# Patient Record
Sex: MALE | Race: WHITE | Employment: UNEMPLOYED | URBAN - NONMETROPOLITAN AREA
[De-identification: names, ages, dates, MRNs, and addresses within clinical notes are randomized per-mention and may not be internally consistent; named-entity substitution may affect disease eponyms.]

---

## 2023-07-29 ENCOUNTER — APPOINTMENT (OUTPATIENT)
Dept: GENERAL RADIOLOGY | Age: 53
End: 2023-07-29
Payer: COMMERCIAL

## 2023-07-29 ENCOUNTER — HOSPITAL ENCOUNTER (EMERGENCY)
Age: 53
Discharge: HOME | End: 2023-07-29
Attending: EMERGENCY MEDICINE
Payer: COMMERCIAL

## 2023-07-29 ENCOUNTER — APPOINTMENT (OUTPATIENT)
Dept: INTERVENTIONAL RADIOLOGY/VASCULAR | Age: 53
End: 2023-07-29
Payer: COMMERCIAL

## 2023-07-29 VITALS
TEMPERATURE: 99.6 F | WEIGHT: 184 LBS | DIASTOLIC BLOOD PRESSURE: 77 MMHG | SYSTOLIC BLOOD PRESSURE: 129 MMHG | HEART RATE: 91 BPM | BODY MASS INDEX: 27.89 KG/M2 | RESPIRATION RATE: 16 BRPM | OXYGEN SATURATION: 92 % | HEIGHT: 68 IN

## 2023-07-29 DIAGNOSIS — M11.20 CHONDROCALCINOSIS: ICD-10-CM

## 2023-07-29 DIAGNOSIS — M19.90 INFLAMMATORY ARTHRITIS: Primary | ICD-10-CM

## 2023-07-29 LAB
ALBUMIN SERPL BCG-MCNC: 3.3 G/DL (ref 3.5–5.1)
ALP SERPL-CCNC: 105 U/L (ref 38–126)
ALT SERPL W/O P-5'-P-CCNC: 11 U/L (ref 11–66)
ANION GAP SERPL CALC-SCNC: 12 MEQ/L (ref 8–16)
AST SERPL-CCNC: 13 U/L (ref 5–40)
BASOPHILS ABSOLUTE: 0.1 THOU/MM3 (ref 0–0.1)
BASOPHILS NFR BLD AUTO: 0.6 %
BILIRUB CONJ SERPL-MCNC: < 0.2 MG/DL (ref 0–0.3)
BILIRUB SERPL-MCNC: 0.4 MG/DL (ref 0.3–1.2)
BUN SERPL-MCNC: 9 MG/DL (ref 7–22)
CALCIUM SERPL-MCNC: 8.7 MG/DL (ref 8.5–10.5)
CHLORIDE SERPL-SCNC: 100 MEQ/L (ref 98–111)
CO2 SERPL-SCNC: 27 MEQ/L (ref 23–33)
CREAT SERPL-MCNC: 0.7 MG/DL (ref 0.4–1.2)
CRP SERPL-MCNC: 5.58 MG/DL (ref 0–1)
DEPRECATED RDW RBC AUTO: 55.8 FL (ref 35–45)
EOSINOPHIL NFR BLD AUTO: 1.8 %
EOSINOPHILS ABSOLUTE: 0.2 THOU/MM3 (ref 0–0.4)
ERYTHROCYTE [DISTWIDTH] IN BLOOD BY AUTOMATED COUNT: 16.3 % (ref 11.5–14.5)
GFR SERPL CREATININE-BSD FRML MDRD: > 60 ML/MIN/1.73M2
GLUCOSE SERPL-MCNC: 125 MG/DL (ref 70–108)
HCT VFR BLD AUTO: 51.2 % (ref 42–52)
HGB BLD-MCNC: 16 GM/DL (ref 14–18)
IMM GRANULOCYTES # BLD AUTO: 0.04 THOU/MM3 (ref 0–0.07)
IMM GRANULOCYTES NFR BLD AUTO: 0.3 %
LYMPHOCYTES ABSOLUTE: 1.1 THOU/MM3 (ref 1–4.8)
LYMPHOCYTES NFR BLD AUTO: 9.5 %
MCH RBC QN AUTO: 29.1 PG (ref 26–33)
MCHC RBC AUTO-ENTMCNC: 31.3 GM/DL (ref 32.2–35.5)
MCV RBC AUTO: 93.3 FL (ref 80–94)
MONOCYTES ABSOLUTE: 1.2 THOU/MM3 (ref 0.4–1.3)
MONOCYTES NFR BLD AUTO: 9.8 %
NEUTROPHILS NFR BLD AUTO: 78 %
NRBC BLD AUTO-RTO: 0 /100 WBC
NT-PROBNP SERPL IA-MCNC: 816.7 PG/ML (ref 0–124)
OSMOLALITY SERPL CALC.SUM OF ELEC: 277.7 MOSMOL/KG (ref 275–300)
PLATELET # BLD AUTO: 368 THOU/MM3 (ref 130–400)
PMV BLD AUTO: 10.6 FL (ref 9.4–12.4)
POTASSIUM SERPL-SCNC: 3.8 MEQ/L (ref 3.5–5.2)
PROT SERPL-MCNC: 7.1 G/DL (ref 6.1–8)
RBC # BLD AUTO: 5.49 MILL/MM3 (ref 4.7–6.1)
SEGMENTED NEUTROPHILS ABSOLUTE COUNT: 9.4 THOU/MM3 (ref 1.8–7.7)
SODIUM SERPL-SCNC: 139 MEQ/L (ref 135–145)
URATE SERPL-MCNC: 5.2 MG/DL (ref 3.7–7)
WBC # BLD AUTO: 12 THOU/MM3 (ref 4.8–10.8)

## 2023-07-29 PROCEDURE — 96372 THER/PROPH/DIAG INJ SC/IM: CPT

## 2023-07-29 PROCEDURE — 87205 SMEAR GRAM STAIN: CPT

## 2023-07-29 PROCEDURE — 89060 EXAM SYNOVIAL FLUID CRYSTALS: CPT

## 2023-07-29 PROCEDURE — 87070 CULTURE OTHR SPECIMN AEROBIC: CPT

## 2023-07-29 PROCEDURE — 80053 COMPREHEN METABOLIC PANEL: CPT

## 2023-07-29 PROCEDURE — 86140 C-REACTIVE PROTEIN: CPT

## 2023-07-29 PROCEDURE — 2500000003 HC RX 250 WO HCPCS: Performed by: EMERGENCY MEDICINE

## 2023-07-29 PROCEDURE — 6370000000 HC RX 637 (ALT 250 FOR IP): Performed by: EMERGENCY MEDICINE

## 2023-07-29 PROCEDURE — 73610 X-RAY EXAM OF ANKLE: CPT

## 2023-07-29 PROCEDURE — 83880 ASSAY OF NATRIURETIC PEPTIDE: CPT

## 2023-07-29 PROCEDURE — 87075 CULTR BACTERIA EXCEPT BLOOD: CPT

## 2023-07-29 PROCEDURE — 6360000002 HC RX W HCPCS: Performed by: EMERGENCY MEDICINE

## 2023-07-29 PROCEDURE — 84550 ASSAY OF BLOOD/URIC ACID: CPT

## 2023-07-29 PROCEDURE — 85025 COMPLETE CBC W/AUTO DIFF WBC: CPT

## 2023-07-29 PROCEDURE — 88305 TISSUE EXAM BY PATHOLOGIST: CPT

## 2023-07-29 PROCEDURE — 20610 DRAIN/INJ JOINT/BURSA W/O US: CPT

## 2023-07-29 PROCEDURE — 88112 CYTOPATH CELL ENHANCE TECH: CPT

## 2023-07-29 PROCEDURE — 89050 BODY FLUID CELL COUNT: CPT

## 2023-07-29 PROCEDURE — 99284 EMERGENCY DEPT VISIT MOD MDM: CPT

## 2023-07-29 PROCEDURE — 82248 BILIRUBIN DIRECT: CPT

## 2023-07-29 PROCEDURE — 73564 X-RAY EXAM KNEE 4 OR MORE: CPT

## 2023-07-29 PROCEDURE — 93970 EXTREMITY STUDY: CPT

## 2023-07-29 PROCEDURE — 36415 COLL VENOUS BLD VENIPUNCTURE: CPT

## 2023-07-29 RX ORDER — PREDNISONE 20 MG/1
40 TABLET ORAL DAILY
Qty: 10 TABLET | Refills: 0 | Status: SHIPPED | OUTPATIENT
Start: 2023-07-29 | End: 2023-08-03

## 2023-07-29 RX ORDER — ACETAMINOPHEN 325 MG/1
650 TABLET ORAL PRN
COMMUNITY

## 2023-07-29 RX ORDER — LIDOCAINE HYDROCHLORIDE 10 MG/ML
5 INJECTION, SOLUTION EPIDURAL; INFILTRATION; INTRACAUDAL; PERINEURAL ONCE
Status: COMPLETED | OUTPATIENT
Start: 2023-07-29 | End: 2023-07-29

## 2023-07-29 RX ORDER — OXYCODONE HYDROCHLORIDE AND ACETAMINOPHEN 5; 325 MG/1; MG/1
1 TABLET ORAL ONCE
Status: COMPLETED | OUTPATIENT
Start: 2023-07-29 | End: 2023-07-29

## 2023-07-29 RX ORDER — NALOXONE HYDROCHLORIDE 4 MG/.1ML
1 SPRAY NASAL PRN
Qty: 1 EACH | Refills: 0 | Status: SHIPPED | OUTPATIENT
Start: 2023-07-29

## 2023-07-29 RX ORDER — KETOROLAC TROMETHAMINE 30 MG/ML
30 INJECTION, SOLUTION INTRAMUSCULAR; INTRAVENOUS ONCE
Status: COMPLETED | OUTPATIENT
Start: 2023-07-29 | End: 2023-07-29

## 2023-07-29 RX ORDER — KETOROLAC TROMETHAMINE 30 MG/ML
30 INJECTION, SOLUTION INTRAMUSCULAR; INTRAVENOUS ONCE
Status: DISCONTINUED | OUTPATIENT
Start: 2023-07-29 | End: 2023-07-29

## 2023-07-29 RX ORDER — OXYCODONE HYDROCHLORIDE AND ACETAMINOPHEN 5; 325 MG/1; MG/1
1 TABLET ORAL EVERY 8 HOURS PRN
Qty: 8 TABLET | Refills: 0 | Status: SHIPPED | OUTPATIENT
Start: 2023-07-29 | End: 2023-08-01

## 2023-07-29 RX ADMIN — OXYCODONE AND ACETAMINOPHEN 1 TABLET: 5; 325 TABLET ORAL at 17:50

## 2023-07-29 RX ADMIN — KETOROLAC TROMETHAMINE 30 MG: 30 INJECTION, SOLUTION INTRAMUSCULAR; INTRAVENOUS at 19:47

## 2023-07-29 RX ADMIN — LIDOCAINE HYDROCHLORIDE 5 ML: 10 INJECTION, SOLUTION EPIDURAL; INFILTRATION; INTRACAUDAL; PERINEURAL at 20:45

## 2023-07-29 ASSESSMENT — PAIN DESCRIPTION - LOCATION: LOCATION: LEG

## 2023-07-29 ASSESSMENT — ENCOUNTER SYMPTOMS
NAUSEA: 0
VOMITING: 0
COUGH: 0
ABDOMINAL PAIN: 0
SHORTNESS OF BREATH: 0

## 2023-07-29 ASSESSMENT — PAIN SCALES - GENERAL: PAINLEVEL_OUTOF10: 4

## 2023-07-29 NOTE — CONSULTS
Orthopedic Consult      CHIEF COMPLAINT: Right lower leg    HISTORY OF PRESENT ILLNESS:      The patient is a 48 y.o. male with increasing right lower leg swelling over the last 3 weeks. Denies any injury or inciting event. States it is all really began at the ankle which slowly became swollen and painful over time. Eventually this worked up the lower leg and now at this time today is all the way to the knee. Does note history of knee injury with a knee surgery approximately 30 years ago. Denies any fevers or chills over the last 3 weeks, states that over the last few days he has felt \"groggy. \"  Patient denies much pain after Percocet was given in the emergency department. Today he notes most of his pain is at the knee and ankle is not very bothersome. Has not noticed any increasing redness, just the swelling and warmth. 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 Authorizing Provider   acetaminophen (TYLENOL) 325 MG tablet Take 2 tablets by mouth as needed for Pain   Yes Historical Provider, MD   ASPIRIN 81 PO Take by mouth   Yes Historical Provider, MD   Walking Boot MISC by Does not apply route Dx: Left Calcaneal Stress Fracture, Hemorrhagic Fracture Blisters    Sig: Please fit/dispense offloading pneumatic fracture boot, to help offload with the assistance of crutches. 2/6/17   Nelsy Zhao DPM       Allergies:    Patient has no known allergies.     Social History:   Social History     Socioeconomic History    Marital status:      Spouse name: None    Number of children: None    Years of education: None    Highest education level: None   Tobacco Use    Smoking status: Every Day     Packs/day: 0.50

## 2023-07-29 NOTE — ED NOTES
Received bedside shift report from [place], [place] at this time.       Daylin Hu RN  07/29/23 [id number]

## 2023-07-29 NOTE — ED TRIAGE NOTES
Patient to ER due to right leg swelling and pain that started three weeks ago. Patient states Tuesday he went to [facility] and they did an ultrasound to look for blood blots and did not have any at that time. Patient states they did not do any blood work. Patient was given an antibitoic for cellulitis. Which he has completed. This antibiotic did not help at all per patient. Patient states the swelling is getting worse. Patient states the swelling has shon from his foot and ankle up to his leg past his knee. Patient unable to walk on it, states can hardly straighten it. Patient states pain is tolerable 4/10 when he is sitting still. When he tries to move or gets a cramp pain goes up to \"12 out of 10\".

## 2023-07-30 LAB
BACTERIA SPEC BFLD CULT: NORMAL
CHARACTER SNV: ABNORMAL
COLOR SNV: YELLOW
CRYSTALS FLD MICRO: ABNORMAL
GRAM STN SPEC: NORMAL
GRANULOCYTES NFR FLD AUTO: 95.7 % (ref 0–24)
MONONUC CELLS NFR FLD AUTO: 4.3 % (ref 0–74)
NUC CELL # FLD AUTO: 3829 /CUMM (ref 0–150)
PATHOLOGIST REVIEW: ABNORMAL
TOTAL VOLUME RECEIVED SYNOVIAL: 50 ML

## 2023-07-30 NOTE — PROCEDURES
Arthrocentesis    Date/Time: 7/29/2023 9:00 PM  Performed by: Venessa Evans MD  Authorized by: Herminio Marshall DO     Consent:     Consent obtained:  Written and verbal    Consent given by:  Patient    Risks, benefits, and alternatives were discussed: yes      Risks discussed:  Bleeding, infection, pain and incomplete drainage    Alternatives discussed:  No treatment  Universal protocol:     Procedure explained and questions answered to patient or proxy's satisfaction: yes      Relevant documents present and verified: yes      Test results available: yes      Imaging studies available: yes      Required blood products, implants, devices, and special equipment available: yes      Site/side marked: yes      Immediately prior to procedure, a time out was called: yes      Patient identity confirmed:  Verbally with patient  Location:     Location:  Knee    Knee:  R knee  Anesthesia:     Anesthesia method:  Local infiltration    Local anesthetic:  Bupivacaine 0.25% w/o epi  Procedure details:     Preparation: Patient was prepped and draped in usual sterile fashion      Needle gauge:  20 G    Ultrasound guidance: no      Approach:  Superior    Aspirate characteristics:  Yellow and serous    Steroid injected: no      Specimen collected: yes    Post-procedure details:     Dressing:  Adhesive bandage    Procedure completion:  Tolerated

## 2023-07-30 NOTE — ED NOTES
Patient given fan for comfort at this time. Patient joint aspiration consent form signed at 2012 by patient, this RN, and provider. Supplies for aspiration at bedside.       Elver Swift RN  07/29/23 2021

## 2023-07-30 NOTE — DISCHARGE INSTRUCTIONS
Be sure to call orthopedics on Monday, July 31, 2023 to schedule an appointment for Monday or Tuesday this week.     Return to the ER if you have any emergent symptoms such as difficulty walking, fever, chills, extremity redness, pain, or you have any other concerns

## 2023-07-30 NOTE — ED PROVIDER NOTES
[facility] ENCOUNTER          Pt Name: Darrius Dickey  MRN: [id number]  [address] [facility] [place] 1970  Date of evaluation: 7/29/2023  Emergency Physician: Kamaljit Mendes       Chief Complaint   Patient presents with    Leg Pain    Leg Swelling     History obtained from the patient. HISTORY OF PRESENT ILLNESS    HPI  Darrius Dickey is a 48 y.o. male who presents to the emergency department for evaluation of lower extremity pain. Patient reports over the last 3 weeks he has had progressively difficulty ambulating. He reports his pain initially started at his right ankle. He noted swelling around his ankle as well. He was seen at an outside hospital and had a negative DVT ultrasound. He reports since that time he has been having progressively worsening pain to his right lower extremity. He states now his pain is about his right knee. He was previously started on Keflex for potential early cellulitis. No fever no chills no rashes. No tick bites. No changes in urination/dysuria. Currently he rates his pain 9 out of 10. The patient has no other acute complaints at this time. REVIEW OF SYSTEMS   Review of Systems   Constitutional:  Negative for chills and fever. Respiratory:  Negative for cough and shortness of breath. Cardiovascular:  Negative for chest pain. Gastrointestinal:  Negative for abdominal pain, nausea and vomiting. Genitourinary:  Negative for decreased urine volume, dysuria, flank pain and urgency. Musculoskeletal:  Positive for arthralgias and joint swelling. Negative for myalgias and neck stiffness. Skin:  Negative for rash and wound.        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 Cervical back: Normal range of motion and neck supple. Right hip: Normal. No tenderness or bony tenderness. Normal range of motion. Right upper leg: Normal. No tenderness or bony tenderness. Right knee: Swelling and effusion present. No erythema or bony tenderness. Decreased range of motion. Tenderness present. No ACL laxity or PCL laxity. Normal pulse. Right lower leg: No swelling. No edema. Right ankle: Swelling present. Tenderness present. No base of 5th metatarsal tenderness. Normal pulse. Right Achilles Tendon: Normal.   Skin:     Comments: Bilateral lower extremities without wounds, cellulitis, or rash   Neurological:      Mental Status: He is alert. INITIAL IMPRESSION AND DIFFERENTIALS   Initial Assessment: Given the patient's above chief complaint and findings on history and physical examination, I thought it was appropriate to consider the following emergency medical conditions: Inflammatory arthritis, occult injury, DVT, septic arthritis, cellulitis, dependent edema, peripheral arterial disease and others. Although, some of these diagnoses are unlikely they were considered in my medical decision making. Plan: CBC, BMP, CRP, sed rate, x-ray knee and ankle symptomatic treatment with analgesia, anti-inflammatory and reassess     Chronic Conditions considered: There is no problem list on file for this patient.         ED RESULTS   Laboratory results:  Labs Reviewed   BASIC METABOLIC PANEL - Abnormal; Notable for the following components:       Result Value    Glucose 125 (*)     All other components within normal limits   CBC WITH AUTO DIFFERENTIAL - Abnormal; Notable for the following components:    WBC 12.0 (*)     MCHC 31.3 (*)     RDW-CV 16.3 (*)     RDW-SD 55.8 (*)     Segs Absolute 9.4 (*)     All other components within normal limits   HEPATIC FUNCTION PANEL - Abnormal; Notable for the following components:    Albumin 3.3 (*)

## 2023-07-30 NOTE — ED NOTES
Dr. Gissell Yousif was able to retrieve 60 cc's of fluid from the right knee during joint aspiration. Lab collected.       Hollie Bey RN  07/29/23 [id number]

## 2023-08-03 LAB
BACTERIA SPEC ANAEROBE CULT: NORMAL
BACTERIA SPEC BFLD CULT: NORMAL
GRAM STN SPEC: NORMAL

## 2024-02-03 ENCOUNTER — HOSPITAL ENCOUNTER (INPATIENT)
Age: 54
LOS: 18 days | Discharge: NURSING, REHAB OR LONG-TERM CARE | DRG: 130 | End: 2024-02-22
Attending: EMERGENCY MEDICINE | Admitting: STUDENT IN AN ORGANIZED HEALTH CARE EDUCATION/TRAINING PROGRAM
Payer: MEDICAID

## 2024-02-03 DIAGNOSIS — R06.09 DYSPNEA ON EXERTION: ICD-10-CM

## 2024-02-03 DIAGNOSIS — R79.89 INCREASED AMMONIA LEVEL: ICD-10-CM

## 2024-02-03 DIAGNOSIS — J96.01 ACUTE RESPIRATORY FAILURE WITH HYPOXIA AND HYPERCAPNIA (HCC): ICD-10-CM

## 2024-02-03 DIAGNOSIS — J96.02 ACUTE RESPIRATORY FAILURE WITH HYPOXIA AND HYPERCAPNIA (HCC): ICD-10-CM

## 2024-02-03 DIAGNOSIS — L03.119 CELLULITIS OF LOWER EXTREMITY, UNSPECIFIED LATERALITY: ICD-10-CM

## 2024-02-03 DIAGNOSIS — L03.311 ABDOMINAL WALL CELLULITIS: Primary | ICD-10-CM

## 2024-02-03 DIAGNOSIS — J80 ARDS (ADULT RESPIRATORY DISTRESS SYNDROME) (HCC): ICD-10-CM

## 2024-02-03 PROCEDURE — 36415 COLL VENOUS BLD VENIPUNCTURE: CPT

## 2024-02-03 PROCEDURE — 99285 EMERGENCY DEPT VISIT HI MDM: CPT

## 2024-02-03 PROCEDURE — 83690 ASSAY OF LIPASE: CPT

## 2024-02-03 PROCEDURE — 84484 ASSAY OF TROPONIN QUANT: CPT

## 2024-02-03 PROCEDURE — 80053 COMPREHEN METABOLIC PANEL: CPT

## 2024-02-03 PROCEDURE — 93005 ELECTROCARDIOGRAM TRACING: CPT | Performed by: EMERGENCY MEDICINE

## 2024-02-03 PROCEDURE — 82248 BILIRUBIN DIRECT: CPT

## 2024-02-03 PROCEDURE — 85046 RETICYTE/HGB CONCENTRATE: CPT

## 2024-02-03 PROCEDURE — 85025 COMPLETE CBC W/AUTO DIFF WBC: CPT

## 2024-02-04 ENCOUNTER — APPOINTMENT (OUTPATIENT)
Dept: ULTRASOUND IMAGING | Age: 54
DRG: 130 | End: 2024-02-04
Payer: MEDICAID

## 2024-02-04 ENCOUNTER — APPOINTMENT (OUTPATIENT)
Dept: CT IMAGING | Age: 54
DRG: 130 | End: 2024-02-04
Payer: MEDICAID

## 2024-02-04 ENCOUNTER — APPOINTMENT (OUTPATIENT)
Dept: GENERAL RADIOLOGY | Age: 54
DRG: 130 | End: 2024-02-04
Payer: MEDICAID

## 2024-02-04 PROBLEM — J44.1 CHRONIC OBSTRUCTIVE PULMONARY DISEASE WITH ACUTE EXACERBATION (HCC): Status: ACTIVE | Noted: 2024-02-04

## 2024-02-04 PROBLEM — E72.20 HYPERAMMONEMIA (HCC): Status: ACTIVE | Noted: 2024-02-04

## 2024-02-04 PROBLEM — R60.1 ANASARCA: Status: ACTIVE | Noted: 2024-02-04

## 2024-02-04 PROBLEM — J96.02 ACUTE RESPIRATORY FAILURE WITH HYPOXIA AND HYPERCAPNIA (HCC): Status: ACTIVE | Noted: 2024-02-04

## 2024-02-04 PROBLEM — L03.311 ABDOMINAL WALL CELLULITIS: Status: ACTIVE | Noted: 2024-02-04

## 2024-02-04 PROBLEM — I25.10 CORONARY ARTERY DISEASE INVOLVING NATIVE CORONARY ARTERY OF NATIVE HEART WITHOUT ANGINA PECTORIS: Status: ACTIVE | Noted: 2024-02-04

## 2024-02-04 PROBLEM — J96.01 ACUTE RESPIRATORY FAILURE WITH HYPOXIA AND HYPERCAPNIA (HCC): Status: ACTIVE | Noted: 2024-02-04

## 2024-02-04 PROBLEM — Z95.5 H/O HEART ARTERY STENT: Status: ACTIVE | Noted: 2024-02-04

## 2024-02-04 PROBLEM — R79.89 INCREASED AMMONIA LEVEL: Status: ACTIVE | Noted: 2024-02-04

## 2024-02-04 PROBLEM — L03.90 CELLULITIS: Status: ACTIVE | Noted: 2024-02-04

## 2024-02-04 LAB
ACINETOBACTER CALCOACETICUS-BAUMANNII BY PCR: NOT DETECTED
ALBUMIN SERPL BCG-MCNC: 3 G/DL (ref 3.5–5.1)
ALBUMIN SERPL BCG-MCNC: 3.1 G/DL (ref 3.5–5.1)
ALP SERPL-CCNC: 130 U/L (ref 38–126)
ALP SERPL-CCNC: 137 U/L (ref 38–126)
ALT SERPL W/O P-5'-P-CCNC: 11 U/L (ref 11–66)
ALT SERPL W/O P-5'-P-CCNC: 13 U/L (ref 11–66)
AMMONIA PLAS-MCNC: 79 UMOL/L (ref 11–60)
AMPHETAMINES UR QL SCN: NEGATIVE
ANION GAP SERPL CALC-SCNC: 2 MEQ/L (ref 8–16)
ANION GAP SERPL CALC-SCNC: 3 MEQ/L (ref 8–16)
ANISOCYTOSIS BLD QL SMEAR: PRESENT
ARTERIAL PATENCY WRIST A: POSITIVE
ARTERIAL PATENCY WRIST A: POSITIVE
AST SERPL-CCNC: 8 U/L (ref 5–40)
AST SERPL-CCNC: 9 U/L (ref 5–40)
BACTERIA URNS QL MICRO: ABNORMAL /HPF
BACTERIA URNS QL MICRO: ABNORMAL /HPF
BARBITURATES UR QL SCN: NEGATIVE
BASE EXCESS BLDA CALC-SCNC: 15.7 MMOL/L (ref -2.5–2.5)
BASE EXCESS BLDA CALC-SCNC: 15.8 MMOL/L (ref -2.5–2.5)
BASE EXCESS BLDA CALC-SCNC: 16.6 MMOL/L (ref -2–3)
BASOPHILS ABSOLUTE: 0.1 THOU/MM3 (ref 0–0.1)
BASOPHILS NFR BLD AUTO: 0.6 %
BDY SITE: ABNORMAL
BDY SITE: ABNORMAL
BENZODIAZ UR QL SCN: NEGATIVE
BILIRUB CONJ SERPL-MCNC: < 0.2 MG/DL (ref 0–0.3)
BILIRUB SERPL-MCNC: 0.5 MG/DL (ref 0.3–1.2)
BILIRUB SERPL-MCNC: 0.5 MG/DL (ref 0.3–1.2)
BILIRUB UR QL STRIP.AUTO: NEGATIVE
BILIRUB UR QL STRIP.AUTO: NEGATIVE
BLACTX-M ISLT/SPM QL: NORMAL
BLAIMP ISLT/SPM QL: NORMAL
BLAKPC ISLT/SPM QL: NORMAL
BLAOXA-48-LIKE ISLT/SPM QL: NORMAL
BLAVIM ISLT/SPM QL: NORMAL
BUN SERPL-MCNC: 11 MG/DL (ref 7–22)
BUN SERPL-MCNC: 12 MG/DL (ref 7–22)
BZE UR QL SCN: NEGATIVE
C PNEUM DNA LOWER RESP QL NAA+NON-PROBE: NOT DETECTED
CA-I BLD ISE-SCNC: 1.11 MMOL/L (ref 1.12–1.32)
CALCIUM SERPL-MCNC: 8.2 MG/DL (ref 8.5–10.5)
CALCIUM SERPL-MCNC: 8.3 MG/DL (ref 8.5–10.5)
CANNABINOIDS UR QL SCN: NEGATIVE
CASTS #/AREA URNS LPF: ABNORMAL /LPF
CASTS #/AREA URNS LPF: ABNORMAL /LPF
CASTS 2: ABNORMAL /LPF
CASTS 2: ABNORMAL /LPF
CHARACTER UR: CLEAR
CHARACTER UR: CLEAR
CHLORIDE SERPL-SCNC: 96 MEQ/L (ref 98–111)
CHLORIDE SERPL-SCNC: 97 MEQ/L (ref 98–111)
CO2 SERPL-SCNC: 45 MEQ/L (ref 23–33)
CO2 SERPL-SCNC: 45 MEQ/L (ref 23–33)
COLLECTED BY:: ABNORMAL
COLOR: YELLOW
COLOR: YELLOW
CREAT SERPL-MCNC: 0.6 MG/DL (ref 0.4–1.2)
CREAT SERPL-MCNC: 0.7 MG/DL (ref 0.4–1.2)
CRYSTALS URNS MICRO: ABNORMAL
CRYSTALS URNS MICRO: ABNORMAL
DEPRECATED RDW RBC AUTO: 73.1 FL (ref 35–45)
DEPRECATED RDW RBC AUTO: 74.2 FL (ref 35–45)
DEPRECATED RDW RBC AUTO: 76 FL (ref 35–45)
DEVICE: ABNORMAL
EKG ATRIAL RATE: 101 BPM
EKG P AXIS: 50 DEGREES
EKG P-R INTERVAL: 160 MS
EKG Q-T INTERVAL: 362 MS
EKG QRS DURATION: 94 MS
EKG QTC CALCULATION (BAZETT): 469 MS
EKG R AXIS: 104 DEGREES
EKG T AXIS: 19 DEGREES
EKG VENTRICULAR RATE: 101 BPM
ENTEROBACTER CLOACAE COMPLEX BY PCR: NOT DETECTED
EOSINOPHIL NFR BLD AUTO: 1.2 %
EOSINOPHILS ABSOLUTE: 0.1 THOU/MM3 (ref 0–0.4)
EPITHELIAL CELLS, UA: ABNORMAL /HPF
EPITHELIAL CELLS, UA: ABNORMAL /HPF
ERYTHROCYTE [DISTWIDTH] IN BLOOD BY AUTOMATED COUNT: 20.1 % (ref 11.5–14.5)
ERYTHROCYTE [DISTWIDTH] IN BLOOD BY AUTOMATED COUNT: 20.3 % (ref 11.5–14.5)
ERYTHROCYTE [DISTWIDTH] IN BLOOD BY AUTOMATED COUNT: 20.4 % (ref 11.5–14.5)
ESCHERICHIA COLI BY PCR: NOT DETECTED
ETHANOL SERPL-MCNC: < 0.01 %
FENTANYL: NEGATIVE
FERRITIN SERPL IA-MCNC: 156 NG/ML (ref 22–322)
FIO2 ON VENT O2 ANALYZER: 100 %
FIO2 ON VENT O2 ANALYZER: 55 %
FIO2 ON VENT O2 ANALYZER: 6 %
FLUAV RNA LOWER RESP QL NAA+NON-PROBE: NOT DETECTED
FLUBV RNA LOWER RESP QL NAA+NON-PROBE: NOT DETECTED
FOLATE SERPL-MCNC: 8.9 NG/ML (ref 4.8–24.2)
GFR SERPL CREATININE-BSD FRML MDRD: > 60 ML/MIN/1.73M2
GFR SERPL CREATININE-BSD FRML MDRD: > 60 ML/MIN/1.73M2
GLUCOSE BLD STRIP.AUTO-MCNC: 104 MG/DL (ref 70–108)
GLUCOSE BLD STRIP.AUTO-MCNC: 107 MG/DL (ref 70–108)
GLUCOSE BLD STRIP.AUTO-MCNC: 129 MG/DL (ref 70–108)
GLUCOSE SERPL-MCNC: 143 MG/DL (ref 70–108)
GLUCOSE SERPL-MCNC: 99 MG/DL (ref 70–108)
GLUCOSE UR QL STRIP.AUTO: NEGATIVE MG/DL
GLUCOSE UR QL STRIP.AUTO: NEGATIVE MG/DL
HADV DNA LOWER RESP QL NAA+NON-PROBE: NOT DETECTED
HAEMOPHILUS INFLUENZAE BY PCR: NOT DETECTED
HCO3 BLDA-SCNC: 49 MMOL/L (ref 23–28)
HCO3 BLDA-SCNC: 50 MMOL/L (ref 23–28)
HCO3 BLDA-SCNC: 50 MMOL/L (ref 23–28)
HCOV RNA LOWER RESP QL NAA+NON-PROBE: NOT DETECTED
HCT VFR BLD AUTO: 44.8 % (ref 42–52)
HCT VFR BLD AUTO: 47.4 % (ref 42–52)
HCT VFR BLD AUTO: 49.1 % (ref 42–52)
HGB BLD-MCNC: 11.8 GM/DL (ref 14–18)
HGB BLD-MCNC: 12.4 GM/DL (ref 14–18)
HGB BLD-MCNC: 12.8 GM/DL (ref 14–18)
HGB RETIC QN AUTO: 32.8 PG (ref 28.2–35.7)
HGB UR QL STRIP.AUTO: ABNORMAL
HGB UR QL STRIP.AUTO: NEGATIVE
HMPV RNA LOWER RESP QL NAA+NON-PROBE: NOT DETECTED
HPIV RNA LOWER RESP QL NAA+NON-PROBE: NOT DETECTED
HYPOCHROMIA BLD QL SMEAR: PRESENT
IMM GRANULOCYTES # BLD AUTO: 0.01 THOU/MM3 (ref 0–0.07)
IMM GRANULOCYTES NFR BLD AUTO: 0.1 %
IMM RETICS NFR: 17.5 % (ref 2.3–13.4)
INR PPP: 1.08 (ref 0.85–1.13)
IRON SATN MFR SERPL: 14 % (ref 20–50)
IRON SERPL-MCNC: 40 UG/DL (ref 65–195)
KETONES UR QL STRIP.AUTO: NEGATIVE
KETONES UR QL STRIP.AUTO: NEGATIVE
KLEBSIELLA AEROGENES BY PCR: NOT DETECTED
KLEBSIELLA OXYTOCA BY PCR: NOT DETECTED
KLEBSIELLA PNEUMONIAE GROUP BY PCR: NOT DETECTED
L PNEUMO DNA LOWER RESP QL NAA+NON-PROBE: NOT DETECTED
LACTATE SERPL-SCNC: 0.7 MMOL/L (ref 0.5–2)
LIPASE SERPL-CCNC: 18.1 U/L (ref 5.6–51.3)
LYMPHOCYTES ABSOLUTE: 0.7 THOU/MM3 (ref 1–4.8)
LYMPHOCYTES NFR BLD AUTO: 7.4 %
M PNEUMO DNA LOWER RESP QL NAA+NON-PROBE: NOT DETECTED
MAGNESIUM SERPL-MCNC: 1.9 MG/DL (ref 1.6–2.4)
MCH RBC QN AUTO: 25.9 PG (ref 26–33)
MCH RBC QN AUTO: 26.1 PG (ref 26–33)
MCH RBC QN AUTO: 26.5 PG (ref 26–33)
MCHC RBC AUTO-ENTMCNC: 26.1 GM/DL (ref 32.2–35.5)
MCHC RBC AUTO-ENTMCNC: 26.2 GM/DL (ref 32.2–35.5)
MCHC RBC AUTO-ENTMCNC: 26.3 GM/DL (ref 32.2–35.5)
MCV RBC AUTO: 100.4 FL (ref 80–94)
MCV RBC AUTO: 99.4 FL (ref 80–94)
MCV RBC AUTO: 99.8 FL (ref 80–94)
MISCELLANEOUS 2: ABNORMAL
MISCELLANEOUS 2: ABNORMAL
MONOCYTES ABSOLUTE: 1.1 THOU/MM3 (ref 0.4–1.3)
MONOCYTES NFR BLD AUTO: 12.5 %
MORAXELLA CATARRHALIS BY PCR: NOT DETECTED
MRSA DNA SPEC QL NAA+PROBE: NEGATIVE
NEUTROPHILS NFR BLD AUTO: 78.2 %
NITRITE UR QL STRIP: NEGATIVE
NITRITE UR QL STRIP: NEGATIVE
NRBC BLD AUTO-RTO: 0 /100 WBC
NT-PROBNP SERPL IA-MCNC: 2648 PG/ML (ref 0–124)
OPIATES UR QL SCN: NEGATIVE
OSMOLALITY SERPL CALC.SUM OF ELEC: 287.2 MOSMOL/KG (ref 275–300)
OXYCODONE: NEGATIVE
PCO2 BLDA: 117 MMHG (ref 35–45)
PCO2 BLDA: 98 MMHG (ref 35–45)
PCO2 TEMP ADJ BLDMV: 104 MMHG (ref 41–51)
PCP UR QL SCN: NEGATIVE
PH BLDA: 7.24 [PH] (ref 7.35–7.45)
PH BLDA: 7.3 [PH] (ref 7.35–7.45)
PH BLDMV: 7.29 [PH] (ref 7.31–7.41)
PH UR STRIP.AUTO: 5.5 [PH] (ref 5–9)
PH UR STRIP.AUTO: 6 [PH] (ref 5–9)
PHOSPHATE SERPL-MCNC: 3.7 MG/DL (ref 2.4–4.7)
PLATELET # BLD AUTO: 275 THOU/MM3 (ref 130–400)
PLATELET # BLD AUTO: 285 THOU/MM3 (ref 130–400)
PLATELET # BLD AUTO: 293 THOU/MM3 (ref 130–400)
PLATELET BLD QL SMEAR: ADEQUATE
PMV BLD AUTO: 10.4 FL (ref 9.4–12.4)
PMV BLD AUTO: 10.5 FL (ref 9.4–12.4)
PMV BLD AUTO: 10.6 FL (ref 9.4–12.4)
PO2 BLDA: 63 MMHG (ref 71–104)
PO2 BLDA: 87 MMHG (ref 71–104)
PO2 BLDMV: 37 MMHG (ref 25–40)
POIKILOCYTES: ABNORMAL
POTASSIUM SERPL-SCNC: 3.4 MEQ/L (ref 3.5–5.2)
POTASSIUM SERPL-SCNC: 3.5 MEQ/L (ref 3.5–5.2)
PROT SERPL-MCNC: 5.8 G/DL (ref 6.1–8)
PROT SERPL-MCNC: 6.2 G/DL (ref 6.1–8)
PROT UR STRIP.AUTO-MCNC: 100 MG/DL
PROT UR STRIP.AUTO-MCNC: 100 MG/DL
PROTEUS SPECIES BY PCR: NOT DETECTED
PSEUDOMONAS AERUGINOSA BY PCR: NOT DETECTED
RBC # BLD AUTO: 4.46 MILL/MM3 (ref 4.7–6.1)
RBC # BLD AUTO: 4.75 MILL/MM3 (ref 4.7–6.1)
RBC # BLD AUTO: 4.94 MILL/MM3 (ref 4.7–6.1)
RBC URINE: ABNORMAL /HPF
RBC URINE: ABNORMAL /HPF
RENAL EPI CELLS #/AREA URNS HPF: ABNORMAL /[HPF]
RENAL EPI CELLS #/AREA URNS HPF: ABNORMAL /[HPF]
RESISTANT GENE MECA/C & MREJ BY PCR: NORMAL
RESISTANT GENE NDM BY PCR: NORMAL
RETICS # AUTO: 46 THOU/MM3 (ref 20–115)
RETICS/RBC NFR AUTO: 1 % (ref 0.5–2)
RSV RNA LOWER RESP QL NAA+NON-PROBE: NOT DETECTED
RV+EV RNA LOWER RESP QL NAA+NON-PROBE: NOT DETECTED
SAO2 % BLDA: 87 %
SAO2 % BLDA: 93 %
SAO2 % BLDMV: 58 %
SCAN OF BLOOD SMEAR: NORMAL
SCAN OF BLOOD SMEAR: NORMAL
SEGMENTED NEUTROPHILS ABSOLUTE COUNT: 7 THOU/MM3 (ref 1.8–7.7)
SERRATIA MARCESCENS BY PCR: NOT DETECTED
SET PEEP: 8 MMHG
SET RESPIRATORY RATE: 20 BPM
SODIUM SERPL-SCNC: 143 MEQ/L (ref 135–145)
SODIUM SERPL-SCNC: 145 MEQ/L (ref 135–145)
SOURCE: NORMAL
SP GR UR REFRACT.AUTO: > 1.03 (ref 1–1.03)
SP GR UR REFRACT.AUTO: > 1.03 (ref 1–1.03)
SPECIMEN ACCEPTABILITY: NORMAL
STAPH AUREUS BY PCR: NOT DETECTED
STOMATOCYTES: ABNORMAL
STREP AGALACTIAE BY PCR: NOT DETECTED
STREP PNEUMONIAE BY PCR: NOT DETECTED
STREP PYOGENES BY PCR: NOT DETECTED
TIBC SERPL-MCNC: 281 UG/DL (ref 171–450)
TROPONIN, HIGH SENSITIVITY: 16 NG/L (ref 0–12)
TROPONIN, HIGH SENSITIVITY: 18 NG/L (ref 0–12)
UROBILINOGEN, URINE: 1 EU/DL (ref 0–1)
UROBILINOGEN, URINE: 1 EU/DL (ref 0–1)
VENTILATION MODE VENT: AC
VIT B12 SERPL-MCNC: 978 PG/ML (ref 211–911)
WBC # BLD AUTO: 6.4 THOU/MM3 (ref 4.8–10.8)
WBC # BLD AUTO: 8.3 THOU/MM3 (ref 4.8–10.8)
WBC # BLD AUTO: 8.9 THOU/MM3 (ref 4.8–10.8)
WBC #/AREA URNS HPF: ABNORMAL /HPF
WBC #/AREA URNS HPF: ABNORMAL /HPF
WBC #/AREA URNS HPF: NEGATIVE /[HPF]
WBC #/AREA URNS HPF: NEGATIVE /[HPF]
YEAST LIKE FUNGI URNS QL MICRO: ABNORMAL
YEAST LIKE FUNGI URNS QL MICRO: ABNORMAL

## 2024-02-04 PROCEDURE — 87070 CULTURE OTHR SPECIMN AEROBIC: CPT

## 2024-02-04 PROCEDURE — 02HV33Z INSERTION OF INFUSION DEVICE INTO SUPERIOR VENA CAVA, PERCUTANEOUS APPROACH: ICD-10-PCS | Performed by: INTERNAL MEDICINE

## 2024-02-04 PROCEDURE — 94640 AIRWAY INHALATION TREATMENT: CPT

## 2024-02-04 PROCEDURE — 87486 CHLMYD PNEUM DNA AMP PROBE: CPT

## 2024-02-04 PROCEDURE — 6360000002 HC RX W HCPCS: Performed by: STUDENT IN AN ORGANIZED HEALTH CARE EDUCATION/TRAINING PROGRAM

## 2024-02-04 PROCEDURE — 74177 CT ABD & PELVIS W/CONTRAST: CPT

## 2024-02-04 PROCEDURE — 83735 ASSAY OF MAGNESIUM: CPT

## 2024-02-04 PROCEDURE — 6360000002 HC RX W HCPCS

## 2024-02-04 PROCEDURE — 82607 VITAMIN B-12: CPT

## 2024-02-04 PROCEDURE — 2580000003 HC RX 258

## 2024-02-04 PROCEDURE — 99223 1ST HOSP IP/OBS HIGH 75: CPT | Performed by: INTERNAL MEDICINE

## 2024-02-04 PROCEDURE — 36415 COLL VENOUS BLD VENIPUNCTURE: CPT

## 2024-02-04 PROCEDURE — 82140 ASSAY OF AMMONIA: CPT

## 2024-02-04 PROCEDURE — 6360000002 HC RX W HCPCS: Performed by: INTERNAL MEDICINE

## 2024-02-04 PROCEDURE — 81001 URINALYSIS AUTO W/SCOPE: CPT

## 2024-02-04 PROCEDURE — 82948 REAGENT STRIP/BLOOD GLUCOSE: CPT

## 2024-02-04 PROCEDURE — 93010 ELECTROCARDIOGRAM REPORT: CPT | Performed by: INTERNAL MEDICINE

## 2024-02-04 PROCEDURE — 36600 WITHDRAWAL OF ARTERIAL BLOOD: CPT

## 2024-02-04 PROCEDURE — 87205 SMEAR GRAM STAIN: CPT

## 2024-02-04 PROCEDURE — 87899 AGENT NOS ASSAY W/OPTIC: CPT

## 2024-02-04 PROCEDURE — 51702 INSERT TEMP BLADDER CATH: CPT

## 2024-02-04 PROCEDURE — 87631 RESP VIRUS 3-5 TARGETS: CPT

## 2024-02-04 PROCEDURE — 36556 INSERT NON-TUNNEL CV CATH: CPT | Performed by: INTERNAL MEDICINE

## 2024-02-04 PROCEDURE — 94761 N-INVAS EAR/PLS OXIMETRY MLT: CPT

## 2024-02-04 PROCEDURE — 82803 BLOOD GASES ANY COMBINATION: CPT

## 2024-02-04 PROCEDURE — 80307 DRUG TEST PRSMV CHEM ANLYZR: CPT

## 2024-02-04 PROCEDURE — 2100000000 HC CCU R&B

## 2024-02-04 PROCEDURE — 6360000004 HC RX CONTRAST MEDICATION: Performed by: INTERNAL MEDICINE

## 2024-02-04 PROCEDURE — 87798 DETECT AGENT NOS DNA AMP: CPT

## 2024-02-04 PROCEDURE — 2500000003 HC RX 250 WO HCPCS: Performed by: INTERNAL MEDICINE

## 2024-02-04 PROCEDURE — 85610 PROTHROMBIN TIME: CPT

## 2024-02-04 PROCEDURE — 84100 ASSAY OF PHOSPHORUS: CPT

## 2024-02-04 PROCEDURE — 80053 COMPREHEN METABOLIC PANEL: CPT

## 2024-02-04 PROCEDURE — 6360000004 HC RX CONTRAST MEDICATION

## 2024-02-04 PROCEDURE — 87040 BLOOD CULTURE FOR BACTERIA: CPT

## 2024-02-04 PROCEDURE — 87541 LEGION PNEUMO DNA AMP PROB: CPT

## 2024-02-04 PROCEDURE — 76870 US EXAM SCROTUM: CPT

## 2024-02-04 PROCEDURE — C9113 INJ PANTOPRAZOLE SODIUM, VIA: HCPCS | Performed by: INTERNAL MEDICINE

## 2024-02-04 PROCEDURE — 82746 ASSAY OF FOLIC ACID SERUM: CPT

## 2024-02-04 PROCEDURE — 87581 M.PNEUMON DNA AMP PROBE: CPT

## 2024-02-04 PROCEDURE — 5A09557 ASSISTANCE WITH RESPIRATORY VENTILATION, GREATER THAN 96 CONSECUTIVE HOURS, CONTINUOUS POSITIVE AIRWAY PRESSURE: ICD-10-PCS

## 2024-02-04 PROCEDURE — 6370000000 HC RX 637 (ALT 250 FOR IP)

## 2024-02-04 PROCEDURE — 82077 ASSAY SPEC XCP UR&BREATH IA: CPT

## 2024-02-04 PROCEDURE — 99291 CRITICAL CARE FIRST HOUR: CPT | Performed by: INTERNAL MEDICINE

## 2024-02-04 PROCEDURE — 87641 MR-STAPH DNA AMP PROBE: CPT

## 2024-02-04 PROCEDURE — 85027 COMPLETE CBC AUTOMATED: CPT

## 2024-02-04 PROCEDURE — 2500000003 HC RX 250 WO HCPCS: Performed by: STUDENT IN AN ORGANIZED HEALTH CARE EDUCATION/TRAINING PROGRAM

## 2024-02-04 PROCEDURE — 31500 INSERT EMERGENCY AIRWAY: CPT | Performed by: INTERNAL MEDICINE

## 2024-02-04 PROCEDURE — 83880 ASSAY OF NATRIURETIC PEPTIDE: CPT

## 2024-02-04 PROCEDURE — 84484 ASSAY OF TROPONIN QUANT: CPT

## 2024-02-04 PROCEDURE — 83605 ASSAY OF LACTIC ACID: CPT

## 2024-02-04 PROCEDURE — 2580000003 HC RX 258: Performed by: STUDENT IN AN ORGANIZED HEALTH CARE EDUCATION/TRAINING PROGRAM

## 2024-02-04 PROCEDURE — 94002 VENT MGMT INPAT INIT DAY: CPT

## 2024-02-04 PROCEDURE — 89220 SPUTUM SPECIMEN COLLECTION: CPT

## 2024-02-04 PROCEDURE — 94660 CPAP INITIATION&MGMT: CPT

## 2024-02-04 PROCEDURE — 71045 X-RAY EXAM CHEST 1 VIEW: CPT

## 2024-02-04 PROCEDURE — 0BH17EZ INSERTION OF ENDOTRACHEAL AIRWAY INTO TRACHEA, VIA NATURAL OR ARTIFICIAL OPENING: ICD-10-PCS

## 2024-02-04 PROCEDURE — 84238 ASSAY NONENDOCRINE RECEPTOR: CPT

## 2024-02-04 PROCEDURE — 83550 IRON BINDING TEST: CPT

## 2024-02-04 PROCEDURE — 2700000000 HC OXYGEN THERAPY PER DAY

## 2024-02-04 PROCEDURE — 71046 X-RAY EXAM CHEST 2 VIEWS: CPT

## 2024-02-04 PROCEDURE — 76705 ECHO EXAM OF ABDOMEN: CPT

## 2024-02-04 PROCEDURE — 82728 ASSAY OF FERRITIN: CPT

## 2024-02-04 PROCEDURE — 2580000003 HC RX 258: Performed by: INTERNAL MEDICINE

## 2024-02-04 PROCEDURE — 82330 ASSAY OF CALCIUM: CPT

## 2024-02-04 PROCEDURE — 6370000000 HC RX 637 (ALT 250 FOR IP): Performed by: INTERNAL MEDICINE

## 2024-02-04 PROCEDURE — 71275 CT ANGIOGRAPHY CHEST: CPT

## 2024-02-04 PROCEDURE — 36556 INSERT NON-TUNNEL CV CATH: CPT

## 2024-02-04 PROCEDURE — 83540 ASSAY OF IRON: CPT

## 2024-02-04 PROCEDURE — 87449 NOS EACH ORGANISM AG IA: CPT

## 2024-02-04 RX ORDER — ROCURONIUM BROMIDE 10 MG/ML
50 INJECTION, SOLUTION INTRAVENOUS ONCE
Status: COMPLETED | OUTPATIENT
Start: 2024-02-04 | End: 2024-02-04

## 2024-02-04 RX ORDER — METHYLPREDNISOLONE SODIUM SUCCINATE 125 MG/2ML
60 INJECTION, POWDER, LYOPHILIZED, FOR SOLUTION INTRAMUSCULAR; INTRAVENOUS EVERY 6 HOURS
Status: DISCONTINUED | OUTPATIENT
Start: 2024-02-04 | End: 2024-02-05

## 2024-02-04 RX ORDER — FENTANYL CITRATE 50 UG/ML
INJECTION, SOLUTION INTRAMUSCULAR; INTRAVENOUS
Status: COMPLETED
Start: 2024-02-04 | End: 2024-02-04

## 2024-02-04 RX ORDER — ACETYLCYSTEINE 200 MG/ML
600 SOLUTION ORAL; RESPIRATORY (INHALATION)
Status: DISCONTINUED | OUTPATIENT
Start: 2024-02-04 | End: 2024-02-09

## 2024-02-04 RX ORDER — LOSARTAN POTASSIUM 50 MG/1
50 TABLET ORAL DAILY
COMMUNITY
Start: 2023-12-31

## 2024-02-04 RX ORDER — ALBUTEROL SULFATE 2.5 MG/3ML
15 SOLUTION RESPIRATORY (INHALATION) ONCE
Status: COMPLETED | OUTPATIENT
Start: 2024-02-04 | End: 2024-02-04

## 2024-02-04 RX ORDER — MAGNESIUM SULFATE IN WATER 40 MG/ML
2000 INJECTION, SOLUTION INTRAVENOUS PRN
Status: DISCONTINUED | OUTPATIENT
Start: 2024-02-04 | End: 2024-02-22 | Stop reason: HOSPADM

## 2024-02-04 RX ORDER — FENTANYL CITRATE-0.9 % NACL/PF 10 MCG/ML
25-200 PLASTIC BAG, INJECTION (ML) INTRAVENOUS CONTINUOUS
Status: DISCONTINUED | OUTPATIENT
Start: 2024-02-04 | End: 2024-02-06

## 2024-02-04 RX ORDER — PHENYLEPHRINE HCL IN 0.9% NACL 1 MG/10 ML
100 VIAL (ML) INTRAVENOUS ONCE
Status: COMPLETED | OUTPATIENT
Start: 2024-02-04 | End: 2024-02-04

## 2024-02-04 RX ORDER — FENTANYL CITRATE 50 UG/ML
100 INJECTION, SOLUTION INTRAMUSCULAR; INTRAVENOUS ONCE
Status: COMPLETED | OUTPATIENT
Start: 2024-02-04 | End: 2024-02-04

## 2024-02-04 RX ORDER — NOREPINEPHRINE BITARTRATE 0.06 MG/ML
1-100 INJECTION, SOLUTION INTRAVENOUS CONTINUOUS
Status: DISCONTINUED | OUTPATIENT
Start: 2024-02-04 | End: 2024-02-14

## 2024-02-04 RX ORDER — SODIUM CHLORIDE 0.9 % (FLUSH) 0.9 %
10 SYRINGE (ML) INJECTION EVERY 12 HOURS SCHEDULED
Status: DISCONTINUED | OUTPATIENT
Start: 2024-02-04 | End: 2024-02-22 | Stop reason: HOSPADM

## 2024-02-04 RX ORDER — ACETAMINOPHEN 650 MG/1
650 SUPPOSITORY RECTAL EVERY 6 HOURS PRN
Status: DISCONTINUED | OUTPATIENT
Start: 2024-02-04 | End: 2024-02-22 | Stop reason: HOSPADM

## 2024-02-04 RX ORDER — POTASSIUM CHLORIDE 20 MEQ/1
40 TABLET, EXTENDED RELEASE ORAL PRN
Status: DISCONTINUED | OUTPATIENT
Start: 2024-02-04 | End: 2024-02-22 | Stop reason: HOSPADM

## 2024-02-04 RX ORDER — POTASSIUM CHLORIDE 7.45 MG/ML
10 INJECTION INTRAVENOUS PRN
Status: DISCONTINUED | OUTPATIENT
Start: 2024-02-04 | End: 2024-02-22 | Stop reason: HOSPADM

## 2024-02-04 RX ORDER — ENOXAPARIN SODIUM 100 MG/ML
40 INJECTION SUBCUTANEOUS DAILY
Status: DISCONTINUED | OUTPATIENT
Start: 2024-02-04 | End: 2024-02-22 | Stop reason: HOSPADM

## 2024-02-04 RX ORDER — DEXMEDETOMIDINE HYDROCHLORIDE 4 UG/ML
.1-1.5 INJECTION, SOLUTION INTRAVENOUS CONTINUOUS
Status: DISCONTINUED | OUTPATIENT
Start: 2024-02-04 | End: 2024-02-06 | Stop reason: ALTCHOICE

## 2024-02-04 RX ORDER — SODIUM CHLORIDE 9 MG/ML
INJECTION, SOLUTION INTRAVENOUS PRN
Status: DISCONTINUED | OUTPATIENT
Start: 2024-02-04 | End: 2024-02-22 | Stop reason: HOSPADM

## 2024-02-04 RX ORDER — SODIUM CHLORIDE 0.9 % (FLUSH) 0.9 %
10 SYRINGE (ML) INJECTION PRN
Status: DISCONTINUED | OUTPATIENT
Start: 2024-02-04 | End: 2024-02-22 | Stop reason: HOSPADM

## 2024-02-04 RX ORDER — LACTULOSE 10 G/15ML
20 SOLUTION ORAL ONCE
Status: COMPLETED | OUTPATIENT
Start: 2024-02-04 | End: 2024-02-04

## 2024-02-04 RX ORDER — POLYETHYLENE GLYCOL 3350 17 G/17G
17 POWDER, FOR SOLUTION ORAL DAILY PRN
Status: DISCONTINUED | OUTPATIENT
Start: 2024-02-04 | End: 2024-02-07

## 2024-02-04 RX ORDER — IPRATROPIUM BROMIDE AND ALBUTEROL SULFATE 2.5; .5 MG/3ML; MG/3ML
1 SOLUTION RESPIRATORY (INHALATION)
Status: DISCONTINUED | OUTPATIENT
Start: 2024-02-04 | End: 2024-02-05

## 2024-02-04 RX ORDER — ONDANSETRON 4 MG/1
4 TABLET, ORALLY DISINTEGRATING ORAL EVERY 8 HOURS PRN
Status: DISCONTINUED | OUTPATIENT
Start: 2024-02-04 | End: 2024-02-22 | Stop reason: HOSPADM

## 2024-02-04 RX ORDER — PROPOFOL 10 MG/ML
5-50 INJECTION, EMULSION INTRAVENOUS CONTINUOUS
Status: DISCONTINUED | OUTPATIENT
Start: 2024-02-04 | End: 2024-02-06

## 2024-02-04 RX ORDER — MIDAZOLAM HYDROCHLORIDE 1 MG/ML
4 INJECTION INTRAMUSCULAR; INTRAVENOUS ONCE
Status: COMPLETED | OUTPATIENT
Start: 2024-02-04 | End: 2024-02-04

## 2024-02-04 RX ORDER — MIDAZOLAM HYDROCHLORIDE 1 MG/ML
INJECTION INTRAMUSCULAR; INTRAVENOUS
Status: COMPLETED
Start: 2024-02-04 | End: 2024-02-04

## 2024-02-04 RX ORDER — ACETAMINOPHEN 325 MG/1
650 TABLET ORAL EVERY 6 HOURS PRN
Status: DISCONTINUED | OUTPATIENT
Start: 2024-02-04 | End: 2024-02-22 | Stop reason: HOSPADM

## 2024-02-04 RX ORDER — BUMETANIDE 0.25 MG/ML
1 INJECTION INTRAMUSCULAR; INTRAVENOUS ONCE
Status: COMPLETED | OUTPATIENT
Start: 2024-02-04 | End: 2024-02-04

## 2024-02-04 RX ORDER — ONDANSETRON 2 MG/ML
4 INJECTION INTRAMUSCULAR; INTRAVENOUS EVERY 6 HOURS PRN
Status: DISCONTINUED | OUTPATIENT
Start: 2024-02-04 | End: 2024-02-22 | Stop reason: HOSPADM

## 2024-02-04 RX ORDER — PANTOPRAZOLE SODIUM 40 MG/10ML
40 INJECTION, POWDER, LYOPHILIZED, FOR SOLUTION INTRAVENOUS DAILY
Status: DISCONTINUED | OUTPATIENT
Start: 2024-02-04 | End: 2024-02-19

## 2024-02-04 RX ORDER — ASPIRIN 81 MG/1
81 TABLET, CHEWABLE ORAL DAILY
Status: DISCONTINUED | OUTPATIENT
Start: 2024-02-04 | End: 2024-02-22 | Stop reason: HOSPADM

## 2024-02-04 RX ORDER — MIDAZOLAM HYDROCHLORIDE 1 MG/ML
2 INJECTION INTRAMUSCULAR; INTRAVENOUS ONCE
Status: COMPLETED | OUTPATIENT
Start: 2024-02-04 | End: 2024-02-04

## 2024-02-04 RX ORDER — PROPOFOL 10 MG/ML
INJECTION, EMULSION INTRAVENOUS
Status: COMPLETED
Start: 2024-02-04 | End: 2024-02-04

## 2024-02-04 RX ADMIN — ALBUTEROL SULFATE 15 MG: 2.5 SOLUTION RESPIRATORY (INHALATION) at 09:46

## 2024-02-04 RX ADMIN — Medication 100 MCG: at 10:17

## 2024-02-04 RX ADMIN — PROPOFOL 50 MCG/KG/MIN: 10 INJECTION, EMULSION INTRAVENOUS at 15:32

## 2024-02-04 RX ADMIN — ROCURONIUM BROMIDE 50 MG: 50 INJECTION, SOLUTION INTRAVENOUS at 10:10

## 2024-02-04 RX ADMIN — PROPOFOL 20 MCG/KG/MIN: 10 INJECTION, EMULSION INTRAVENOUS at 09:18

## 2024-02-04 RX ADMIN — PIPERACILLIN AND TAZOBACTAM 4500 MG: 4; .5 INJECTION, POWDER, FOR SOLUTION INTRAVENOUS at 02:36

## 2024-02-04 RX ADMIN — PROPOFOL 50 MG: 10 INJECTION, EMULSION INTRAVENOUS at 09:40

## 2024-02-04 RX ADMIN — LACTULOSE 20 G: 20 SOLUTION ORAL at 04:33

## 2024-02-04 RX ADMIN — VANCOMYCIN HYDROCHLORIDE 2000 MG: 1 INJECTION, POWDER, LYOPHILIZED, FOR SOLUTION INTRAVENOUS at 04:23

## 2024-02-04 RX ADMIN — PROPOFOL 50 MCG/KG/MIN: 10 INJECTION, EMULSION INTRAVENOUS at 12:06

## 2024-02-04 RX ADMIN — POTASSIUM BICARBONATE 40 MEQ: 782 TABLET, EFFERVESCENT ORAL at 14:32

## 2024-02-04 RX ADMIN — METHYLPREDNISOLONE SODIUM SUCCINATE 60 MG: 125 INJECTION, POWDER, FOR SOLUTION INTRAMUSCULAR; INTRAVENOUS at 18:09

## 2024-02-04 RX ADMIN — IPRATROPIUM BROMIDE AND ALBUTEROL SULFATE 1 DOSE: .5; 3 SOLUTION RESPIRATORY (INHALATION) at 15:28

## 2024-02-04 RX ADMIN — PIPERACILLIN AND TAZOBACTAM 3375 MG: 3; .375 INJECTION, POWDER, FOR SOLUTION INTRAVENOUS at 10:53

## 2024-02-04 RX ADMIN — Medication 50 MCG/HR: at 09:51

## 2024-02-04 RX ADMIN — ENOXAPARIN SODIUM 40 MG: 100 INJECTION SUBCUTANEOUS at 12:13

## 2024-02-04 RX ADMIN — FENTANYL CITRATE 100 MCG: 50 INJECTION, SOLUTION INTRAMUSCULAR; INTRAVENOUS at 09:17

## 2024-02-04 RX ADMIN — ACETYLCYSTEINE 600 MG: 200 SOLUTION ORAL; RESPIRATORY (INHALATION) at 20:18

## 2024-02-04 RX ADMIN — MIDAZOLAM 4 MG: 1 INJECTION INTRAMUSCULAR; INTRAVENOUS at 09:17

## 2024-02-04 RX ADMIN — PROPOFOL 50 MCG/KG/MIN: 10 INJECTION, EMULSION INTRAVENOUS at 22:21

## 2024-02-04 RX ADMIN — IPRATROPIUM BROMIDE AND ALBUTEROL SULFATE 1 DOSE: .5; 3 SOLUTION RESPIRATORY (INHALATION) at 12:57

## 2024-02-04 RX ADMIN — MIDAZOLAM HYDROCHLORIDE 2 MG: 1 INJECTION INTRAMUSCULAR; INTRAVENOUS at 09:53

## 2024-02-04 RX ADMIN — WATER 60 MG: 1 INJECTION INTRAMUSCULAR; INTRAVENOUS; SUBCUTANEOUS at 12:06

## 2024-02-04 RX ADMIN — ROCURONIUM BROMIDE 50 MG: 50 INJECTION, SOLUTION INTRAVENOUS at 09:21

## 2024-02-04 RX ADMIN — IOPAMIDOL 80 ML: 755 INJECTION, SOLUTION INTRAVENOUS at 01:08

## 2024-02-04 RX ADMIN — ACETAZOLAMIDE SODIUM 500 MG: 500 INJECTION, POWDER, LYOPHILIZED, FOR SOLUTION INTRAVENOUS at 13:12

## 2024-02-04 RX ADMIN — MIDAZOLAM 2 MG: 1 INJECTION INTRAMUSCULAR; INTRAVENOUS at 09:53

## 2024-02-04 RX ADMIN — ASPIRIN 81 MG 81 MG: 81 TABLET ORAL at 12:15

## 2024-02-04 RX ADMIN — Medication 0.2 MCG/KG/HR: at 11:00

## 2024-02-04 RX ADMIN — IPRATROPIUM BROMIDE AND ALBUTEROL SULFATE 1 DOSE: .5; 3 SOLUTION RESPIRATORY (INHALATION) at 20:18

## 2024-02-04 RX ADMIN — Medication 4 MCG/MIN: at 10:25

## 2024-02-04 RX ADMIN — Medication 75 MCG/HR: at 20:56

## 2024-02-04 RX ADMIN — PIPERACILLIN AND TAZOBACTAM 3375 MG: 3; .375 INJECTION, POWDER, FOR SOLUTION INTRAVENOUS at 16:58

## 2024-02-04 RX ADMIN — PROPOFOL 25 MCG/KG/MIN: 10 INJECTION, EMULSION INTRAVENOUS at 09:38

## 2024-02-04 RX ADMIN — FENTANYL CITRATE 100 MCG: 50 INJECTION INTRAMUSCULAR; INTRAVENOUS at 09:17

## 2024-02-04 RX ADMIN — AZITHROMYCIN DIHYDRATE 500 MG: 500 INJECTION, POWDER, LYOPHILIZED, FOR SOLUTION INTRAVENOUS at 12:57

## 2024-02-04 RX ADMIN — IOPAMIDOL 80 ML: 755 INJECTION, SOLUTION INTRAVENOUS at 12:31

## 2024-02-04 RX ADMIN — BUMETANIDE 1 MG: 0.25 INJECTION INTRAMUSCULAR; INTRAVENOUS at 18:09

## 2024-02-04 RX ADMIN — PROPOFOL 50 MCG/KG/MIN: 10 INJECTION, EMULSION INTRAVENOUS at 18:54

## 2024-02-04 RX ADMIN — MIDAZOLAM HYDROCHLORIDE 4 MG: 1 INJECTION INTRAMUSCULAR; INTRAVENOUS at 09:17

## 2024-02-04 RX ADMIN — VANCOMYCIN HYDROCHLORIDE 1000 MG: 1 INJECTION, POWDER, LYOPHILIZED, FOR SOLUTION INTRAVENOUS at 17:06

## 2024-02-04 RX ADMIN — PANTOPRAZOLE SODIUM 40 MG: 40 INJECTION, POWDER, FOR SOLUTION INTRAVENOUS at 12:13

## 2024-02-04 ASSESSMENT — PAIN SCALES - GENERAL
PAINLEVEL_OUTOF10: 0
PAINLEVEL_OUTOF10: 10
PAINLEVEL_OUTOF10: 0

## 2024-02-04 ASSESSMENT — LIFESTYLE VARIABLES: HOW OFTEN DO YOU HAVE A DRINK CONTAINING ALCOHOL: NEVER

## 2024-02-04 ASSESSMENT — PAIN DESCRIPTION - FREQUENCY: FREQUENCY: CONTINUOUS

## 2024-02-04 ASSESSMENT — PULMONARY FUNCTION TESTS
PIF_VALUE: 26
PIF_VALUE: 40
PIF_VALUE: 30
PIF_VALUE: 25
PIF_VALUE: 23
PIF_VALUE: 22

## 2024-02-04 ASSESSMENT — PAIN DESCRIPTION - ORIENTATION: ORIENTATION: RIGHT;LEFT

## 2024-02-04 ASSESSMENT — PAIN DESCRIPTION - ONSET: ONSET: ON-GOING

## 2024-02-04 ASSESSMENT — PAIN - FUNCTIONAL ASSESSMENT: PAIN_FUNCTIONAL_ASSESSMENT: ACTIVITIES ARE NOT PREVENTED

## 2024-02-04 ASSESSMENT — PAIN DESCRIPTION - PAIN TYPE: TYPE: ACUTE PAIN

## 2024-02-04 ASSESSMENT — PAIN DESCRIPTION - LOCATION: LOCATION: ARM

## 2024-02-04 ASSESSMENT — PAIN DESCRIPTION - DESCRIPTORS: DESCRIPTORS: THROBBING

## 2024-02-04 NOTE — ED PROVIDER NOTES
[facility] EMERGENCY DEPT  EMERGENCY DEPARTMENT ENCOUNTER          Pt Name: Fredo Rod  MRN: [id number]  Birthdate 1970  Date of evaluation: 2/3/2024  Physician: Jack Tavares MD  Supervising Attending Physician: Raleigh Vyas DO       CHIEF COMPLAINT       Chief Complaint   Patient presents with    Leg Swelling    Altered Mental Status       HISTORY OF PRESENT ILLNESS    HPI  Fredo Rod is a 53 y.o. male with PMH significant for COPD (on 6L O2 via NC at all times at home) and likely CHF who presents to the emergency department from home, brought in by EMS for evaluation of abdominal distention and worsening shortness of breath (SOB).  Patient states that he completes the majority of his care at [facility] however did not like the last time he was evaluated there, therefore decided to come to [facility] today.  Reports that he had worsening SOB over the past 2 hours and abdominal distention for the past 3 months.  States that he has not had a worsening cough from his baseline.  He was last seen in the hospital 3 weeks ago at [facility] in which he is unaware of his final diagnosis or disposition.  Lives with daughter at home.  Of note, patient states that he currently smokes approximately 1 pack of cigarettes every 3 to 4 days which decreased from previously smoking 1 carton every 1.5 days.  Denies current EtOH or drug use.    Denies fever, chills, diaphoresis, congestion, chest pain, abdominal pain, nausea, vomiting, constipation, dysuria, numbness/tingling/weakness in extremities.    The patient has no other acute complaints at this time.      PAST MEDICAL AND SURGICAL HISTORY     Past Medical History:   Diagnosis Date    Sarcoma of rib (HCC)      Past Surgical History:   Procedure Laterality Date    ARM SURGERY Left 1997    BONE RESECTION, RIB           MEDICATIONS     Current Facility-Administered Medications:     piperacillin-tazobactam (ZOSYN) 4,500 mg in sodium chloride 0.9

## 2024-02-04 NOTE — ED NOTES
ED to inpatient nurses report      Chief Complaint:  Chief Complaint   Patient presents with    Leg Swelling    Altered Mental Status     Present to ED from: home    MOA:     LOC: alert and orientated to name, place, date  Mobility: Requires assistance * 1  Oxygen Baseline: 5-6 L NC    Current needs required: 5 L NC     Code Status:   No Order    What abnormal results were found and what did you give/do to treat them?   Any procedures or intervention occur?     Mental Status:  Level of Consciousness: Alert (0)    Psych Assessment:        Vitals:  Patient Vitals for the past 24 hrs:   BP Temp Temp src Pulse Resp SpO2 Height Weight   02/04/24 0203 139/79 -- -- 93 -- 91 % -- --   02/04/24 0119 (!) 145/82 -- -- 98 (!) 31 91 % -- --   02/04/24 0052 (!) 143/82 -- -- 92 (!) 32 90 % -- --   02/03/24 2356 -- -- -- 99 30 92 % -- --   02/03/24 2310 (!) 165/85 98.7 °F (37.1 °C) Oral (!) 104 18 90 % 1.727 m (5' 8\") 83.9 kg (185 lb)        LDAs:   Peripheral IV 02/03/24 Right Antecubital (Active)   Site Assessment Clean, dry & intact 02/04/24 0119   Line Status Normal saline locked 02/04/24 0119   Phlebitis Assessment No symptoms 02/04/24 0119   Infiltration Assessment 0 02/04/24 0119       Peripheral IV 02/04/24 Right;Anterior Cephalic (Active)   Site Assessment Clean, dry & intact 02/04/24 0218   Line Status Blood return noted;Flushed;Normal saline locked;Specimen collected 02/04/24 0218   Phlebitis Assessment No symptoms 02/04/24 0218   Infiltration Assessment 0 02/04/24 0218   Dressing Status New dressing applied;Clean, dry & intact 02/04/24 0218   Dressing Type Transparent 02/04/24 0218   Dressing Intervention New 02/04/24 0218       Ambulatory Status:  No data recorded    Diagnosis:  DISPOSITION Admitted 02/04/2024 02:17:58 AM   Final diagnoses:   Abdominal wall cellulitis   Increased ammonia level   Cellulitis of lower extremity, unspecified laterality        Consults:  PHARMACY TO DOSE VANCOMYCIN     Pain Score:

## 2024-02-04 NOTE — H&P
Medicine Admission History & Physical      Patient:  Fredo Rod  YOB: 1970  Date of Service: 2/4/2024  MRN: [id number]   Acct: [id number]   Primary Care Physician: No primary care provider on file.    Admitting Faculty MD: Luther Henderson MD    Code Status: Full Code No additional code details    Date of Service: Pt seen/examined in consultation on 2/4/2024     Assessment / Plan     Briefly, pt Fredo Rod is a 53 y.o. male with a PMH of CAD, CHF, suspected COPD who presented with AMS.    #Anasarca: PoA. Presents with significant 4+ pitting edema in the b/l LE and abdominal swelling. Swelling worsening over the last 3 months, was reportedly at [facility] around 6 weeks ago, though patient unsure of treatment course and outcome. Does have abdominal wall cellulitis per below. Albumin 3.0 on admission, less likely liver dysfunction induced hypoalbuminemia. Renal function stable, will obtain UA to assess for proteinuria. No hx protein-losing enteropathy. Will hold on inflammatory markers given ?clinical significance in setting of cellulitis. Significant RHS on EKG. Concern for R-sided HF given suspected severe COPD and underlying pHTN, ?PE. ECHO ordered. CTA chest ordered to assess for PE given symptoms and RHS, will defer D-dimer in setting of active infection. Diuretic management per below.  #Abdominal wall cellulitis: PoA. Erythema of the abdomen/BLE noted on physical exam. CTAP with R ventral lateral abdominal wall inflammatory changes without abscess. Given Zosyn/vancomycin in the ED, will continue with vancomycin at this time.  #Hyperammonemia: Ammonia 79 on admission. No signs of cirrhosis on performed CTAP, denies significant alcohol abuse history. ?non-hepatic hyperammonemia with mild encephalopathy poa. No ascites/jaundice noted. Will check PT/INR to assess for liver function. Will treat with lactulose x1.  #Metabolic alkalosis: HCO3 45 on admission, elevated from previous values. Unclear

## 2024-02-04 NOTE — CONSULTS
CRITICAL CARE CONSULT NOTE      Patient:  Fredo Rod    Unit/Bed:4B-02/002-A  YOB: 1970  MRN: [id number]   PCP: No primary care provider on file.  Date of Admission: 2/3/2024  Chief Complaint:- AMS    Assessment and Plan:    Acute metabolic encephalopathy:  Multifactorial.  Predominantly secondary to hypoxic/hypercapnic respiratory failure, see below with concomitant hyperammonemia.  Questionable history of alcohol intake.  Check serum EtOH.  UDS ordered.  UA negative.  CT A/P with right ventral lateral abdominal wall findings consistent for cellulitis.  Blood cultures x 2 obtained.  On antibiotics.  LFTs and INR nonsignificant.  Check US liver. Given lactulose x 1. Consider CT head if no improvement.    Acute on chronic hypoxic hypercapnic respiratory failure: Suspect secondary to volume overload with concerns for progressive PHTN.  Chronic 6 L baseline home O2, suspicion for noncompliance.  History of COPD +/- PHTN, noncompliant with home inhalers and not following with outpatient pulmonology.  Possibly underlying CHF exacerbation, see below.  Presented with anasarca with worsening respiratory status.  Initial ABG 7.30/90/63/49 and worsening/refractory to BiPAP.  Intubated 2/4.  Continue wean as tolerated for SpO2 > 90%.  Repeat CXR check ABG if respiratory status worsens.  COPD: In exacerbation.  On 6 L baseline O2, presented in respiratory distress with history of stable hypercapnia and hypoxia necessitating intubation, see above.  No formal PFTs.  Previously seen by pulmonology, not currently following.  Noncompliant with home inhalers.  Check respiratory culture and pneumonia panel.  De-escalate appropriate per cultures and sensitivities. Started on azithromycin and Rocephin x 5 2/4.  Started Solu-Cortef 100 mg IV 3 times daily x 5.  Generalized anasarca: Unclear etiology.  High suspicion for PHTN, likely from uncontrolled COPD.  Presented with hyperammonemia, history of EtOH and morbid

## 2024-02-04 NOTE — PROCEDURES
Fredo Rod is a 53 y.o. male patient.  1. Abdominal wall cellulitis    2. Increased ammonia level    3. Cellulitis of lower extremity, unspecified laterality    4. Dyspnea on exertion      Past Medical History:   Diagnosis Date    Sarcoma of rib (HCC)      Blood pressure 134/75, pulse 77, temperature 98.4 °F (36.9 °C), resp. rate 25, height 1.753 m (5' 9\"), weight 99.4 kg (219 lb 2.2 oz), SpO2 94 %.    Intubation    Date/Time: 2/4/2024 9:32 AM    Performed by: Jose Juan Yancey DO  Authorized by: Jose Juan Yancey DO  Consent: The procedure was performed in an emergent situation. Verbal consent obtained. Written consent not obtained.  Patient identity confirmed: arm band  Time out: Immediately prior to procedure a \"time out\" was called to verify the correct patient, procedure, equipment, support staff and site/side marked as required.  Indications: respiratory distress, hypercapnia and hypoxemia  Intubation method: fiberoptic oral  Pretreatment medications: fentanyl  Sedatives: midazolam  Paralytic: rocuronium  Laryngoscope size: Mac 4  Tube size: 8.0 mm  Tube type: cuffed  Number of attempts: 1  Cricoid pressure: no  Cords visualized: yes  Post-procedure assessment: chest rise, CO2 detector and ETCO2 monitor  Breath sounds: equal  Cuff inflated: yes  ETT to lip: 24 cm  ETT to teeth: 23 cm  Tube secured with: ETT christopher      Discussed consent with patient's sister and mother.  Postprocedure CXR ordered.  To be interpreted by myself once completed.    Jose Juan Yancey DO  2/4/2024

## 2024-02-04 NOTE — ED NOTES
Pt and vs reassessed. Pt resting in bed alert. 63 ml urine noted on bladder scan. Admitting doctor at bedside assessing pt. Pt stable at this time. Second blood culture obtained.

## 2024-02-04 NOTE — ED PROVIDER NOTES
I performed a history and physical examination of the patient and discussed management with the resident. I reviewed the resident’s note and agree with the documented findings and plan of care. Any areas of disagreement are noted on the chart. I was personally present for the key portions of any procedures. I have documented in the chart those procedures where I was not present during the key portions. I have reviewed the emergency nurses triage note. I agree with the chief complaint, past medical history, past surgical history, allergies, medications, social and family history as documented unless otherwise noted below. Documentation of the HPI, Physical Exam and Medical Decision Making performed by medical students or scribes is based on my personal performance of the HPI, PE and MDM. For Phys Assistant/ Nurse Practitioner cases/documentation I have personally evaluated this patient and have completed at least one if not all key elements of the E/M (history, physical exam, and MDM). My findings are as noted below.    In other words, I personally saw and examined the patient I have reviewed and agreed with the resident findings including all diagnostic interpretations and treatment plans as written.  I was present for the key portion of any procedures performed and the inclusive time noted in any critical care statement.    For Ori today for reports of altered mental status.  He is able to hold conversation with us.  He states he has been having problems where he cannot remember things.  Patient does have a history of COPD states he is on 6 L nasal cannula at home.  He has been feeling increasing shortness of breath.  Patient is also here today complaining of stomach pain patient states that he feels like his stomach is going to explode.  Here today on physical examination he has extensive erythema on the abdomen.  There is multiple surgical scars.  Patient also has bilateral lower extremity swelling.  Patient

## 2024-02-04 NOTE — ED TRIAGE NOTES
Patient presents to ED via [facility] with report of altered metal status, cough, and leg swelling. Patient has a hx of COPD and is on 6L O2 NC at home. Patient states he has been SOB recently and feels like his \"stomach is going to burst\". Extensive abdominal distension noted at this time and patient states his last BM was yesterday. Patient is A/O x3 on arrival and is unable to state who the president is. Patient is ambulatory and pitting edema is also noted to bilateral lower legs at this time.

## 2024-02-04 NOTE — ED NOTES
Pt back from testing and vs reassessed. Pt resting in bed alert and updated on POC. Pt denies any needs and is stable at this time

## 2024-02-04 NOTE — ED NOTES
Assumed care at this time. Received report from Marguerite CURRIE. Pt out of room for testing at this time

## 2024-02-05 ENCOUNTER — APPOINTMENT (OUTPATIENT)
Dept: GENERAL RADIOLOGY | Age: 54
DRG: 130 | End: 2024-02-05
Payer: MEDICAID

## 2024-02-05 ENCOUNTER — APPOINTMENT (OUTPATIENT)
Age: 54
DRG: 130 | End: 2024-02-05
Payer: MEDICAID

## 2024-02-05 LAB
AMMONIA PLAS-MCNC: 50 UMOL/L (ref 11–60)
ANION GAP SERPL CALC-SCNC: 5 MEQ/L (ref 8–16)
BUN SERPL-MCNC: 13 MG/DL (ref 7–22)
CALCIUM SERPL-MCNC: 8.4 MG/DL (ref 8.5–10.5)
CHLORIDE SERPL-SCNC: 98 MEQ/L (ref 98–111)
CO2 SERPL-SCNC: 40 MEQ/L (ref 23–33)
CREAT SERPL-MCNC: 0.7 MG/DL (ref 0.4–1.2)
DEPRECATED RDW RBC AUTO: 73 FL (ref 35–45)
ECHO AO ASC DIAM: 3.5 CM
ECHO AO ASCENDING AORTA INDEX: 1.64 CM/M2
ECHO AV CUSP MM: 2.2 CM
ECHO AV PEAK GRADIENT: 16 MMHG
ECHO AV PEAK VELOCITY: 2 M/S
ECHO AV VELOCITY RATIO: 0.65
ECHO BSA: 2.18 M2
ECHO EST RA PRESSURE: 15 MMHG
ECHO IVC PROX: 2.9 CM
ECHO LA AREA 2C: 31.7 CM2
ECHO LA AREA 4C: 31.6 CM2
ECHO LA DIAMETER INDEX: 1.59 CM/M2
ECHO LA DIAMETER: 3.4 CM
ECHO LA MAJOR AXIS: 7.4 CM
ECHO LA MINOR AXIS: 6.9 CM
ECHO LA VOL BP: 120 ML (ref 18–58)
ECHO LA VOL MOD A2C: 120 ML (ref 18–58)
ECHO LA VOL MOD A4C: 112 ML (ref 18–58)
ECHO LA VOL/BSA BIPLANE: 56 ML/M2 (ref 16–34)
ECHO LA VOLUME INDEX MOD A2C: 56 ML/M2 (ref 16–34)
ECHO LA VOLUME INDEX MOD A4C: 52 ML/M2 (ref 16–34)
ECHO LV E' LATERAL VELOCITY: 10 CM/S
ECHO LV E' SEPTAL VELOCITY: 8 CM/S
ECHO LV FRACTIONAL SHORTENING: 35 % (ref 28–44)
ECHO LV INTERNAL DIMENSION DIASTOLE INDEX: 2.29 CM/M2
ECHO LV INTERNAL DIMENSION DIASTOLIC: 4.9 CM (ref 4.2–5.9)
ECHO LV INTERNAL DIMENSION SYSTOLIC INDEX: 1.5 CM/M2
ECHO LV INTERNAL DIMENSION SYSTOLIC: 3.2 CM
ECHO LV ISOVOLUMETRIC RELAXATION TIME (IVRT): 67 MS
ECHO LV IVSD: 1.5 CM (ref 0.6–1)
ECHO LV MASS 2D: 312.9 G (ref 88–224)
ECHO LV MASS INDEX 2D: 146.2 G/M2 (ref 49–115)
ECHO LV POSTERIOR WALL DIASTOLIC: 1.5 CM (ref 0.6–1)
ECHO LV RELATIVE WALL THICKNESS RATIO: 0.61
ECHO LVOT PEAK GRADIENT: 7 MMHG
ECHO LVOT PEAK VELOCITY: 1.3 M/S
ECHO MV A VELOCITY: 0.61 M/S
ECHO MV E DECELERATION TIME (DT): 317 MS
ECHO MV E VELOCITY: 0.94 M/S
ECHO MV E/A RATIO: 1.54
ECHO MV E/E' LATERAL: 9.4
ECHO MV E/E' RATIO (AVERAGED): 10.58
ECHO PV MAX VELOCITY: 0.8 M/S
ECHO PV PEAK GRADIENT: 3 MMHG
ECHO RIGHT VENTRICULAR SYSTOLIC PRESSURE (RVSP): 55 MMHG
ECHO RV GLOBAL SYSTOLIC STRAIN (GLS): -16.6 %
ECHO RV INTERNAL DIMENSION: 3.9 CM
ECHO RV TAPSE: 1.9 CM (ref 1.7–?)
ECHO TV E WAVE: 0.6 M/S
ECHO TV REGURGITANT MAX VELOCITY: 3.18 M/S
ECHO TV REGURGITANT PEAK GRADIENT: 40 MMHG
EKG ATRIAL RATE: 47 BPM
EKG P AXIS: 52 DEGREES
EKG P-R INTERVAL: 166 MS
EKG Q-T INTERVAL: 510 MS
EKG QRS DURATION: 104 MS
EKG QTC CALCULATION (BAZETT): 451 MS
EKG R AXIS: 55 DEGREES
EKG T AXIS: 43 DEGREES
EKG VENTRICULAR RATE: 47 BPM
ERYTHROCYTE [DISTWIDTH] IN BLOOD BY AUTOMATED COUNT: 20.3 % (ref 11.5–14.5)
GFR SERPL CREATININE-BSD FRML MDRD: > 60 ML/MIN/1.73M2
GLUCOSE BLD STRIP.AUTO-MCNC: 136 MG/DL (ref 70–108)
GLUCOSE BLD STRIP.AUTO-MCNC: 136 MG/DL (ref 70–108)
GLUCOSE BLD STRIP.AUTO-MCNC: 167 MG/DL (ref 70–108)
GLUCOSE SERPL-MCNC: 148 MG/DL (ref 70–108)
HAV IGM SER QL: NEGATIVE
HBV CORE IGM SERPL QL IA: NEGATIVE
HBV SURFACE AG SERPL QL IA: NEGATIVE
HCT VFR BLD AUTO: 45.2 % (ref 42–52)
HCV IGG SERPL QL IA: NEGATIVE
HGB BLD-MCNC: 12.2 GM/DL (ref 14–18)
MCH RBC QN AUTO: 26.6 PG (ref 26–33)
MCHC RBC AUTO-ENTMCNC: 27 GM/DL (ref 32.2–35.5)
MCV RBC AUTO: 98.7 FL (ref 80–94)
PLATELET # BLD AUTO: 285 THOU/MM3 (ref 130–400)
PMV BLD AUTO: 11.1 FL (ref 9.4–12.4)
POTASSIUM SERPL-SCNC: 4.1 MEQ/L (ref 3.5–5.2)
RBC # BLD AUTO: 4.58 MILL/MM3 (ref 4.7–6.1)
SODIUM SERPL-SCNC: 143 MEQ/L (ref 135–145)
T4 FREE SERPL-MCNC: 0.87 NG/DL (ref 0.93–1.76)
TROPONIN, HIGH SENSITIVITY: 13 NG/L (ref 0–12)
TSH SERPL DL<=0.005 MIU/L-ACNC: 0.2 UIU/ML (ref 0.4–4.2)
VANCOMYCIN SERPL-MCNC: 9 UG/ML (ref 0.1–39.9)
WBC # BLD AUTO: 5.7 THOU/MM3 (ref 4.8–10.8)

## 2024-02-05 PROCEDURE — 6360000002 HC RX W HCPCS

## 2024-02-05 PROCEDURE — 2580000003 HC RX 258

## 2024-02-05 PROCEDURE — 5A1955Z RESPIRATORY VENTILATION, GREATER THAN 96 CONSECUTIVE HOURS: ICD-10-PCS | Performed by: STUDENT IN AN ORGANIZED HEALTH CARE EDUCATION/TRAINING PROGRAM

## 2024-02-05 PROCEDURE — 94761 N-INVAS EAR/PLS OXIMETRY MLT: CPT

## 2024-02-05 PROCEDURE — 84439 ASSAY OF FREE THYROXINE: CPT

## 2024-02-05 PROCEDURE — 80202 ASSAY OF VANCOMYCIN: CPT

## 2024-02-05 PROCEDURE — 2580000003 HC RX 258: Performed by: STUDENT IN AN ORGANIZED HEALTH CARE EDUCATION/TRAINING PROGRAM

## 2024-02-05 PROCEDURE — 2500000003 HC RX 250 WO HCPCS: Performed by: INTERNAL MEDICINE

## 2024-02-05 PROCEDURE — 6370000000 HC RX 637 (ALT 250 FOR IP): Performed by: STUDENT IN AN ORGANIZED HEALTH CARE EDUCATION/TRAINING PROGRAM

## 2024-02-05 PROCEDURE — 93005 ELECTROCARDIOGRAM TRACING: CPT | Performed by: INTERNAL MEDICINE

## 2024-02-05 PROCEDURE — 71045 X-RAY EXAM CHEST 1 VIEW: CPT

## 2024-02-05 PROCEDURE — 36592 COLLECT BLOOD FROM PICC: CPT

## 2024-02-05 PROCEDURE — 6370000000 HC RX 637 (ALT 250 FOR IP): Performed by: INTERNAL MEDICINE

## 2024-02-05 PROCEDURE — 6360000002 HC RX W HCPCS: Performed by: STUDENT IN AN ORGANIZED HEALTH CARE EDUCATION/TRAINING PROGRAM

## 2024-02-05 PROCEDURE — C9113 INJ PANTOPRAZOLE SODIUM, VIA: HCPCS | Performed by: INTERNAL MEDICINE

## 2024-02-05 PROCEDURE — 2100000000 HC CCU R&B

## 2024-02-05 PROCEDURE — 99291 CRITICAL CARE FIRST HOUR: CPT | Performed by: INTERNAL MEDICINE

## 2024-02-05 PROCEDURE — 6370000000 HC RX 637 (ALT 250 FOR IP)

## 2024-02-05 PROCEDURE — 84484 ASSAY OF TROPONIN QUANT: CPT

## 2024-02-05 PROCEDURE — 93306 TTE W/DOPPLER COMPLETE: CPT

## 2024-02-05 PROCEDURE — 80074 ACUTE HEPATITIS PANEL: CPT

## 2024-02-05 PROCEDURE — 94640 AIRWAY INHALATION TREATMENT: CPT

## 2024-02-05 PROCEDURE — 82140 ASSAY OF AMMONIA: CPT

## 2024-02-05 PROCEDURE — 85027 COMPLETE CBC AUTOMATED: CPT

## 2024-02-05 PROCEDURE — 2700000000 HC OXYGEN THERAPY PER DAY

## 2024-02-05 PROCEDURE — 93306 TTE W/DOPPLER COMPLETE: CPT | Performed by: NUCLEAR MEDICINE

## 2024-02-05 PROCEDURE — 89220 SPUTUM SPECIMEN COLLECTION: CPT

## 2024-02-05 PROCEDURE — 6360000002 HC RX W HCPCS: Performed by: INTERNAL MEDICINE

## 2024-02-05 PROCEDURE — 36415 COLL VENOUS BLD VENIPUNCTURE: CPT

## 2024-02-05 PROCEDURE — 94003 VENT MGMT INPAT SUBQ DAY: CPT

## 2024-02-05 PROCEDURE — 2500000003 HC RX 250 WO HCPCS: Performed by: STUDENT IN AN ORGANIZED HEALTH CARE EDUCATION/TRAINING PROGRAM

## 2024-02-05 PROCEDURE — 93010 ELECTROCARDIOGRAM REPORT: CPT | Performed by: INTERNAL MEDICINE

## 2024-02-05 PROCEDURE — 80048 BASIC METABOLIC PNL TOTAL CA: CPT

## 2024-02-05 PROCEDURE — 84443 ASSAY THYROID STIM HORMONE: CPT

## 2024-02-05 PROCEDURE — 82948 REAGENT STRIP/BLOOD GLUCOSE: CPT

## 2024-02-05 RX ORDER — CHLORHEXIDINE GLUCONATE ORAL RINSE 1.2 MG/ML
15 SOLUTION DENTAL 2 TIMES DAILY
Status: DISCONTINUED | OUTPATIENT
Start: 2024-02-05 | End: 2024-02-16

## 2024-02-05 RX ORDER — ALBUTEROL SULFATE 2.5 MG/3ML
2.5 SOLUTION RESPIRATORY (INHALATION) 2 TIMES DAILY
Status: DISCONTINUED | OUTPATIENT
Start: 2024-02-05 | End: 2024-02-17

## 2024-02-05 RX ADMIN — IPRATROPIUM BROMIDE AND ALBUTEROL SULFATE 1 DOSE: .5; 3 SOLUTION RESPIRATORY (INHALATION) at 04:02

## 2024-02-05 RX ADMIN — ACETYLCYSTEINE 600 MG: 200 SOLUTION ORAL; RESPIRATORY (INHALATION) at 17:34

## 2024-02-05 RX ADMIN — TIOTROPIUM BROMIDE AND OLODATEROL 2 PUFF: 3.124; 2.736 SPRAY, METERED RESPIRATORY (INHALATION) at 07:26

## 2024-02-05 RX ADMIN — PROPOFOL 30 MCG/KG/MIN: 10 INJECTION, EMULSION INTRAVENOUS at 20:45

## 2024-02-05 RX ADMIN — METHYLPREDNISOLONE SODIUM SUCCINATE 60 MG: 125 INJECTION, POWDER, FOR SOLUTION INTRAMUSCULAR; INTRAVENOUS at 00:02

## 2024-02-05 RX ADMIN — VANCOMYCIN HYDROCHLORIDE 1000 MG: 1 INJECTION, POWDER, LYOPHILIZED, FOR SOLUTION INTRAVENOUS at 05:14

## 2024-02-05 RX ADMIN — ALBUTEROL SULFATE 2.5 MG: 2.5 SOLUTION RESPIRATORY (INHALATION) at 17:31

## 2024-02-05 RX ADMIN — ACETAZOLAMIDE 500 MG: 500 INJECTION, POWDER, LYOPHILIZED, FOR SOLUTION INTRAVENOUS at 09:15

## 2024-02-05 RX ADMIN — Medication 5 MCG/MIN: at 14:27

## 2024-02-05 RX ADMIN — PANTOPRAZOLE SODIUM 40 MG: 40 INJECTION, POWDER, FOR SOLUTION INTRAVENOUS at 08:10

## 2024-02-05 RX ADMIN — PIPERACILLIN AND TAZOBACTAM 3375 MG: 3; .375 INJECTION, POWDER, FOR SOLUTION INTRAVENOUS at 00:08

## 2024-02-05 RX ADMIN — IPRATROPIUM BROMIDE AND ALBUTEROL SULFATE 1 DOSE: .5; 3 SOLUTION RESPIRATORY (INHALATION) at 00:50

## 2024-02-05 RX ADMIN — Medication 1250 MG: at 16:32

## 2024-02-05 RX ADMIN — PIPERACILLIN AND TAZOBACTAM 3375 MG: 3; .375 INJECTION, POWDER, FOR SOLUTION INTRAVENOUS at 17:40

## 2024-02-05 RX ADMIN — Medication 125 MCG/HR: at 22:09

## 2024-02-05 RX ADMIN — AZITHROMYCIN DIHYDRATE 500 MG: 500 INJECTION, POWDER, LYOPHILIZED, FOR SOLUTION INTRAVENOUS at 12:37

## 2024-02-05 RX ADMIN — PIPERACILLIN AND TAZOBACTAM 3375 MG: 3; .375 INJECTION, POWDER, FOR SOLUTION INTRAVENOUS at 08:27

## 2024-02-05 RX ADMIN — METHYLPREDNISOLONE SODIUM SUCCINATE 60 MG: 125 INJECTION, POWDER, FOR SOLUTION INTRAMUSCULAR; INTRAVENOUS at 12:40

## 2024-02-05 RX ADMIN — METHYLPREDNISOLONE SODIUM SUCCINATE 60 MG: 125 INJECTION, POWDER, FOR SOLUTION INTRAMUSCULAR; INTRAVENOUS at 08:10

## 2024-02-05 RX ADMIN — PROPOFOL 50 MCG/KG/MIN: 10 INJECTION, EMULSION INTRAVENOUS at 00:49

## 2024-02-05 RX ADMIN — PROPOFOL 40 MCG/KG/MIN: 10 INJECTION, EMULSION INTRAVENOUS at 04:29

## 2024-02-05 RX ADMIN — PROPOFOL 30 MCG/KG/MIN: 10 INJECTION, EMULSION INTRAVENOUS at 15:38

## 2024-02-05 RX ADMIN — ENOXAPARIN SODIUM 40 MG: 100 INJECTION SUBCUTANEOUS at 08:10

## 2024-02-05 RX ADMIN — ALBUTEROL SULFATE 2.5 MG: 2.5 SOLUTION RESPIRATORY (INHALATION) at 13:07

## 2024-02-05 RX ADMIN — CHLORHEXIDINE GLUCONATE 0.12% ORAL RINSE 15 ML: 1.2 LIQUID ORAL at 20:29

## 2024-02-05 RX ADMIN — ASPIRIN 81 MG 81 MG: 81 TABLET ORAL at 08:10

## 2024-02-05 RX ADMIN — Medication 85 MCG/HR: at 11:41

## 2024-02-05 ASSESSMENT — PULMONARY FUNCTION TESTS
PIF_VALUE: 27
PIF_VALUE: 33
PIF_VALUE: 29
PIF_VALUE: 29
PIF_VALUE: 34
PIF_VALUE: 27
PIF_VALUE: 34

## 2024-02-05 NOTE — CARE COORDINATION
Case Management Assessment  Initial Evaluation    Date/Time of Evaluation: 2/5/2024 3:02 PM  Assessment Completed by: Iesha Villanueva RN    If patient is discharged prior to next notation, then this note serves as note for discharge by case management.    Patient Name: Fredo Rod                   YOB: 1970  Diagnosis: Cellulitis [L03.90]  Abdominal wall cellulitis [L03.311]  Increased ammonia level [R79.89]  Cellulitis of lower extremity, unspecified laterality [L03.119]                   Date / Time: 2/3/2024 11:07 PM  [facility]     Patient Admission Status: Inpatient   Readmission Risk Low 0-14, Mod 15-19), High > 20: Readmission Risk Score: 13.9    Current PCP: Mohinder Page MD  PCP verified by CM? Yes (PCP added to EPIC)    Chart Reviewed: Yes      History Provided by: Child/Family (Mother - Berna)  Patient Orientation: Sedated (on vent)    Patient Cognition: Other (see comment) (Sedated on vent)    Hospitalization in the last 30 days (Readmission):  No    If yes, Readmission Assessment in CM Navigator will be completed.    Advance Directives:      Code Status: Full Code   Patient's Primary Decision Maker is: Legal Next of Kin      Discharge Planning:    Patient lives with: Children (with 12 yo daughter) Type of Home: Trailer/Mobile Home  Primary Care Giver: Self  Patient Support Systems include: Parent, Children, Family Members   Current Financial resources: Medicaid  Current community resources: Other (Comment) (Food stamps)  Current services prior to admission: Oxygen Therapy, C-pap (She is unsure how many liters O2 - thinks 6L ATC; O2 & cpap thru [employer])            Current DME:              Type of Home Care services:  None    ADLS  Prior functional level: Independent in ADLs/IADLs  Current functional level: Other (see comment) (REBECCA; sedated on vent)    Family can provide assistance at DC: Yes  Would you like Case Management to discuss the discharge plan with any

## 2024-02-06 ENCOUNTER — APPOINTMENT (OUTPATIENT)
Dept: GENERAL RADIOLOGY | Age: 54
DRG: 130 | End: 2024-02-06
Payer: MEDICAID

## 2024-02-06 LAB
ANION GAP SERPL CALC-SCNC: 4 MEQ/L (ref 8–16)
BACTERIA SPEC AEROBE CULT: NORMAL
BACTERIA SPEC RESP CULT: NORMAL
BASE EXCESS BLDA CALC-SCNC: 10.6 MMOL/L (ref -2.5–2.5)
BREATHS SETTING VENT: 14 BPM
BUN SERPL-MCNC: 18 MG/DL (ref 7–22)
CALCIUM SERPL-MCNC: 8.3 MG/DL (ref 8.5–10.5)
CHLORIDE SERPL-SCNC: 99 MEQ/L (ref 98–111)
CO2 SERPL-SCNC: 39 MEQ/L (ref 23–33)
COLLECTED BY:: ABNORMAL
CREAT SERPL-MCNC: 0.7 MG/DL (ref 0.4–1.2)
DEPRECATED RDW RBC AUTO: 73.3 FL (ref 35–45)
DEVICE: ABNORMAL
ERYTHROCYTE [DISTWIDTH] IN BLOOD BY AUTOMATED COUNT: 20.9 % (ref 11.5–14.5)
FIO2 ON VENT O2 ANALYZER: 90 %
GFR SERPL CREATININE-BSD FRML MDRD: > 60 ML/MIN/1.73M2
GLUCOSE BLD STRIP.AUTO-MCNC: 126 MG/DL (ref 70–108)
GLUCOSE SERPL-MCNC: 108 MG/DL (ref 70–108)
GRAM STN SPEC: NORMAL
HCO3 BLDA-SCNC: 39 MMOL/L (ref 23–28)
HCT VFR BLD AUTO: 43.8 % (ref 42–52)
HGB BLD-MCNC: 11.9 GM/DL (ref 14–18)
L PNEUMO1 AG UR QL IA.RAPID: NEGATIVE
MCH RBC QN AUTO: 26.3 PG (ref 26–33)
MCHC RBC AUTO-ENTMCNC: 27.2 GM/DL (ref 32.2–35.5)
MCV RBC AUTO: 96.7 FL (ref 80–94)
PCO2 BLDA: 68 MMHG (ref 35–45)
PEEP SETTING VENT: 14 MMHG
PH BLDA: 7.37 [PH] (ref 7.35–7.45)
PIP: 32 CMH2O
PLATELET # BLD AUTO: 345 THOU/MM3 (ref 130–400)
PMV BLD AUTO: 11.2 FL (ref 9.4–12.4)
PO2 BLDA: 68 MMHG (ref 71–104)
POTASSIUM SERPL-SCNC: 4 MEQ/L (ref 3.5–5.2)
RBC # BLD AUTO: 4.53 MILL/MM3 (ref 4.7–6.1)
SAO2 % BLDA: 92 %
SODIUM SERPL-SCNC: 142 MEQ/L (ref 135–145)
STFR SERPL-MCNC: 4.2 MG/L (ref 2.2–5)
STREP PNEUMO AG, UR: NEGATIVE
VENTILATION MODE VENT: ABNORMAL
WBC # BLD AUTO: 7.7 THOU/MM3 (ref 4.8–10.8)

## 2024-02-06 PROCEDURE — 94761 N-INVAS EAR/PLS OXIMETRY MLT: CPT

## 2024-02-06 PROCEDURE — 80048 BASIC METABOLIC PNL TOTAL CA: CPT

## 2024-02-06 PROCEDURE — 6360000002 HC RX W HCPCS

## 2024-02-06 PROCEDURE — 6370000000 HC RX 637 (ALT 250 FOR IP)

## 2024-02-06 PROCEDURE — 6360000002 HC RX W HCPCS: Performed by: INTERNAL MEDICINE

## 2024-02-06 PROCEDURE — 2700000000 HC OXYGEN THERAPY PER DAY

## 2024-02-06 PROCEDURE — 2500000003 HC RX 250 WO HCPCS: Performed by: INTERNAL MEDICINE

## 2024-02-06 PROCEDURE — 2500000003 HC RX 250 WO HCPCS

## 2024-02-06 PROCEDURE — 85027 COMPLETE CBC AUTOMATED: CPT

## 2024-02-06 PROCEDURE — 89220 SPUTUM SPECIMEN COLLECTION: CPT

## 2024-02-06 PROCEDURE — C9113 INJ PANTOPRAZOLE SODIUM, VIA: HCPCS | Performed by: INTERNAL MEDICINE

## 2024-02-06 PROCEDURE — 6360000002 HC RX W HCPCS: Performed by: STUDENT IN AN ORGANIZED HEALTH CARE EDUCATION/TRAINING PROGRAM

## 2024-02-06 PROCEDURE — 2580000003 HC RX 258

## 2024-02-06 PROCEDURE — 6370000000 HC RX 637 (ALT 250 FOR IP): Performed by: INTERNAL MEDICINE

## 2024-02-06 PROCEDURE — 2580000003 HC RX 258: Performed by: STUDENT IN AN ORGANIZED HEALTH CARE EDUCATION/TRAINING PROGRAM

## 2024-02-06 PROCEDURE — 2100000000 HC CCU R&B

## 2024-02-06 PROCEDURE — 36415 COLL VENOUS BLD VENIPUNCTURE: CPT

## 2024-02-06 PROCEDURE — 2500000003 HC RX 250 WO HCPCS: Performed by: STUDENT IN AN ORGANIZED HEALTH CARE EDUCATION/TRAINING PROGRAM

## 2024-02-06 PROCEDURE — 99291 CRITICAL CARE FIRST HOUR: CPT | Performed by: INTERNAL MEDICINE

## 2024-02-06 PROCEDURE — 71045 X-RAY EXAM CHEST 1 VIEW: CPT

## 2024-02-06 PROCEDURE — 94003 VENT MGMT INPAT SUBQ DAY: CPT

## 2024-02-06 PROCEDURE — 82948 REAGENT STRIP/BLOOD GLUCOSE: CPT

## 2024-02-06 PROCEDURE — 82803 BLOOD GASES ANY COMBINATION: CPT

## 2024-02-06 PROCEDURE — 94640 AIRWAY INHALATION TREATMENT: CPT

## 2024-02-06 PROCEDURE — 84480 ASSAY TRIIODOTHYRONINE (T3): CPT

## 2024-02-06 RX ORDER — DEXAMETHASONE SODIUM PHOSPHATE 4 MG/ML
10 INJECTION, SOLUTION INTRA-ARTICULAR; INTRALESIONAL; INTRAMUSCULAR; INTRAVENOUS; SOFT TISSUE DAILY
Status: COMPLETED | OUTPATIENT
Start: 2024-02-11 | End: 2024-02-15

## 2024-02-06 RX ORDER — MORPHINE SULFATE 4 MG/ML
INJECTION, SOLUTION INTRAMUSCULAR; INTRAVENOUS
Status: COMPLETED
Start: 2024-02-06 | End: 2024-02-06

## 2024-02-06 RX ORDER — FENTANYL CITRATE-0.9 % NACL/PF 10 MCG/ML
25-200 PLASTIC BAG, INJECTION (ML) INTRAVENOUS CONTINUOUS
Status: DISCONTINUED | OUTPATIENT
Start: 2024-02-06 | End: 2024-02-13

## 2024-02-06 RX ORDER — ALBUTEROL SULFATE 2.5 MG/3ML
2.5 SOLUTION RESPIRATORY (INHALATION) EVERY 4 HOURS PRN
Status: DISCONTINUED | OUTPATIENT
Start: 2024-02-06 | End: 2024-02-22 | Stop reason: HOSPADM

## 2024-02-06 RX ORDER — LORAZEPAM 2 MG/ML
4 INJECTION INTRAMUSCULAR ONCE
Status: COMPLETED | OUTPATIENT
Start: 2024-02-06 | End: 2024-02-06

## 2024-02-06 RX ORDER — LORAZEPAM 2 MG/ML
INJECTION INTRAMUSCULAR
Status: COMPLETED
Start: 2024-02-06 | End: 2024-02-06

## 2024-02-06 RX ORDER — MORPHINE SULFATE 4 MG/ML
4 INJECTION, SOLUTION INTRAMUSCULAR; INTRAVENOUS ONCE
Status: COMPLETED | OUTPATIENT
Start: 2024-02-06 | End: 2024-02-06

## 2024-02-06 RX ORDER — PROPOFOL 10 MG/ML
5-50 INJECTION, EMULSION INTRAVENOUS CONTINUOUS
Status: DISCONTINUED | OUTPATIENT
Start: 2024-02-06 | End: 2024-02-09

## 2024-02-06 RX ORDER — LORAZEPAM 2 MG/ML
INJECTION INTRAMUSCULAR
Status: DISPENSED
Start: 2024-02-06 | End: 2024-02-06

## 2024-02-06 RX ORDER — MIDAZOLAM HYDROCHLORIDE 1 MG/ML
1-10 INJECTION, SOLUTION INTRAVENOUS CONTINUOUS
Status: DISCONTINUED | OUTPATIENT
Start: 2024-02-06 | End: 2024-02-06

## 2024-02-06 RX ORDER — MIDAZOLAM HYDROCHLORIDE 1 MG/ML
1-10 INJECTION, SOLUTION INTRAVENOUS CONTINUOUS
Status: DISCONTINUED | OUTPATIENT
Start: 2024-02-06 | End: 2024-02-13

## 2024-02-06 RX ADMIN — ALBUTEROL SULFATE 2.5 MG: 2.5 SOLUTION RESPIRATORY (INHALATION) at 07:39

## 2024-02-06 RX ADMIN — LORAZEPAM 4 MG: 2 INJECTION INTRAMUSCULAR; INTRAVENOUS at 09:45

## 2024-02-06 RX ADMIN — AZITHROMYCIN DIHYDRATE 500 MG: 500 INJECTION, POWDER, LYOPHILIZED, FOR SOLUTION INTRAVENOUS at 14:32

## 2024-02-06 RX ADMIN — Medication 200 MCG/HR: at 11:33

## 2024-02-06 RX ADMIN — PROPOFOL 50 MCG/KG/MIN: 10 INJECTION, EMULSION INTRAVENOUS at 11:31

## 2024-02-06 RX ADMIN — Medication 200 MCG/HR: at 10:16

## 2024-02-06 RX ADMIN — PROPOFOL 50 MCG/KG/MIN: 10 INJECTION, EMULSION INTRAVENOUS at 21:45

## 2024-02-06 RX ADMIN — CHLORHEXIDINE GLUCONATE 0.12% ORAL RINSE 15 ML: 1.2 LIQUID ORAL at 22:55

## 2024-02-06 RX ADMIN — WATER 60 MG: 1 INJECTION INTRAMUSCULAR; INTRAVENOUS; SUBCUTANEOUS at 10:12

## 2024-02-06 RX ADMIN — CHLORHEXIDINE GLUCONATE 0.12% ORAL RINSE 15 ML: 1.2 LIQUID ORAL at 10:12

## 2024-02-06 RX ADMIN — Medication 150 MCG/HR: at 07:09

## 2024-02-06 RX ADMIN — PROPOFOL 25 MCG/KG/MIN: 10 INJECTION, EMULSION INTRAVENOUS at 06:39

## 2024-02-06 RX ADMIN — TIOTROPIUM BROMIDE AND OLODATEROL 2 PUFF: 3.124; 2.736 SPRAY, METERED RESPIRATORY (INHALATION) at 07:39

## 2024-02-06 RX ADMIN — Medication 8 MG/HR: at 20:31

## 2024-02-06 RX ADMIN — Medication 5 MCG/MIN: at 07:19

## 2024-02-06 RX ADMIN — Medication 200 MCG/HR: at 21:26

## 2024-02-06 RX ADMIN — PROPOFOL 50 MCG/KG/MIN: 10 INJECTION, EMULSION INTRAVENOUS at 17:56

## 2024-02-06 RX ADMIN — Medication 1250 MG: at 17:03

## 2024-02-06 RX ADMIN — ACETYLCYSTEINE 600 MG: 200 SOLUTION ORAL; RESPIRATORY (INHALATION) at 07:39

## 2024-02-06 RX ADMIN — Medication 6 MG/HR: at 10:17

## 2024-02-06 RX ADMIN — PIPERACILLIN AND TAZOBACTAM 3375 MG: 3; .375 INJECTION, POWDER, FOR SOLUTION INTRAVENOUS at 17:08

## 2024-02-06 RX ADMIN — PROPOFOL 50 MCG/KG/MIN: 10 INJECTION, EMULSION INTRAVENOUS at 14:30

## 2024-02-06 RX ADMIN — PIPERACILLIN AND TAZOBACTAM 3375 MG: 3; .375 INJECTION, POWDER, FOR SOLUTION INTRAVENOUS at 01:18

## 2024-02-06 RX ADMIN — Medication 200 MCG/HR: at 16:44

## 2024-02-06 RX ADMIN — Medication 125 MCG/HR: at 06:23

## 2024-02-06 RX ADMIN — ALBUTEROL SULFATE 2.5 MG: 2.5 SOLUTION RESPIRATORY (INHALATION) at 17:39

## 2024-02-06 RX ADMIN — PROPOFOL 40 MCG/KG/MIN: 10 INJECTION, EMULSION INTRAVENOUS at 09:21

## 2024-02-06 RX ADMIN — MORPHINE SULFATE 4 MG: 4 INJECTION, SOLUTION INTRAMUSCULAR; INTRAVENOUS at 09:43

## 2024-02-06 RX ADMIN — ENOXAPARIN SODIUM 40 MG: 100 INJECTION SUBCUTANEOUS at 10:12

## 2024-02-06 RX ADMIN — Medication 1250 MG: at 05:32

## 2024-02-06 RX ADMIN — ASPIRIN 81 MG 81 MG: 81 TABLET ORAL at 10:12

## 2024-02-06 RX ADMIN — ALBUTEROL SULFATE 2.5 MG: 2.5 SOLUTION RESPIRATORY (INHALATION) at 01:06

## 2024-02-06 RX ADMIN — PANTOPRAZOLE SODIUM 40 MG: 40 INJECTION, POWDER, FOR SOLUTION INTRAVENOUS at 10:12

## 2024-02-06 RX ADMIN — Medication 2 MG/HR: at 09:07

## 2024-02-06 RX ADMIN — PIPERACILLIN AND TAZOBACTAM 3375 MG: 3; .375 INJECTION, POWDER, FOR SOLUTION INTRAVENOUS at 10:20

## 2024-02-06 RX ADMIN — ACETYLCYSTEINE 600 MG: 200 SOLUTION ORAL; RESPIRATORY (INHALATION) at 17:40

## 2024-02-06 RX ADMIN — DEXAMETHASONE SODIUM PHOSPHATE 20 MG: 4 INJECTION, SOLUTION INTRAMUSCULAR; INTRAVENOUS at 13:45

## 2024-02-06 RX ADMIN — LORAZEPAM 4 MG: 2 INJECTION INTRAMUSCULAR at 09:45

## 2024-02-06 RX ADMIN — ACETAZOLAMIDE 500 MG: 500 INJECTION, POWDER, LYOPHILIZED, FOR SOLUTION INTRAVENOUS at 22:55

## 2024-02-06 ASSESSMENT — PULMONARY FUNCTION TESTS
PIF_VALUE: 29
PIF_VALUE: 31
PIF_VALUE: 30
PIF_VALUE: 31
PIF_VALUE: 30
PIF_VALUE: 29

## 2024-02-06 ASSESSMENT — PAIN SCALES - GENERAL: PAINLEVEL_OUTOF10: 0

## 2024-02-07 ENCOUNTER — APPOINTMENT (OUTPATIENT)
Dept: GENERAL RADIOLOGY | Age: 54
DRG: 130 | End: 2024-02-07
Payer: MEDICAID

## 2024-02-07 LAB
ALBUMIN SERPL BCG-MCNC: 3.1 G/DL (ref 3.5–5.1)
ALP SERPL-CCNC: 116 U/L (ref 38–126)
ALT SERPL W/O P-5'-P-CCNC: 30 U/L (ref 11–66)
ANION GAP SERPL CALC-SCNC: 7 MEQ/L (ref 8–16)
ARTERIAL PATENCY WRIST A: POSITIVE
ARTERIAL PATENCY WRIST A: POSITIVE
AST SERPL-CCNC: 25 U/L (ref 5–40)
BASE EXCESS BLDA CALC-SCNC: 3.4 MMOL/L (ref -2.5–2.5)
BASE EXCESS BLDA CALC-SCNC: 8.2 MMOL/L (ref -2.5–2.5)
BDY SITE: ABNORMAL
BDY SITE: ABNORMAL
BILIRUB CONJ SERPL-MCNC: 0.4 MG/DL (ref 0–0.3)
BILIRUB SERPL-MCNC: 0.7 MG/DL (ref 0.3–1.2)
BREATHS SETTING VENT: 12 BPM
BREATHS SETTING VENT: 12 BPM
BUN SERPL-MCNC: 18 MG/DL (ref 7–22)
CA-I BLD ISE-SCNC: 1.16 MMOL/L (ref 1.12–1.32)
CALCIUM SERPL-MCNC: 8.4 MG/DL (ref 8.5–10.5)
CHLORIDE SERPL-SCNC: 104 MEQ/L (ref 98–111)
CO2 SERPL-SCNC: 32 MEQ/L (ref 23–33)
COLLECTED BY:: ABNORMAL
COLLECTED BY:: ABNORMAL
CREAT SERPL-MCNC: 0.6 MG/DL (ref 0.4–1.2)
DEPRECATED RDW RBC AUTO: 70.9 FL (ref 35–45)
DEVICE: ABNORMAL
DEVICE: ABNORMAL
ERYTHROCYTE [DISTWIDTH] IN BLOOD BY AUTOMATED COUNT: 20.9 % (ref 11.5–14.5)
FIO2 ON VENT O2 ANALYZER: 65 %
FIO2 ON VENT O2 ANALYZER: 80 %
GFR SERPL CREATININE-BSD FRML MDRD: > 60 ML/MIN/1.73M2
GLUCOSE SERPL-MCNC: 103 MG/DL (ref 70–108)
HCO3 BLDA-SCNC: 35 MMOL/L (ref 23–28)
HCO3 BLDA-SCNC: 35 MMOL/L (ref 23–28)
HCT VFR BLD AUTO: 42.6 % (ref 42–52)
HGB BLD-MCNC: 12 GM/DL (ref 14–18)
MCH RBC QN AUTO: 26.3 PG (ref 26–33)
MCHC RBC AUTO-ENTMCNC: 28.2 GM/DL (ref 32.2–35.5)
MCV RBC AUTO: 93.2 FL (ref 80–94)
PCO2 BLDA: 54 MMHG (ref 35–45)
PCO2 BLDA: 83 MMHG (ref 35–45)
PEEP SETTING VENT: 14 MMHG
PEEP SETTING VENT: 14 MMHG
PH BLDA: 7.23 [PH] (ref 7.35–7.45)
PH BLDA: 7.42 [PH] (ref 7.35–7.45)
PIP: 30 CMH2O
PIP: 32 CMH2O
PLATELET # BLD AUTO: 347 THOU/MM3 (ref 130–400)
PMV BLD AUTO: 10.6 FL (ref 9.4–12.4)
PO2 BLDA: 276 MMHG (ref 71–104)
PO2 BLDA: 76 MMHG (ref 71–104)
POTASSIUM SERPL-SCNC: 4.1 MEQ/L (ref 3.5–5.2)
PRESSURE SUPPORT SETTING VENT: 18 CMH2O
PROT SERPL-MCNC: 5.7 G/DL (ref 6.1–8)
RBC # BLD AUTO: 4.57 MILL/MM3 (ref 4.7–6.1)
SAO2 % BLDA: 100 %
SAO2 % BLDA: 91 %
SODIUM SERPL-SCNC: 143 MEQ/L (ref 135–145)
T3 TOTAL: 71 NG/DL (ref 80–200)
TRIGL SERPL-MCNC: 149 MG/DL (ref 0–199)
VANCOMYCIN SERPL-MCNC: 21.2 UG/ML (ref 0.1–39.9)
VENTILATION MODE VENT: ABNORMAL
VENTILATION MODE VENT: ABNORMAL
WBC # BLD AUTO: 10.8 THOU/MM3 (ref 4.8–10.8)

## 2024-02-07 PROCEDURE — 99291 CRITICAL CARE FIRST HOUR: CPT | Performed by: INTERNAL MEDICINE

## 2024-02-07 PROCEDURE — 6360000002 HC RX W HCPCS: Performed by: INTERNAL MEDICINE

## 2024-02-07 PROCEDURE — 6370000000 HC RX 637 (ALT 250 FOR IP)

## 2024-02-07 PROCEDURE — 82803 BLOOD GASES ANY COMBINATION: CPT

## 2024-02-07 PROCEDURE — 6360000002 HC RX W HCPCS

## 2024-02-07 PROCEDURE — 36600 WITHDRAWAL OF ARTERIAL BLOOD: CPT

## 2024-02-07 PROCEDURE — 94640 AIRWAY INHALATION TREATMENT: CPT

## 2024-02-07 PROCEDURE — 2700000000 HC OXYGEN THERAPY PER DAY

## 2024-02-07 PROCEDURE — 2580000003 HC RX 258

## 2024-02-07 PROCEDURE — 2000000000 HC ICU R&B

## 2024-02-07 PROCEDURE — 80202 ASSAY OF VANCOMYCIN: CPT

## 2024-02-07 PROCEDURE — 36592 COLLECT BLOOD FROM PICC: CPT

## 2024-02-07 PROCEDURE — 84478 ASSAY OF TRIGLYCERIDES: CPT

## 2024-02-07 PROCEDURE — 80053 COMPREHEN METABOLIC PANEL: CPT

## 2024-02-07 PROCEDURE — 2500000003 HC RX 250 WO HCPCS

## 2024-02-07 PROCEDURE — 94761 N-INVAS EAR/PLS OXIMETRY MLT: CPT

## 2024-02-07 PROCEDURE — 85027 COMPLETE CBC AUTOMATED: CPT

## 2024-02-07 PROCEDURE — 82330 ASSAY OF CALCIUM: CPT

## 2024-02-07 PROCEDURE — 89220 SPUTUM SPECIMEN COLLECTION: CPT

## 2024-02-07 PROCEDURE — 82248 BILIRUBIN DIRECT: CPT

## 2024-02-07 PROCEDURE — C9113 INJ PANTOPRAZOLE SODIUM, VIA: HCPCS | Performed by: INTERNAL MEDICINE

## 2024-02-07 PROCEDURE — 36415 COLL VENOUS BLD VENIPUNCTURE: CPT

## 2024-02-07 PROCEDURE — 71045 X-RAY EXAM CHEST 1 VIEW: CPT

## 2024-02-07 PROCEDURE — 6360000002 HC RX W HCPCS: Performed by: STUDENT IN AN ORGANIZED HEALTH CARE EDUCATION/TRAINING PROGRAM

## 2024-02-07 PROCEDURE — 94003 VENT MGMT INPAT SUBQ DAY: CPT

## 2024-02-07 PROCEDURE — 6370000000 HC RX 637 (ALT 250 FOR IP): Performed by: INTERNAL MEDICINE

## 2024-02-07 RX ORDER — POLYETHYLENE GLYCOL 3350 17 G/17G
17 POWDER, FOR SOLUTION ORAL DAILY
Status: DISCONTINUED | OUTPATIENT
Start: 2024-02-07 | End: 2024-02-22 | Stop reason: HOSPADM

## 2024-02-07 RX ORDER — SENNOSIDES 8.8 MG/5ML
5 LIQUID ORAL NIGHTLY
Status: DISCONTINUED | OUTPATIENT
Start: 2024-02-07 | End: 2024-02-08

## 2024-02-07 RX ADMIN — PROPOFOL 50 MCG/KG/MIN: 10 INJECTION, EMULSION INTRAVENOUS at 03:47

## 2024-02-07 RX ADMIN — SODIUM CHLORIDE, PRESERVATIVE FREE 10 ML: 5 INJECTION INTRAVENOUS at 09:02

## 2024-02-07 RX ADMIN — Medication 7 MG/HR: at 09:17

## 2024-02-07 RX ADMIN — Medication 200 MCG/HR: at 03:22

## 2024-02-07 RX ADMIN — ALBUTEROL SULFATE 2.5 MG: 2.5 SOLUTION RESPIRATORY (INHALATION) at 17:12

## 2024-02-07 RX ADMIN — PANTOPRAZOLE SODIUM 40 MG: 40 INJECTION, POWDER, FOR SOLUTION INTRAVENOUS at 09:02

## 2024-02-07 RX ADMIN — ALBUTEROL SULFATE 2.5 MG: 2.5 SOLUTION RESPIRATORY (INHALATION) at 07:47

## 2024-02-07 RX ADMIN — POLYETHYLENE GLYCOL 3350 17 G: 17 POWDER, FOR SOLUTION ORAL at 17:02

## 2024-02-07 RX ADMIN — Medication 8.8 MG: at 19:59

## 2024-02-07 RX ADMIN — PIPERACILLIN AND TAZOBACTAM 3375 MG: 3; .375 INJECTION, POWDER, FOR SOLUTION INTRAVENOUS at 08:49

## 2024-02-07 RX ADMIN — SODIUM CHLORIDE, PRESERVATIVE FREE 10 ML: 5 INJECTION INTRAVENOUS at 19:59

## 2024-02-07 RX ADMIN — Medication 150 MCG/HR: at 23:11

## 2024-02-07 RX ADMIN — Medication 1250 MG: at 04:24

## 2024-02-07 RX ADMIN — ACETYLCYSTEINE 600 MG: 200 SOLUTION ORAL; RESPIRATORY (INHALATION) at 07:48

## 2024-02-07 RX ADMIN — PIPERACILLIN AND TAZOBACTAM 3375 MG: 3; .375 INJECTION, POWDER, FOR SOLUTION INTRAVENOUS at 17:02

## 2024-02-07 RX ADMIN — PIPERACILLIN AND TAZOBACTAM 3375 MG: 3; .375 INJECTION, POWDER, FOR SOLUTION INTRAVENOUS at 01:04

## 2024-02-07 RX ADMIN — TIOTROPIUM BROMIDE AND OLODATEROL 2 PUFF: 3.124; 2.736 SPRAY, METERED RESPIRATORY (INHALATION) at 07:48

## 2024-02-07 RX ADMIN — ACETYLCYSTEINE 600 MG: 200 SOLUTION ORAL; RESPIRATORY (INHALATION) at 17:13

## 2024-02-07 RX ADMIN — CHLORHEXIDINE GLUCONATE 0.12% ORAL RINSE 15 ML: 1.2 LIQUID ORAL at 09:01

## 2024-02-07 RX ADMIN — PROPOFOL 50 MCG/KG/MIN: 10 INJECTION, EMULSION INTRAVENOUS at 06:36

## 2024-02-07 RX ADMIN — Medication 150 MCG/HR: at 15:44

## 2024-02-07 RX ADMIN — DEXAMETHASONE SODIUM PHOSPHATE 20 MG: 4 INJECTION, SOLUTION INTRAMUSCULAR; INTRAVENOUS at 09:13

## 2024-02-07 RX ADMIN — CHLORHEXIDINE GLUCONATE 0.12% ORAL RINSE 15 ML: 1.2 LIQUID ORAL at 19:59

## 2024-02-07 RX ADMIN — ALBUTEROL SULFATE 2.5 MG: 2.5 SOLUTION RESPIRATORY (INHALATION) at 05:33

## 2024-02-07 RX ADMIN — ASPIRIN 81 MG 81 MG: 81 TABLET ORAL at 08:54

## 2024-02-07 RX ADMIN — PROPOFOL 50 MCG/KG/MIN: 10 INJECTION, EMULSION INTRAVENOUS at 00:24

## 2024-02-07 RX ADMIN — Medication 1250 MG: at 15:54

## 2024-02-07 RX ADMIN — Medication 175 MCG/HR: at 09:01

## 2024-02-07 RX ADMIN — ENOXAPARIN SODIUM 40 MG: 100 INJECTION SUBCUTANEOUS at 09:03

## 2024-02-07 ASSESSMENT — PULMONARY FUNCTION TESTS
PIF_VALUE: 31
PIF_VALUE: 34
PIF_VALUE: 29
PIF_VALUE: 30
PIF_VALUE: 29
PIF_VALUE: 12
PIF_VALUE: 30

## 2024-02-07 NOTE — CARE COORDINATION
2/7/24, 2:47 PM EST    DISCHARGE ON GOING EVALUATION    Fredo CANO [facility] day: 3  Location: 4D-16/016-A Reason for admit: Cellulitis [L03.90]  Abdominal wall cellulitis [L03.311]  Increased ammonia level [R79.89]  Cellulitis of lower extremity, unspecified laterality [L03.119]   Procedure:   2/3 CXR: Moderate cardiomegaly with findings of moderate interstitial and   alveolar edema.  2/4 CT Abd/pelvis: Right ventral lateral abdominal wall inflammatory changes consistent with the provided diagnosis of cellulitis. No evidence of abscess or acute   finding within the peritoneal compartment.  2/4 US Scrotum/testicles: Small bilateral hydroceles. Otherwise normal scrotal ultrasound   2/4 US Liver: There is mild sludge and a gallstone present within the gallbladder lumen. No pericholecystic fluid or gallbladder wall thickening is seen however. The common bile duct is normal in caliber.   2/4 Intubated  2/4 CVC RIJ  2/4 CTA Chest: No CT evidence of pulmonary embolus; Right lower lobe collapse. Segmental atelectasis in the right upper lobe; Mild emphysema; Small bilateral pleural effusions.  2/6 Pronation therapy initiated  2/7 CXR: Support devices in place.  Small pleural effusions, improved.  Mild improvement bilateral consolidations.  No visible thoracic source of increased ventilatory requirements    Barriers to Discharge: Yesterday primary RN reports overnight had 2 times required BVM ventilation, agitated, and dropped sats. Broke restraints. Again in morning yesterday, had episodes requiring BVM. Increased sedation and started pronation therapy.     Pronation therapy continues - 16/8 schedule. Remains on vent w/ETT on AC/PC, peep 14, FIO2 75%, sats 100%. Tmax 100.1. NSR. Unable to follow commands; no movement to painful stim x4. Dietitian consulted. Telemetry, CVC, OG w/TF, blakely. Fentanyl @ 150 mcg/hr, versed 6 mg/hr, levo @ 6 mcg/min, mucomyst inhalation, nebs, asa, peridex, IV decadron 20 mg daily,

## 2024-02-08 ENCOUNTER — APPOINTMENT (OUTPATIENT)
Dept: GENERAL RADIOLOGY | Age: 54
DRG: 130 | End: 2024-02-08
Payer: MEDICAID

## 2024-02-08 LAB
ANION GAP SERPL CALC-SCNC: 5 MEQ/L (ref 8–16)
ARTERIAL PATENCY WRIST A: NEGATIVE
ARTERIAL PATENCY WRIST A: POSITIVE
BASE EXCESS BLDA CALC-SCNC: 3.8 MMOL/L (ref -2.5–2.5)
BASE EXCESS BLDA CALC-SCNC: 5.3 MMOL/L (ref -2.5–2.5)
BDY SITE: ABNORMAL
BDY SITE: ABNORMAL
BREATHS SETTING VENT: 12 BPM
BREATHS SETTING VENT: 12 BPM
BUN SERPL-MCNC: 22 MG/DL (ref 7–22)
CALCIUM SERPL-MCNC: 8.4 MG/DL (ref 8.5–10.5)
CHLORIDE SERPL-SCNC: 105 MEQ/L (ref 98–111)
CO2 SERPL-SCNC: 32 MEQ/L (ref 23–33)
COLLECTED BY:: ABNORMAL
COLLECTED BY:: ABNORMAL
CREAT SERPL-MCNC: 0.5 MG/DL (ref 0.4–1.2)
DEPRECATED RDW RBC AUTO: 75.3 FL (ref 35–45)
DEVICE: ABNORMAL
DEVICE: ABNORMAL
EKG ATRIAL RATE: 47 BPM
EKG P AXIS: 25 DEGREES
EKG P-R INTERVAL: 200 MS
EKG Q-T INTERVAL: 474 MS
EKG QRS DURATION: 86 MS
EKG QTC CALCULATION (BAZETT): 419 MS
EKG R AXIS: 66 DEGREES
EKG T AXIS: 50 DEGREES
EKG VENTRICULAR RATE: 47 BPM
ERYTHROCYTE [DISTWIDTH] IN BLOOD BY AUTOMATED COUNT: 21.3 % (ref 11.5–14.5)
FIO2 ON VENT O2 ANALYZER: 45 %
FIO2 ON VENT O2 ANALYZER: 50 %
GFR SERPL CREATININE-BSD FRML MDRD: > 60 ML/MIN/1.73M2
GLUCOSE BLD-MCNC: 124 MG/DL (ref 70–108)
GLUCOSE SERPL-MCNC: 103 MG/DL (ref 70–108)
HCO3 BLDA-SCNC: 32 MMOL/L (ref 23–28)
HCO3 BLDA-SCNC: 32 MMOL/L (ref 23–28)
HCT VFR BLD AUTO: 43.1 % (ref 42–52)
HGB BLD-MCNC: 11.9 GM/DL (ref 14–18)
MCH RBC QN AUTO: 26.7 PG (ref 26–33)
MCHC RBC AUTO-ENTMCNC: 27.6 GM/DL (ref 32.2–35.5)
MCV RBC AUTO: 96.6 FL (ref 80–94)
PCO2 BLDA: 55 MMHG (ref 35–45)
PCO2 BLDA: 65 MMHG (ref 35–45)
PEEP SETTING VENT: 10 MMHG
PEEP SETTING VENT: 14 MMHG
PH BLDA: 7.3 [PH] (ref 7.35–7.45)
PH BLDA: 7.38 [PH] (ref 7.35–7.45)
PIP: 30 CMH2O
PLATELET # BLD AUTO: 307 THOU/MM3 (ref 130–400)
PMV BLD AUTO: 10.7 FL (ref 9.4–12.4)
PO2 BLDA: 151 MMHG (ref 71–104)
PO2 BLDA: 74 MMHG (ref 71–104)
POTASSIUM SERPL-SCNC: 4.1 MEQ/L (ref 3.5–5.2)
RBC # BLD AUTO: 4.46 MILL/MM3 (ref 4.7–6.1)
SAO2 % BLDA: 92 %
SAO2 % BLDA: 99 %
SODIUM SERPL-SCNC: 142 MEQ/L (ref 135–145)
VENTILATION MODE VENT: ABNORMAL
VENTILATION MODE VENT: ABNORMAL
WBC # BLD AUTO: 6.2 THOU/MM3 (ref 4.8–10.8)

## 2024-02-08 PROCEDURE — 82947 ASSAY GLUCOSE BLOOD QUANT: CPT

## 2024-02-08 PROCEDURE — 6370000000 HC RX 637 (ALT 250 FOR IP)

## 2024-02-08 PROCEDURE — 2580000003 HC RX 258

## 2024-02-08 PROCEDURE — 6360000002 HC RX W HCPCS: Performed by: INTERNAL MEDICINE

## 2024-02-08 PROCEDURE — 2500000003 HC RX 250 WO HCPCS: Performed by: INTERNAL MEDICINE

## 2024-02-08 PROCEDURE — 85027 COMPLETE CBC AUTOMATED: CPT

## 2024-02-08 PROCEDURE — 2000000000 HC ICU R&B

## 2024-02-08 PROCEDURE — 6360000002 HC RX W HCPCS: Performed by: STUDENT IN AN ORGANIZED HEALTH CARE EDUCATION/TRAINING PROGRAM

## 2024-02-08 PROCEDURE — 94761 N-INVAS EAR/PLS OXIMETRY MLT: CPT

## 2024-02-08 PROCEDURE — 6360000002 HC RX W HCPCS

## 2024-02-08 PROCEDURE — 93005 ELECTROCARDIOGRAM TRACING: CPT | Performed by: STUDENT IN AN ORGANIZED HEALTH CARE EDUCATION/TRAINING PROGRAM

## 2024-02-08 PROCEDURE — 99291 CRITICAL CARE FIRST HOUR: CPT | Performed by: INTERNAL MEDICINE

## 2024-02-08 PROCEDURE — 2700000000 HC OXYGEN THERAPY PER DAY

## 2024-02-08 PROCEDURE — 94640 AIRWAY INHALATION TREATMENT: CPT

## 2024-02-08 PROCEDURE — 2500000003 HC RX 250 WO HCPCS

## 2024-02-08 PROCEDURE — 82803 BLOOD GASES ANY COMBINATION: CPT

## 2024-02-08 PROCEDURE — 6370000000 HC RX 637 (ALT 250 FOR IP): Performed by: INTERNAL MEDICINE

## 2024-02-08 PROCEDURE — 71045 X-RAY EXAM CHEST 1 VIEW: CPT

## 2024-02-08 PROCEDURE — 80048 BASIC METABOLIC PNL TOTAL CA: CPT

## 2024-02-08 PROCEDURE — 36600 WITHDRAWAL OF ARTERIAL BLOOD: CPT

## 2024-02-08 PROCEDURE — C9113 INJ PANTOPRAZOLE SODIUM, VIA: HCPCS | Performed by: INTERNAL MEDICINE

## 2024-02-08 PROCEDURE — 93010 ELECTROCARDIOGRAM REPORT: CPT | Performed by: INTERNAL MEDICINE

## 2024-02-08 PROCEDURE — 36415 COLL VENOUS BLD VENIPUNCTURE: CPT

## 2024-02-08 PROCEDURE — 94003 VENT MGMT INPAT SUBQ DAY: CPT

## 2024-02-08 RX ORDER — SENNOSIDES 8.8 MG/5ML
10 LIQUID ORAL 2 TIMES DAILY
Status: DISCONTINUED | OUTPATIENT
Start: 2024-02-08 | End: 2024-02-16

## 2024-02-08 RX ORDER — BUMETANIDE 0.25 MG/ML
1 INJECTION INTRAMUSCULAR; INTRAVENOUS ONCE
Status: COMPLETED | OUTPATIENT
Start: 2024-02-08 | End: 2024-02-08

## 2024-02-08 RX ORDER — LACTULOSE 10 G/15ML
20 SOLUTION ORAL ONCE
Status: COMPLETED | OUTPATIENT
Start: 2024-02-08 | End: 2024-02-08

## 2024-02-08 RX ADMIN — PIPERACILLIN AND TAZOBACTAM 3375 MG: 3; .375 INJECTION, POWDER, FOR SOLUTION INTRAVENOUS at 07:55

## 2024-02-08 RX ADMIN — LACTULOSE 20 G: 20 SOLUTION ORAL at 15:08

## 2024-02-08 RX ADMIN — ASPIRIN 81 MG 81 MG: 81 TABLET ORAL at 07:50

## 2024-02-08 RX ADMIN — Medication 4 MG/HR: at 05:01

## 2024-02-08 RX ADMIN — Medication 1250 MG: at 04:10

## 2024-02-08 RX ADMIN — Medication 150 MCG/HR: at 12:56

## 2024-02-08 RX ADMIN — ENOXAPARIN SODIUM 40 MG: 100 INJECTION SUBCUTANEOUS at 07:49

## 2024-02-08 RX ADMIN — ACETYLCYSTEINE 600 MG: 200 SOLUTION ORAL; RESPIRATORY (INHALATION) at 20:50

## 2024-02-08 RX ADMIN — CHLORHEXIDINE GLUCONATE 0.12% ORAL RINSE 15 ML: 1.2 LIQUID ORAL at 07:47

## 2024-02-08 RX ADMIN — ALBUTEROL SULFATE 2.5 MG: 2.5 SOLUTION RESPIRATORY (INHALATION) at 08:59

## 2024-02-08 RX ADMIN — POLYETHYLENE GLYCOL 3350 17 G: 17 POWDER, FOR SOLUTION ORAL at 07:50

## 2024-02-08 RX ADMIN — BUMETANIDE 1 MG: 0.25 INJECTION INTRAMUSCULAR; INTRAVENOUS at 10:55

## 2024-02-08 RX ADMIN — ALBUTEROL SULFATE 2.5 MG: 2.5 SOLUTION RESPIRATORY (INHALATION) at 20:50

## 2024-02-08 RX ADMIN — TIOTROPIUM BROMIDE AND OLODATEROL 2 PUFF: 3.124; 2.736 SPRAY, METERED RESPIRATORY (INHALATION) at 08:59

## 2024-02-08 RX ADMIN — PANTOPRAZOLE SODIUM 40 MG: 40 INJECTION, POWDER, FOR SOLUTION INTRAVENOUS at 07:47

## 2024-02-08 RX ADMIN — Medication 17.6 MG: at 20:26

## 2024-02-08 RX ADMIN — PIPERACILLIN AND TAZOBACTAM 3375 MG: 3; .375 INJECTION, POWDER, FOR SOLUTION INTRAVENOUS at 01:12

## 2024-02-08 RX ADMIN — DEXAMETHASONE SODIUM PHOSPHATE 20 MG: 4 INJECTION, SOLUTION INTRAMUSCULAR; INTRAVENOUS at 10:55

## 2024-02-08 RX ADMIN — PIPERACILLIN AND TAZOBACTAM 3375 MG: 3; .375 INJECTION, POWDER, FOR SOLUTION INTRAVENOUS at 16:32

## 2024-02-08 RX ADMIN — SODIUM CHLORIDE, PRESERVATIVE FREE 10 ML: 5 INJECTION INTRAVENOUS at 07:48

## 2024-02-08 RX ADMIN — Medication 175 MCG/HR: at 20:26

## 2024-02-08 RX ADMIN — Medication 1250 MG: at 16:30

## 2024-02-08 RX ADMIN — Medication 150 MCG/HR: at 05:09

## 2024-02-08 RX ADMIN — ACETYLCYSTEINE 600 MG: 200 SOLUTION ORAL; RESPIRATORY (INHALATION) at 08:59

## 2024-02-08 RX ADMIN — PIPERACILLIN AND TAZOBACTAM 3375 MG: 3; .375 INJECTION, POWDER, FOR SOLUTION INTRAVENOUS at 23:56

## 2024-02-08 RX ADMIN — CHLORHEXIDINE GLUCONATE 0.12% ORAL RINSE 15 ML: 1.2 LIQUID ORAL at 20:26

## 2024-02-08 RX ADMIN — Medication 2 MCG/MIN: at 11:05

## 2024-02-08 ASSESSMENT — PULMONARY FUNCTION TESTS
PIF_VALUE: 32
PIF_VALUE: 33
PIF_VALUE: 33

## 2024-02-08 ASSESSMENT — PAIN SCALES - GENERAL: PAINLEVEL_OUTOF10: 0

## 2024-02-08 NOTE — SIGNIFICANT EVENT
ABG completed 2/7/24 at ~ 1236 -> 7.23 / 83 / 76 / 35 w/BE 3.4 w/91% O2 sats on 80% FiO2 32/14 PCV. Pt currently on 16/8 proning schedule. P/F ratio now 95, significantly decreased from just after proning.     Repeat ABG ordered for 0000 -> assess trajectory of respiratory status / dynamics   7.38 / 55 / 151 / 32 w/99% O2 on 45%FiO2  P/F ratio now 335      Electronically signed by Kenji Marin MD on 2/8/24 at 12:00 AM EST

## 2024-02-09 ENCOUNTER — APPOINTMENT (OUTPATIENT)
Dept: CT IMAGING | Age: 54
DRG: 130 | End: 2024-02-09
Payer: MEDICAID

## 2024-02-09 ENCOUNTER — APPOINTMENT (OUTPATIENT)
Dept: GENERAL RADIOLOGY | Age: 54
DRG: 130 | End: 2024-02-09
Payer: MEDICAID

## 2024-02-09 LAB
ACINETOBACTER CALCOACETICUS-BAUMANNII BY PCR: NOT DETECTED
ANION GAP SERPL CALC-SCNC: 7 MEQ/L (ref 8–16)
ARTERIAL PATENCY WRIST A: POSITIVE
BASE EXCESS BLDA CALC-SCNC: 3.9 MMOL/L (ref -2.5–2.5)
BDY SITE: ABNORMAL
BLACTX-M ISLT/SPM QL: NORMAL
BLAIMP ISLT/SPM QL: NORMAL
BLAKPC ISLT/SPM QL: NORMAL
BLAOXA-48-LIKE ISLT/SPM QL: NORMAL
BLAVIM ISLT/SPM QL: NORMAL
BREATHS SETTING VENT: 16 BPM
BUN SERPL-MCNC: 23 MG/DL (ref 7–22)
C PNEUM DNA LOWER RESP QL NAA+NON-PROBE: NOT DETECTED
CALCIUM SERPL-MCNC: 8.6 MG/DL (ref 8.5–10.5)
CHLORIDE SERPL-SCNC: 105 MEQ/L (ref 98–111)
CO2 SERPL-SCNC: 30 MEQ/L (ref 23–33)
COLLECTED BY:: ABNORMAL
CREAT SERPL-MCNC: 0.6 MG/DL (ref 0.4–1.2)
DEPRECATED RDW RBC AUTO: 71.9 FL (ref 35–45)
DEVICE: ABNORMAL
ENTEROBACTER CLOACAE COMPLEX BY PCR: NOT DETECTED
ERYTHROCYTE [DISTWIDTH] IN BLOOD BY AUTOMATED COUNT: 21.2 % (ref 11.5–14.5)
ESCHERICHIA COLI BY PCR: NOT DETECTED
FIO2 ON VENT O2 ANALYZER: 80 %
FLUAV RNA LOWER RESP QL NAA+NON-PROBE: NOT DETECTED
FLUBV RNA LOWER RESP QL NAA+NON-PROBE: NOT DETECTED
GFR SERPL CREATININE-BSD FRML MDRD: > 60 ML/MIN/1.73M2
GLUCOSE SERPL-MCNC: 99 MG/DL (ref 70–108)
HADV DNA LOWER RESP QL NAA+NON-PROBE: NOT DETECTED
HAEMOPHILUS INFLUENZAE BY PCR: NOT DETECTED
HCO3 BLDA-SCNC: 30 MMOL/L (ref 23–28)
HCOV RNA LOWER RESP QL NAA+NON-PROBE: NOT DETECTED
HCT VFR BLD AUTO: 43.8 % (ref 42–52)
HGB BLD-MCNC: 12.5 GM/DL (ref 14–18)
HMPV RNA LOWER RESP QL NAA+NON-PROBE: NOT DETECTED
HPIV RNA LOWER RESP QL NAA+NON-PROBE: NOT DETECTED
KLEBSIELLA AEROGENES BY PCR: NOT DETECTED
KLEBSIELLA OXYTOCA BY PCR: NOT DETECTED
KLEBSIELLA PNEUMONIAE GROUP BY PCR: NOT DETECTED
L PNEUMO DNA LOWER RESP QL NAA+NON-PROBE: NOT DETECTED
M PNEUMO DNA LOWER RESP QL NAA+NON-PROBE: NOT DETECTED
MCH RBC QN AUTO: 26.5 PG (ref 26–33)
MCHC RBC AUTO-ENTMCNC: 28.5 GM/DL (ref 32.2–35.5)
MCV RBC AUTO: 93 FL (ref 80–94)
MORAXELLA CATARRHALIS BY PCR: NOT DETECTED
PCO2 BLDA: 52 MMHG (ref 35–45)
PEEP SETTING VENT: 10 MMHG
PH BLDA: 7.38 [PH] (ref 7.35–7.45)
PIP: 34 CMH2O
PLATELET # BLD AUTO: 290 THOU/MM3 (ref 130–400)
PMV BLD AUTO: 10.5 FL (ref 9.4–12.4)
PO2 BLDA: 67 MMHG (ref 71–104)
POTASSIUM SERPL-SCNC: 4.2 MEQ/L (ref 3.5–5.2)
PROTEUS SPECIES BY PCR: NOT DETECTED
PSEUDOMONAS AERUGINOSA BY PCR: NOT DETECTED
RBC # BLD AUTO: 4.71 MILL/MM3 (ref 4.7–6.1)
RESISTANT GENE MECA/C & MREJ BY PCR: NORMAL
RESISTANT GENE NDM BY PCR: NORMAL
RSV RNA LOWER RESP QL NAA+NON-PROBE: NOT DETECTED
RV+EV RNA LOWER RESP QL NAA+NON-PROBE: NOT DETECTED
SAO2 % BLDA: 92 %
SERRATIA MARCESCENS BY PCR: NOT DETECTED
SODIUM SERPL-SCNC: 142 MEQ/L (ref 135–145)
SOURCE: NORMAL
SPECIMEN ACCEPTABILITY: NORMAL
STAPH AUREUS BY PCR: NOT DETECTED
STREP AGALACTIAE BY PCR: NOT DETECTED
STREP PNEUMONIAE BY PCR: NOT DETECTED
STREP PYOGENES BY PCR: NOT DETECTED
VENTILATION MODE VENT: ABNORMAL
WBC # BLD AUTO: 6.7 THOU/MM3 (ref 4.8–10.8)

## 2024-02-09 PROCEDURE — 6360000002 HC RX W HCPCS

## 2024-02-09 PROCEDURE — 87541 LEGION PNEUMO DNA AMP PROB: CPT

## 2024-02-09 PROCEDURE — 87070 CULTURE OTHR SPECIMN AEROBIC: CPT

## 2024-02-09 PROCEDURE — 94761 N-INVAS EAR/PLS OXIMETRY MLT: CPT

## 2024-02-09 PROCEDURE — 87631 RESP VIRUS 3-5 TARGETS: CPT

## 2024-02-09 PROCEDURE — 87798 DETECT AGENT NOS DNA AMP: CPT

## 2024-02-09 PROCEDURE — 87581 M.PNEUMON DNA AMP PROBE: CPT

## 2024-02-09 PROCEDURE — 99291 CRITICAL CARE FIRST HOUR: CPT | Performed by: INTERNAL MEDICINE

## 2024-02-09 PROCEDURE — 2500000003 HC RX 250 WO HCPCS: Performed by: INTERNAL MEDICINE

## 2024-02-09 PROCEDURE — 6360000002 HC RX W HCPCS: Performed by: STUDENT IN AN ORGANIZED HEALTH CARE EDUCATION/TRAINING PROGRAM

## 2024-02-09 PROCEDURE — C9113 INJ PANTOPRAZOLE SODIUM, VIA: HCPCS | Performed by: INTERNAL MEDICINE

## 2024-02-09 PROCEDURE — 85027 COMPLETE CBC AUTOMATED: CPT

## 2024-02-09 PROCEDURE — 2580000003 HC RX 258

## 2024-02-09 PROCEDURE — 6370000000 HC RX 637 (ALT 250 FOR IP)

## 2024-02-09 PROCEDURE — 94003 VENT MGMT INPAT SUBQ DAY: CPT

## 2024-02-09 PROCEDURE — 87486 CHLMYD PNEUM DNA AMP PROBE: CPT

## 2024-02-09 PROCEDURE — 6370000000 HC RX 637 (ALT 250 FOR IP): Performed by: INTERNAL MEDICINE

## 2024-02-09 PROCEDURE — 2500000003 HC RX 250 WO HCPCS

## 2024-02-09 PROCEDURE — 6360000002 HC RX W HCPCS: Performed by: INTERNAL MEDICINE

## 2024-02-09 PROCEDURE — 71045 X-RAY EXAM CHEST 1 VIEW: CPT

## 2024-02-09 PROCEDURE — 94640 AIRWAY INHALATION TREATMENT: CPT

## 2024-02-09 PROCEDURE — 6370000000 HC RX 637 (ALT 250 FOR IP): Performed by: STUDENT IN AN ORGANIZED HEALTH CARE EDUCATION/TRAINING PROGRAM

## 2024-02-09 PROCEDURE — 2700000000 HC OXYGEN THERAPY PER DAY

## 2024-02-09 PROCEDURE — 36415 COLL VENOUS BLD VENIPUNCTURE: CPT

## 2024-02-09 PROCEDURE — 80048 BASIC METABOLIC PNL TOTAL CA: CPT

## 2024-02-09 PROCEDURE — 71250 CT THORAX DX C-: CPT

## 2024-02-09 PROCEDURE — 37799 UNLISTED PX VASCULAR SURGERY: CPT

## 2024-02-09 PROCEDURE — 87205 SMEAR GRAM STAIN: CPT

## 2024-02-09 PROCEDURE — 36600 WITHDRAWAL OF ARTERIAL BLOOD: CPT

## 2024-02-09 PROCEDURE — 2000000000 HC ICU R&B

## 2024-02-09 PROCEDURE — 82803 BLOOD GASES ANY COMBINATION: CPT

## 2024-02-09 RX ORDER — PROPOFOL 10 MG/ML
5-50 INJECTION, EMULSION INTRAVENOUS CONTINUOUS
Status: DISCONTINUED | OUTPATIENT
Start: 2024-02-09 | End: 2024-02-11

## 2024-02-09 RX ORDER — SCOLOPAMINE TRANSDERMAL SYSTEM 1 MG/1
1 PATCH, EXTENDED RELEASE TRANSDERMAL
Status: DISCONTINUED | OUTPATIENT
Start: 2024-02-09 | End: 2024-02-14

## 2024-02-09 RX ORDER — BUMETANIDE 0.25 MG/ML
1 INJECTION INTRAMUSCULAR; INTRAVENOUS 2 TIMES DAILY
Status: DISCONTINUED | OUTPATIENT
Start: 2024-02-09 | End: 2024-02-19

## 2024-02-09 RX ORDER — PROPOFOL 10 MG/ML
INJECTION, EMULSION INTRAVENOUS
Status: COMPLETED
Start: 2024-02-09 | End: 2024-02-09

## 2024-02-09 RX ADMIN — ALBUTEROL SULFATE 2.5 MG: 2.5 SOLUTION RESPIRATORY (INHALATION) at 22:27

## 2024-02-09 RX ADMIN — PROPOFOL 20 MCG/KG/MIN: 10 INJECTION, EMULSION INTRAVENOUS at 18:06

## 2024-02-09 RX ADMIN — Medication 1250 MG: at 04:39

## 2024-02-09 RX ADMIN — CHLORHEXIDINE GLUCONATE 0.12% ORAL RINSE 15 ML: 1.2 LIQUID ORAL at 20:01

## 2024-02-09 RX ADMIN — SODIUM CHLORIDE: 9 INJECTION, SOLUTION INTRAVENOUS at 20:01

## 2024-02-09 RX ADMIN — ASPIRIN 81 MG 81 MG: 81 TABLET ORAL at 08:11

## 2024-02-09 RX ADMIN — CHLORHEXIDINE GLUCONATE 0.12% ORAL RINSE 15 ML: 1.2 LIQUID ORAL at 08:11

## 2024-02-09 RX ADMIN — Medication 17.6 MG: at 08:11

## 2024-02-09 RX ADMIN — PIPERACILLIN AND TAZOBACTAM 3375 MG: 3; .375 INJECTION, POWDER, FOR SOLUTION INTRAVENOUS at 15:38

## 2024-02-09 RX ADMIN — Medication 1250 MG: at 18:55

## 2024-02-09 RX ADMIN — BUMETANIDE 1 MG: 0.25 INJECTION, SOLUTION INTRAMUSCULAR; INTRAVENOUS at 08:12

## 2024-02-09 RX ADMIN — SODIUM CHLORIDE, PRESERVATIVE FREE 10 ML: 5 INJECTION INTRAVENOUS at 20:01

## 2024-02-09 RX ADMIN — TIOTROPIUM BROMIDE AND OLODATEROL 2 PUFF: 3.124; 2.736 SPRAY, METERED RESPIRATORY (INHALATION) at 08:04

## 2024-02-09 RX ADMIN — Medication 5 MG/HR: at 09:33

## 2024-02-09 RX ADMIN — Medication 3 MCG/MIN: at 18:21

## 2024-02-09 RX ADMIN — ACETYLCYSTEINE 600 MG: 200 SOLUTION ORAL; RESPIRATORY (INHALATION) at 08:04

## 2024-02-09 RX ADMIN — Medication 200 MCG/HR: at 20:00

## 2024-02-09 RX ADMIN — Medication 175 MCG/HR: at 14:11

## 2024-02-09 RX ADMIN — DEXAMETHASONE SODIUM PHOSPHATE 20 MG: 4 INJECTION, SOLUTION INTRAMUSCULAR; INTRAVENOUS at 08:21

## 2024-02-09 RX ADMIN — ENOXAPARIN SODIUM 40 MG: 100 INJECTION SUBCUTANEOUS at 08:12

## 2024-02-09 RX ADMIN — Medication 175 MCG/HR: at 08:23

## 2024-02-09 RX ADMIN — PANTOPRAZOLE SODIUM 40 MG: 40 INJECTION, POWDER, FOR SOLUTION INTRAVENOUS at 08:11

## 2024-02-09 RX ADMIN — Medication 175 MCG/HR: at 02:05

## 2024-02-09 RX ADMIN — ALBUTEROL SULFATE 2.5 MG: 2.5 SOLUTION RESPIRATORY (INHALATION) at 08:05

## 2024-02-09 RX ADMIN — Medication 17.6 MG: at 20:01

## 2024-02-09 RX ADMIN — PIPERACILLIN AND TAZOBACTAM 3375 MG: 3; .375 INJECTION, POWDER, FOR SOLUTION INTRAVENOUS at 23:48

## 2024-02-09 RX ADMIN — PIPERACILLIN AND TAZOBACTAM 3375 MG: 3; .375 INJECTION, POWDER, FOR SOLUTION INTRAVENOUS at 09:42

## 2024-02-09 RX ADMIN — BUMETANIDE 1 MG: 0.25 INJECTION, SOLUTION INTRAMUSCULAR; INTRAVENOUS at 20:01

## 2024-02-09 ASSESSMENT — PULMONARY FUNCTION TESTS
PIF_VALUE: 27
PIF_VALUE: 28
PIF_VALUE: 32
PIF_VALUE: 33
PIF_VALUE: 29
PIF_VALUE: 29

## 2024-02-09 NOTE — CARE COORDINATION
2/9/24, 3:37 PM EST    DISCHARGE ON GOING EVALUATION    Fredo CANO [facility] day: 5  Location: 4D-16/016-A Reason for admit: Cellulitis [L03.90]  Abdominal wall cellulitis [L03.311]  Increased ammonia level [R79.89]  Cellulitis of lower extremity, unspecified laterality [L03.119]   Procedure:   2/3 CXR: Moderate cardiomegaly with findings of moderate interstitial and   alveolar edema.  2/4 CT Abd/pelvis: Right ventral lateral abdominal wall inflammatory changes consistent with the provided diagnosis of cellulitis. No evidence of abscess or acute   finding within the peritoneal compartment.  2/4 US Scrotum/testicles: Small bilateral hydroceles. Otherwise normal scrotal ultrasound   2/4 US Liver: There is mild sludge and a gallstone present within the gallbladder lumen. No pericholecystic fluid or gallbladder wall thickening is seen however. The common bile duct is normal in caliber.   2/4 Intubated  2/4 CVC RIJ  2/4 CTA Chest: No CT evidence of pulmonary embolus; Right lower lobe collapse. Segmental atelectasis in the right upper lobe; Mild emphysema; Small bilateral pleural effusions.  2/6 Pronation therapy initiated  2/8 CXR: 1. Moderate cardiomegaly. ET tube, NG tube, and right jugular line remain in good position.  2. Old healed fracture left clavicle. No effusion. Moderate patchy infiltrates both lung bases and left parahilar/suprahilar region, consistent with atelectasis/pneumonia..  3. Overall appearance of chest has worsened since prior.  2/9 CXR: No interval change since previous study dated 2/8/2024..     Barriers to Discharge: Overnight patient had episode with desat to 70's, kept proned longer. Supine this morning; resuming proning 16/8 schedule today.     Pronation therapy continues - 16/8 schedule. Remains on vent w/ETT on AC/PC, peep 10, FIO2 70%, sats 94%. Tmax 99.6. SB 50's. Unable to follow commands; JENSEN x4 to painful stim. Dietitian consulted. Telemetry, CVC, OG w/TF, blakely. Fentanyl @

## 2024-02-10 PROBLEM — J80 ARDS (ADULT RESPIRATORY DISTRESS SYNDROME) (HCC): Status: ACTIVE | Noted: 2024-02-10

## 2024-02-10 LAB
ALBUMIN SERPL BCG-MCNC: 3.4 G/DL (ref 3.5–5.1)
ALP SERPL-CCNC: 110 U/L (ref 38–126)
ALT SERPL W/O P-5'-P-CCNC: 62 U/L (ref 11–66)
ANION GAP SERPL CALC-SCNC: 9 MEQ/L (ref 8–16)
ANISOCYTOSIS BLD QL SMEAR: PRESENT
AST SERPL-CCNC: 17 U/L (ref 5–40)
BASOPHILS ABSOLUTE: 0 THOU/MM3 (ref 0–0.1)
BASOPHILS NFR BLD AUTO: 0.1 %
BILIRUB SERPL-MCNC: 0.6 MG/DL (ref 0.3–1.2)
BUN SERPL-MCNC: 24 MG/DL (ref 7–22)
CALCIUM SERPL-MCNC: 8.7 MG/DL (ref 8.5–10.5)
CHLORIDE SERPL-SCNC: 103 MEQ/L (ref 98–111)
CO2 SERPL-SCNC: 30 MEQ/L (ref 23–33)
CREAT SERPL-MCNC: 0.6 MG/DL (ref 0.4–1.2)
DEPRECATED RDW RBC AUTO: 70 FL (ref 35–45)
EOSINOPHIL NFR BLD AUTO: 2.2 %
EOSINOPHILS ABSOLUTE: 0.2 THOU/MM3 (ref 0–0.4)
ERYTHROCYTE [DISTWIDTH] IN BLOOD BY AUTOMATED COUNT: 21.1 % (ref 11.5–14.5)
GFR SERPL CREATININE-BSD FRML MDRD: > 60 ML/MIN/1.73M2
GLUCOSE SERPL-MCNC: 93 MG/DL (ref 70–108)
HCT VFR BLD AUTO: 43.1 % (ref 42–52)
HGB BLD-MCNC: 12.6 GM/DL (ref 14–18)
IMM GRANULOCYTES # BLD AUTO: 0.02 THOU/MM3 (ref 0–0.07)
IMM GRANULOCYTES NFR BLD AUTO: 0.3 %
LYMPHOCYTES ABSOLUTE: 1 THOU/MM3 (ref 1–4.8)
LYMPHOCYTES NFR BLD AUTO: 13.6 %
MCH RBC QN AUTO: 26.9 PG (ref 26–33)
MCHC RBC AUTO-ENTMCNC: 29.2 GM/DL (ref 32.2–35.5)
MCV RBC AUTO: 91.9 FL (ref 80–94)
MONOCYTES ABSOLUTE: 1 THOU/MM3 (ref 0.4–1.3)
MONOCYTES NFR BLD AUTO: 14 %
NEUTROPHILS NFR BLD AUTO: 69.8 %
NRBC BLD AUTO-RTO: 0 /100 WBC
PLATELET # BLD AUTO: 314 THOU/MM3 (ref 130–400)
PMV BLD AUTO: 11.6 FL (ref 9.4–12.4)
POTASSIUM SERPL-SCNC: 3.9 MEQ/L (ref 3.5–5.2)
PROT SERPL-MCNC: 5.9 G/DL (ref 6.1–8)
RBC # BLD AUTO: 4.69 MILL/MM3 (ref 4.7–6.1)
SEGMENTED NEUTROPHILS ABSOLUTE COUNT: 5.2 THOU/MM3 (ref 1.8–7.7)
SODIUM SERPL-SCNC: 142 MEQ/L (ref 135–145)
VANCOMYCIN SERPL-MCNC: 14.6 UG/ML (ref 0.1–39.9)
WBC # BLD AUTO: 7.4 THOU/MM3 (ref 4.8–10.8)

## 2024-02-10 PROCEDURE — 6360000002 HC RX W HCPCS

## 2024-02-10 PROCEDURE — 6360000002 HC RX W HCPCS: Performed by: STUDENT IN AN ORGANIZED HEALTH CARE EDUCATION/TRAINING PROGRAM

## 2024-02-10 PROCEDURE — 36415 COLL VENOUS BLD VENIPUNCTURE: CPT

## 2024-02-10 PROCEDURE — 80053 COMPREHEN METABOLIC PANEL: CPT

## 2024-02-10 PROCEDURE — 94003 VENT MGMT INPAT SUBQ DAY: CPT

## 2024-02-10 PROCEDURE — 2580000003 HC RX 258

## 2024-02-10 PROCEDURE — 6370000000 HC RX 637 (ALT 250 FOR IP)

## 2024-02-10 PROCEDURE — 94761 N-INVAS EAR/PLS OXIMETRY MLT: CPT

## 2024-02-10 PROCEDURE — 94640 AIRWAY INHALATION TREATMENT: CPT

## 2024-02-10 PROCEDURE — 2000000000 HC ICU R&B

## 2024-02-10 PROCEDURE — 6370000000 HC RX 637 (ALT 250 FOR IP): Performed by: INTERNAL MEDICINE

## 2024-02-10 PROCEDURE — 2500000003 HC RX 250 WO HCPCS

## 2024-02-10 PROCEDURE — 85025 COMPLETE CBC W/AUTO DIFF WBC: CPT

## 2024-02-10 PROCEDURE — 2700000000 HC OXYGEN THERAPY PER DAY

## 2024-02-10 PROCEDURE — C9113 INJ PANTOPRAZOLE SODIUM, VIA: HCPCS | Performed by: INTERNAL MEDICINE

## 2024-02-10 PROCEDURE — 80202 ASSAY OF VANCOMYCIN: CPT

## 2024-02-10 PROCEDURE — 99291 CRITICAL CARE FIRST HOUR: CPT | Performed by: INTERNAL MEDICINE

## 2024-02-10 PROCEDURE — 6360000002 HC RX W HCPCS: Performed by: INTERNAL MEDICINE

## 2024-02-10 RX ADMIN — Medication 5 MG/HR: at 03:38

## 2024-02-10 RX ADMIN — SODIUM CHLORIDE, PRESERVATIVE FREE 10 ML: 5 INJECTION INTRAVENOUS at 20:29

## 2024-02-10 RX ADMIN — Medication 1250 MG: at 17:38

## 2024-02-10 RX ADMIN — ALBUTEROL SULFATE 2.5 MG: 2.5 SOLUTION RESPIRATORY (INHALATION) at 07:58

## 2024-02-10 RX ADMIN — ENOXAPARIN SODIUM 40 MG: 100 INJECTION SUBCUTANEOUS at 11:06

## 2024-02-10 RX ADMIN — BUMETANIDE 1 MG: 0.25 INJECTION, SOLUTION INTRAMUSCULAR; INTRAVENOUS at 09:45

## 2024-02-10 RX ADMIN — Medication 200 MCG/HR: at 01:33

## 2024-02-10 RX ADMIN — Medication 200 MCG/HR: at 17:55

## 2024-02-10 RX ADMIN — PROPOFOL 10 MCG/KG/MIN: 10 INJECTION, EMULSION INTRAVENOUS at 20:32

## 2024-02-10 RX ADMIN — Medication 200 MCG/HR: at 23:40

## 2024-02-10 RX ADMIN — ALBUTEROL SULFATE 2.5 MG: 2.5 SOLUTION RESPIRATORY (INHALATION) at 21:21

## 2024-02-10 RX ADMIN — Medication 17.6 MG: at 20:29

## 2024-02-10 RX ADMIN — Medication 200 MCG/HR: at 07:24

## 2024-02-10 RX ADMIN — DEXAMETHASONE SODIUM PHOSPHATE 20 MG: 4 INJECTION, SOLUTION INTRAMUSCULAR; INTRAVENOUS at 10:10

## 2024-02-10 RX ADMIN — SODIUM CHLORIDE, PRESERVATIVE FREE 10 ML: 5 INJECTION INTRAVENOUS at 09:48

## 2024-02-10 RX ADMIN — PROPOFOL 10 MCG/KG/MIN: 10 INJECTION, EMULSION INTRAVENOUS at 06:01

## 2024-02-10 RX ADMIN — TIOTROPIUM BROMIDE AND OLODATEROL 2 PUFF: 3.124; 2.736 SPRAY, METERED RESPIRATORY (INHALATION) at 08:00

## 2024-02-10 RX ADMIN — PANTOPRAZOLE SODIUM 40 MG: 40 INJECTION, POWDER, FOR SOLUTION INTRAVENOUS at 09:45

## 2024-02-10 RX ADMIN — CHLORHEXIDINE GLUCONATE 0.12% ORAL RINSE 15 ML: 1.2 LIQUID ORAL at 20:29

## 2024-02-10 RX ADMIN — Medication 1250 MG: at 05:35

## 2024-02-10 RX ADMIN — CHLORHEXIDINE GLUCONATE 0.12% ORAL RINSE 15 ML: 1.2 LIQUID ORAL at 11:09

## 2024-02-10 RX ADMIN — BUMETANIDE 1 MG: 0.25 INJECTION, SOLUTION INTRAMUSCULAR; INTRAVENOUS at 20:29

## 2024-02-10 RX ADMIN — ASPIRIN 81 MG 81 MG: 81 TABLET ORAL at 09:45

## 2024-02-10 RX ADMIN — PIPERACILLIN AND TAZOBACTAM 3375 MG: 3; .375 INJECTION, POWDER, FOR SOLUTION INTRAVENOUS at 17:34

## 2024-02-10 RX ADMIN — PIPERACILLIN AND TAZOBACTAM 3375 MG: 3; .375 INJECTION, POWDER, FOR SOLUTION INTRAVENOUS at 10:12

## 2024-02-10 RX ADMIN — Medication 200 MCG/HR: at 13:09

## 2024-02-10 RX ADMIN — Medication 5 MG/HR: at 23:41

## 2024-02-10 ASSESSMENT — PULMONARY FUNCTION TESTS
PIF_VALUE: 25
PIF_VALUE: 24
PIF_VALUE: 24
PIF_VALUE: 25
PIF_VALUE: 24
PIF_VALUE: 27

## 2024-02-11 ENCOUNTER — APPOINTMENT (OUTPATIENT)
Dept: GENERAL RADIOLOGY | Age: 54
DRG: 130 | End: 2024-02-11
Payer: MEDICAID

## 2024-02-11 LAB
ALBUMIN SERPL BCG-MCNC: 3.3 G/DL (ref 3.5–5.1)
ALP SERPL-CCNC: 103 U/L (ref 38–126)
ALT SERPL W/O P-5'-P-CCNC: 61 U/L (ref 11–66)
ANION GAP SERPL CALC-SCNC: 9 MEQ/L (ref 8–16)
ANISOCYTOSIS BLD QL SMEAR: PRESENT
AST SERPL-CCNC: 19 U/L (ref 5–40)
BACTERIA BLD AEROBE CULT: NORMAL
BACTERIA BLD AEROBE CULT: NORMAL
BACTERIA SPEC RESP CULT: NORMAL
BASE EXCESS BLDA CALC-SCNC: 3.5 MMOL/L (ref -2.5–2.5)
BASOPHILS ABSOLUTE: 0 THOU/MM3 (ref 0–0.1)
BASOPHILS NFR BLD AUTO: 0.1 %
BDY SITE: ABNORMAL
BILIRUB SERPL-MCNC: 0.6 MG/DL (ref 0.3–1.2)
BREATHS SETTING VENT: 16 BPM
BUN SERPL-MCNC: 25 MG/DL (ref 7–22)
CALCIUM SERPL-MCNC: 8.4 MG/DL (ref 8.5–10.5)
CHLORIDE SERPL-SCNC: 99 MEQ/L (ref 98–111)
CO2 SERPL-SCNC: 32 MEQ/L (ref 23–33)
COLLECTED BY:: ABNORMAL
CREAT SERPL-MCNC: 0.5 MG/DL (ref 0.4–1.2)
DEPRECATED RDW RBC AUTO: 70.5 FL (ref 35–45)
DEVICE: ABNORMAL
EOSINOPHIL NFR BLD AUTO: 0.3 %
EOSINOPHILS ABSOLUTE: 0 THOU/MM3 (ref 0–0.4)
ERYTHROCYTE [DISTWIDTH] IN BLOOD BY AUTOMATED COUNT: 21.2 % (ref 11.5–14.5)
FIO2 ON VENT O2 ANALYZER: 70 %
GFR SERPL CREATININE-BSD FRML MDRD: > 60 ML/MIN/1.73M2
GLUCOSE SERPL-MCNC: 123 MG/DL (ref 70–108)
GRAM STN SPEC: NORMAL
HCO3 BLDA-SCNC: 31 MMOL/L (ref 23–28)
HCT VFR BLD AUTO: 43.5 % (ref 42–52)
HGB BLD-MCNC: 12.6 GM/DL (ref 14–18)
IMM GRANULOCYTES # BLD AUTO: 0.04 THOU/MM3 (ref 0–0.07)
IMM GRANULOCYTES NFR BLD AUTO: 0.5 %
LYMPHOCYTES ABSOLUTE: 0.6 THOU/MM3 (ref 1–4.8)
LYMPHOCYTES NFR BLD AUTO: 7.5 %
MCH RBC QN AUTO: 26.5 PG (ref 26–33)
MCHC RBC AUTO-ENTMCNC: 29 GM/DL (ref 32.2–35.5)
MCV RBC AUTO: 91.4 FL (ref 80–94)
MONOCYTES ABSOLUTE: 0.7 THOU/MM3 (ref 0.4–1.3)
MONOCYTES NFR BLD AUTO: 8.7 %
NEUTROPHILS NFR BLD AUTO: 82.9 %
NRBC BLD AUTO-RTO: 0 /100 WBC
PCO2 BLDA: 55 MMHG (ref 35–45)
PEEP SETTING VENT: 10 MMHG
PH BLDA: 7.36 [PH] (ref 7.35–7.45)
PIP: 28 CMH2O
PLATELET # BLD AUTO: 286 THOU/MM3 (ref 130–400)
PLATELET BLD QL SMEAR: ADEQUATE
PMV BLD AUTO: 10.8 FL (ref 9.4–12.4)
PO2 BLDA: 57 MMHG (ref 71–104)
POTASSIUM SERPL-SCNC: 4.1 MEQ/L (ref 3.5–5.2)
PROT SERPL-MCNC: 6 G/DL (ref 6.1–8)
RBC # BLD AUTO: 4.76 MILL/MM3 (ref 4.7–6.1)
SAO2 % BLDA: 88 %
SCAN OF BLOOD SMEAR: NORMAL
SEGMENTED NEUTROPHILS ABSOLUTE COUNT: 6.5 THOU/MM3 (ref 1.8–7.7)
SODIUM SERPL-SCNC: 140 MEQ/L (ref 135–145)
VENTILATION MODE VENT: ABNORMAL
WBC # BLD AUTO: 7.9 THOU/MM3 (ref 4.8–10.8)

## 2024-02-11 PROCEDURE — 31500 INSERT EMERGENCY AIRWAY: CPT | Performed by: INTERNAL MEDICINE

## 2024-02-11 PROCEDURE — 94640 AIRWAY INHALATION TREATMENT: CPT

## 2024-02-11 PROCEDURE — 6360000002 HC RX W HCPCS: Performed by: STUDENT IN AN ORGANIZED HEALTH CARE EDUCATION/TRAINING PROGRAM

## 2024-02-11 PROCEDURE — 94761 N-INVAS EAR/PLS OXIMETRY MLT: CPT

## 2024-02-11 PROCEDURE — 82803 BLOOD GASES ANY COMBINATION: CPT

## 2024-02-11 PROCEDURE — 89220 SPUTUM SPECIMEN COLLECTION: CPT

## 2024-02-11 PROCEDURE — 6370000000 HC RX 637 (ALT 250 FOR IP)

## 2024-02-11 PROCEDURE — 99291 CRITICAL CARE FIRST HOUR: CPT | Performed by: INTERNAL MEDICINE

## 2024-02-11 PROCEDURE — 2000000000 HC ICU R&B

## 2024-02-11 PROCEDURE — 6360000002 HC RX W HCPCS: Performed by: INTERNAL MEDICINE

## 2024-02-11 PROCEDURE — C9113 INJ PANTOPRAZOLE SODIUM, VIA: HCPCS | Performed by: INTERNAL MEDICINE

## 2024-02-11 PROCEDURE — 71045 X-RAY EXAM CHEST 1 VIEW: CPT

## 2024-02-11 PROCEDURE — 6360000002 HC RX W HCPCS

## 2024-02-11 PROCEDURE — 2500000003 HC RX 250 WO HCPCS

## 2024-02-11 PROCEDURE — 2500000003 HC RX 250 WO HCPCS: Performed by: INTERNAL MEDICINE

## 2024-02-11 PROCEDURE — 2700000000 HC OXYGEN THERAPY PER DAY

## 2024-02-11 PROCEDURE — 36415 COLL VENOUS BLD VENIPUNCTURE: CPT

## 2024-02-11 PROCEDURE — 80053 COMPREHEN METABOLIC PANEL: CPT

## 2024-02-11 PROCEDURE — 94003 VENT MGMT INPAT SUBQ DAY: CPT

## 2024-02-11 PROCEDURE — 2580000003 HC RX 258

## 2024-02-11 PROCEDURE — 85025 COMPLETE CBC W/AUTO DIFF WBC: CPT

## 2024-02-11 PROCEDURE — 6370000000 HC RX 637 (ALT 250 FOR IP): Performed by: INTERNAL MEDICINE

## 2024-02-11 RX ORDER — PROPOFOL 10 MG/ML
5-50 INJECTION, EMULSION INTRAVENOUS CONTINUOUS
Status: DISCONTINUED | OUTPATIENT
Start: 2024-02-11 | End: 2024-02-13

## 2024-02-11 RX ORDER — GLYCOPYRROLATE 0.2 MG/ML
0 INJECTION INTRAMUSCULAR; INTRAVENOUS ONCE
Status: COMPLETED | OUTPATIENT
Start: 2024-02-11 | End: 2024-02-11

## 2024-02-11 RX ADMIN — CHLORHEXIDINE GLUCONATE 0.12% ORAL RINSE 15 ML: 1.2 LIQUID ORAL at 20:00

## 2024-02-11 RX ADMIN — BUMETANIDE 1 MG: 0.25 INJECTION, SOLUTION INTRAMUSCULAR; INTRAVENOUS at 20:00

## 2024-02-11 RX ADMIN — Medication 200 MCG/HR: at 21:50

## 2024-02-11 RX ADMIN — PANTOPRAZOLE SODIUM 40 MG: 40 INJECTION, POWDER, FOR SOLUTION INTRAVENOUS at 09:16

## 2024-02-11 RX ADMIN — PROPOFOL 10 MCG/KG/MIN: 10 INJECTION, EMULSION INTRAVENOUS at 12:42

## 2024-02-11 RX ADMIN — SODIUM CHLORIDE, PRESERVATIVE FREE 10 ML: 5 INJECTION INTRAVENOUS at 20:00

## 2024-02-11 RX ADMIN — Medication 200 MCG/HR: at 16:17

## 2024-02-11 RX ADMIN — GLYCOPYRROLATE 0.37 MG: 0.2 INJECTION INTRAMUSCULAR; INTRAVENOUS at 16:58

## 2024-02-11 RX ADMIN — Medication 17.6 MG: at 09:10

## 2024-02-11 RX ADMIN — Medication 7 MG/HR: at 22:28

## 2024-02-11 RX ADMIN — Medication 2 MCG/MIN: at 20:25

## 2024-02-11 RX ADMIN — DEXAMETHASONE SODIUM PHOSPHATE 10 MG: 4 INJECTION, SOLUTION INTRA-ARTICULAR; INTRALESIONAL; INTRAMUSCULAR; INTRAVENOUS; SOFT TISSUE at 09:10

## 2024-02-11 RX ADMIN — Medication 1250 MG: at 16:46

## 2024-02-11 RX ADMIN — ALBUTEROL SULFATE 2.5 MG: 2.5 SOLUTION RESPIRATORY (INHALATION) at 21:09

## 2024-02-11 RX ADMIN — Medication 1250 MG: at 05:02

## 2024-02-11 RX ADMIN — Medication 200 MCG/HR: at 10:55

## 2024-02-11 RX ADMIN — SODIUM CHLORIDE, PRESERVATIVE FREE 10 ML: 5 INJECTION INTRAVENOUS at 09:18

## 2024-02-11 RX ADMIN — PIPERACILLIN AND TAZOBACTAM 3375 MG: 3; .375 INJECTION, POWDER, FOR SOLUTION INTRAVENOUS at 00:51

## 2024-02-11 RX ADMIN — BUMETANIDE 1 MG: 0.25 INJECTION, SOLUTION INTRAMUSCULAR; INTRAVENOUS at 09:16

## 2024-02-11 RX ADMIN — Medication 17.6 MG: at 20:03

## 2024-02-11 RX ADMIN — ASPIRIN 81 MG 81 MG: 81 TABLET ORAL at 09:10

## 2024-02-11 RX ADMIN — TIOTROPIUM BROMIDE AND OLODATEROL 2 PUFF: 3.124; 2.736 SPRAY, METERED RESPIRATORY (INHALATION) at 09:10

## 2024-02-11 RX ADMIN — Medication 200 MCG/HR: at 05:05

## 2024-02-11 RX ADMIN — CHLORHEXIDINE GLUCONATE 0.12% ORAL RINSE 15 ML: 1.2 LIQUID ORAL at 09:16

## 2024-02-11 RX ADMIN — PROPOFOL 20 MCG/KG/MIN: 10 INJECTION, EMULSION INTRAVENOUS at 20:27

## 2024-02-11 RX ADMIN — ENOXAPARIN SODIUM 40 MG: 100 INJECTION SUBCUTANEOUS at 15:56

## 2024-02-11 RX ADMIN — ALBUTEROL SULFATE 2.5 MG: 2.5 SOLUTION RESPIRATORY (INHALATION) at 09:10

## 2024-02-11 ASSESSMENT — PULMONARY FUNCTION TESTS
PIF_VALUE: 25
PIF_VALUE: 25
PIF_VALUE: 23
PIF_VALUE: 25
PIF_VALUE: 19
PIF_VALUE: 28

## 2024-02-11 NOTE — PROCEDURES
Fredo Rod is a 53 y.o. male patient.  1. Abdominal wall cellulitis    2. Increased ammonia level    3. Cellulitis of lower extremity, unspecified laterality    4. Dyspnea on exertion    5. Acute respiratory failure with hypoxia and hypercapnia (HCC)      Past Medical History:   Diagnosis Date    Sarcoma of rib (HCC)      Blood pressure 115/66, pulse 58, temperature 99.9 °F (37.7 °C), temperature source Rectal, resp. rate 16, height 1.753 m (5' 9\"), weight 93.1 kg (205 lb 4 oz), SpO2 99 %.    Dr. Hanna and myself performed a successful ETT exchange over bougie due to cuff leak. Confirmed with direct visualization using glide scope, bilateral breath sounds, CXR.     Intubation    Date/Time: 2/11/2024 1:38 PM    Performed by: Jose Juan Yancey DO  Authorized by: Jose Juan Yancey DO  Consent: The procedure was performed in an emergent situation. Written consent obtained.  Patient identity confirmed: arm band  Patient status: sedated  Paralytic: none  Laryngoscope size: Mac 4  Tube size: 8.0 mm  Tube type: cuffed  Number of attempts: 2  Cords visualized: yes  Cuff inflated: yes  ETT to lip: 24 cm  Tube secured with: ETT christopher  Chest x-ray interpreted by me.  Chest x-ray findings: endotracheal tube in appropriate position        Jose Juan Yancey DO  2/11/2024    Addendum by Dr. Jacques MD:  I was present at the bed side during the above described procedure.  Dr.Riley MARILU Yancey DO  performed the above described procedure under my direct supervision.  The procedure was performed with out any immediate complications.    Pari Hanna MD 2/11/2024 2:57 PM

## 2024-02-12 LAB
ALBUMIN SERPL BCG-MCNC: 3.3 G/DL (ref 3.5–5.1)
ALP SERPL-CCNC: 105 U/L (ref 38–126)
ALT SERPL W/O P-5'-P-CCNC: 61 U/L (ref 11–66)
ANION GAP SERPL CALC-SCNC: 11 MEQ/L (ref 8–16)
ANISOCYTOSIS BLD QL SMEAR: PRESENT
ARTERIAL PATENCY WRIST A: POSITIVE
ARTERIAL PATENCY WRIST A: POSITIVE
AST SERPL-CCNC: 17 U/L (ref 5–40)
BASE EXCESS BLDA CALC-SCNC: 4.6 MMOL/L (ref -2.5–2.5)
BASE EXCESS BLDA CALC-SCNC: 5.9 MMOL/L (ref -2.5–2.5)
BASOPHILS ABSOLUTE: 0 THOU/MM3 (ref 0–0.1)
BASOPHILS NFR BLD AUTO: 0.1 %
BDY SITE: ABNORMAL
BDY SITE: ABNORMAL
BILIRUB SERPL-MCNC: 0.5 MG/DL (ref 0.3–1.2)
BREATHS SETTING VENT: 16 BPM
BREATHS SETTING VENT: 16 BPM
BUN SERPL-MCNC: 26 MG/DL (ref 7–22)
CALCIUM SERPL-MCNC: 8.3 MG/DL (ref 8.5–10.5)
CHLORIDE SERPL-SCNC: 103 MEQ/L (ref 98–111)
CO2 SERPL-SCNC: 28 MEQ/L (ref 23–33)
COLLECTED BY:: ABNORMAL
COLLECTED BY:: ABNORMAL
CREAT SERPL-MCNC: 0.5 MG/DL (ref 0.4–1.2)
DEPRECATED RDW RBC AUTO: 72.1 FL (ref 35–45)
DEVICE: ABNORMAL
DEVICE: ABNORMAL
EOSINOPHIL NFR BLD AUTO: 2.5 %
EOSINOPHILS ABSOLUTE: 0.2 THOU/MM3 (ref 0–0.4)
ERYTHROCYTE [DISTWIDTH] IN BLOOD BY AUTOMATED COUNT: 21.4 % (ref 11.5–14.5)
FIO2 ON VENT O2 ANALYZER: 60 %
FIO2 ON VENT O2 ANALYZER: 60 %
GFR SERPL CREATININE-BSD FRML MDRD: > 60 ML/MIN/1.73M2
GLUCOSE SERPL-MCNC: 100 MG/DL (ref 70–108)
HCO3 BLDA-SCNC: 30 MMOL/L (ref 23–28)
HCO3 BLDA-SCNC: 32 MMOL/L (ref 23–28)
HCT VFR BLD AUTO: 43.5 % (ref 42–52)
HGB BLD-MCNC: 12.5 GM/DL (ref 14–18)
HYPOCHROMIA BLD QL SMEAR: PRESENT
IMM GRANULOCYTES # BLD AUTO: 0.04 THOU/MM3 (ref 0–0.07)
IMM GRANULOCYTES NFR BLD AUTO: 0.4 %
LYMPHOCYTES ABSOLUTE: 1.3 THOU/MM3 (ref 1–4.8)
LYMPHOCYTES NFR BLD AUTO: 13.4 %
MCH RBC QN AUTO: 26.5 PG (ref 26–33)
MCHC RBC AUTO-ENTMCNC: 28.7 GM/DL (ref 32.2–35.5)
MCV RBC AUTO: 92.2 FL (ref 80–94)
MONOCYTES ABSOLUTE: 1.2 THOU/MM3 (ref 0.4–1.3)
MONOCYTES NFR BLD AUTO: 12.6 %
NEUTROPHILS NFR BLD AUTO: 71 %
NRBC BLD AUTO-RTO: 0 /100 WBC
PCO2 BLDA: 48 MMHG (ref 35–45)
PCO2 BLDA: 51 MMHG (ref 35–45)
PEEP SETTING VENT: 10 MMHG
PEEP SETTING VENT: 12 MMHG
PH BLDA: 7.41 [PH] (ref 7.35–7.45)
PH BLDA: 7.41 [PH] (ref 7.35–7.45)
PIP: 26 CMH2O
PIP: 30 CMH2O
PLATELET # BLD AUTO: 290 THOU/MM3 (ref 130–400)
PMV BLD AUTO: 11.4 FL (ref 9.4–12.4)
PO2 BLDA: 153 MMHG (ref 71–104)
PO2 BLDA: 88 MMHG (ref 71–104)
POTASSIUM SERPL-SCNC: 4.1 MEQ/L (ref 3.5–5.2)
PROT SERPL-MCNC: 5.8 G/DL (ref 6.1–8)
RBC # BLD AUTO: 4.72 MILL/MM3 (ref 4.7–6.1)
SAO2 % BLDA: 97 %
SAO2 % BLDA: 99 %
SCAN OF BLOOD SMEAR: NORMAL
SEGMENTED NEUTROPHILS ABSOLUTE COUNT: 6.9 THOU/MM3 (ref 1.8–7.7)
SODIUM SERPL-SCNC: 142 MEQ/L (ref 135–145)
TRIGL SERPL-MCNC: 127 MG/DL (ref 0–199)
VENTILATION MODE VENT: ABNORMAL
VENTILATION MODE VENT: ABNORMAL
WBC # BLD AUTO: 9.7 THOU/MM3 (ref 4.8–10.8)

## 2024-02-12 PROCEDURE — 6370000000 HC RX 637 (ALT 250 FOR IP): Performed by: INTERNAL MEDICINE

## 2024-02-12 PROCEDURE — 94640 AIRWAY INHALATION TREATMENT: CPT

## 2024-02-12 PROCEDURE — 85025 COMPLETE CBC W/AUTO DIFF WBC: CPT

## 2024-02-12 PROCEDURE — C9113 INJ PANTOPRAZOLE SODIUM, VIA: HCPCS | Performed by: INTERNAL MEDICINE

## 2024-02-12 PROCEDURE — 6360000002 HC RX W HCPCS: Performed by: STUDENT IN AN ORGANIZED HEALTH CARE EDUCATION/TRAINING PROGRAM

## 2024-02-12 PROCEDURE — 2700000000 HC OXYGEN THERAPY PER DAY

## 2024-02-12 PROCEDURE — 94761 N-INVAS EAR/PLS OXIMETRY MLT: CPT

## 2024-02-12 PROCEDURE — 6370000000 HC RX 637 (ALT 250 FOR IP): Performed by: STUDENT IN AN ORGANIZED HEALTH CARE EDUCATION/TRAINING PROGRAM

## 2024-02-12 PROCEDURE — 2580000003 HC RX 258

## 2024-02-12 PROCEDURE — 6370000000 HC RX 637 (ALT 250 FOR IP)

## 2024-02-12 PROCEDURE — 2500000003 HC RX 250 WO HCPCS

## 2024-02-12 PROCEDURE — 94003 VENT MGMT INPAT SUBQ DAY: CPT

## 2024-02-12 PROCEDURE — 36600 WITHDRAWAL OF ARTERIAL BLOOD: CPT

## 2024-02-12 PROCEDURE — 84478 ASSAY OF TRIGLYCERIDES: CPT

## 2024-02-12 PROCEDURE — 80053 COMPREHEN METABOLIC PANEL: CPT

## 2024-02-12 PROCEDURE — 6360000002 HC RX W HCPCS

## 2024-02-12 PROCEDURE — 36415 COLL VENOUS BLD VENIPUNCTURE: CPT

## 2024-02-12 PROCEDURE — 6360000002 HC RX W HCPCS: Performed by: INTERNAL MEDICINE

## 2024-02-12 PROCEDURE — 99291 CRITICAL CARE FIRST HOUR: CPT | Performed by: INTERNAL MEDICINE

## 2024-02-12 PROCEDURE — 82803 BLOOD GASES ANY COMBINATION: CPT

## 2024-02-12 PROCEDURE — 2000000000 HC ICU R&B

## 2024-02-12 RX ADMIN — SODIUM CHLORIDE, PRESERVATIVE FREE 10 ML: 5 INJECTION INTRAVENOUS at 08:04

## 2024-02-12 RX ADMIN — Medication 200 MCG/HR: at 13:40

## 2024-02-12 RX ADMIN — PANTOPRAZOLE SODIUM 40 MG: 40 INJECTION, POWDER, FOR SOLUTION INTRAVENOUS at 08:04

## 2024-02-12 RX ADMIN — PROPOFOL 30 MCG/KG/MIN: 10 INJECTION, EMULSION INTRAVENOUS at 10:59

## 2024-02-12 RX ADMIN — Medication 17.6 MG: at 08:05

## 2024-02-12 RX ADMIN — Medication 200 MCG/HR: at 19:51

## 2024-02-12 RX ADMIN — CHLORHEXIDINE GLUCONATE 0.12% ORAL RINSE 15 ML: 1.2 LIQUID ORAL at 20:22

## 2024-02-12 RX ADMIN — Medication 200 MCG/HR: at 02:54

## 2024-02-12 RX ADMIN — DEXAMETHASONE SODIUM PHOSPHATE 10 MG: 4 INJECTION, SOLUTION INTRA-ARTICULAR; INTRALESIONAL; INTRAMUSCULAR; INTRAVENOUS; SOFT TISSUE at 08:13

## 2024-02-12 RX ADMIN — ALBUTEROL SULFATE 2.5 MG: 2.5 SOLUTION RESPIRATORY (INHALATION) at 20:17

## 2024-02-12 RX ADMIN — ALBUTEROL SULFATE 2.5 MG: 2.5 SOLUTION RESPIRATORY (INHALATION) at 08:51

## 2024-02-12 RX ADMIN — PROPOFOL 20 MCG/KG/MIN: 10 INJECTION, EMULSION INTRAVENOUS at 16:22

## 2024-02-12 RX ADMIN — BUMETANIDE 1 MG: 0.25 INJECTION, SOLUTION INTRAMUSCULAR; INTRAVENOUS at 08:41

## 2024-02-12 RX ADMIN — Medication 7 MG/HR: at 13:42

## 2024-02-12 RX ADMIN — BUMETANIDE 1 MG: 0.25 INJECTION, SOLUTION INTRAMUSCULAR; INTRAVENOUS at 20:22

## 2024-02-12 RX ADMIN — ASPIRIN 81 MG 81 MG: 81 TABLET ORAL at 08:19

## 2024-02-12 RX ADMIN — ENOXAPARIN SODIUM 40 MG: 100 INJECTION SUBCUTANEOUS at 08:04

## 2024-02-12 RX ADMIN — CHLORHEXIDINE GLUCONATE 0.12% ORAL RINSE 15 ML: 1.2 LIQUID ORAL at 07:50

## 2024-02-12 RX ADMIN — TIOTROPIUM BROMIDE AND OLODATEROL 2 PUFF: 3.124; 2.736 SPRAY, METERED RESPIRATORY (INHALATION) at 08:50

## 2024-02-12 RX ADMIN — Medication 200 MCG/HR: at 08:02

## 2024-02-12 RX ADMIN — SODIUM CHLORIDE, PRESERVATIVE FREE 10 ML: 5 INJECTION INTRAVENOUS at 20:22

## 2024-02-12 RX ADMIN — Medication 17.6 MG: at 20:21

## 2024-02-12 RX ADMIN — PROPOFOL 30 MCG/KG/MIN: 10 INJECTION, EMULSION INTRAVENOUS at 02:55

## 2024-02-12 ASSESSMENT — PULMONARY FUNCTION TESTS
PIF_VALUE: 29
PIF_VALUE: 28
PIF_VALUE: 25
PIF_VALUE: 25

## 2024-02-12 NOTE — FLOWSHEET NOTE
02/12/24 1300   Vitals   Pulse 60   Heart Rate Source Monitor   Respirations 16   /66   MAP (Calculated) 82   MAP (mmHg) 80   Oxygen Therapy   SpO2 98 %   O2 Device Ventilator   FiO2  70 %     Patient supine with RNs and RTs at bedside, patient tolerated, vitals as above.

## 2024-02-13 ENCOUNTER — APPOINTMENT (OUTPATIENT)
Dept: GENERAL RADIOLOGY | Age: 54
DRG: 130 | End: 2024-02-13
Payer: MEDICAID

## 2024-02-13 LAB
ANION GAP SERPL CALC-SCNC: 9 MEQ/L (ref 8–16)
BUN SERPL-MCNC: 26 MG/DL (ref 7–22)
CALCIUM SERPL-MCNC: 8.7 MG/DL (ref 8.5–10.5)
CHLORIDE SERPL-SCNC: 102 MEQ/L (ref 98–111)
CO2 SERPL-SCNC: 31 MEQ/L (ref 23–33)
CREAT SERPL-MCNC: 0.5 MG/DL (ref 0.4–1.2)
DEPRECATED RDW RBC AUTO: 71.9 FL (ref 35–45)
ERYTHROCYTE [DISTWIDTH] IN BLOOD BY AUTOMATED COUNT: 21.2 % (ref 11.5–14.5)
GFR SERPL CREATININE-BSD FRML MDRD: > 60 ML/MIN/1.73M2
GLUCOSE SERPL-MCNC: 94 MG/DL (ref 70–108)
HCT VFR BLD AUTO: 44 % (ref 42–52)
HGB BLD-MCNC: 12.5 GM/DL (ref 14–18)
MCH RBC QN AUTO: 26.2 PG (ref 26–33)
MCHC RBC AUTO-ENTMCNC: 28.4 GM/DL (ref 32.2–35.5)
MCV RBC AUTO: 92.2 FL (ref 80–94)
PLATELET # BLD AUTO: 294 THOU/MM3 (ref 130–400)
PMV BLD AUTO: 11.4 FL (ref 9.4–12.4)
POTASSIUM SERPL-SCNC: 4.1 MEQ/L (ref 3.5–5.2)
RBC # BLD AUTO: 4.77 MILL/MM3 (ref 4.7–6.1)
SODIUM SERPL-SCNC: 142 MEQ/L (ref 135–145)
TRIGL SERPL-MCNC: 91 MG/DL (ref 0–199)
WBC # BLD AUTO: 8.9 THOU/MM3 (ref 4.8–10.8)

## 2024-02-13 PROCEDURE — 6370000000 HC RX 637 (ALT 250 FOR IP)

## 2024-02-13 PROCEDURE — 6360000002 HC RX W HCPCS

## 2024-02-13 PROCEDURE — 74018 RADEX ABDOMEN 1 VIEW: CPT

## 2024-02-13 PROCEDURE — 80048 BASIC METABOLIC PNL TOTAL CA: CPT

## 2024-02-13 PROCEDURE — 94761 N-INVAS EAR/PLS OXIMETRY MLT: CPT

## 2024-02-13 PROCEDURE — 2500000003 HC RX 250 WO HCPCS

## 2024-02-13 PROCEDURE — 85027 COMPLETE CBC AUTOMATED: CPT

## 2024-02-13 PROCEDURE — 93005 ELECTROCARDIOGRAM TRACING: CPT | Performed by: INTERNAL MEDICINE

## 2024-02-13 PROCEDURE — 2580000003 HC RX 258

## 2024-02-13 PROCEDURE — C9113 INJ PANTOPRAZOLE SODIUM, VIA: HCPCS | Performed by: INTERNAL MEDICINE

## 2024-02-13 PROCEDURE — 99291 CRITICAL CARE FIRST HOUR: CPT | Performed by: INTERNAL MEDICINE

## 2024-02-13 PROCEDURE — 36415 COLL VENOUS BLD VENIPUNCTURE: CPT

## 2024-02-13 PROCEDURE — 6370000000 HC RX 637 (ALT 250 FOR IP): Performed by: STUDENT IN AN ORGANIZED HEALTH CARE EDUCATION/TRAINING PROGRAM

## 2024-02-13 PROCEDURE — 6360000002 HC RX W HCPCS: Performed by: STUDENT IN AN ORGANIZED HEALTH CARE EDUCATION/TRAINING PROGRAM

## 2024-02-13 PROCEDURE — 6360000002 HC RX W HCPCS: Performed by: INTERNAL MEDICINE

## 2024-02-13 PROCEDURE — 71045 X-RAY EXAM CHEST 1 VIEW: CPT

## 2024-02-13 PROCEDURE — 6370000000 HC RX 637 (ALT 250 FOR IP): Performed by: INTERNAL MEDICINE

## 2024-02-13 PROCEDURE — 84478 ASSAY OF TRIGLYCERIDES: CPT

## 2024-02-13 PROCEDURE — 2000000000 HC ICU R&B

## 2024-02-13 PROCEDURE — 94003 VENT MGMT INPAT SUBQ DAY: CPT

## 2024-02-13 PROCEDURE — 93010 ELECTROCARDIOGRAM REPORT: CPT | Performed by: INTERNAL MEDICINE

## 2024-02-13 PROCEDURE — 2700000000 HC OXYGEN THERAPY PER DAY

## 2024-02-13 PROCEDURE — 94640 AIRWAY INHALATION TREATMENT: CPT

## 2024-02-13 RX ORDER — PROPOFOL 10 MG/ML
5-50 INJECTION, EMULSION INTRAVENOUS CONTINUOUS
Status: DISCONTINUED | OUTPATIENT
Start: 2024-02-13 | End: 2024-02-15

## 2024-02-13 RX ORDER — FENTANYL CITRATE-0.9 % NACL/PF 10 MCG/ML
25-200 PLASTIC BAG, INJECTION (ML) INTRAVENOUS CONTINUOUS
Status: DISCONTINUED | OUTPATIENT
Start: 2024-02-13 | End: 2024-02-16

## 2024-02-13 RX ORDER — DEXMEDETOMIDINE HYDROCHLORIDE 4 UG/ML
.1-1.5 INJECTION, SOLUTION INTRAVENOUS CONTINUOUS
Status: DISCONTINUED | OUTPATIENT
Start: 2024-02-13 | End: 2024-02-17

## 2024-02-13 RX ORDER — MIDAZOLAM HYDROCHLORIDE 1 MG/ML
1-10 INJECTION, SOLUTION INTRAVENOUS CONTINUOUS
Status: DISCONTINUED | OUTPATIENT
Start: 2024-02-13 | End: 2024-02-16

## 2024-02-13 RX ADMIN — Medication 4 MG/HR: at 05:22

## 2024-02-13 RX ADMIN — ALBUTEROL SULFATE 2.5 MG: 2.5 SOLUTION RESPIRATORY (INHALATION) at 19:46

## 2024-02-13 RX ADMIN — ASPIRIN 81 MG 81 MG: 81 TABLET ORAL at 08:45

## 2024-02-13 RX ADMIN — Medication 1.5 MCG/KG/HR: at 20:52

## 2024-02-13 RX ADMIN — Medication 175 MCG/HR: at 05:21

## 2024-02-13 RX ADMIN — Medication 1.5 MCG/KG/HR: at 17:16

## 2024-02-13 RX ADMIN — SODIUM CHLORIDE, PRESERVATIVE FREE 10 ML: 5 INJECTION INTRAVENOUS at 08:46

## 2024-02-13 RX ADMIN — Medication 200 MCG/HR: at 00:16

## 2024-02-13 RX ADMIN — Medication 1.4 MCG/KG/HR: at 13:56

## 2024-02-13 RX ADMIN — CHLORHEXIDINE GLUCONATE 0.12% ORAL RINSE 15 ML: 1.2 LIQUID ORAL at 19:59

## 2024-02-13 RX ADMIN — Medication 150 MCG/HR: at 23:31

## 2024-02-13 RX ADMIN — Medication 17.6 MG: at 08:45

## 2024-02-13 RX ADMIN — SODIUM CHLORIDE, PRESERVATIVE FREE 10 ML: 5 INJECTION INTRAVENOUS at 19:59

## 2024-02-13 RX ADMIN — NALOXEGOL OXALATE 12.5 MG: 12.5 TABLET, FILM COATED ORAL at 22:40

## 2024-02-13 RX ADMIN — BUMETANIDE 1 MG: 0.25 INJECTION, SOLUTION INTRAMUSCULAR; INTRAVENOUS at 19:59

## 2024-02-13 RX ADMIN — Medication 175 MCG/HR: at 11:50

## 2024-02-13 RX ADMIN — PROPOFOL 15 MCG/KG/MIN: 10 INJECTION, EMULSION INTRAVENOUS at 01:31

## 2024-02-13 RX ADMIN — BUMETANIDE 1 MG: 0.25 INJECTION, SOLUTION INTRAMUSCULAR; INTRAVENOUS at 07:57

## 2024-02-13 RX ADMIN — POLYETHYLENE GLYCOL 3350 17 G: 17 POWDER, FOR SOLUTION ORAL at 15:27

## 2024-02-13 RX ADMIN — PANTOPRAZOLE SODIUM 40 MG: 40 INJECTION, POWDER, FOR SOLUTION INTRAVENOUS at 08:46

## 2024-02-13 RX ADMIN — DEXAMETHASONE SODIUM PHOSPHATE 10 MG: 4 INJECTION, SOLUTION INTRA-ARTICULAR; INTRALESIONAL; INTRAMUSCULAR; INTRAVENOUS; SOFT TISSUE at 08:46

## 2024-02-13 RX ADMIN — Medication 0.2 MCG/KG/HR: at 09:48

## 2024-02-13 RX ADMIN — TIOTROPIUM BROMIDE AND OLODATEROL 2 PUFF: 3.124; 2.736 SPRAY, METERED RESPIRATORY (INHALATION) at 07:47

## 2024-02-13 RX ADMIN — ENOXAPARIN SODIUM 40 MG: 100 INJECTION SUBCUTANEOUS at 09:01

## 2024-02-13 RX ADMIN — Medication 175 MCG/HR: at 17:15

## 2024-02-13 RX ADMIN — ALBUTEROL SULFATE 2.5 MG: 2.5 SOLUTION RESPIRATORY (INHALATION) at 07:46

## 2024-02-13 RX ADMIN — CHLORHEXIDINE GLUCONATE 0.12% ORAL RINSE 15 ML: 1.2 LIQUID ORAL at 09:01

## 2024-02-13 RX ADMIN — Medication 17.6 MG: at 19:59

## 2024-02-13 ASSESSMENT — PULMONARY FUNCTION TESTS
PIF_VALUE: 21
PIF_VALUE: 24
PIF_VALUE: 23
PIF_VALUE: 27
PIF_VALUE: 19
PIF_VALUE: 24

## 2024-02-13 NOTE — CARE COORDINATION
2/13/24, 3:04 PM EST    DISCHARGE ON GOING EVALUATION    Fredo CANO [facility] day: 9  Location: -16/016-A Reason for admit: Cellulitis [L03.90]  Abdominal wall cellulitis [L03.311]  Increased ammonia level [R79.89]  Cellulitis of lower extremity, unspecified laterality [L03.119]   Procedure:   2/3 CXR: Moderate cardiomegaly with findings of moderate interstitial and   alveolar edema.  2/4 CT Abd/pelvis: Right ventral lateral abdominal wall inflammatory changes consistent with the provided diagnosis of cellulitis. No evidence of abscess or acute   finding within the peritoneal compartment.  2/4 US Scrotum/testicles: Small bilateral hydroceles. Otherwise normal scrotal ultrasound   2/4 US Liver: There is mild sludge and a gallstone present within the gallbladder lumen. No pericholecystic fluid or gallbladder wall thickening is seen however. The common bile duct is normal in caliber.   2/4 Intubated  2/4 CVC RIJ  2/4 CTA Chest: No CT evidence of pulmonary embolus; Right lower lobe collapse. Segmental atelectasis in the right upper lobe; Mild emphysema; Small bilateral pleural effusions.  2/6 Pronation therapy initiated  2/9 CT Chest: Left lower lobe collapse.  Right lower lobe consolidation, which could be due to pneumonia. Small bilateral pleural effusions.  2/11 ETT exchanged d/t cuff leak  2/13 Pronation therapy stopped  2/13 CXR: 1. Moderate cardiomegaly. NG tube, ET tube, right jugular central line all remain in good position.  2. Tiny bilateral pleural effusions. Moderate bibasilar atelectasis/pneumonia, worse on the left.  3. No significant change from prior.    Barriers to Discharge: ETT exchanged on 2/11 d/t cuff leak. Antibiotics stopped yesterday. Pronation therapy stopped today. Weaning sedation, on SBT.     Remains on vent w/ETT on SBT, FIO2 40%, sats 92%. Tmax 99.3. NSR. Follows commands x4. SLP/PT/OT. Dietitian following. Telemetry, CVC, OG w/TF, blakely. Precedex @ 1.5 mcg/kg/hr, Fentanyl @

## 2024-02-14 ENCOUNTER — APPOINTMENT (OUTPATIENT)
Dept: GENERAL RADIOLOGY | Age: 54
DRG: 130 | End: 2024-02-14
Payer: MEDICAID

## 2024-02-14 LAB
ANION GAP SERPL CALC-SCNC: 10 MEQ/L (ref 8–16)
ARTERIAL PATENCY WRIST A: POSITIVE
BASE EXCESS BLDA CALC-SCNC: 7.8 MMOL/L (ref -2.5–2.5)
BDY SITE: ABNORMAL
BREATHS SETTING VENT: 12 BPM
BUN SERPL-MCNC: 24 MG/DL (ref 7–22)
CALCIUM SERPL-MCNC: 9 MG/DL (ref 8.5–10.5)
CHLORIDE SERPL-SCNC: 100 MEQ/L (ref 98–111)
CO2 SERPL-SCNC: 33 MEQ/L (ref 23–33)
COLLECTED BY:: ABNORMAL
CREAT SERPL-MCNC: 0.4 MG/DL (ref 0.4–1.2)
DEPRECATED RDW RBC AUTO: 68.5 FL (ref 35–45)
DEVICE: ABNORMAL
EKG ATRIAL RATE: 66 BPM
EKG P AXIS: 50 DEGREES
EKG P-R INTERVAL: 156 MS
EKG Q-T INTERVAL: 392 MS
EKG QRS DURATION: 100 MS
EKG QTC CALCULATION (BAZETT): 410 MS
EKG R AXIS: 46 DEGREES
EKG T AXIS: 23 DEGREES
EKG VENTRICULAR RATE: 66 BPM
ERYTHROCYTE [DISTWIDTH] IN BLOOD BY AUTOMATED COUNT: 20.4 % (ref 11.5–14.5)
FIO2 ON VENT O2 ANALYZER: 40 %
GFR SERPL CREATININE-BSD FRML MDRD: > 60 ML/MIN/1.73M2
GLUCOSE SERPL-MCNC: 106 MG/DL (ref 70–108)
HCO3 BLDA-SCNC: 35 MMOL/L (ref 23–28)
HCT VFR BLD AUTO: 44.9 % (ref 42–52)
HGB BLD-MCNC: 13 GM/DL (ref 14–18)
MCH RBC QN AUTO: 26.6 PG (ref 26–33)
MCHC RBC AUTO-ENTMCNC: 29 GM/DL (ref 32.2–35.5)
MCV RBC AUTO: 92 FL (ref 80–94)
PCO2 BLDA: 54 MMHG (ref 35–45)
PEEP SETTING VENT: 10 MMHG
PH BLDA: 7.41 [PH] (ref 7.35–7.45)
PIP: 28 CMH2O
PLATELET # BLD AUTO: 330 THOU/MM3 (ref 130–400)
PMV BLD AUTO: 11.8 FL (ref 9.4–12.4)
PO2 BLDA: 76 MMHG (ref 71–104)
POTASSIUM SERPL-SCNC: 4.3 MEQ/L (ref 3.5–5.2)
PRESSURE SUPPORT SETTING VENT: 18 CMH2O
RBC # BLD AUTO: 4.88 MILL/MM3 (ref 4.7–6.1)
SAO2 % BLDA: 95 %
SODIUM SERPL-SCNC: 143 MEQ/L (ref 135–145)
VENTILATION MODE VENT: ABNORMAL
WBC # BLD AUTO: 11 THOU/MM3 (ref 4.8–10.8)

## 2024-02-14 PROCEDURE — 94003 VENT MGMT INPAT SUBQ DAY: CPT

## 2024-02-14 PROCEDURE — 36415 COLL VENOUS BLD VENIPUNCTURE: CPT

## 2024-02-14 PROCEDURE — 97167 OT EVAL HIGH COMPLEX 60 MIN: CPT

## 2024-02-14 PROCEDURE — 94640 AIRWAY INHALATION TREATMENT: CPT

## 2024-02-14 PROCEDURE — 2700000000 HC OXYGEN THERAPY PER DAY

## 2024-02-14 PROCEDURE — 97535 SELF CARE MNGMENT TRAINING: CPT

## 2024-02-14 PROCEDURE — 92523 SPEECH SOUND LANG COMPREHEN: CPT

## 2024-02-14 PROCEDURE — 6360000002 HC RX W HCPCS

## 2024-02-14 PROCEDURE — 97530 THERAPEUTIC ACTIVITIES: CPT

## 2024-02-14 PROCEDURE — 6360000002 HC RX W HCPCS: Performed by: INTERNAL MEDICINE

## 2024-02-14 PROCEDURE — 2580000003 HC RX 258

## 2024-02-14 PROCEDURE — 99291 CRITICAL CARE FIRST HOUR: CPT | Performed by: INTERNAL MEDICINE

## 2024-02-14 PROCEDURE — 85027 COMPLETE CBC AUTOMATED: CPT

## 2024-02-14 PROCEDURE — 6370000000 HC RX 637 (ALT 250 FOR IP)

## 2024-02-14 PROCEDURE — 89220 SPUTUM SPECIMEN COLLECTION: CPT

## 2024-02-14 PROCEDURE — 71045 X-RAY EXAM CHEST 1 VIEW: CPT

## 2024-02-14 PROCEDURE — 2500000003 HC RX 250 WO HCPCS

## 2024-02-14 PROCEDURE — 97163 PT EVAL HIGH COMPLEX 45 MIN: CPT

## 2024-02-14 PROCEDURE — 36600 WITHDRAWAL OF ARTERIAL BLOOD: CPT

## 2024-02-14 PROCEDURE — 6360000002 HC RX W HCPCS: Performed by: STUDENT IN AN ORGANIZED HEALTH CARE EDUCATION/TRAINING PROGRAM

## 2024-02-14 PROCEDURE — 6370000000 HC RX 637 (ALT 250 FOR IP): Performed by: INTERNAL MEDICINE

## 2024-02-14 PROCEDURE — 82803 BLOOD GASES ANY COMBINATION: CPT

## 2024-02-14 PROCEDURE — C9113 INJ PANTOPRAZOLE SODIUM, VIA: HCPCS | Performed by: INTERNAL MEDICINE

## 2024-02-14 PROCEDURE — 2000000000 HC ICU R&B

## 2024-02-14 PROCEDURE — 92507 TX SP LANG VOICE COMM INDIV: CPT

## 2024-02-14 PROCEDURE — 80048 BASIC METABOLIC PNL TOTAL CA: CPT

## 2024-02-14 PROCEDURE — 94761 N-INVAS EAR/PLS OXIMETRY MLT: CPT

## 2024-02-14 PROCEDURE — 97110 THERAPEUTIC EXERCISES: CPT

## 2024-02-14 RX ADMIN — Medication 1.5 MCG/KG/HR: at 06:31

## 2024-02-14 RX ADMIN — Medication 1.2 MCG/KG/HR: at 18:40

## 2024-02-14 RX ADMIN — Medication 1.2 MCG/KG/HR: at 14:25

## 2024-02-14 RX ADMIN — PANTOPRAZOLE SODIUM 40 MG: 40 INJECTION, POWDER, FOR SOLUTION INTRAVENOUS at 07:47

## 2024-02-14 RX ADMIN — SODIUM CHLORIDE, PRESERVATIVE FREE 10 ML: 5 INJECTION INTRAVENOUS at 07:54

## 2024-02-14 RX ADMIN — Medication 1.5 MCG/KG/HR: at 03:19

## 2024-02-14 RX ADMIN — Medication 6 MG/HR: at 04:20

## 2024-02-14 RX ADMIN — DEXAMETHASONE SODIUM PHOSPHATE 10 MG: 4 INJECTION, SOLUTION INTRA-ARTICULAR; INTRALESIONAL; INTRAMUSCULAR; INTRAVENOUS; SOFT TISSUE at 07:45

## 2024-02-14 RX ADMIN — NALOXEGOL OXALATE 12.5 MG: 12.5 TABLET, FILM COATED ORAL at 07:49

## 2024-02-14 RX ADMIN — ENOXAPARIN SODIUM 40 MG: 100 INJECTION SUBCUTANEOUS at 07:51

## 2024-02-14 RX ADMIN — SODIUM CHLORIDE, PRESERVATIVE FREE 10 ML: 5 INJECTION INTRAVENOUS at 20:09

## 2024-02-14 RX ADMIN — CHLORHEXIDINE GLUCONATE 0.12% ORAL RINSE 15 ML: 1.2 LIQUID ORAL at 07:37

## 2024-02-14 RX ADMIN — Medication 17.6 MG: at 20:06

## 2024-02-14 RX ADMIN — TIOTROPIUM BROMIDE AND OLODATEROL 2 PUFF: 3.124; 2.736 SPRAY, METERED RESPIRATORY (INHALATION) at 08:30

## 2024-02-14 RX ADMIN — Medication 1.5 MCG/KG/HR: at 00:11

## 2024-02-14 RX ADMIN — BUMETANIDE 1 MG: 0.25 INJECTION, SOLUTION INTRAMUSCULAR; INTRAVENOUS at 07:51

## 2024-02-14 RX ADMIN — Medication 1.2 MCG/KG/HR: at 10:14

## 2024-02-14 RX ADMIN — BUMETANIDE 1 MG: 0.25 INJECTION, SOLUTION INTRAMUSCULAR; INTRAVENOUS at 20:09

## 2024-02-14 RX ADMIN — ALBUTEROL SULFATE 2.5 MG: 2.5 SOLUTION RESPIRATORY (INHALATION) at 18:21

## 2024-02-14 RX ADMIN — Medication 17.6 MG: at 07:49

## 2024-02-14 RX ADMIN — ASPIRIN 81 MG 81 MG: 81 TABLET ORAL at 07:49

## 2024-02-14 RX ADMIN — Medication 150 MCG/HR: at 06:41

## 2024-02-14 RX ADMIN — POLYETHYLENE GLYCOL 3350 17 G: 17 POWDER, FOR SOLUTION ORAL at 07:49

## 2024-02-14 RX ADMIN — CHLORHEXIDINE GLUCONATE 0.12% ORAL RINSE 15 ML: 1.2 LIQUID ORAL at 20:09

## 2024-02-14 RX ADMIN — Medication 1.2 MCG/KG/HR: at 22:56

## 2024-02-14 RX ADMIN — ALBUTEROL SULFATE 2.5 MG: 2.5 SOLUTION RESPIRATORY (INHALATION) at 08:35

## 2024-02-14 ASSESSMENT — PULMONARY FUNCTION TESTS
PIF_VALUE: 23
PIF_VALUE: 27
PIF_VALUE: 28
PIF_VALUE: 19
PIF_VALUE: 19
PIF_VALUE: 27

## 2024-02-14 ASSESSMENT — PAIN SCALES - GENERAL: PAINLEVEL_OUTOF10: 0

## 2024-02-15 ENCOUNTER — APPOINTMENT (OUTPATIENT)
Dept: GENERAL RADIOLOGY | Age: 54
DRG: 130 | End: 2024-02-15
Payer: MEDICAID

## 2024-02-15 LAB
ANION GAP SERPL CALC-SCNC: 11 MEQ/L (ref 8–16)
BUN SERPL-MCNC: 22 MG/DL (ref 7–22)
CALCIUM SERPL-MCNC: 9.2 MG/DL (ref 8.5–10.5)
CHLORIDE SERPL-SCNC: 98 MEQ/L (ref 98–111)
CO2 SERPL-SCNC: 34 MEQ/L (ref 23–33)
CREAT SERPL-MCNC: 0.5 MG/DL (ref 0.4–1.2)
DEPRECATED RDW RBC AUTO: 66.7 FL (ref 35–45)
ERYTHROCYTE [DISTWIDTH] IN BLOOD BY AUTOMATED COUNT: 20.2 % (ref 11.5–14.5)
GFR SERPL CREATININE-BSD FRML MDRD: > 60 ML/MIN/1.73M2
GLUCOSE SERPL-MCNC: 111 MG/DL (ref 70–108)
HCT VFR BLD AUTO: 46.1 % (ref 42–52)
HGB BLD-MCNC: 13.3 GM/DL (ref 14–18)
MCH RBC QN AUTO: 26.5 PG (ref 26–33)
MCHC RBC AUTO-ENTMCNC: 28.9 GM/DL (ref 32.2–35.5)
MCV RBC AUTO: 91.8 FL (ref 80–94)
PLATELET # BLD AUTO: 359 THOU/MM3 (ref 130–400)
PMV BLD AUTO: 11.9 FL (ref 9.4–12.4)
POTASSIUM SERPL-SCNC: 3.9 MEQ/L (ref 3.5–5.2)
RBC # BLD AUTO: 5.02 MILL/MM3 (ref 4.7–6.1)
SODIUM SERPL-SCNC: 143 MEQ/L (ref 135–145)
WBC # BLD AUTO: 10.1 THOU/MM3 (ref 4.8–10.8)

## 2024-02-15 PROCEDURE — 74018 RADEX ABDOMEN 1 VIEW: CPT

## 2024-02-15 PROCEDURE — 6370000000 HC RX 637 (ALT 250 FOR IP): Performed by: INTERNAL MEDICINE

## 2024-02-15 PROCEDURE — 94640 AIRWAY INHALATION TREATMENT: CPT

## 2024-02-15 PROCEDURE — 2580000003 HC RX 258

## 2024-02-15 PROCEDURE — 36415 COLL VENOUS BLD VENIPUNCTURE: CPT

## 2024-02-15 PROCEDURE — 94003 VENT MGMT INPAT SUBQ DAY: CPT

## 2024-02-15 PROCEDURE — 97530 THERAPEUTIC ACTIVITIES: CPT

## 2024-02-15 PROCEDURE — 6360000002 HC RX W HCPCS: Performed by: STUDENT IN AN ORGANIZED HEALTH CARE EDUCATION/TRAINING PROGRAM

## 2024-02-15 PROCEDURE — 6370000000 HC RX 637 (ALT 250 FOR IP)

## 2024-02-15 PROCEDURE — 6360000002 HC RX W HCPCS

## 2024-02-15 PROCEDURE — 97535 SELF CARE MNGMENT TRAINING: CPT

## 2024-02-15 PROCEDURE — 80048 BASIC METABOLIC PNL TOTAL CA: CPT

## 2024-02-15 PROCEDURE — 2000000000 HC ICU R&B

## 2024-02-15 PROCEDURE — 94761 N-INVAS EAR/PLS OXIMETRY MLT: CPT

## 2024-02-15 PROCEDURE — 85027 COMPLETE CBC AUTOMATED: CPT

## 2024-02-15 PROCEDURE — 2500000003 HC RX 250 WO HCPCS

## 2024-02-15 PROCEDURE — C9113 INJ PANTOPRAZOLE SODIUM, VIA: HCPCS | Performed by: INTERNAL MEDICINE

## 2024-02-15 PROCEDURE — 99291 CRITICAL CARE FIRST HOUR: CPT | Performed by: INTERNAL MEDICINE

## 2024-02-15 PROCEDURE — 6360000002 HC RX W HCPCS: Performed by: INTERNAL MEDICINE

## 2024-02-15 PROCEDURE — 2700000000 HC OXYGEN THERAPY PER DAY

## 2024-02-15 PROCEDURE — 89220 SPUTUM SPECIMEN COLLECTION: CPT

## 2024-02-15 RX ADMIN — Medication 1.2 MCG/KG/HR: at 03:14

## 2024-02-15 RX ADMIN — ASPIRIN 81 MG 81 MG: 81 TABLET ORAL at 08:18

## 2024-02-15 RX ADMIN — BUMETANIDE 1 MG: 0.25 INJECTION, SOLUTION INTRAMUSCULAR; INTRAVENOUS at 21:38

## 2024-02-15 RX ADMIN — Medication 0.8 MCG/KG/HR: at 15:21

## 2024-02-15 RX ADMIN — Medication 17.6 MG: at 21:39

## 2024-02-15 RX ADMIN — SODIUM CHLORIDE, PRESERVATIVE FREE 10 ML: 5 INJECTION INTRAVENOUS at 21:38

## 2024-02-15 RX ADMIN — Medication 75 MCG/HR: at 01:09

## 2024-02-15 RX ADMIN — POLYETHYLENE GLYCOL 3350 17 G: 17 POWDER, FOR SOLUTION ORAL at 08:18

## 2024-02-15 RX ADMIN — Medication 0.4 MCG/KG/HR: at 23:27

## 2024-02-15 RX ADMIN — ALBUTEROL SULFATE 2.5 MG: 2.5 SOLUTION RESPIRATORY (INHALATION) at 07:44

## 2024-02-15 RX ADMIN — SODIUM CHLORIDE, PRESERVATIVE FREE 10 ML: 5 INJECTION INTRAVENOUS at 08:52

## 2024-02-15 RX ADMIN — ENOXAPARIN SODIUM 40 MG: 100 INJECTION SUBCUTANEOUS at 08:18

## 2024-02-15 RX ADMIN — CHLORHEXIDINE GLUCONATE 0.12% ORAL RINSE 15 ML: 1.2 LIQUID ORAL at 08:53

## 2024-02-15 RX ADMIN — ALBUTEROL SULFATE 2.5 MG: 2.5 SOLUTION RESPIRATORY (INHALATION) at 21:17

## 2024-02-15 RX ADMIN — NALOXEGOL OXALATE 12.5 MG: 12.5 TABLET, FILM COATED ORAL at 05:58

## 2024-02-15 RX ADMIN — Medication 1.2 MCG/KG/HR: at 11:32

## 2024-02-15 RX ADMIN — METHYLNALTREXONE BROMIDE 12 MG: 12 INJECTION, SOLUTION SUBCUTANEOUS at 17:49

## 2024-02-15 RX ADMIN — Medication 1.2 MCG/KG/HR: at 07:43

## 2024-02-15 RX ADMIN — TIOTROPIUM BROMIDE AND OLODATEROL 2 PUFF: 3.124; 2.736 SPRAY, METERED RESPIRATORY (INHALATION) at 07:44

## 2024-02-15 RX ADMIN — Medication 17.6 MG: at 08:25

## 2024-02-15 RX ADMIN — PANTOPRAZOLE SODIUM 40 MG: 40 INJECTION, POWDER, FOR SOLUTION INTRAVENOUS at 08:35

## 2024-02-15 RX ADMIN — BUMETANIDE 1 MG: 0.25 INJECTION, SOLUTION INTRAMUSCULAR; INTRAVENOUS at 08:38

## 2024-02-15 RX ADMIN — DEXAMETHASONE SODIUM PHOSPHATE 10 MG: 4 INJECTION, SOLUTION INTRA-ARTICULAR; INTRALESIONAL; INTRAMUSCULAR; INTRAVENOUS; SOFT TISSUE at 08:46

## 2024-02-15 ASSESSMENT — PAIN SCALES - GENERAL
PAINLEVEL_OUTOF10: 0
PAINLEVEL_OUTOF10: 0

## 2024-02-15 ASSESSMENT — PULMONARY FUNCTION TESTS
PIF_VALUE: 17
PIF_VALUE: 21
PIF_VALUE: 21

## 2024-02-15 NOTE — CARE COORDINATION
2/15/24, 2:53 PM EST    DISCHARGE ON GOING EVALUATION    Fredo CANO [facility] day: 11  Location: 4D-16/016-A Reason for admit: Cellulitis [L03.90]  Abdominal wall cellulitis [L03.311]  Increased ammonia level [R79.89]  Cellulitis of lower extremity, unspecified laterality [L03.119]   Procedure:   2/3 CXR: Moderate cardiomegaly with findings of moderate interstitial and   alveolar edema.  2/4 CT Abd/pelvis: Right ventral lateral abdominal wall inflammatory changes consistent with the provided diagnosis of cellulitis. No evidence of abscess or acute   finding within the peritoneal compartment.  2/4 US Scrotum/testicles: Small bilateral hydroceles. Otherwise normal scrotal ultrasound   2/4 US Liver: There is mild sludge and a gallstone present within the gallbladder lumen. No pericholecystic fluid or gallbladder wall thickening is seen however. The common bile duct is normal in caliber.   2/4 Intubated  2/4 CVC RIJ  2/4 CTA Chest: No CT evidence of pulmonary embolus; Right lower lobe collapse. Segmental atelectasis in the right upper lobe; Mild emphysema; Small bilateral pleural effusions.  2/6 Pronation therapy initiated  2/9 CT Chest: Left lower lobe collapse.  Right lower lobe consolidation, which could be due to pneumonia. Small bilateral pleural effusions.  2/11 ETT exchanged d/t cuff leak  2/13 Pronation therapy stopped  2/13 KUB: Mild distention right and transverse colon, question ileus   2/15 KUB: Mild ileus involving colon as well as small bowel.     Barriers to Discharge: Some concern for ileus. TF were stopped yesterday; plan to restart today. Extubated this afternoon to HFNC.     Remains on HFNC, 40L, FIO2 45%, sats 96%. Tmax 99.1. SB 50's. Follows commands x4. SLP/PT/OT. Dietitian following. Telemetry, CVC, blakely. Precedex @ 1.2 mcg/kg/hr, nebs, asa, IV bumex 1 mg bid, peridex, lovenox, SQ relistor x1, IV protonix, glycolax, senna, inhaler, Electrolyte replacement protocols.      PCP: Camilo

## 2024-02-16 ENCOUNTER — APPOINTMENT (OUTPATIENT)
Dept: GENERAL RADIOLOGY | Age: 54
DRG: 130 | End: 2024-02-16
Payer: MEDICAID

## 2024-02-16 LAB
ANION GAP SERPL CALC-SCNC: 16 MEQ/L (ref 8–16)
BUN SERPL-MCNC: 25 MG/DL (ref 7–22)
CALCIUM SERPL-MCNC: 9.4 MG/DL (ref 8.5–10.5)
CHLORIDE SERPL-SCNC: 96 MEQ/L (ref 98–111)
CO2 SERPL-SCNC: 32 MEQ/L (ref 23–33)
CREAT SERPL-MCNC: 0.5 MG/DL (ref 0.4–1.2)
DEPRECATED RDW RBC AUTO: 66.3 FL (ref 35–45)
ERYTHROCYTE [DISTWIDTH] IN BLOOD BY AUTOMATED COUNT: 20.4 % (ref 11.5–14.5)
GFR SERPL CREATININE-BSD FRML MDRD: > 60 ML/MIN/1.73M2
GLUCOSE SERPL-MCNC: 85 MG/DL (ref 70–108)
HCT VFR BLD AUTO: 48.4 % (ref 42–52)
HGB BLD-MCNC: 14 GM/DL (ref 14–18)
MAGNESIUM SERPL-MCNC: 2.1 MG/DL (ref 1.6–2.4)
MCH RBC QN AUTO: 26.3 PG (ref 26–33)
MCHC RBC AUTO-ENTMCNC: 28.9 GM/DL (ref 32.2–35.5)
MCV RBC AUTO: 90.8 FL (ref 80–94)
PLATELET # BLD AUTO: 373 THOU/MM3 (ref 130–400)
PMV BLD AUTO: 11.3 FL (ref 9.4–12.4)
POTASSIUM SERPL-SCNC: 3.5 MEQ/L (ref 3.5–5.2)
RBC # BLD AUTO: 5.33 MILL/MM3 (ref 4.7–6.1)
SODIUM SERPL-SCNC: 144 MEQ/L (ref 135–145)
WBC # BLD AUTO: 11.7 THOU/MM3 (ref 4.8–10.8)

## 2024-02-16 PROCEDURE — 2700000000 HC OXYGEN THERAPY PER DAY

## 2024-02-16 PROCEDURE — 92612 ENDOSCOPY SWALLOW (FEES) VID: CPT

## 2024-02-16 PROCEDURE — 6360000002 HC RX W HCPCS: Performed by: INTERNAL MEDICINE

## 2024-02-16 PROCEDURE — 83735 ASSAY OF MAGNESIUM: CPT

## 2024-02-16 PROCEDURE — 6370000000 HC RX 637 (ALT 250 FOR IP)

## 2024-02-16 PROCEDURE — 2580000003 HC RX 258

## 2024-02-16 PROCEDURE — 97530 THERAPEUTIC ACTIVITIES: CPT

## 2024-02-16 PROCEDURE — 99291 CRITICAL CARE FIRST HOUR: CPT | Performed by: INTERNAL MEDICINE

## 2024-02-16 PROCEDURE — 6370000000 HC RX 637 (ALT 250 FOR IP): Performed by: INTERNAL MEDICINE

## 2024-02-16 PROCEDURE — 6360000002 HC RX W HCPCS

## 2024-02-16 PROCEDURE — 6360000002 HC RX W HCPCS: Performed by: STUDENT IN AN ORGANIZED HEALTH CARE EDUCATION/TRAINING PROGRAM

## 2024-02-16 PROCEDURE — 85027 COMPLETE CBC AUTOMATED: CPT

## 2024-02-16 PROCEDURE — 94761 N-INVAS EAR/PLS OXIMETRY MLT: CPT

## 2024-02-16 PROCEDURE — 94640 AIRWAY INHALATION TREATMENT: CPT

## 2024-02-16 PROCEDURE — 92610 EVALUATE SWALLOWING FUNCTION: CPT

## 2024-02-16 PROCEDURE — 80048 BASIC METABOLIC PNL TOTAL CA: CPT

## 2024-02-16 PROCEDURE — 71045 X-RAY EXAM CHEST 1 VIEW: CPT

## 2024-02-16 PROCEDURE — 2000000000 HC ICU R&B

## 2024-02-16 PROCEDURE — 36415 COLL VENOUS BLD VENIPUNCTURE: CPT

## 2024-02-16 PROCEDURE — C9113 INJ PANTOPRAZOLE SODIUM, VIA: HCPCS | Performed by: INTERNAL MEDICINE

## 2024-02-16 PROCEDURE — 97535 SELF CARE MNGMENT TRAINING: CPT

## 2024-02-16 PROCEDURE — 92526 ORAL FUNCTION THERAPY: CPT

## 2024-02-16 RX ORDER — POTASSIUM CHLORIDE 29.8 MG/ML
20 INJECTION INTRAVENOUS
Status: DISPENSED | OUTPATIENT
Start: 2024-02-16 | End: 2024-02-16

## 2024-02-16 RX ORDER — SENNOSIDES A AND B 8.6 MG/1
1 TABLET, FILM COATED ORAL 2 TIMES DAILY
Status: DISCONTINUED | OUTPATIENT
Start: 2024-02-16 | End: 2024-02-22 | Stop reason: HOSPADM

## 2024-02-16 RX ADMIN — SODIUM CHLORIDE, PRESERVATIVE FREE 10 ML: 5 INJECTION INTRAVENOUS at 23:39

## 2024-02-16 RX ADMIN — ALBUTEROL SULFATE 2.5 MG: 2.5 SOLUTION RESPIRATORY (INHALATION) at 09:19

## 2024-02-16 RX ADMIN — BUMETANIDE 1 MG: 0.25 INJECTION, SOLUTION INTRAMUSCULAR; INTRAVENOUS at 23:37

## 2024-02-16 RX ADMIN — POLYETHYLENE GLYCOL 3350 17 G: 17 POWDER, FOR SOLUTION ORAL at 12:38

## 2024-02-16 RX ADMIN — SODIUM CHLORIDE, PRESERVATIVE FREE 10 ML: 5 INJECTION INTRAVENOUS at 09:10

## 2024-02-16 RX ADMIN — PANTOPRAZOLE SODIUM 40 MG: 40 INJECTION, POWDER, FOR SOLUTION INTRAVENOUS at 09:01

## 2024-02-16 RX ADMIN — NALOXEGOL OXALATE 12.5 MG: 12.5 TABLET, FILM COATED ORAL at 12:38

## 2024-02-16 RX ADMIN — ENOXAPARIN SODIUM 40 MG: 100 INJECTION SUBCUTANEOUS at 09:14

## 2024-02-16 RX ADMIN — SENNOSIDES 8.6 MG: 8.6 TABLET, FILM COATED ORAL at 23:38

## 2024-02-16 RX ADMIN — ASPIRIN 81 MG 81 MG: 81 TABLET ORAL at 12:39

## 2024-02-16 RX ADMIN — SENNOSIDES 8.6 MG: 8.6 TABLET, FILM COATED ORAL at 12:39

## 2024-02-16 RX ADMIN — BUMETANIDE 1 MG: 0.25 INJECTION, SOLUTION INTRAMUSCULAR; INTRAVENOUS at 09:01

## 2024-02-16 RX ADMIN — CHLORHEXIDINE GLUCONATE 0.12% ORAL RINSE 15 ML: 1.2 LIQUID ORAL at 09:01

## 2024-02-16 RX ADMIN — ALBUTEROL SULFATE 2.5 MG: 2.5 SOLUTION RESPIRATORY (INHALATION) at 20:39

## 2024-02-16 RX ADMIN — POTASSIUM BICARBONATE 40 MEQ: 782 TABLET, EFFERVESCENT ORAL at 06:20

## 2024-02-17 ENCOUNTER — APPOINTMENT (OUTPATIENT)
Dept: GENERAL RADIOLOGY | Age: 54
DRG: 130 | End: 2024-02-17
Payer: MEDICAID

## 2024-02-17 LAB
ANION GAP SERPL CALC-SCNC: 12 MEQ/L (ref 8–16)
BUN SERPL-MCNC: 30 MG/DL (ref 7–22)
CALCIUM SERPL-MCNC: 9.3 MG/DL (ref 8.5–10.5)
CHLORIDE SERPL-SCNC: 95 MEQ/L (ref 98–111)
CO2 SERPL-SCNC: 36 MEQ/L (ref 23–33)
CREAT SERPL-MCNC: 0.6 MG/DL (ref 0.4–1.2)
DEPRECATED RDW RBC AUTO: 68.8 FL (ref 35–45)
ERYTHROCYTE [DISTWIDTH] IN BLOOD BY AUTOMATED COUNT: 20.7 % (ref 11.5–14.5)
GFR SERPL CREATININE-BSD FRML MDRD: > 60 ML/MIN/1.73M2
GLUCOSE SERPL-MCNC: 76 MG/DL (ref 70–108)
HCT VFR BLD AUTO: 52.1 % (ref 42–52)
HGB BLD-MCNC: 15.1 GM/DL (ref 14–18)
MCH RBC QN AUTO: 26.7 PG (ref 26–33)
MCHC RBC AUTO-ENTMCNC: 29 GM/DL (ref 32.2–35.5)
MCV RBC AUTO: 92.2 FL (ref 80–94)
PLATELET # BLD AUTO: 429 THOU/MM3 (ref 130–400)
PMV BLD AUTO: 12.3 FL (ref 9.4–12.4)
POTASSIUM SERPL-SCNC: 3.8 MEQ/L (ref 3.5–5.2)
RBC # BLD AUTO: 5.65 MILL/MM3 (ref 4.7–6.1)
SODIUM SERPL-SCNC: 143 MEQ/L (ref 135–145)
WBC # BLD AUTO: 12.5 THOU/MM3 (ref 4.8–10.8)

## 2024-02-17 PROCEDURE — 6360000002 HC RX W HCPCS: Performed by: INTERNAL MEDICINE

## 2024-02-17 PROCEDURE — 6370000000 HC RX 637 (ALT 250 FOR IP)

## 2024-02-17 PROCEDURE — 2700000000 HC OXYGEN THERAPY PER DAY

## 2024-02-17 PROCEDURE — 6360000002 HC RX W HCPCS

## 2024-02-17 PROCEDURE — C9113 INJ PANTOPRAZOLE SODIUM, VIA: HCPCS | Performed by: INTERNAL MEDICINE

## 2024-02-17 PROCEDURE — 71045 X-RAY EXAM CHEST 1 VIEW: CPT

## 2024-02-17 PROCEDURE — 80048 BASIC METABOLIC PNL TOTAL CA: CPT

## 2024-02-17 PROCEDURE — 6370000000 HC RX 637 (ALT 250 FOR IP): Performed by: STUDENT IN AN ORGANIZED HEALTH CARE EDUCATION/TRAINING PROGRAM

## 2024-02-17 PROCEDURE — 85027 COMPLETE CBC AUTOMATED: CPT

## 2024-02-17 PROCEDURE — 6360000002 HC RX W HCPCS: Performed by: STUDENT IN AN ORGANIZED HEALTH CARE EDUCATION/TRAINING PROGRAM

## 2024-02-17 PROCEDURE — 2000000000 HC ICU R&B

## 2024-02-17 PROCEDURE — 94640 AIRWAY INHALATION TREATMENT: CPT

## 2024-02-17 PROCEDURE — 2580000003 HC RX 258

## 2024-02-17 PROCEDURE — 36415 COLL VENOUS BLD VENIPUNCTURE: CPT

## 2024-02-17 PROCEDURE — 94761 N-INVAS EAR/PLS OXIMETRY MLT: CPT

## 2024-02-17 PROCEDURE — 94660 CPAP INITIATION&MGMT: CPT

## 2024-02-17 RX ORDER — ALBUTEROL SULFATE 2.5 MG/3ML
2.5 SOLUTION RESPIRATORY (INHALATION)
Status: DISCONTINUED | OUTPATIENT
Start: 2024-02-17 | End: 2024-02-17

## 2024-02-17 RX ADMIN — NALOXEGOL OXALATE 12.5 MG: 12.5 TABLET, FILM COATED ORAL at 08:20

## 2024-02-17 RX ADMIN — ALBUTEROL SULFATE 2.5 MG: 2.5 SOLUTION RESPIRATORY (INHALATION) at 07:49

## 2024-02-17 RX ADMIN — BUMETANIDE 1 MG: 0.25 INJECTION, SOLUTION INTRAMUSCULAR; INTRAVENOUS at 08:21

## 2024-02-17 RX ADMIN — SODIUM CHLORIDE, PRESERVATIVE FREE 10 ML: 5 INJECTION INTRAVENOUS at 20:12

## 2024-02-17 RX ADMIN — TIOTROPIUM BROMIDE AND OLODATEROL 2 PUFF: 3.124; 2.736 SPRAY, METERED RESPIRATORY (INHALATION) at 07:49

## 2024-02-17 RX ADMIN — POLYETHYLENE GLYCOL 3350 17 G: 17 POWDER, FOR SOLUTION ORAL at 08:21

## 2024-02-17 RX ADMIN — BUMETANIDE 1 MG: 0.25 INJECTION, SOLUTION INTRAMUSCULAR; INTRAVENOUS at 20:12

## 2024-02-17 RX ADMIN — PANTOPRAZOLE SODIUM 40 MG: 40 INJECTION, POWDER, FOR SOLUTION INTRAVENOUS at 08:21

## 2024-02-17 RX ADMIN — SODIUM CHLORIDE, PRESERVATIVE FREE 10 ML: 5 INJECTION INTRAVENOUS at 08:22

## 2024-02-17 RX ADMIN — ALBUTEROL SULFATE 2.5 MG: 2.5 SOLUTION RESPIRATORY (INHALATION) at 14:06

## 2024-02-17 RX ADMIN — ENOXAPARIN SODIUM 40 MG: 100 INJECTION SUBCUTANEOUS at 09:00

## 2024-02-17 RX ADMIN — ASPIRIN 81 MG 81 MG: 81 TABLET ORAL at 08:21

## 2024-02-17 RX ADMIN — SENNOSIDES 8.6 MG: 8.6 TABLET, FILM COATED ORAL at 20:12

## 2024-02-17 RX ADMIN — SENNOSIDES 8.6 MG: 8.6 TABLET, FILM COATED ORAL at 08:21

## 2024-02-17 ASSESSMENT — PAIN SCALES - GENERAL
PAINLEVEL_OUTOF10: 0

## 2024-02-18 ENCOUNTER — APPOINTMENT (OUTPATIENT)
Dept: GENERAL RADIOLOGY | Age: 54
DRG: 130 | End: 2024-02-18
Payer: MEDICAID

## 2024-02-18 LAB
ANION GAP SERPL CALC-SCNC: 16 MEQ/L (ref 8–16)
ANISOCYTOSIS BLD QL SMEAR: PRESENT
BASOPHILS ABSOLUTE: 0 THOU/MM3 (ref 0–0.1)
BASOPHILS NFR BLD AUTO: 0.2 %
BUN SERPL-MCNC: 35 MG/DL (ref 7–22)
CALCIUM SERPL-MCNC: 9.1 MG/DL (ref 8.5–10.5)
CHLORIDE SERPL-SCNC: 97 MEQ/L (ref 98–111)
CO2 SERPL-SCNC: 31 MEQ/L (ref 23–33)
CREAT SERPL-MCNC: 0.7 MG/DL (ref 0.4–1.2)
DEPRECATED RDW RBC AUTO: 68.9 FL (ref 35–45)
EOSINOPHIL NFR BLD AUTO: 1.8 %
EOSINOPHILS ABSOLUTE: 0.3 THOU/MM3 (ref 0–0.4)
ERYTHROCYTE [DISTWIDTH] IN BLOOD BY AUTOMATED COUNT: 20.2 % (ref 11.5–14.5)
GFR SERPL CREATININE-BSD FRML MDRD: > 60 ML/MIN/1.73M2
GLUCOSE SERPL-MCNC: 121 MG/DL (ref 70–108)
HCT VFR BLD AUTO: 49.7 % (ref 42–52)
HGB BLD-MCNC: 14.2 GM/DL (ref 14–18)
HYPOCHROMIA BLD QL SMEAR: PRESENT
IMM GRANULOCYTES # BLD AUTO: 0.09 THOU/MM3 (ref 0–0.07)
IMM GRANULOCYTES NFR BLD AUTO: 0.6 %
LYMPHOCYTES ABSOLUTE: 0.8 THOU/MM3 (ref 1–4.8)
LYMPHOCYTES NFR BLD AUTO: 5 %
MAGNESIUM SERPL-MCNC: 2.1 MG/DL (ref 1.6–2.4)
MCH RBC QN AUTO: 27 PG (ref 26–33)
MCHC RBC AUTO-ENTMCNC: 28.6 GM/DL (ref 32.2–35.5)
MCV RBC AUTO: 94.7 FL (ref 80–94)
MONOCYTES ABSOLUTE: 1.5 THOU/MM3 (ref 0.4–1.3)
MONOCYTES NFR BLD AUTO: 9.6 %
NEUTROPHILS NFR BLD AUTO: 82.8 %
NRBC BLD AUTO-RTO: 0 /100 WBC
PLATELET # BLD AUTO: 384 THOU/MM3 (ref 130–400)
PMV BLD AUTO: 11.9 FL (ref 9.4–12.4)
POTASSIUM SERPL-SCNC: 3.4 MEQ/L (ref 3.5–5.2)
RBC # BLD AUTO: 5.25 MILL/MM3 (ref 4.7–6.1)
SEGMENTED NEUTROPHILS ABSOLUTE COUNT: 13.3 THOU/MM3 (ref 1.8–7.7)
SODIUM SERPL-SCNC: 144 MEQ/L (ref 135–145)
WBC # BLD AUTO: 16.1 THOU/MM3 (ref 4.8–10.8)

## 2024-02-18 PROCEDURE — 94660 CPAP INITIATION&MGMT: CPT

## 2024-02-18 PROCEDURE — 6360000002 HC RX W HCPCS

## 2024-02-18 PROCEDURE — 83735 ASSAY OF MAGNESIUM: CPT

## 2024-02-18 PROCEDURE — 36415 COLL VENOUS BLD VENIPUNCTURE: CPT

## 2024-02-18 PROCEDURE — 6370000000 HC RX 637 (ALT 250 FOR IP)

## 2024-02-18 PROCEDURE — 2580000003 HC RX 258

## 2024-02-18 PROCEDURE — 85025 COMPLETE CBC W/AUTO DIFF WBC: CPT

## 2024-02-18 PROCEDURE — 2060000000 HC ICU INTERMEDIATE R&B

## 2024-02-18 PROCEDURE — 6360000002 HC RX W HCPCS: Performed by: INTERNAL MEDICINE

## 2024-02-18 PROCEDURE — 80048 BASIC METABOLIC PNL TOTAL CA: CPT

## 2024-02-18 PROCEDURE — 2700000000 HC OXYGEN THERAPY PER DAY

## 2024-02-18 PROCEDURE — C9113 INJ PANTOPRAZOLE SODIUM, VIA: HCPCS | Performed by: INTERNAL MEDICINE

## 2024-02-18 PROCEDURE — 94640 AIRWAY INHALATION TREATMENT: CPT

## 2024-02-18 PROCEDURE — 94761 N-INVAS EAR/PLS OXIMETRY MLT: CPT

## 2024-02-18 PROCEDURE — 74018 RADEX ABDOMEN 1 VIEW: CPT

## 2024-02-18 RX ORDER — LOSARTAN POTASSIUM 50 MG/1
50 TABLET ORAL DAILY
Status: DISCONTINUED | OUTPATIENT
Start: 2024-02-18 | End: 2024-02-22 | Stop reason: HOSPADM

## 2024-02-18 RX ADMIN — SODIUM CHLORIDE, PRESERVATIVE FREE 10 ML: 5 INJECTION INTRAVENOUS at 19:32

## 2024-02-18 RX ADMIN — SENNOSIDES 8.6 MG: 8.6 TABLET, FILM COATED ORAL at 19:32

## 2024-02-18 RX ADMIN — ACETAMINOPHEN 650 MG: 325 TABLET ORAL at 18:34

## 2024-02-18 RX ADMIN — BUMETANIDE 1 MG: 0.25 INJECTION, SOLUTION INTRAMUSCULAR; INTRAVENOUS at 08:05

## 2024-02-18 RX ADMIN — ASPIRIN 81 MG 81 MG: 81 TABLET ORAL at 08:05

## 2024-02-18 RX ADMIN — ACETAMINOPHEN 650 MG: 325 TABLET ORAL at 11:47

## 2024-02-18 RX ADMIN — ENOXAPARIN SODIUM 40 MG: 100 INJECTION SUBCUTANEOUS at 08:30

## 2024-02-18 RX ADMIN — POTASSIUM BICARBONATE 40 MEQ: 782 TABLET, EFFERVESCENT ORAL at 06:40

## 2024-02-18 RX ADMIN — TIOTROPIUM BROMIDE AND OLODATEROL 2 PUFF: 3.124; 2.736 SPRAY, METERED RESPIRATORY (INHALATION) at 08:07

## 2024-02-18 RX ADMIN — LOSARTAN POTASSIUM 50 MG: 50 TABLET, FILM COATED ORAL at 11:47

## 2024-02-18 RX ADMIN — POTASSIUM BICARBONATE 40 MEQ: 782 TABLET, EFFERVESCENT ORAL at 11:50

## 2024-02-18 RX ADMIN — SODIUM CHLORIDE, PRESERVATIVE FREE 10 ML: 5 INJECTION INTRAVENOUS at 11:51

## 2024-02-18 RX ADMIN — PANTOPRAZOLE SODIUM 40 MG: 40 INJECTION, POWDER, FOR SOLUTION INTRAVENOUS at 08:05

## 2024-02-18 RX ADMIN — SENNOSIDES 8.6 MG: 8.6 TABLET, FILM COATED ORAL at 08:05

## 2024-02-18 RX ADMIN — BUMETANIDE 1 MG: 0.25 INJECTION, SOLUTION INTRAMUSCULAR; INTRAVENOUS at 19:31

## 2024-02-18 RX ADMIN — NALOXEGOL OXALATE 12.5 MG: 12.5 TABLET, FILM COATED ORAL at 08:07

## 2024-02-18 RX ADMIN — POLYETHYLENE GLYCOL 3350 17 G: 17 POWDER, FOR SOLUTION ORAL at 08:04

## 2024-02-18 ASSESSMENT — PAIN DESCRIPTION - DESCRIPTORS
DESCRIPTORS: ACHING

## 2024-02-18 ASSESSMENT — PAIN SCALES - GENERAL
PAINLEVEL_OUTOF10: 3
PAINLEVEL_OUTOF10: 0
PAINLEVEL_OUTOF10: 4
PAINLEVEL_OUTOF10: 3
PAINLEVEL_OUTOF10: 0
PAINLEVEL_OUTOF10: 3

## 2024-02-18 ASSESSMENT — PAIN DESCRIPTION - ORIENTATION
ORIENTATION: INNER
ORIENTATION: MID

## 2024-02-18 ASSESSMENT — PAIN DESCRIPTION - LOCATION
LOCATION: GENERALIZED

## 2024-02-18 ASSESSMENT — PAIN - FUNCTIONAL ASSESSMENT
PAIN_FUNCTIONAL_ASSESSMENT: ACTIVITIES ARE NOT PREVENTED
PAIN_FUNCTIONAL_ASSESSMENT: ACTIVITIES ARE NOT PREVENTED

## 2024-02-18 NOTE — FLOWSHEET NOTE
Tx out report received from ICU resident  Ok to tx to stepdown - on hi flow    Electronically signed by Kraig Mathews MD on 2/18/2024 at 12:15 PM

## 2024-02-19 LAB
ALBUMIN SERPL BCG-MCNC: 3.8 G/DL (ref 3.5–5.1)
ALP SERPL-CCNC: 134 U/L (ref 38–126)
ALT SERPL W/O P-5'-P-CCNC: 26 U/L (ref 11–66)
ANION GAP SERPL CALC-SCNC: 9 MEQ/L (ref 8–16)
ANISOCYTOSIS BLD QL SMEAR: PRESENT
AST SERPL-CCNC: 12 U/L (ref 5–40)
BASOPHILS ABSOLUTE: 0.1 THOU/MM3 (ref 0–0.1)
BASOPHILS NFR BLD AUTO: 0.4 %
BILIRUB SERPL-MCNC: 0.9 MG/DL (ref 0.3–1.2)
BUN SERPL-MCNC: 29 MG/DL (ref 7–22)
CALCIUM SERPL-MCNC: 9.1 MG/DL (ref 8.5–10.5)
CHLORIDE SERPL-SCNC: 94 MEQ/L (ref 98–111)
CO2 SERPL-SCNC: 37 MEQ/L (ref 23–33)
CREAT SERPL-MCNC: 0.6 MG/DL (ref 0.4–1.2)
DEPRECATED RDW RBC AUTO: 68.9 FL (ref 35–45)
EOSINOPHIL NFR BLD AUTO: 2 %
EOSINOPHILS ABSOLUTE: 0.3 THOU/MM3 (ref 0–0.4)
ERYTHROCYTE [DISTWIDTH] IN BLOOD BY AUTOMATED COUNT: 20.6 % (ref 11.5–14.5)
GFR SERPL CREATININE-BSD FRML MDRD: > 60 ML/MIN/1.73M2
GLUCOSE SERPL-MCNC: 142 MG/DL (ref 70–108)
HCT VFR BLD AUTO: 50.4 % (ref 42–52)
HGB BLD-MCNC: 14.5 GM/DL (ref 14–18)
HYPOCHROMIA BLD QL SMEAR: PRESENT
IMM GRANULOCYTES # BLD AUTO: 0.09 THOU/MM3 (ref 0–0.07)
IMM GRANULOCYTES NFR BLD AUTO: 0.5 %
LYMPHOCYTES ABSOLUTE: 0.8 THOU/MM3 (ref 1–4.8)
LYMPHOCYTES NFR BLD AUTO: 4.5 %
MCH RBC QN AUTO: 27 PG (ref 26–33)
MCHC RBC AUTO-ENTMCNC: 28.8 GM/DL (ref 32.2–35.5)
MCV RBC AUTO: 93.9 FL (ref 80–94)
MONOCYTES ABSOLUTE: 1.1 THOU/MM3 (ref 0.4–1.3)
MONOCYTES NFR BLD AUTO: 6.4 %
NEUTROPHILS NFR BLD AUTO: 86.2 %
NRBC BLD AUTO-RTO: 0 /100 WBC
PLATELET # BLD AUTO: 398 THOU/MM3 (ref 130–400)
PMV BLD AUTO: 11.8 FL (ref 9.4–12.4)
POTASSIUM SERPL-SCNC: 4.4 MEQ/L (ref 3.5–5.2)
PROT SERPL-MCNC: 7.4 G/DL (ref 6.1–8)
RBC # BLD AUTO: 5.37 MILL/MM3 (ref 4.7–6.1)
SEGMENTED NEUTROPHILS ABSOLUTE COUNT: 14.9 THOU/MM3 (ref 1.8–7.7)
SODIUM SERPL-SCNC: 140 MEQ/L (ref 135–145)
WBC # BLD AUTO: 17.3 THOU/MM3 (ref 4.8–10.8)

## 2024-02-19 PROCEDURE — 97530 THERAPEUTIC ACTIVITIES: CPT

## 2024-02-19 PROCEDURE — 2580000003 HC RX 258

## 2024-02-19 PROCEDURE — 6360000002 HC RX W HCPCS: Performed by: INTERNAL MEDICINE

## 2024-02-19 PROCEDURE — 6370000000 HC RX 637 (ALT 250 FOR IP)

## 2024-02-19 PROCEDURE — 94640 AIRWAY INHALATION TREATMENT: CPT

## 2024-02-19 PROCEDURE — 97110 THERAPEUTIC EXERCISES: CPT

## 2024-02-19 PROCEDURE — C9113 INJ PANTOPRAZOLE SODIUM, VIA: HCPCS | Performed by: INTERNAL MEDICINE

## 2024-02-19 PROCEDURE — 99233 SBSQ HOSP IP/OBS HIGH 50: CPT | Performed by: STUDENT IN AN ORGANIZED HEALTH CARE EDUCATION/TRAINING PROGRAM

## 2024-02-19 PROCEDURE — 97535 SELF CARE MNGMENT TRAINING: CPT

## 2024-02-19 PROCEDURE — 2700000000 HC OXYGEN THERAPY PER DAY

## 2024-02-19 PROCEDURE — 85025 COMPLETE CBC W/AUTO DIFF WBC: CPT

## 2024-02-19 PROCEDURE — 80053 COMPREHEN METABOLIC PANEL: CPT

## 2024-02-19 PROCEDURE — 2060000000 HC ICU INTERMEDIATE R&B

## 2024-02-19 PROCEDURE — 36415 COLL VENOUS BLD VENIPUNCTURE: CPT

## 2024-02-19 PROCEDURE — 6360000002 HC RX W HCPCS

## 2024-02-19 PROCEDURE — 94761 N-INVAS EAR/PLS OXIMETRY MLT: CPT

## 2024-02-19 RX ORDER — BUMETANIDE 1 MG/1
1 TABLET ORAL 2 TIMES DAILY
Status: DISCONTINUED | OUTPATIENT
Start: 2024-02-19 | End: 2024-02-22 | Stop reason: HOSPADM

## 2024-02-19 RX ORDER — HYDROCODONE BITARTRATE AND ACETAMINOPHEN 5; 325 MG/1; MG/1
1 TABLET ORAL EVERY 8 HOURS PRN
Status: DISCONTINUED | OUTPATIENT
Start: 2024-02-19 | End: 2024-02-22 | Stop reason: HOSPADM

## 2024-02-19 RX ORDER — PANTOPRAZOLE SODIUM 40 MG/1
40 TABLET, DELAYED RELEASE ORAL
Status: DISCONTINUED | OUTPATIENT
Start: 2024-02-20 | End: 2024-02-22 | Stop reason: HOSPADM

## 2024-02-19 RX ADMIN — PANTOPRAZOLE SODIUM 40 MG: 40 INJECTION, POWDER, FOR SOLUTION INTRAVENOUS at 09:26

## 2024-02-19 RX ADMIN — ACETAMINOPHEN 650 MG: 325 TABLET ORAL at 21:26

## 2024-02-19 RX ADMIN — BUMETANIDE 1 MG: 0.25 INJECTION, SOLUTION INTRAMUSCULAR; INTRAVENOUS at 09:26

## 2024-02-19 RX ADMIN — TIOTROPIUM BROMIDE AND OLODATEROL 2 PUFF: 3.124; 2.736 SPRAY, METERED RESPIRATORY (INHALATION) at 08:56

## 2024-02-19 RX ADMIN — SODIUM CHLORIDE, PRESERVATIVE FREE 10 ML: 5 INJECTION INTRAVENOUS at 21:25

## 2024-02-19 RX ADMIN — ACETAMINOPHEN 650 MG: 325 TABLET ORAL at 09:26

## 2024-02-19 RX ADMIN — ASPIRIN 81 MG 81 MG: 81 TABLET ORAL at 09:25

## 2024-02-19 RX ADMIN — SENNOSIDES 8.6 MG: 8.6 TABLET, FILM COATED ORAL at 09:25

## 2024-02-19 RX ADMIN — LOSARTAN POTASSIUM 50 MG: 50 TABLET, FILM COATED ORAL at 09:25

## 2024-02-19 RX ADMIN — NALOXEGOL OXALATE 12.5 MG: 12.5 TABLET, FILM COATED ORAL at 09:25

## 2024-02-19 RX ADMIN — SODIUM CHLORIDE, PRESERVATIVE FREE 10 ML: 5 INJECTION INTRAVENOUS at 09:26

## 2024-02-19 RX ADMIN — HYDROCODONE BITARTRATE AND ACETAMINOPHEN 1 TABLET: 5; 325 TABLET ORAL at 17:30

## 2024-02-19 RX ADMIN — SENNOSIDES 8.6 MG: 8.6 TABLET, FILM COATED ORAL at 21:25

## 2024-02-19 RX ADMIN — ENOXAPARIN SODIUM 40 MG: 100 INJECTION SUBCUTANEOUS at 09:26

## 2024-02-19 RX ADMIN — POLYETHYLENE GLYCOL 3350 17 G: 17 POWDER, FOR SOLUTION ORAL at 09:26

## 2024-02-19 RX ADMIN — BUMETANIDE 1 MG: 1 TABLET ORAL at 17:30

## 2024-02-19 ASSESSMENT — PAIN SCALES - GENERAL
PAINLEVEL_OUTOF10: 3
PAINLEVEL_OUTOF10: 0
PAINLEVEL_OUTOF10: 3
PAINLEVEL_OUTOF10: 8
PAINLEVEL_OUTOF10: 8
PAINLEVEL_OUTOF10: 7
PAINLEVEL_OUTOF10: 0

## 2024-02-19 ASSESSMENT — PAIN DESCRIPTION - DESCRIPTORS
DESCRIPTORS: SHARP;SHOOTING
DESCRIPTORS: ACHING

## 2024-02-19 ASSESSMENT — PAIN DESCRIPTION - LOCATION
LOCATION: BACK
LOCATION: GENERALIZED
LOCATION: GENERALIZED
LOCATION: LEG

## 2024-02-19 ASSESSMENT — PAIN DESCRIPTION - ONSET
ONSET: ON-GOING
ONSET: ON-GOING

## 2024-02-19 ASSESSMENT — PAIN DESCRIPTION - ORIENTATION
ORIENTATION: RIGHT
ORIENTATION: LOWER
ORIENTATION: MID
ORIENTATION: INNER

## 2024-02-19 ASSESSMENT — PAIN DESCRIPTION - PAIN TYPE
TYPE: ACUTE PAIN
TYPE: ACUTE PAIN

## 2024-02-19 ASSESSMENT — PAIN - FUNCTIONAL ASSESSMENT
PAIN_FUNCTIONAL_ASSESSMENT: ACTIVITIES ARE NOT PREVENTED

## 2024-02-19 ASSESSMENT — PAIN DESCRIPTION - FREQUENCY
FREQUENCY: CONTINUOUS
FREQUENCY: CONTINUOUS

## 2024-02-19 ASSESSMENT — PAIN SCALES - WONG BAKER: WONGBAKER_NUMERICALRESPONSE: 0

## 2024-02-19 NOTE — CARE COORDINATION
2/19/24, 2:20 PM EST    DISCHARGE PLANNING EVALUATION       Spoke with admissions person at [facility] and she stated that they have accepted patient and will start pre-cert.

## 2024-02-19 NOTE — CARE COORDINATION
DISCHARGE PLANNING EVALUATION  2/19/24, 12:09 PM EST    Reason for Referral: Wants ECF  Mental Status: Answered questions correctly   Decision Making: Makes own decisions  Family/Social/Home Environment: Patient resides at home alone in a trailer.  He stated that he has 4 steps inside.   Current Services including food security, transportation and housekeeping:  Self   Current Equipment: Grab bars in the shower  Payment Source:[employer]  Concerns or Barriers to Discharge:  NA  Post-acute (PAC) provider list was provided to patient. Patient was informed of their freedom to choose PAC provider. Discussed and offered to show the patient the relevant PAC Providers quality and resource use measures on Medicare Compare web site via computer based on patient's goals of care and treatment preferences. Questions regarding selection process were answered.      Teach Back Method used with  Fredo regarding care plan and options for discharge.   Patient  verbalized understanding of the plan of care and contribute to goal setting.       Patient goals, treatment preferences and discharge plan: Patient prefers an ECF stay for rehab.  He stated would like [facility] as they are close to his home.  Referral made to [facility].  Patient is a pre-cert.  Second choice is [facility]     Electronically signed by ALEXANDER Rosenbaum on 2/19/2024 at 12:09 PM

## 2024-02-19 NOTE — CARE COORDINATION
Collaborative Discharge Planning    Fredo Rod  :  1970  MRN:  [id number]    ADMIT DATE:  2/3/2024      Discharge Planning Discharge Planning  Type of Residence: Trailer/Mobile Home  Living Arrangements: Children (with 14 yo daughter)  Support Systems: Parent, Children, Family Members  Current Services Prior To Admission: Oxygen Therapy, C-pap (She is unsure how many liters O2 - thinks 6L ATC; O2 & cpap thru [employer])  Potential Assistance Needed: Home Care  DME Ordered?: No  Potential Assistance Purchasing Medications: No  Type of Home Care Services: None  Patient expects to be discharged to:: Trailer/mobile home  Follow Up Appointment: Best Day/Time : Monday AM  One/Two Story Residence: One story  History of falls?: No  White Board Notes /Social Work Whiteboard Notes  /Social Work Whiteboard:  CM: From home w/14 yo daughter, prefers [facility] (precert, therapy following), has CPAP    Discharge Plan SNF  Lives w daughter, has oxygen 5-6L, therapy following  Discharge Milestones and Delays: Clinical status  From ICU (ventilator, pressors there)  AMS/ARDS/COPD/Abdominal Wall Cellulitis/Ileus  History: Active Smoker,Alcohol Use  Oxygen 3L  PPI, IV Diuresing, [employer]  Await SNF precert      SIGNED:  Stevie Crum RN   2024, 3:20 PM                    CM Note  Client prefers [facility] for rehab, 2nd choice [facility] (precert, therapy following, has CPAP) after choices offered; collaborated w Sadaf berkowitz SW  Post-acute (PAC) provider list was provided to patient. Patient was informed of their freedom to choose PAC provider. Discussed and offered to show the patient the relevant PAC Providers quality and resource use measures on Medicare Compare web site via computer based on patient's goals of care and treatment preferences. Questions regarding selection process were answered.

## 2024-02-19 NOTE — DISCHARGE INSTR - COC
Continuity of Care Form    Patient Name: Fredo Rod   :  1970  MRN:  [id number]    Admit date:  2/3/2024  Discharge date:  24    Code Status Order: Full Code   Advance Directives:     Admitting Physician:  No admitting provider for patient encounter.  PCP: Mohinder Page MD    Discharging Nurse: Consuelo CURRIE  Discharging Hospital Unit/Room#: 4K-02/002-A  Discharging Unit Phone Number: [phone]    Emergency Contact:   Extended Emergency Contact Information  Primary Emergency Contact: Berna Rod   [facility]  Home Phone: [phone]  Relation: Parent    Past Surgical History:  Past Surgical History:   Procedure Laterality Date    ARM SURGERY Left     BONE RESECTION, RIB         Immunization History:     There is no immunization history on file for this patient.    Active Problems:  Patient Active Problem List   Diagnosis Code    Cellulitis L03.90    Acute respiratory failure with hypoxia and hypercapnia ([facility]) J96.01, J96.02    Anasarca R60.1    Coronary artery disease involving native coronary artery of native heart without angina pectoris I25.10    H/O heart artery stent Z95.5    Chronic obstructive pulmonary disease with acute exacerbation ([facility]) J44.1    Hyperammonemia ([facility]) E72.20    Abdominal wall cellulitis L03.311    Increased ammonia level R79.89    ARDS (adult respiratory distress syndrome) ([facility]) J80       Isolation/Infection:   Isolation            No Isolation          Patient Infection Status       None to display            Nurse Assessment:  Last Vital Signs: /76   Pulse 92   Temp 99.1 °F (37.3 °C) (Oral)   Resp 20   Ht 1.753 m (5' 9\")   Wt 79.6 kg (175 lb 8 oz)   SpO2 95%   BMI 25.92 kg/m²     Last documented pain score (0-10 scale): Pain Level: 0  Last Weight:   Wt Readings from Last 1 Encounters:   24 79.6 kg (175 lb 8 oz)     Mental Status:  oriented and alert    IV Access:  - None    Nursing Mobility/ADLs:  Walking   Assisted  Transfer

## 2024-02-20 ENCOUNTER — APPOINTMENT (OUTPATIENT)
Dept: CT IMAGING | Age: 54
DRG: 130 | End: 2024-02-20
Payer: MEDICAID

## 2024-02-20 PROBLEM — R14.0 ABDOMINAL DISTENSION: Status: ACTIVE | Noted: 2024-02-20

## 2024-02-20 LAB
ALBUMIN SERPL BCG-MCNC: 3.5 G/DL (ref 3.5–5.1)
ALP SERPL-CCNC: 127 U/L (ref 38–126)
ALT SERPL W/O P-5'-P-CCNC: 21 U/L (ref 11–66)
ANION GAP SERPL CALC-SCNC: 10 MEQ/L (ref 8–16)
ANISOCYTOSIS BLD QL SMEAR: PRESENT
AST SERPL-CCNC: 10 U/L (ref 5–40)
BASOPHILS ABSOLUTE: 0.1 THOU/MM3 (ref 0–0.1)
BASOPHILS NFR BLD AUTO: 0.9 %
BILIRUB SERPL-MCNC: 0.8 MG/DL (ref 0.3–1.2)
BUN SERPL-MCNC: 25 MG/DL (ref 7–22)
CALCIUM SERPL-MCNC: 9.5 MG/DL (ref 8.5–10.5)
CHLORIDE SERPL-SCNC: 95 MEQ/L (ref 98–111)
CO2 SERPL-SCNC: 35 MEQ/L (ref 23–33)
CREAT SERPL-MCNC: 0.6 MG/DL (ref 0.4–1.2)
DEPRECATED RDW RBC AUTO: 75.1 FL (ref 35–45)
EOSINOPHIL NFR BLD AUTO: 5.4 %
EOSINOPHILS ABSOLUTE: 0.5 THOU/MM3 (ref 0–0.4)
ERYTHROCYTE [DISTWIDTH] IN BLOOD BY AUTOMATED COUNT: 20.8 % (ref 11.5–14.5)
GFR SERPL CREATININE-BSD FRML MDRD: > 60 ML/MIN/1.73M2
GLUCOSE SERPL-MCNC: 96 MG/DL (ref 70–108)
HCT VFR BLD AUTO: 53.6 % (ref 42–52)
HGB BLD-MCNC: 14.7 GM/DL (ref 14–18)
HYPOCHROMIA BLD QL SMEAR: PRESENT
IMM GRANULOCYTES # BLD AUTO: 0.04 THOU/MM3 (ref 0–0.07)
IMM GRANULOCYTES NFR BLD AUTO: 0.4 %
LYMPHOCYTES ABSOLUTE: 1.5 THOU/MM3 (ref 1–4.8)
LYMPHOCYTES NFR BLD AUTO: 16.5 %
MCH RBC QN AUTO: 27.4 PG (ref 26–33)
MCHC RBC AUTO-ENTMCNC: 27.4 GM/DL (ref 32.2–35.5)
MCV RBC AUTO: 99.8 FL (ref 80–94)
MONOCYTES ABSOLUTE: 0.8 THOU/MM3 (ref 0.4–1.3)
MONOCYTES NFR BLD AUTO: 9 %
NEUTROPHILS NFR BLD AUTO: 67.8 %
NRBC BLD AUTO-RTO: 0 /100 WBC
PLATELET # BLD AUTO: 284 THOU/MM3 (ref 130–400)
PLATELET BLD QL SMEAR: ADEQUATE
PMV BLD AUTO: 11.9 FL (ref 9.4–12.4)
POTASSIUM SERPL-SCNC: 4.9 MEQ/L (ref 3.5–5.2)
PROT SERPL-MCNC: 7.2 G/DL (ref 6.1–8)
RBC # BLD AUTO: 5.37 MILL/MM3 (ref 4.7–6.1)
REASON FOR REJECTION: NORMAL
REJECTED TEST: NORMAL
SCAN OF BLOOD SMEAR: NORMAL
SEGMENTED NEUTROPHILS ABSOLUTE COUNT: 6.1 THOU/MM3 (ref 1.8–7.7)
SODIUM SERPL-SCNC: 140 MEQ/L (ref 135–145)
VARIANT LYMPHS BLD QL SMEAR: ABNORMAL %
WBC # BLD AUTO: 9 THOU/MM3 (ref 4.8–10.8)

## 2024-02-20 PROCEDURE — 94660 CPAP INITIATION&MGMT: CPT

## 2024-02-20 PROCEDURE — 85025 COMPLETE CBC W/AUTO DIFF WBC: CPT

## 2024-02-20 PROCEDURE — 2060000000 HC ICU INTERMEDIATE R&B

## 2024-02-20 PROCEDURE — 6370000000 HC RX 637 (ALT 250 FOR IP)

## 2024-02-20 PROCEDURE — 97530 THERAPEUTIC ACTIVITIES: CPT

## 2024-02-20 PROCEDURE — 6360000002 HC RX W HCPCS

## 2024-02-20 PROCEDURE — 94761 N-INVAS EAR/PLS OXIMETRY MLT: CPT

## 2024-02-20 PROCEDURE — 94640 AIRWAY INHALATION TREATMENT: CPT

## 2024-02-20 PROCEDURE — 97110 THERAPEUTIC EXERCISES: CPT

## 2024-02-20 PROCEDURE — 36415 COLL VENOUS BLD VENIPUNCTURE: CPT

## 2024-02-20 PROCEDURE — 80053 COMPREHEN METABOLIC PANEL: CPT

## 2024-02-20 PROCEDURE — 74178 CT ABD&PLV WO CNTR FLWD CNTR: CPT

## 2024-02-20 PROCEDURE — 2580000003 HC RX 258

## 2024-02-20 PROCEDURE — 6360000004 HC RX CONTRAST MEDICATION: Performed by: STUDENT IN AN ORGANIZED HEALTH CARE EDUCATION/TRAINING PROGRAM

## 2024-02-20 PROCEDURE — 2700000000 HC OXYGEN THERAPY PER DAY

## 2024-02-20 PROCEDURE — 99233 SBSQ HOSP IP/OBS HIGH 50: CPT | Performed by: STUDENT IN AN ORGANIZED HEALTH CARE EDUCATION/TRAINING PROGRAM

## 2024-02-20 RX ADMIN — ENOXAPARIN SODIUM 40 MG: 100 INJECTION SUBCUTANEOUS at 08:44

## 2024-02-20 RX ADMIN — BUMETANIDE 1 MG: 1 TABLET ORAL at 17:51

## 2024-02-20 RX ADMIN — HYDROCODONE BITARTRATE AND ACETAMINOPHEN 1 TABLET: 5; 325 TABLET ORAL at 22:32

## 2024-02-20 RX ADMIN — SODIUM CHLORIDE, PRESERVATIVE FREE 10 ML: 5 INJECTION INTRAVENOUS at 21:52

## 2024-02-20 RX ADMIN — SENNOSIDES 8.6 MG: 8.6 TABLET, FILM COATED ORAL at 08:45

## 2024-02-20 RX ADMIN — NALOXEGOL OXALATE 12.5 MG: 12.5 TABLET, FILM COATED ORAL at 08:44

## 2024-02-20 RX ADMIN — TIOTROPIUM BROMIDE AND OLODATEROL 2 PUFF: 3.124; 2.736 SPRAY, METERED RESPIRATORY (INHALATION) at 08:56

## 2024-02-20 RX ADMIN — BUMETANIDE 1 MG: 1 TABLET ORAL at 08:44

## 2024-02-20 RX ADMIN — ACETAMINOPHEN 650 MG: 325 TABLET ORAL at 16:21

## 2024-02-20 RX ADMIN — PANTOPRAZOLE SODIUM 40 MG: 40 TABLET, DELAYED RELEASE ORAL at 06:41

## 2024-02-20 RX ADMIN — MAGNESIUM HYDROXIDE 30 ML: 1200 LIQUID ORAL at 16:23

## 2024-02-20 RX ADMIN — IOPAMIDOL 80 ML: 755 INJECTION, SOLUTION INTRAVENOUS at 14:48

## 2024-02-20 RX ADMIN — HYDROCODONE BITARTRATE AND ACETAMINOPHEN 1 TABLET: 5; 325 TABLET ORAL at 10:47

## 2024-02-20 RX ADMIN — POLYETHYLENE GLYCOL 3350 17 G: 17 POWDER, FOR SOLUTION ORAL at 08:45

## 2024-02-20 RX ADMIN — SENNOSIDES 8.6 MG: 8.6 TABLET, FILM COATED ORAL at 21:52

## 2024-02-20 RX ADMIN — ASPIRIN 81 MG 81 MG: 81 TABLET ORAL at 08:44

## 2024-02-20 RX ADMIN — SODIUM CHLORIDE, PRESERVATIVE FREE 10 ML: 5 INJECTION INTRAVENOUS at 08:45

## 2024-02-20 RX ADMIN — LOSARTAN POTASSIUM 50 MG: 50 TABLET, FILM COATED ORAL at 08:44

## 2024-02-20 ASSESSMENT — PAIN DESCRIPTION - ORIENTATION
ORIENTATION: LOWER
ORIENTATION: RIGHT;LOWER
ORIENTATION: LOWER

## 2024-02-20 ASSESSMENT — PAIN SCALES - GENERAL
PAINLEVEL_OUTOF10: 5
PAINLEVEL_OUTOF10: 0
PAINLEVEL_OUTOF10: 6
PAINLEVEL_OUTOF10: 9
PAINLEVEL_OUTOF10: 8
PAINLEVEL_OUTOF10: 8

## 2024-02-20 ASSESSMENT — PAIN DESCRIPTION - LOCATION
LOCATION: BACK
LOCATION: BACK
LOCATION: ABDOMEN
LOCATION: BACK

## 2024-02-20 ASSESSMENT — PAIN - FUNCTIONAL ASSESSMENT: PAIN_FUNCTIONAL_ASSESSMENT: ACTIVITIES ARE NOT PREVENTED

## 2024-02-20 ASSESSMENT — PAIN DESCRIPTION - DESCRIPTORS
DESCRIPTORS: ACHING
DESCRIPTORS: SHARP
DESCRIPTORS: GNAWING

## 2024-02-21 ENCOUNTER — APPOINTMENT (OUTPATIENT)
Dept: GENERAL RADIOLOGY | Age: 54
DRG: 130 | End: 2024-02-21
Payer: MEDICAID

## 2024-02-21 LAB
ALBUMIN SERPL BCG-MCNC: 3.6 G/DL (ref 3.5–5.1)
ALP SERPL-CCNC: 129 U/L (ref 38–126)
ALT SERPL W/O P-5'-P-CCNC: 21 U/L (ref 11–66)
ANION GAP SERPL CALC-SCNC: 12 MEQ/L (ref 8–16)
ANISOCYTOSIS BLD QL SMEAR: PRESENT
AST SERPL-CCNC: 14 U/L (ref 5–40)
BASOPHILS ABSOLUTE: 0.1 THOU/MM3 (ref 0–0.1)
BASOPHILS NFR BLD AUTO: 0.6 %
BILIRUB SERPL-MCNC: 0.6 MG/DL (ref 0.3–1.2)
BUN SERPL-MCNC: 20 MG/DL (ref 7–22)
CALCIUM SERPL-MCNC: 9.7 MG/DL (ref 8.5–10.5)
CHLORIDE SERPL-SCNC: 95 MEQ/L (ref 98–111)
CO2 SERPL-SCNC: 33 MEQ/L (ref 23–33)
CREAT SERPL-MCNC: 0.7 MG/DL (ref 0.4–1.2)
DEPRECATED RDW RBC AUTO: 67.4 FL (ref 35–45)
EOSINOPHIL NFR BLD AUTO: 5.5 %
EOSINOPHILS ABSOLUTE: 0.5 THOU/MM3 (ref 0–0.4)
ERYTHROCYTE [DISTWIDTH] IN BLOOD BY AUTOMATED COUNT: 19.9 % (ref 11.5–14.5)
GFR SERPL CREATININE-BSD FRML MDRD: > 60 ML/MIN/1.73M2
GLUCOSE SERPL-MCNC: 122 MG/DL (ref 70–108)
HCT VFR BLD AUTO: 50 % (ref 42–52)
HGB BLD-MCNC: 14.2 GM/DL (ref 14–18)
HYPOCHROMIA BLD QL SMEAR: PRESENT
IMM GRANULOCYTES # BLD AUTO: 0.04 THOU/MM3 (ref 0–0.07)
IMM GRANULOCYTES NFR BLD AUTO: 0.5 %
LYMPHOCYTES ABSOLUTE: 1.6 THOU/MM3 (ref 1–4.8)
LYMPHOCYTES NFR BLD AUTO: 18.5 %
MCH RBC QN AUTO: 26.9 PG (ref 26–33)
MCHC RBC AUTO-ENTMCNC: 28.4 GM/DL (ref 32.2–35.5)
MCV RBC AUTO: 94.7 FL (ref 80–94)
MONOCYTES ABSOLUTE: 0.8 THOU/MM3 (ref 0.4–1.3)
MONOCYTES NFR BLD AUTO: 8.9 %
NEUTROPHILS NFR BLD AUTO: 66 %
NRBC BLD AUTO-RTO: 0 /100 WBC
PLATELET # BLD AUTO: 371 THOU/MM3 (ref 130–400)
PMV BLD AUTO: 11.3 FL (ref 9.4–12.4)
POTASSIUM SERPL-SCNC: 5.1 MEQ/L (ref 3.5–5.2)
PROT SERPL-MCNC: 7.3 G/DL (ref 6.1–8)
RBC # BLD AUTO: 5.28 MILL/MM3 (ref 4.7–6.1)
SEGMENTED NEUTROPHILS ABSOLUTE COUNT: 5.6 THOU/MM3 (ref 1.8–7.7)
SODIUM SERPL-SCNC: 140 MEQ/L (ref 135–145)
WBC # BLD AUTO: 8.5 THOU/MM3 (ref 4.8–10.8)

## 2024-02-21 PROCEDURE — 97110 THERAPEUTIC EXERCISES: CPT

## 2024-02-21 PROCEDURE — 94761 N-INVAS EAR/PLS OXIMETRY MLT: CPT

## 2024-02-21 PROCEDURE — 6360000002 HC RX W HCPCS

## 2024-02-21 PROCEDURE — 94640 AIRWAY INHALATION TREATMENT: CPT

## 2024-02-21 PROCEDURE — 94669 MECHANICAL CHEST WALL OSCILL: CPT

## 2024-02-21 PROCEDURE — 99232 SBSQ HOSP IP/OBS MODERATE 35: CPT | Performed by: INTERNAL MEDICINE

## 2024-02-21 PROCEDURE — 71045 X-RAY EXAM CHEST 1 VIEW: CPT

## 2024-02-21 PROCEDURE — 6370000000 HC RX 637 (ALT 250 FOR IP)

## 2024-02-21 PROCEDURE — 36415 COLL VENOUS BLD VENIPUNCTURE: CPT

## 2024-02-21 PROCEDURE — 2060000000 HC ICU INTERMEDIATE R&B

## 2024-02-21 PROCEDURE — 80053 COMPREHEN METABOLIC PANEL: CPT

## 2024-02-21 PROCEDURE — 2580000003 HC RX 258

## 2024-02-21 PROCEDURE — 94660 CPAP INITIATION&MGMT: CPT

## 2024-02-21 PROCEDURE — 97116 GAIT TRAINING THERAPY: CPT

## 2024-02-21 PROCEDURE — 2700000000 HC OXYGEN THERAPY PER DAY

## 2024-02-21 PROCEDURE — 85025 COMPLETE CBC W/AUTO DIFF WBC: CPT

## 2024-02-21 PROCEDURE — 97535 SELF CARE MNGMENT TRAINING: CPT

## 2024-02-21 RX ADMIN — TIOTROPIUM BROMIDE AND OLODATEROL 2 PUFF: 3.124; 2.736 SPRAY, METERED RESPIRATORY (INHALATION) at 11:00

## 2024-02-21 RX ADMIN — BUMETANIDE 1 MG: 1 TABLET ORAL at 19:15

## 2024-02-21 RX ADMIN — HYDROCODONE BITARTRATE AND ACETAMINOPHEN 1 TABLET: 5; 325 TABLET ORAL at 05:02

## 2024-02-21 RX ADMIN — POLYETHYLENE GLYCOL 3350 17 G: 17 POWDER, FOR SOLUTION ORAL at 08:06

## 2024-02-21 RX ADMIN — ACETAMINOPHEN 650 MG: 325 TABLET ORAL at 11:16

## 2024-02-21 RX ADMIN — BUMETANIDE 1 MG: 1 TABLET ORAL at 08:06

## 2024-02-21 RX ADMIN — ENOXAPARIN SODIUM 40 MG: 100 INJECTION SUBCUTANEOUS at 11:11

## 2024-02-21 RX ADMIN — PANTOPRAZOLE SODIUM 40 MG: 40 TABLET, DELAYED RELEASE ORAL at 05:02

## 2024-02-21 RX ADMIN — HYDROCODONE BITARTRATE AND ACETAMINOPHEN 1 TABLET: 5; 325 TABLET ORAL at 14:30

## 2024-02-21 RX ADMIN — SODIUM CHLORIDE, PRESERVATIVE FREE 10 ML: 5 INJECTION INTRAVENOUS at 19:15

## 2024-02-21 RX ADMIN — SENNOSIDES 8.6 MG: 8.6 TABLET, FILM COATED ORAL at 19:15

## 2024-02-21 RX ADMIN — ASPIRIN 81 MG 81 MG: 81 TABLET ORAL at 08:06

## 2024-02-21 RX ADMIN — SENNOSIDES 8.6 MG: 8.6 TABLET, FILM COATED ORAL at 08:06

## 2024-02-21 RX ADMIN — SODIUM CHLORIDE, PRESERVATIVE FREE 10 ML: 5 INJECTION INTRAVENOUS at 08:06

## 2024-02-21 RX ADMIN — NALOXEGOL OXALATE 12.5 MG: 12.5 TABLET, FILM COATED ORAL at 08:06

## 2024-02-21 RX ADMIN — LOSARTAN POTASSIUM 50 MG: 50 TABLET, FILM COATED ORAL at 08:06

## 2024-02-21 ASSESSMENT — PAIN DESCRIPTION - ORIENTATION
ORIENTATION: LEFT

## 2024-02-21 ASSESSMENT — PAIN SCALES - GENERAL
PAINLEVEL_OUTOF10: 8
PAINLEVEL_OUTOF10: 7
PAINLEVEL_OUTOF10: 4
PAINLEVEL_OUTOF10: 6

## 2024-02-21 ASSESSMENT — PAIN DESCRIPTION - ONSET: ONSET: GRADUAL

## 2024-02-21 ASSESSMENT — PAIN DESCRIPTION - FREQUENCY: FREQUENCY: INTERMITTENT

## 2024-02-21 ASSESSMENT — PAIN DESCRIPTION - DESCRIPTORS
DESCRIPTORS: SHARP
DESCRIPTORS: ACHING;SORE
DESCRIPTORS: SHARP

## 2024-02-21 ASSESSMENT — PAIN DESCRIPTION - PAIN TYPE: TYPE: ACUTE PAIN

## 2024-02-21 ASSESSMENT — PAIN DESCRIPTION - LOCATION
LOCATION: BACK
LOCATION: FLANK
LOCATION: FLANK

## 2024-02-21 NOTE — CARE COORDINATION
2/21/24, 1:49 PM EST  DISCHARGE PLANNING EVALUATION    SW notified that patient wants to consider [facility].     EMERY spoke with patient and he reported that he would like to consider [facility] because he has a niece that works there.     [facility] and spoke with Mario. He reported that they do not have any male beds and are not in network with patients insurance.    SW [facility] and [facility] not accept [facility] insurance.    EMERY received a call from [facility] and she reported that precert has been approved.

## 2024-02-22 VITALS
WEIGHT: 175.5 LBS | BODY MASS INDEX: 26 KG/M2 | TEMPERATURE: 99.1 F | HEART RATE: 92 BPM | SYSTOLIC BLOOD PRESSURE: 127 MMHG | HEIGHT: 69 IN | RESPIRATION RATE: 20 BRPM | DIASTOLIC BLOOD PRESSURE: 76 MMHG | OXYGEN SATURATION: 95 %

## 2024-02-22 LAB
ANION GAP SERPL CALC-SCNC: 11 MEQ/L (ref 8–16)
BUN SERPL-MCNC: 21 MG/DL (ref 7–22)
CALCIUM SERPL-MCNC: 9.6 MG/DL (ref 8.5–10.5)
CHLORIDE SERPL-SCNC: 94 MEQ/L (ref 98–111)
CO2 SERPL-SCNC: 35 MEQ/L (ref 23–33)
CREAT SERPL-MCNC: 0.7 MG/DL (ref 0.4–1.2)
DEPRECATED RDW RBC AUTO: 68.6 FL (ref 35–45)
ERYTHROCYTE [DISTWIDTH] IN BLOOD BY AUTOMATED COUNT: 19.9 % (ref 11.5–14.5)
GFR SERPL CREATININE-BSD FRML MDRD: > 60 ML/MIN/1.73M2
GLUCOSE SERPL-MCNC: 97 MG/DL (ref 70–108)
HCT VFR BLD AUTO: 48.8 % (ref 42–52)
HGB BLD-MCNC: 14 GM/DL (ref 14–18)
MCH RBC QN AUTO: 27.1 PG (ref 26–33)
MCHC RBC AUTO-ENTMCNC: 28.7 GM/DL (ref 32.2–35.5)
MCV RBC AUTO: 94.4 FL (ref 80–94)
PLATELET # BLD AUTO: 336 THOU/MM3 (ref 130–400)
PMV BLD AUTO: 11.3 FL (ref 9.4–12.4)
POTASSIUM SERPL-SCNC: 4.6 MEQ/L (ref 3.5–5.2)
RBC # BLD AUTO: 5.17 MILL/MM3 (ref 4.7–6.1)
SODIUM SERPL-SCNC: 140 MEQ/L (ref 135–145)
WBC # BLD AUTO: 8.2 THOU/MM3 (ref 4.8–10.8)

## 2024-02-22 PROCEDURE — 2700000000 HC OXYGEN THERAPY PER DAY

## 2024-02-22 PROCEDURE — 6370000000 HC RX 637 (ALT 250 FOR IP)

## 2024-02-22 PROCEDURE — 85027 COMPLETE CBC AUTOMATED: CPT

## 2024-02-22 PROCEDURE — 36415 COLL VENOUS BLD VENIPUNCTURE: CPT

## 2024-02-22 PROCEDURE — 94669 MECHANICAL CHEST WALL OSCILL: CPT

## 2024-02-22 PROCEDURE — 99239 HOSP IP/OBS DSCHRG MGMT >30: CPT | Performed by: INTERNAL MEDICINE

## 2024-02-22 PROCEDURE — 94640 AIRWAY INHALATION TREATMENT: CPT

## 2024-02-22 PROCEDURE — 6360000002 HC RX W HCPCS

## 2024-02-22 PROCEDURE — 2580000003 HC RX 258

## 2024-02-22 PROCEDURE — 94660 CPAP INITIATION&MGMT: CPT

## 2024-02-22 PROCEDURE — 94761 N-INVAS EAR/PLS OXIMETRY MLT: CPT

## 2024-02-22 PROCEDURE — 80048 BASIC METABOLIC PNL TOTAL CA: CPT

## 2024-02-22 PROCEDURE — 92526 ORAL FUNCTION THERAPY: CPT

## 2024-02-22 PROCEDURE — 97110 THERAPEUTIC EXERCISES: CPT

## 2024-02-22 RX ORDER — SENNOSIDES A AND B 8.6 MG/1
1 TABLET, FILM COATED ORAL 2 TIMES DAILY
Qty: 60 TABLET | Refills: 0 | DISCHARGE
Start: 2024-02-22 | End: 2024-03-23

## 2024-02-22 RX ORDER — BUMETANIDE 1 MG/1
1 TABLET ORAL 2 TIMES DAILY
Qty: 30 TABLET | Refills: 0 | DISCHARGE
Start: 2024-02-22

## 2024-02-22 RX ORDER — POLYETHYLENE GLYCOL 3350 17 G/17G
17 POWDER, FOR SOLUTION ORAL DAILY PRN
Qty: 527 G | Refills: 0 | DISCHARGE
Start: 2024-02-22 | End: 2024-03-24

## 2024-02-22 RX ADMIN — NALOXEGOL OXALATE 12.5 MG: 12.5 TABLET, FILM COATED ORAL at 09:10

## 2024-02-22 RX ADMIN — PANTOPRAZOLE SODIUM 40 MG: 40 TABLET, DELAYED RELEASE ORAL at 06:35

## 2024-02-22 RX ADMIN — BUMETANIDE 1 MG: 1 TABLET ORAL at 09:10

## 2024-02-22 RX ADMIN — ASPIRIN 81 MG 81 MG: 81 TABLET ORAL at 09:10

## 2024-02-22 RX ADMIN — SENNOSIDES 8.6 MG: 8.6 TABLET, FILM COATED ORAL at 09:10

## 2024-02-22 RX ADMIN — SODIUM CHLORIDE, PRESERVATIVE FREE 10 ML: 5 INJECTION INTRAVENOUS at 09:10

## 2024-02-22 RX ADMIN — TIOTROPIUM BROMIDE AND OLODATEROL 2 PUFF: 3.124; 2.736 SPRAY, METERED RESPIRATORY (INHALATION) at 09:42

## 2024-02-22 RX ADMIN — ENOXAPARIN SODIUM 40 MG: 100 INJECTION SUBCUTANEOUS at 09:10

## 2024-02-22 RX ADMIN — POLYETHYLENE GLYCOL 3350 17 G: 17 POWDER, FOR SOLUTION ORAL at 09:10

## 2024-02-22 RX ADMIN — HYDROCODONE BITARTRATE AND ACETAMINOPHEN 1 TABLET: 5; 325 TABLET ORAL at 10:55

## 2024-02-22 RX ADMIN — LOSARTAN POTASSIUM 50 MG: 50 TABLET, FILM COATED ORAL at 09:10

## 2024-02-22 ASSESSMENT — PAIN DESCRIPTION - DESCRIPTORS
DESCRIPTORS: ACHING
DESCRIPTORS: ACHING

## 2024-02-22 ASSESSMENT — PAIN DESCRIPTION - PAIN TYPE
TYPE: ACUTE PAIN
TYPE: ACUTE PAIN

## 2024-02-22 ASSESSMENT — PAIN DESCRIPTION - ONSET
ONSET: GRADUAL
ONSET: GRADUAL

## 2024-02-22 ASSESSMENT — PAIN DESCRIPTION - LOCATION
LOCATION: BACK
LOCATION: BACK

## 2024-02-22 ASSESSMENT — PAIN DESCRIPTION - ORIENTATION
ORIENTATION: LOWER
ORIENTATION: LOWER

## 2024-02-22 ASSESSMENT — PAIN DESCRIPTION - FREQUENCY
FREQUENCY: CONTINUOUS
FREQUENCY: CONTINUOUS

## 2024-02-22 ASSESSMENT — PAIN SCALES - GENERAL
PAINLEVEL_OUTOF10: 9
PAINLEVEL_OUTOF10: 7

## 2024-02-22 NOTE — PLAN OF CARE
Problem: Discharge Planning  Goal: Discharge to home or other facility with appropriate resources  2/12/2024 0901 by Conchita Haas RN  Outcome: Progressing  Flowsheets (Taken 2/12/2024 0901)  Discharge to home or other facility with appropriate resources:   Identify discharge learning needs (meds, wound care, etc)   Identify barriers to discharge with patient and caregiver   Refer to discharge planning if patient needs post-hospital services based on physician order or complex needs related to functional status, cognitive ability or social support system     Problem: Pain  Goal: Verbalizes/displays adequate comfort level or baseline comfort level  2/12/2024 0901 by Conchita Haas RN  Outcome: Progressing  Flowsheets (Taken 2/12/2024 0901)  Verbalizes/displays adequate comfort level or baseline comfort level:   Assess pain using appropriate pain scale   Implement non-pharmacological measures as appropriate and evaluate response     Problem: ABCDS Injury Assessment  Goal: Absence of physical injury  2/12/2024 0901 by Conchita Haas RN  Outcome: Progressing  Flowsheets (Taken 2/12/2024 0901)  Absence of Physical Injury: Implement safety measures based on patient assessment     Problem: Safety - Adult  Goal: Free from fall injury  2/12/2024 0901 by Conchita Haas RN  Outcome: Progressing  Flowsheets (Taken 2/12/2024 0901)  Free From Fall Injury:   Instruct family/caregiver on patient safety   Based on caregiver fall risk screen, instruct family/caregiver to ask for assistance with transferring infant if caregiver noted to have fall risk factors     Problem: Skin/Tissue Integrity  Goal: Absence of new skin breakdown  Description: 1.  Monitor for areas of redness and/or skin breakdown  2.  Assess vascular access sites hourly  3.  Every 4-6 hours minimum:  Change oxygen saturation probe site  4.  Every 4-6 hours:  If on nasal continuous positive airway pressure, respiratory therapy assess 
  Problem: Discharge Planning  Goal: Discharge to home or other facility with appropriate resources  2/12/2024 2211 by Kelsie Kerr, RN  Outcome: Progressing  Flowsheets  Taken 2/12/2024 2211  Discharge to home or other facility with appropriate resources:   Identify barriers to discharge with patient and caregiver   Identify discharge learning needs (meds, wound care, etc)   Refer to discharge planning if patient needs post-hospital services based on physician order or complex needs related to functional status, cognitive ability or social support system  Taken 2/12/2024 2000  Discharge to home or other facility with appropriate resources:   Identify barriers to discharge with patient and caregiver   Identify discharge learning needs (meds, wound care, etc)   Refer to discharge planning if patient needs post-hospital services based on physician order or complex needs related to functional status, cognitive ability or social support system  2/12/2024 0901   Outcome: Progressing  Flowsheets (Taken 2/12/2024 0901)  Discharge to home or other facility with appropriate resources:   Identify discharge learning needs (meds, wound care, etc)   Identify barriers to discharge with patient and caregiver   Refer to discharge planning if patient needs post-hospital services based on physician order or complex needs related to functional status, cognitive ability or social support system     Problem: Pain  Goal: Verbalizes/displays adequate comfort level or baseline comfort level  2/12/2024 2211 by Kelsie Kerr, RN  Outcome: Progressing  Flowsheets  Taken 2/12/2024 2211  Verbalizes/displays adequate comfort level or baseline comfort level:   Assess pain using appropriate pain scale   Administer analgesics based on type and severity of pain and evaluate response   Implement non-pharmacological measures as appropriate and evaluate response   Consider cultural and social influences on pain and pain management   Notify 
  Problem: Discharge Planning  Goal: Discharge to home or other facility with appropriate resources  2/14/2024 1454 by Bert Ziegler, RN  Outcome: Progressing  Flowsheets (Taken 2/14/2024 1454)  Discharge to home or other facility with appropriate resources: Identify barriers to discharge with patient and caregiver     Problem: Pain  Goal: Verbalizes/displays adequate comfort level or baseline comfort level  2/14/2024 1454 by Bert Ziegler, RN  Outcome: Progressing  Flowsheets (Taken 2/14/2024 1454)  Verbalizes/displays adequate comfort level or baseline comfort level:   Encourage patient to monitor pain and request assistance   Assess pain using appropriate pain scale   Administer analgesics based on type and severity of pain and evaluate response   Implement non-pharmacological measures as appropriate and evaluate response   Consider cultural and social influences on pain and pain management     Problem: ABCDS Injury Assessment  Goal: Absence of physical injury  2/14/2024 1454 by Bert Ziegler RN  Outcome: Progressing  Flowsheets (Taken 2/12/2024 2211 by Kelsie Kerr, RN)  Absence of Physical Injury: Implement safety measures based on patient assessment     Problem: Safety - Adult  Goal: Free from fall injury  2/14/2024 1454 by Bert Ziegler RN  Outcome: Progressing  Flowsheets (Taken 2/12/2024 2211 by Kelsie Kerr, RN)  Free From Fall Injury:   Instruct family/caregiver on patient safety   Based on caregiver fall risk screen, instruct family/caregiver to ask for assistance with transferring infant if caregiver noted to have fall risk factors     Problem: Safety - Medical Restraint  Goal: Remains free of injury from restraints (Restraint for Interference with Medical Device)  Description: INTERVENTIONS:  1. Determine that other, less restrictive measures have been tried or would not be effective before applying the restraint  2. Evaluate the patient's condition at the time of restraint 
  Problem: Discharge Planning  Goal: Discharge to home or other facility with appropriate resources  2/4/2024 1401 by María Meraz RN  Outcome: Progressing  Flowsheets (Taken 2/4/2024 0800)  Discharge to home or other facility with appropriate resources: Identify barriers to discharge with patient and caregiver  2/4/2024 0407 by Nabial Burnham, RN  Outcome: Progressing  Flowsheets (Taken 2/4/2024 0407)  Discharge to home or other facility with appropriate resources:   Identify barriers to discharge with patient and caregiver   Identify discharge learning needs (meds, wound care, etc)   Refer to discharge planning if patient needs post-hospital services based on physician order or complex needs related to functional status, cognitive ability or social support system   Arrange for needed discharge resources and transportation as appropriate   Arrange for interpreters to assist at discharge as needed  Note: Feedback readiness for discharge.  Promoting inclusion for discharge planning.      Problem: Pain  Goal: Verbalizes/displays adequate comfort level or baseline comfort level  2/4/2024 1401 by María Meraz RN  Outcome: Progressing  Flowsheets  Taken 2/4/2024 1215  Verbalizes/displays adequate comfort level or baseline comfort level:   Encourage patient to monitor pain and request assistance   Assess pain using appropriate pain scale  Taken 2/4/2024 0951  Verbalizes/displays adequate comfort level or baseline comfort level:   Encourage patient to monitor pain and request assistance   Assess pain using appropriate pain scale  2/4/2024 0407 by Nabila Burnham, RN  Outcome: Progressing  Flowsheets (Taken 2/4/2024 0407)  Verbalizes/displays adequate comfort level or baseline comfort level:   Encourage patient to monitor pain and request assistance   Administer analgesics based on type and severity of pain and evaluate response   Consider cultural and social influences on pain and pain management   Assess pain using 
  Problem: Discharge Planning  Goal: Discharge to home or other facility with appropriate resources  2/4/2024 2042 by Nancy Mcdonoguh RN  Outcome: Progressing  2/4/2024 1401 by María Meraz RN  Outcome: Progressing  Flowsheets (Taken 2/4/2024 0800)  Discharge to home or other facility with appropriate resources: Identify barriers to discharge with patient and caregiver     Problem: Pain  Goal: Verbalizes/displays adequate comfort level or baseline comfort level  2/4/2024 2042 by Nancy Mcdonough RN  Outcome: Progressing  2/4/2024 1401 by María Meraz RN  Outcome: Progressing  Flowsheets  Taken 2/4/2024 1215  Verbalizes/displays adequate comfort level or baseline comfort level:   Encourage patient to monitor pain and request assistance   Assess pain using appropriate pain scale  Taken 2/4/2024 0951  Verbalizes/displays adequate comfort level or baseline comfort level:   Encourage patient to monitor pain and request assistance   Assess pain using appropriate pain scale     Problem: ABCDS Injury Assessment  Goal: Absence of physical injury  2/4/2024 2042 by Nancy Mcdonough RN  Outcome: Progressing  2/4/2024 1401 by María Meraz RN  Outcome: Progressing     Problem: Safety - Adult  Goal: Free from fall injury  2/4/2024 2042 by Nancy Mcdonough RN  Outcome: Progressing  2/4/2024 1401 by María Meraz RN  Outcome: Progressing     Problem: Safety - Medical Restraint  Goal: Remains free of injury from restraints (Restraint for Interference with Medical Device)  Description: INTERVENTIONS:  1. Determine that other, less restrictive measures have been tried or would not be effective before applying the restraint  2. Evaluate the patient's condition at the time of restraint application  3. Inform patient/family regarding the reason for restraint  4. Q2H: Monitor safety, psychosocial status, comfort, nutrition and hydration  2/4/2024 2042 by Nancy Mcdonough RN  Outcome: Progressing  2/4/2024 1401 by María Meraz 
  Problem: Discharge Planning  Goal: Discharge to home or other facility with appropriate resources  2/5/2024 1110 by Dulce Saucedo RN  Outcome: Progressing  2/5/2024 1107 by Dulce Saucedo RN  Outcome: Progressing  Flowsheets (Taken 2/5/2024 0800)  Discharge to home or other facility with appropriate resources:   Identify barriers to discharge with patient and caregiver   Arrange for needed discharge resources and transportation as appropriate   Identify discharge learning needs (meds, wound care, etc)   Arrange for interpreters to assist at discharge as needed   Refer to discharge planning if patient needs post-hospital services based on physician order or complex needs related to functional status, cognitive ability or social support system     Problem: Pain  Goal: Verbalizes/displays adequate comfort level or baseline comfort level  2/5/2024 1110 by Dulce Saucedo RN  Outcome: Progressing  2/5/2024 1107 by Dulce Saucedo RN  Outcome: Progressing  Flowsheets (Taken 2/5/2024 0800)  Verbalizes/displays adequate comfort level or baseline comfort level:   Encourage patient to monitor pain and request assistance   Assess pain using appropriate pain scale   Administer analgesics based on type and severity of pain and evaluate response   Implement non-pharmacological measures as appropriate and evaluate response   Consider cultural and social influences on pain and pain management   Notify Licensed Independent Practitioner if interventions unsuccessful or patient reports new pain     Problem: ABCDS Injury Assessment  Goal: Absence of physical injury  2/5/2024 1110 by Dulce Saucedo, RN  Outcome: Progressing  2/5/2024 1107 by Dulce Saucedo RN  Outcome: Progressing     Problem: Safety - Adult  Goal: Free from fall injury  2/5/2024 1110 by Dulce Saucedo, RN  Outcome: Progressing  2/5/2024 1107 by Dulce Saucedo RN  Outcome: Progressing     Problem: Safety - Medical Restraint  Goal: Remains free of injury from restraints 
  Problem: Discharge Planning  Goal: Discharge to home or other facility with appropriate resources  2/8/2024 0846 by Bert Ziegler RN  Outcome: Progressing  Flowsheets (Taken 2/8/2024 0846)  Discharge to home or other facility with appropriate resources:   Identify barriers to discharge with patient and caregiver   Identify discharge learning needs (meds, wound care, etc)     Problem: Pain  Goal: Verbalizes/displays adequate comfort level or baseline comfort level  2/8/2024 0846 by Bert Ziegler RN  Outcome: Progressing  Flowsheets (Taken 2/7/2024 2326 by Consuelo Mock, RN)  Verbalizes/displays adequate comfort level or baseline comfort level:   Assess pain using appropriate pain scale   Encourage patient to monitor pain and request assistance     Problem: ABCDS Injury Assessment  Goal: Absence of physical injury  2/8/2024 0846 by Bert Ziegler RN  Outcome: Progressing  Flowsheets (Taken 2/8/2024 0846)  Absence of Physical Injury: Implement safety measures based on patient assessment     Problem: Safety - Adult  Goal: Free from fall injury  2/8/2024 0846 by Bert Ziegler RN  Outcome: Progressing  Flowsheets (Taken 2/7/2024 2326 by Consuelo Mock RN)  Free From Fall Injury: Instruct family/caregiver on patient safety     Problem: Safety - Medical Restraint  Goal: Remains free of injury from restraints (Restraint for Interference with Medical Device)  Description: INTERVENTIONS:  1. Determine that other, less restrictive measures have been tried or would not be effective before applying the restraint  2. Evaluate the patient's condition at the time of restraint application  3. Inform patient/family regarding the reason for restraint  4. Q2H: Monitor safety, psychosocial status, comfort, nutrition and hydration  2/8/2024 0846 by Bert Ziegler RN  Outcome: Progressing  Flowsheets (Taken 2/8/2024 0706 by Consuelo Mock RN)  Remains free of injury from restraints (restraint for interference with 
  Problem: Discharge Planning  Goal: Discharge to home or other facility with appropriate resources  2/9/2024 2011 by Cleopatra Victoria RN  Outcome: Progressing  Flowsheets (Taken 2/9/2024 1946)  Discharge to home or other facility with appropriate resources:   Identify barriers to discharge with patient and caregiver   Arrange for needed discharge resources and transportation as appropriate   Identify discharge learning needs (meds, wound care, etc)     Problem: Pain  Goal: Verbalizes/displays adequate comfort level or baseline comfort level  2/9/2024 2011 by Cleopatra Victoria RN  Verbalizes/displays adequate comfort level or baseline comfort level:   Encourage patient to monitor pain and request assistance   Assess pain using appropriate pain scale        Problem: ABCDS Injury Assessment  Goal: Absence of physical injury  2/9/2024 2011 by Cleopatra Victoria RN  Outcome: Progressing  Flowsheets (Taken 2/9/2024 2010)  Absence of Physical Injury: Implement safety measures based on patient assessment     Problem: Safety - Adult  Goal: Free from fall injury  2/9/2024 2011 by Cleopatra Victoria RN  Outcome: Progressing  Flowsheets (Taken 2/9/2024 2010)  Free From Fall Injury: Instruct family/caregiver on patient safety     Problem: Coping  Goal: Pt/Family able to verbalize concerns and demonstrate effective coping strategies  Description: INTERVENTIONS:  1. Assist patient/family to identify coping skills, available support systems and cultural and spiritual values  2. Provide emotional support, including active listening and acknowledgement of concerns of patient and caregivers  3. Reduce environmental stimuli, as able  4. Instruct patient/family in relaxation techniques, as appropriate  5. Assess for spiritual pain/suffering and initiate Spiritual Care, Psychosocial Clinical Specialist consults as needed  Outcome: Progressing  Flowsheets  Taken 2/9/2024 1946 by Cleopatra Victoria RN  Patient/family able to verbalize anxieties, 
  Problem: Discharge Planning  Goal: Discharge to home or other facility with appropriate resources  Outcome: Progressing     Problem: Pain  Goal: Verbalizes/displays adequate comfort level or baseline comfort level  Outcome: Progressing     Problem: ABCDS Injury Assessment  Goal: Absence of physical injury  Outcome: Progressing     Problem: Safety - Adult  Goal: Free from fall injury  Outcome: Progressing     Problem: Skin/Tissue Integrity  Goal: Absence of new skin breakdown  Description: 1.  Monitor for areas of redness and/or skin breakdown  2.  Assess vascular access sites hourly  3.  Every 4-6 hours minimum:  Change oxygen saturation probe site  4.  Every 4-6 hours:  If on nasal continuous positive airway pressure, respiratory therapy assess nares and determine need for appliance change or resting period.  Outcome: Progressing     Problem: Coping  Goal: Pt/Family able to verbalize concerns and demonstrate effective coping strategies  Description: INTERVENTIONS:  1. Assist patient/family to identify coping skills, available support systems and cultural and spiritual values  2. Provide emotional support, including active listening and acknowledgement of concerns of patient and caregivers  3. Reduce environmental stimuli, as able  4. Instruct patient/family in relaxation techniques, as appropriate  5. Assess for spiritual pain/suffering and initiate Spiritual Care, Psychosocial Clinical Specialist consults as needed  Outcome: Progressing     Problem: Nutrition Deficit:  Goal: Optimize nutritional status  Outcome: Progressing     Problem: Cardiovascular - Adult  Goal: Maintains optimal cardiac output and hemodynamic stability  Outcome: Progressing  Goal: Absence of cardiac dysrhythmias or at baseline  Outcome: Progressing     Problem: Respiratory - Adult  Goal: Achieves optimal ventilation and oxygenation  Outcome: Progressing     Problem: Musculoskeletal - Adult  Goal: Return mobility to safest level of 
  Problem: Discharge Planning  Goal: Discharge to home or other facility with appropriate resources  Outcome: Progressing  Flowsheets (Taken 2/10/2024 2024)  Discharge to home or other facility with appropriate resources:   Identify barriers to discharge with patient and caregiver   Arrange for needed discharge resources and transportation as appropriate   Identify discharge learning needs (meds, wound care, etc)     Problem: Pain  Goal: Verbalizes/displays adequate comfort level or baseline comfort level  Outcome: Progressing  Verbalizes/displays adequate comfort level or baseline comfort level:   Encourage patient to monitor pain and request assistance   Assess pain using appropriate pain scale     Problem: ABCDS Injury Assessment  Goal: Absence of physical injury  Outcome: Progressing  Flowsheets (Taken 2/10/2024 2140)  Absence of Physical Injury: Implement safety measures based on patient assessment     Problem: Safety - Adult  Goal: Free from fall injury  Outcome: Progressing  Flowsheets (Taken 2/10/2024 2140)  Free From Fall Injury: Instruct family/caregiver on patient safety     Problem: Coping  Goal: Pt/Family able to verbalize concerns and demonstrate effective coping strategies  Description: INTERVENTIONS:  1. Assist patient/family to identify coping skills, available support systems and cultural and spiritual values  2. Provide emotional support, including active listening and acknowledgement of concerns of patient and caregivers  3. Reduce environmental stimuli, as able  4. Instruct patient/family in relaxation techniques, as appropriate  5. Assess for spiritual pain/suffering and initiate Spiritual Care, Psychosocial Clinical Specialist consults as needed  Outcome: Progressing  Flowsheets (Taken 2/10/2024 2024)  Patient/family able to verbalize anxieties, fears, and concerns, and demonstrate effective coping:   Assist patient/family to identify coping skills, available support systems and cultural and 
  Problem: Discharge Planning  Goal: Discharge to home or other facility with appropriate resources  Outcome: Progressing  Flowsheets (Taken 2/11/2024 2055)  Discharge to home or other facility with appropriate resources:   Identify barriers to discharge with patient and caregiver   Identify discharge learning needs (meds, wound care, etc)   Arrange for needed discharge resources and transportation as appropriate   Refer to discharge planning if patient needs post-hospital services based on physician order or complex needs related to functional status, cognitive ability or social support system     Problem: Pain  Goal: Verbalizes/displays adequate comfort level or baseline comfort level  Outcome: Progressing  Flowsheets (Taken 2/11/2024 2055)  Verbalizes/displays adequate comfort level or baseline comfort level:   Consider cultural and social influences on pain and pain management   Administer analgesics based on type and severity of pain and evaluate response   Encourage patient to monitor pain and request assistance   Assess pain using appropriate pain scale   Implement non-pharmacological measures as appropriate and evaluate response   Notify Licensed Independent Practitioner if interventions unsuccessful or patient reports new pain     Problem: ABCDS Injury Assessment  Goal: Absence of physical injury  Outcome: Progressing  Flowsheets (Taken 2/11/2024 2055)  Absence of Physical Injury: Implement safety measures based on patient assessment     Problem: Safety - Adult  Goal: Free from fall injury  Outcome: Progressing  Flowsheets (Taken 2/11/2024 2055)  Free From Fall Injury:   Based on caregiver fall risk screen, instruct family/caregiver to ask for assistance with transferring infant if caregiver noted to have fall risk factors   Instruct family/caregiver on patient safety     Problem: Skin/Tissue Integrity  Goal: Absence of new skin breakdown  Description: 1.  Monitor for areas of redness and/or skin 
  Problem: Discharge Planning  Goal: Discharge to home or other facility with appropriate resources  Outcome: Progressing  Flowsheets (Taken 2/19/2024 2115)  Discharge to home or other facility with appropriate resources: Identify barriers to discharge with patient and caregiver     Problem: Pain  Goal: Verbalizes/displays adequate comfort level or baseline comfort level  Outcome: Progressing     Problem: ABCDS Injury Assessment  Goal: Absence of physical injury  Outcome: Progressing     Problem: Safety - Adult  Goal: Free from fall injury  Outcome: Progressing     Problem: Skin/Tissue Integrity  Goal: Absence of new skin breakdown  Description: 1.  Monitor for areas of redness and/or skin breakdown  2.  Assess vascular access sites hourly  3.  Every 4-6 hours minimum:  Change oxygen saturation probe site  4.  Every 4-6 hours:  If on nasal continuous positive airway pressure, respiratory therapy assess nares and determine need for appliance change or resting period.  Outcome: Progressing     Problem: Coping  Goal: Pt/Family able to verbalize concerns and demonstrate effective coping strategies  Description: INTERVENTIONS:  1. Assist patient/family to identify coping skills, available support systems and cultural and spiritual values  2. Provide emotional support, including active listening and acknowledgement of concerns of patient and caregivers  3. Reduce environmental stimuli, as able  4. Instruct patient/family in relaxation techniques, as appropriate  5. Assess for spiritual pain/suffering and initiate Spiritual Care, Psychosocial Clinical Specialist consults as needed  Outcome: Progressing  Flowsheets (Taken 2/19/2024 2115)  Patient/family able to verbalize anxieties, fears, and concerns, and demonstrate effective coping: Assist patient/family to identify coping skills, available support systems and cultural and spiritual values     Problem: Nutrition Deficit:  Goal: Optimize nutritional status  2/20/2024 0151 
  Problem: Discharge Planning  Goal: Discharge to home or other facility with appropriate resources  Outcome: Progressing  Flowsheets (Taken 2/21/2024 1900)  Discharge to home or other facility with appropriate resources: Identify barriers to discharge with patient and caregiver     Problem: Pain  Goal: Verbalizes/displays adequate comfort level or baseline comfort level  Outcome: Progressing     Problem: ABCDS Injury Assessment  Goal: Absence of physical injury  Outcome: Progressing     Problem: Safety - Adult  Goal: Free from fall injury  Outcome: Progressing     Problem: Skin/Tissue Integrity  Goal: Absence of new skin breakdown  Description: 1.  Monitor for areas of redness and/or skin breakdown  2.  Assess vascular access sites hourly  3.  Every 4-6 hours minimum:  Change oxygen saturation probe site  4.  Every 4-6 hours:  If on nasal continuous positive airway pressure, respiratory therapy assess nares and determine need for appliance change or resting period.  Outcome: Progressing     Problem: Coping  Goal: Pt/Family able to verbalize concerns and demonstrate effective coping strategies  Description: INTERVENTIONS:  1. Assist patient/family to identify coping skills, available support systems and cultural and spiritual values  2. Provide emotional support, including active listening and acknowledgement of concerns of patient and caregivers  3. Reduce environmental stimuli, as able  4. Instruct patient/family in relaxation techniques, as appropriate  5. Assess for spiritual pain/suffering and initiate Spiritual Care, Psychosocial Clinical Specialist consults as needed  Outcome: Progressing  Flowsheets (Taken 2/21/2024 1900)  Patient/family able to verbalize anxieties, fears, and concerns, and demonstrate effective coping: Assist patient/family to identify coping skills, available support systems and cultural and spiritual values     Problem: Nutrition Deficit:  Goal: Optimize nutritional status  Outcome: 
  Problem: Discharge Planning  Goal: Discharge to home or other facility with appropriate resources  Outcome: Progressing  Flowsheets (Taken 2/4/2024 0407)  Discharge to home or other facility with appropriate resources:   Identify barriers to discharge with patient and caregiver   Identify discharge learning needs (meds, wound care, etc)   Refer to discharge planning if patient needs post-hospital services based on physician order or complex needs related to functional status, cognitive ability or social support system   Arrange for needed discharge resources and transportation as appropriate   Arrange for interpreters to assist at discharge as needed  Note: Feedback readiness for discharge.  Promoting inclusion for discharge planning.      Problem: Pain  Goal: Verbalizes/displays adequate comfort level or baseline comfort level  Outcome: Progressing  Flowsheets (Taken 2/4/2024 0407)  Verbalizes/displays adequate comfort level or baseline comfort level:   Encourage patient to monitor pain and request assistance   Administer analgesics based on type and severity of pain and evaluate response   Consider cultural and social influences on pain and pain management   Assess pain using appropriate pain scale   Implement non-pharmacological measures as appropriate and evaluate response   Notify Licensed Independent Practitioner if interventions unsuccessful or patient reports new pain  Note: Utilize correct pain assessment tool based on patient abilities.  Use appropriate pain medications base on appropriate tools.  Utilize non-pharmacologic pain reduction methods to supplement ordered pain medications.Educate patient on pain control.  Educate patient on acceptable pain level with chronic pain.   Talk to patient about non-pharmaceutical pain interventions.Encourage use of medication when needed.  Promote rest and distractions from pain.     Problem: ABCDS Injury Assessment  Goal: Absence of physical injury  Outcome: 
  Problem: Discharge Planning  Goal: Discharge to home or other facility with appropriate resources  Outcome: Progressing  Flowsheets (Taken 2/5/2024 0800)  Discharge to home or other facility with appropriate resources:   Identify barriers to discharge with patient and caregiver   Arrange for needed discharge resources and transportation as appropriate   Identify discharge learning needs (meds, wound care, etc)   Arrange for interpreters to assist at discharge as needed   Refer to discharge planning if patient needs post-hospital services based on physician order or complex needs related to functional status, cognitive ability or social support system     Problem: Pain  Goal: Verbalizes/displays adequate comfort level or baseline comfort level  Outcome: Progressing  Flowsheets (Taken 2/5/2024 0800)  Verbalizes/displays adequate comfort level or baseline comfort level:   Encourage patient to monitor pain and request assistance   Assess pain using appropriate pain scale   Administer analgesics based on type and severity of pain and evaluate response   Implement non-pharmacological measures as appropriate and evaluate response   Consider cultural and social influences on pain and pain management   Notify Licensed Independent Practitioner if interventions unsuccessful or patient reports new pain     Problem: ABCDS Injury Assessment  Goal: Absence of physical injury  Outcome: Progressing     Problem: Safety - Adult  Goal: Free from fall injury  Outcome: Progressing     Problem: Safety - Medical Restraint  Goal: Remains free of injury from restraints (Restraint for Interference with Medical Device)  Description: INTERVENTIONS:  1. Determine that other, less restrictive measures have been tried or would not be effective before applying the restraint  2. Evaluate the patient's condition at the time of restraint application  3. Inform patient/family regarding the reason for restraint  4. Q2H: Monitor safety, psychosocial 
  Problem: Discharge Planning  Goal: Discharge to home or other facility with appropriate resources  Outcome: Progressing  Flowsheets (Taken 2/8/2024 1927)  Discharge to home or other facility with appropriate resources:   Identify barriers to discharge with patient and caregiver   Arrange for needed discharge resources and transportation as appropriate   Identify discharge learning needs (meds, wound care, etc)     Problem: Pain  Goal: Verbalizes/displays adequate comfort level or baseline comfort level  Outcome: Progressing  Verbalizes/displays adequate comfort level or baseline comfort level:   Assess pain using appropriate pain scale   Encourage patient to monitor pain and request assistance     Problem: Safety - Medical Restraint  Goal: Remains free of injury from restraints (Restraint for Interference with Medical Device)  Description: INTERVENTIONS:  1. Determine that other, less restrictive measures have been tried or would not be effective before applying the restraint  2. Evaluate the patient's condition at the time of restraint application  3. Inform patient/family regarding the reason for restraint  4. Q2H: Monitor safety, psychosocial status, comfort, nutrition and hydration  Recent Flowsheet Documentation  Taken 2/9/2024 0001 by Cleopatra Victoria RN        Problem: Safety - Medical Restraint  Goal: Remains free of injury from restraints (Restraint for Interference with Medical Device)  Description: INTERVENTIONS:  1. Determine that other, less restrictive measures have been tried or would not be effective before applying the restraint  2. Evaluate the patient's condition at the time of restraint application  3. Inform patient/family regarding the reason for restraint  4. Q2H: Monitor safety, psychosocial status, comfort, nutrition and hydration  Outcome: Progressing  Flowsheets  Taken 2/9/2024 0001 by Cleopatra Victoria RN  Remains free of injury from restraints (restraint for interference with medical 
  Problem: Discharge Planning  Goal: Discharge to home or other facility with appropriate resources  Recent Flowsheet Documentation  Taken 2/17/2024 2000 by Rosey Villar, RN  Discharge to home or other facility with appropriate resources:   Identify barriers to discharge with patient and caregiver   Arrange for needed discharge resources and transportation as appropriate   Identify discharge learning needs (meds, wound care, etc)   Arrange for interpreters to assist at discharge as needed   Refer to discharge planning if patient needs post-hospital services based on physician order or complex needs related to functional status, cognitive ability or social support system  2/17/2024 0907 by Mohinder Corona, RN  Outcome: Progressing     Problem: Pain  Goal: Verbalizes/displays adequate comfort level or baseline comfort level  2/17/2024 2220 by Rosey Villar RN  Outcome: Progressing  Flowsheets (Taken 2/17/2024 2000)  Verbalizes/displays adequate comfort level or baseline comfort level:   Encourage patient to monitor pain and request assistance   Assess pain using appropriate pain scale   Administer analgesics based on type and severity of pain and evaluate response   Implement non-pharmacological measures as appropriate and evaluate response   Consider cultural and social influences on pain and pain management   Notify Licensed Independent Practitioner if interventions unsuccessful or patient reports new pain  2/17/2024 0907 by Mohinder Corona, RN  Outcome: Progressing     Problem: ABCDS Injury Assessment  Goal: Absence of physical injury  2/17/2024 2220 by Rosey Villar, RN  Outcome: Progressing  2/17/2024 0907 by Mohinder Corona, RN  Outcome: Progressing     Problem: Safety - Adult  Goal: Free from fall injury  2/17/2024 2220 by Rosey Villar, RN  Outcome: Progressing  2/17/2024 0907 by Mohinder Corona, RN  Outcome: Progressing     Problem: Skin/Tissue Integrity  Goal: Absence of new skin breakdown  Description: 1. 
  Problem: Musculoskeletal - Adult  Goal: Return mobility to safest level of function  2/7/2024 2326 by Consuelo Mock, RN  Outcome: Progressing  Flowsheets (Taken 2/7/2024 2326)  Return Mobility to Safest Level of Function: Apply continuous passive motion per provider or physical therapy orders to increase flexion toward goal  2/7/2024 1337 by Lisa Kerr, RN  Outcome: Not Progressing  Goal: Return ADL status to a safe level of function  2/7/2024 2326 by Consuelo Mock, RN  Outcome: Progressing  Flowsheets (Taken 2/7/2024 2326)  Return ADL Status to a Safe Level of Function: Administer medication as ordered  2/7/2024 1337 by Lisa Kerr, RN  Outcome: Not Progressing     
  Problem: Pain  Goal: Verbalizes/displays adequate comfort level or baseline comfort level  Outcome: Progressing  Flowsheets (Taken 2/9/2024 0800)  Verbalizes/displays adequate comfort level or baseline comfort level:   Encourage patient to monitor pain and request assistance   Assess pain using appropriate pain scale     Problem: ABCDS Injury Assessment  Goal: Absence of physical injury  Outcome: Progressing     Problem: Safety - Adult  Goal: Free from fall injury  Outcome: Progressing     Problem: Skin/Tissue Integrity  Goal: Absence of new skin breakdown  Description: 1.  Monitor for areas of redness and/or skin breakdown  2.  Assess vascular access sites hourly  3.  Every 4-6 hours minimum:  Change oxygen saturation probe site  4.  Every 4-6 hours:  If on nasal continuous positive airway pressure, respiratory therapy assess nares and determine need for appliance change or resting period.  Outcome: Progressing     Problem: Discharge Planning  Goal: Discharge to home or other facility with appropriate resources  Outcome: Not Progressing  Flowsheets (Taken 2/9/2024 0800)  Discharge to home or other facility with appropriate resources:   Identify barriers to discharge with patient and caregiver   Arrange for needed discharge resources and transportation as appropriate     
  Problem: Respiratory - Adult  Goal: Achieves optimal ventilation and oxygenation  2/12/2024 1014 by Jaylin Mosley KARLA  Outcome: Progressing   Patient remains on the ventilator, continuing to wean as tolerated and prone positioning daily.                                               Patient Weaning Progress    The patient's vent settings was not able to be weaned this shift.      Ventilator settings that were weaned              [] Mode   [] Pressure support weaned   [] Fio2 weaned   [] Peep weaned      Spontaneous weaning trial  was not attempted.     due to defined parameters for SBT (spontaneous breathing trial) not being met.     *Results of SBT documented under SBTOUTCOME note.    Reason that defined ventilator parameters for SBT was not met              [] Patient condition requires increased ventilator settings  [] Requires increased sedation   [x] Settings not within weaning range   [] SAT not completed   [] Physician orders      Cuff was not  deflated to determine cuff leak. Unable to get agreement for goals because no family is present and patient cannot respond.      
  Problem: Respiratory - Adult  Goal: Achieves optimal ventilation and oxygenation  2/17/2024 1426 by Jaylin Mosley, [facility]  Outcome: Progressing   Patient remains on HFNC, continuing to wean as tolerated.   Patient mutually agreed on goals.    
  Problem: Respiratory - Adult  Goal: Achieves optimal ventilation and oxygenation  2/7/2024 0755 by Theodore Pacheco, [facility]  Outcome: Progressing                                                Patient Weaning Progress    The patient's vent settings was able to be weaned this shift.      Ventilator settings that were weaned              [] Mode   [x] Pressure support weaned   [] Fio2 weaned   [] Peep weaned      Spontaneous weaning trial  was not attempted.     due to defined parameters for SBT (spontaneous breathing trial) not being met.     *Results of SBT documented under SBTOUTCOME note.    Reason that defined ventilator parameters for SBT was not met              [] Patient condition requires increased ventilator settings  [] Requires increased sedation   [x] Settings not within weaning range   [] SAT not completed   [] Physician orders      Evac tube was  hooked up with continuous low suction(20-30mmHg)      Cuff was not deflated to determine cuff leak.     Unable to get agreement for goals because no family is present and patient cannot respond.      
  Problem: Respiratory - Adult  Goal: Achieves optimal ventilation and oxygenation  2/7/2024 2039 by Rossy Delgado RCP  Outcome: Progressing  Flowsheets (Taken 2/7/2024 2039)  Achieves optimal ventilation and oxygenation:   Assess for changes in respiratory status   Position to facilitate oxygenation and minimize respiratory effort   Assess the need for suctioning and aspirate as needed   Respiratory therapy support as indicated   Assess and instruct to report shortness of breath or any respiratory difficulty   Encourage broncho-pulmonary hygiene including cough, deep breathe, incentive spirometry   Oxygen supplementation based on oxygen saturation or arterial blood gases   Assess for changes in mentation and behavior  Note: Intubated/Vented. Patient proning at this time. Will wean as tolerated.      
  Problem: Respiratory - Adult  Goal: Achieves optimal ventilation and oxygenation  Outcome: Progressing                                                 Patient Weaning Progress    The patient's vent settings was able to be weaned this shift.      Ventilator settings that were weaned              [] Mode   [] Pressure support weaned   [x] Fio2 weaned   [] Peep weaned      Spontaneous weaning trial  was not attempted.     due to defined parameters for SBT (spontaneous breathing trial) not being met.     *Results of SBT documented under SBTOUTCOME note.    Reason that defined ventilator parameters for SBT was not met              [] Patient condition requires increased ventilator settings  [x] Requires increased sedation   [x] Settings not within weaning range   [] SAT not completed   [] Physician orders    Cuff was not  deflated to determine cuff leak.   Unable to get agreement for goals because no family is present and patient cannot respond.     
  Problem: Respiratory - Adult  Goal: Achieves optimal ventilation and oxygenation  Outcome: Progressing                                                Patient Weaning Progress    The patient's vent settings was able to be weaned this shift.      Ventilator settings that were weaned              [] Mode   [] Pressure support weaned   [x] Fio2 weaned   [] Peep weaned      Spontaneous weaning trial  was not attempted.     due to defined parameters for SBT (spontaneous breathing trial) not being met.     Reason that defined ventilator parameters for SBT was not met              [x] Patient condition requires increased ventilator settings  [x] Requires increased sedation   [x] Settings not within weaning range   [x] SAT not completed   [x] Physician orders    Evac tube was  hooked up with continuous low suction(20-30mmHg)      Cuff was not  deflated to determine cuff leak.     Unable to get agreement for goals because no family is present and patient cannot respond.     
  Problem: Respiratory - Adult  Goal: Achieves optimal ventilation and oxygenation  Outcome: Progressing  Achieves optimal ventilation and oxygenation:   Assess for changes in respiratory status   Position to facilitate oxygenation and minimize respiratory effort   Assess the need for suctioning and aspirate as needed   Respiratory therapy support as indicated   Assess and instruct to report shortness of breath or any respiratory difficulty   Encourage broncho-pulmonary hygiene including cough, deep breathe, incentive spirometry   Oxygen supplementation based on oxygen saturation or arterial blood gases   Assess for changes in mentation and behavior  Note: Intubated/Vented. Patient proning at this time. Will wean as tolerated.      
  Problem: Safety - Medical Restraint  Goal: Remains free of injury from restraints (Restraint for Interference with Medical Device)  Description: INTERVENTIONS:  1. Determine that other, less restrictive measures have been tried or would not be effective before applying the restraint  2. Evaluate the patient's condition at the time of restraint application  3. Inform patient/family regarding the reason for restraint  4. Q2H: Monitor safety, psychosocial status, comfort, nutrition and hydration  Outcome: Not Progressing  Flowsheets (Taken 2/8/2024 0706)  Remains free of injury from restraints (restraint for interference with medical device):   Determine that other, less restrictive measures have been tried or would not be effective before applying the restraint   Evaluate the patient's condition at the time of restraint application   Inform patient/family regarding the reason for restraint   Every 2 hours: Monitor safety, psychosocial status, comfort, nutrition and hydration     
  Problem: Safety - Medical Restraint  Goal: Remains free of injury from restraints (Restraint for Interference with Medical Device)  Description: INTERVENTIONS:  1. Determine that other, less restrictive measures have been tried or would not be effective before applying the restraint  2. Evaluate the patient's condition at the time of restraint application  3. Inform patient/family regarding the reason for restraint  4. Q2H: Monitor safety, psychosocial status, comfort, nutrition and hydration  Outcome: Progressing  Flowsheets (Taken 2/6/2024 0427)  Remains free of injury from restraints (restraint for interference with medical device):   Determine that other, less restrictive measures have been tried or would not be effective before applying the restraint   Evaluate the patient's condition at the time of restraint application   Every 2 hours: Monitor safety, psychosocial status, comfort, nutrition and hydration   Inform patient/family regarding the reason for restraint  Care plan reviewed with pt's family. Pt's family verbalizes understanding of the care plan and contributed to goal setting.       
  Problem: Safety - Medical Restraint  Goal: Remains free of injury from restraints (Restraint for Interference with Medical Device)  Description: INTERVENTIONS:  1. Determine that other, less restrictive measures have been tried or would not be effective before applying the restraint  2. Evaluate the patient's condition at the time of restraint application  3. Inform patient/family regarding the reason for restraint  4. Q2H: Monitor safety, psychosocial status, comfort, nutrition and hydration  Recent Flowsheet Documentation  Taken 2/13/2024 0413 by Kelsie Kerr RN  Remains free of injury from restraints (restraint for interference with medical device):   Determine that other, less restrictive measures have been tried or would not be effective before applying the restraint   Evaluate the patient's condition at the time of restraint application   Every 2 hours: Monitor safety, psychosocial status, comfort, nutrition and hydration   Inform patient/family regarding the reason for restraint  Taken 2/13/2024 0409 by Kelsie Kerr RN  Remains free of injury from restraints (restraint for interference with medical device):   Determine that other, less restrictive measures have been tried or would not be effective before applying the restraint   Inform patient/family regarding the reason for restraint   Evaluate the patient's condition at the time of restraint application   Every 2 hours: Monitor safety, psychosocial status, comfort, nutrition and hydration     Problem: Safety - Medical Restraint  Goal: Remains free of injury from restraints (Restraint for Interference with Medical Device)  Description: INTERVENTIONS:  1. Determine that other, less restrictive measures have been tried or would not be effective before applying the restraint  2. Evaluate the patient's condition at the time of restraint application  3. Inform patient/family regarding the reason for restraint  4. Q2H: Monitor safety, psychosocial 
ABG following turning supine and ventilator adjustment.  7.41/48/88/30.  PF ratio 147.  Plan for proning on 16/8 hr schedule to begin at approximately 9 PM.  
Care plan reviewed with patient and family member.  Patient and family member verbalize understanding of the plan of care and contribute to goal setting.     
Consult Received.  See SW note.   
Patient educated on how to use incentive spirometer. Patient verbalized understanding and demonstrated proper use. Emphasized importance and usage of device, with coughing and deep breathing every 2 hours while awake.         
Patient educated on how to use incentive spirometer. Patient verbalized understanding and demonstrated proper use. Emphasized importance and usage of device, with coughing and deep breathing every 2 hours while awake.         
Problem: Discharge Planning  Goal: Discharge to home or other facility with appropriate resources  2/16/2024 0945 by Sylvester Orona RN  Outcome: Progressing  Flowsheets (Taken 2/16/2024 0945)  Discharge to home or other facility with appropriate resources:   Identify discharge learning needs (meds, wound care, etc)   Identify barriers to discharge with patient and caregiver   Refer to discharge planning if patient needs post-hospital services based on physician order or complex needs related to functional status, cognitive ability or social support system   Arrange for needed discharge resources and transportation as appropriate   Arrange for interpreters to assist at discharge as needed     Problem: Pain  Goal: Verbalizes/displays adequate comfort level or baseline comfort level  2/16/2024 0945 by Sylvester Orona RN  Outcome: Progressing  Flowsheets (Taken 2/16/2024 0945)  Verbalizes/displays adequate comfort level or baseline comfort level:   Encourage patient to monitor pain and request assistance   Administer analgesics based on type and severity of pain and evaluate response   Consider cultural and social influences on pain and pain management   Assess pain using appropriate pain scale   Implement non-pharmacological measures as appropriate and evaluate response   Notify Licensed Independent Practitioner if interventions unsuccessful or patient reports new pain     Problem: ABCDS Injury Assessment  Goal: Absence of physical injury  2/16/2024 0945 by Sylvester Orona RN  Outcome: Progressing  Flowsheets (Taken 2/16/2024 0945)  Absence of Physical Injury: Implement safety measures based on patient assessment     Problem: Safety - Adult  Goal: Free from fall injury  2/16/2024 0945 by Sylvester Orona RN  Outcome: Progressing  Flowsheets (Taken 2/16/2024 0945)  Free From Fall Injury:   Instruct family/caregiver on patient safety   Based on caregiver fall risk screen, instruct family/caregiver to ask for 
dysrhythmias or at baseline  Outcome: Progressing     Problem: Respiratory - Adult  Goal: Achieves optimal ventilation and oxygenation  2/7/2024 1337 by Lisa Kerr RN  Outcome: Progressing     Problem: Musculoskeletal - Adult  Goal: Maintain proper alignment of affected body part  Outcome: Progressing     Problem: Safety - Medical Restraint  Goal: Remains free of injury from restraints (Restraint for Interference with Medical Device)  Description: INTERVENTIONS:  1. Determine that other, less restrictive measures have been tried or would not be effective before applying the restraint  2. Evaluate the patient's condition at the time of restraint application  3. Inform patient/family regarding the reason for restraint  4. Q2H: Monitor safety, psychosocial status, comfort, nutrition and hydration  Outcome: Completed  Flowsheets (Taken 2/7/2024 3870)  Remains free of injury from restraints (restraint for interference with medical device):   Determine that other, less restrictive measures have been tried or would not be effective before applying the restraint   Evaluate the patient's condition at the time of restraint application   Every 2 hours: Monitor safety, psychosocial status, comfort, nutrition and hydration    Care plan reviewed with patient and family. Family verbalize understanding of the plan of care and contribute to goal setting.   Patient unable to verbalize  
family/caregiver on patient safety  2/19/2024 0219 by Kay Pascal, RN  Outcome: Progressing  Flowsheets (Taken 2/19/2024 0219)  Free From Fall Injury: Instruct family/caregiver on patient safety     Problem: Skin/Tissue Integrity  Goal: Absence of new skin breakdown  Description: 1.  Monitor for areas of redness and/or skin breakdown  2.  Assess vascular access sites hourly  3.  Every 4-6 hours minimum:  Change oxygen saturation probe site  4.  Every 4-6 hours:  If on nasal continuous positive airway pressure, respiratory therapy assess nares and determine need for appliance change or resting period.  2/19/2024 1128 by Yessenia Peña, RN  Outcome: Progressing  2/19/2024 0219 by Kay Pascal, RN  Outcome: Progressing  Note: Patient's skin is appropriate for ethnicity, warm, and dry, with no new skin issues noted this shift. Mucous membranes are pink, moist, and intact.       Problem: Coping  Goal: Pt/Family able to verbalize concerns and demonstrate effective coping strategies  Description: INTERVENTIONS:  1. Assist patient/family to identify coping skills, available support systems and cultural and spiritual values  2. Provide emotional support, including active listening and acknowledgement of concerns of patient and caregivers  3. Reduce environmental stimuli, as able  4. Instruct patient/family in relaxation techniques, as appropriate  5. Assess for spiritual pain/suffering and initiate Spiritual Care, Psychosocial Clinical Specialist consults as needed  2/19/2024 1128 by Yessenia Peña, RN  Outcome: Progressing  Flowsheets (Taken 2/19/2024 0219 by Kay Pascal, RN)  Patient/family able to verbalize anxieties, fears, and concerns, and demonstrate effective coping: Assist patient/family to identify coping skills, available support systems and cultural and spiritual values  2/19/2024 0219 by Kay Pascal, RN  Outcome: Progressing  Flowsheets  Taken 2/19/2024 0219  Patient/family able to 
that other, less restrictive measures have been tried or would not be effective before applying the restraint   Inform patient/family regarding the reason for restraint     Problem: Coping  Goal: Pt/Family able to verbalize concerns and demonstrate effective coping strategies  Description: INTERVENTIONS:  1. Assist patient/family to identify coping skills, available support systems and cultural and spiritual values  2. Provide emotional support, including active listening and acknowledgement of concerns of patient and caregivers  3. Reduce environmental stimuli, as able  4. Instruct patient/family in relaxation techniques, as appropriate  5. Assess for spiritual pain/suffering and initiate Spiritual Care, Psychosocial Clinical Specialist consults as needed  2/15/2024 2222 by Cleopatra Victoria RN  Outcome: Progressing  Flowsheets (Taken 2/15/2024 2120)  Patient/family able to verbalize anxieties, fears, and concerns, and demonstrate effective coping:   Assist patient/family to identify coping skills, available support systems and cultural and spiritual values   Reduce environmental stimuli, as able   Provide emotional support, including active listening and acknowledgement of concerns of patient and caregivers     Problem: Nutrition Deficit:  Goal: Optimize nutritional status  2/15/2024 2222 by Cleopatra Victoria, RN  Outcome: Progressing  Nutrient intake appropriate for improving, restoring, or maintaining nutritional needs: Assess nutritional status and recommend course of action     Problem: Cardiovascular - Adult  Goal: Maintains optimal cardiac output and hemodynamic stability  2/15/2024 2222 by Cleopatra Victoria RN  Outcome: Progressing  Flowsheets (Taken 2/15/2024 2120)  Maintains optimal cardiac output and hemodynamic stability:   Monitor blood pressure and heart rate   Assess for signs of decreased cardiac output   Monitor urine output and notify Licensed Independent Practitioner for values outside of normal 
Independent Practitioner for values outside of normal range   Assess for signs of decreased cardiac output   Administer fluid and/or volume expanders as ordered   Administer vasoactive medications as ordered  Goal: Absence of cardiac dysrhythmias or at baseline  Outcome: Progressing  Flowsheets (Taken 2/13/2024 2000)  Absence of cardiac dysrhythmias or at baseline:   Monitor cardiac rate and rhythm   Assess for signs of decreased cardiac output     Problem: Respiratory - Adult  Goal: Achieves optimal ventilation and oxygenation  Outcome: Progressing  Flowsheets (Taken 2/12/2024 2211)  Achieves optimal ventilation and oxygenation:   Assess for changes in respiratory status   Assess for changes in mentation and behavior   Position to facilitate oxygenation and minimize respiratory effort   Oxygen supplementation based on oxygen saturation or arterial blood gases   Assess the need for suctioning and aspirate as needed   Assess and instruct to report shortness of breath or any respiratory difficulty   Respiratory therapy support as indicated     Problem: Musculoskeletal - Adult  Goal: Return mobility to safest level of function  Outcome: Progressing  Flowsheets (Taken 2/12/2024 2211)  Return Mobility to Safest Level of Function:   Assess patient stability and activity tolerance for standing, transferring and ambulating with or without assistive devices   Assist with transfers and ambulation using safe patient handling equipment as needed   Ensure adequate protection for wounds/incisions during mobilization  Goal: Maintain proper alignment of affected body part  Outcome: Progressing  Flowsheets (Taken 2/12/2024 2211)  Maintain proper alignment of affected body part:   Support and protect limb and body alignment per provider's orders   Instruct and reinforce with patient and family use of appropriate assistive device and precautions (e.g. spinal or hip dislocation precautions)  Goal: Return ADL status to a safe level of 
oxygenation: Assess for changes in respiratory status  Taken 2/18/2024 1945  Achieves optimal ventilation and oxygenation: Assess for changes in respiratory status     Problem: Musculoskeletal - Adult  Goal: Return mobility to safest level of function  Outcome: Progressing  Flowsheets  Taken 2/19/2024 0219  Return Mobility to Safest Level of Function: Assess patient stability and activity tolerance for standing, transferring and ambulating with or without assistive devices  Taken 2/18/2024 1945  Return Mobility to Safest Level of Function: Assess patient stability and activity tolerance for standing, transferring and ambulating with or without assistive devices  Goal: Return ADL status to a safe level of function  Outcome: Progressing  Flowsheets  Taken 2/19/2024 0219  Return ADL Status to a Safe Level of Function: Administer medication as ordered  Taken 2/18/2024 1945  Return ADL Status to a Safe Level of Function: Administer medication as ordered     Problem: Gastrointestinal - Adult  Goal: Maintains or returns to baseline bowel function  Outcome: Progressing  Flowsheets  Taken 2/19/2024 0219  Maintains or returns to baseline bowel function: Assess bowel function  Taken 2/18/2024 1945  Maintains or returns to baseline bowel function: Assess bowel function  Goal: Maintains adequate nutritional intake  Outcome: Progressing  Flowsheets  Taken 2/19/2024 0219  Maintains adequate nutritional intake: Monitor percentage of each meal consumed  Taken 2/18/2024 1945  Maintains adequate nutritional intake: Monitor percentage of each meal consumed       Patient educated on how to use incentive spirometer. Patient verbalized understanding and demonstrated proper use. Emphasized importance and usage of device, with coughing and deep breathing every 2 hours while awake.     Care plan reviewed with patient.  Patient does verbalize understanding of the plan of care and contribute to goal setting.

## 2024-02-22 NOTE — PROGRESS NOTES
Patient Weaning Progress    The patient's vent settings was able to be weaned this shift.      Ventilator settings that were weaned              [] Mode   [] Pressure support weaned   [x] Fio2 weaned   [] Peep weaned      Spontaneous weaning trial  was not attempted.    due to defined parameters for SBT (spontaneous breathing trial) not being met.     Reason that defined ventilator parameters for SBT was not met              [x] Patient condition requires increased ventilator settings  [] Requires increased sedation   [] Settings not within weaning range   [x] SAT not completed   [] Physician orders      Evac tube was  hooked up with continuous low suction(20-30mmHg)        Unable to get agreement for goals because no family is present and patient cannot respond.     
                                             Patient Weaning Progress    The patient's vent settings was able to be weaned this shift.      Ventilator settings that were weaned              [] Mode   [] Pressure support weaned   [x] Fio2 weaned   [x] Peep weaned      Spontaneous weaning trial  was not attempted due to defined parameters for SBT (spontaneous breathing trial) not being met.       Reason that defined ventilator parameters for SBT was not met              [] Patient condition requires increased ventilator settings  [] Requires increased sedation   [x] Settings not within weaning range   [] SAT not completed   [] Physician orders      Unable to get agreement for goals because no family is present and patient cannot respond.     
                                             Patient Weaning Progress    The patient's vent settings was able to be weaned this shift.      Ventilator settings that were weaned              [] Mode   [x] Pressure support weaned   [x] Fio2 weaned   [x] Peep weaned      Spontaneous weaning trial  was not attempted.     due to defined parameters for SBT (spontaneous breathing trial) not being met.     *Results of SBT documented under SBTOUTCOME note.    Reason that defined ventilator parameters for SBT was not met              [] Patient condition requires increased ventilator settings  [] Requires increased sedation   [x] Settings not within weaning range   [] SAT not completed   [] Physician orders        Evac tube was  hooked up with continuous low suction(20-30mmHg)      Cuff was not  deflated to determine cuff leak. A leak  was not detected.     Unable to get agreement for goals because no family is present and patient cannot respond.     
                                             Patient Weaning Progress    The patient's vent settings was able to be weaned this shift.    Ventilator settings that were weaned              [] Mode   [] Pressure support weaned   [x] Fio2 weaned   [] Peep weaned    Spontaneous weaning trial  was not attempted due to defined parameters for SBT (spontaneous breathing trial) not being met.    Reason that defined ventilator parameters for SBT was not met              [x] Patient condition requires increased ventilator settings  [] Requires increased sedation   [] Settings not within weaning range   [x] SAT not completed   [] Physician orders    Evac tube was  hooked up with continuous low suction(20-30mmHg)    Cuff was not  deflated to determine cuff leak.    Unable to get agreement for goals because no family is present and patient cannot respond.  
                                             Patient Weaning Progress    The patient's vent settings was not able to be weaned this shift.      Spontaneous weaning trial  was not attempted.     due to defined parameters for SBT (spontaneous breathing trial) not being met.     *Results of SBT documented under SBTOUTCOME note.    Reason that defined ventilator parameters for SBT was not met              [x] Patient condition requires increased ventilator settings  [] Requires increased sedation   [] Settings not within weaning range   [] SAT not completed   [] Physician orders      Cuff was not  deflated to determine cuff leak.     Unable to get agreement for goals because no family is present and patient cannot respond.      
                                             Patient Weaning Progress    The patient's vent settings was not able to be weaned this shift.    Spontaneous weaning trial was attempted. Patient respiratory rate was 8-9 breaths per minute, switched back to pressure control.    Evac tube was hooked up with continuous low suction (20-30mmHg).    Cuff was not deflated to determine cuff leak.    Unable to get agreement for goals because no family is present and patient cannot respond.  
                                             Patient Weaning Progress    The patient's vent settings was not able to be weaned this shift.    Spontaneous weaning trial was not attempted due to defined parameters for SBT (spontaneous breathing trial) not being met.    Reason that defined ventilator parameters for SBT was not met              [x] Patient condition requires increased ventilator settings  [] Requires increased sedation   [] Settings not within weaning range   [] SAT not completed   [] Physician orders    Evac tube was not hooked up with continuous low suction (20-30mmHg).    Cuff was not deflated to determine cuff leak.    Unable to get agreement for goals because no family is present and patient cannot respond.  
                                             Patient Weaning Progress    The patient's vent settings was not able to be weaned this shift.    Spontaneous weaning trial was not attempted due to defined parameters for SBT (spontaneous breathing trial) not being met.    Reason that defined ventilator parameters for SBT was not met              [x] Patient condition requires increased ventilator settings  [] Requires increased sedation   [x] Settings not within weaning range   [] SAT not completed   [] Physician orders    Evac tube was hooked up with continuous low suction (20-30mmHg).    Cuff was not deflated to determine cuff leak.    Unable to get agreement for goals because no family is present and patient cannot respond.  
     Problem: Discharge Planning  Goal: Discharge to home or other facility with appropriate resources  2/12/2024 0901 by Conchita Haas RN  Outcome: Progressing  Flowsheets (Taken 2/12/2024 0901)  Discharge to home or other facility with appropriate resources:   Identify discharge learning needs (meds, wound care, etc)   Identify barriers to discharge with patient and caregiver   Refer to discharge planning if patient needs post-hospital services based on physician order or complex needs related to functional status, cognitive ability or social support system     Problem: Pain  Goal: Verbalizes/displays adequate comfort level or baseline comfort level  2/12/2024 0901 by Conchita Haas RN  Outcome: Progressing  Flowsheets (Taken 2/12/2024 0901)  Verbalizes/displays adequate comfort level or baseline comfort level:   Assess pain using appropriate pain scale   Implement non-pharmacological measures as appropriate and evaluate response     Problem: ABCDS Injury Assessment  Goal: Absence of physical injury  2/12/2024 0901 by Conchita Haas RN  Outcome: Progressing  Flowsheets (Taken 2/12/2024 0901)  Absence of Physical Injury: Implement safety measures based on patient assessment     Problem: Safety - Adult  Goal: Free from fall injury  2/12/2024 0901 by Conchita Haas RN  Outcome: Progressing  Flowsheets (Taken 2/12/2024 0901)  Free From Fall Injury:   Instruct family/caregiver on patient safety   Based on caregiver fall risk screen, instruct family/caregiver to ask for assistance with transferring infant if caregiver noted to have fall risk factors     Problem: Skin/Tissue Integrity  Goal: Absence of new skin breakdown  Description: 1.  Monitor for areas of redness and/or skin breakdown  2.  Assess vascular access sites hourly  3.  Every 4-6 hours minimum:  Change oxygen saturation probe site  4.  Every 4-6 hours:  If on nasal continuous positive airway pressure, respiratory therapy 
   02/10/24 1206   Encounter Summary   Encounter Overview/Reason  Spiritual/Emotional Needs   Service Provided For: Family   Referral/Consult From: Rounding   Support System Family members;Children   Last Encounter  02/10/24   Complexity of Encounter Low   Begin Time 1200   End Time  1208   Total Time Calculated 8 min   Assessment/Intervention/Outcome   Assessment Coping;Concerns with suffering   Intervention Active listening;Discussed illness injury and it’s impact;Explored/Affirmed feelings, thoughts, concerns   Outcome Comfort;Engaged in conversation;Expressed feelings, needs, and concerns;Receptive     This is a spiritual health encounter with patient's family as a part of rounds. Patient's family, including three children and patient's brother, shared about patient's medical situation and its impact. Family shared their hopes for patient as well as the stressful situation it has been for them, even as they are \"trying to stay positive.\"  provided empathic listening and reflection as well as information regarding  services and availability.      team will remain available to support patient/family, PRN.     Chaplain Janeth Sanchez M.Div     Spiritual Care Department  [address] [address]  [phone]    
  CRITICAL CARE PROGRESS NOTE      Patient:  Fredo Rod    Unit/Bed:4B-02/002-A  YOB: 1970  MRN: [id number]   PCP: No primary care provider on file.  Date of Admission: 2/3/2024  Chief Complaint:- AMS    Assessment and Plan:    Acute metabolic encephalopathy:  Multifactorial.  Predominantly secondary to hypoxic/hypercapnic respiratory failure, see below with concomitant hyperammonemia. Serum EtOH and UDS negative.  UA negative.  CT A/P with right ventral lateral abdominal wall findings consistent for cellulitis.  Blood cultures x 2 obtained.  On antibiotics.  Hyperammonemia, no history of cirrhosis or significant alcohol.  Given lactulose x 1. Repeat ammonia level. LFTs and INR nonsignificant.  US liver with mild sludge and gallbladder present but no evidence of cholecystitis/choledocholithiasis.  Consider CT head if no improvement.  Check TSH/FT4 to rule out insidious myxedema coma  Acute on chronic hypoxic hypercapnic respiratory failure: Initially suspected secondary to volume overload with concerns for progressive PHTN.  Chronic 6 L baseline home O2, suspicion for noncompliance.  History of COPD +/- PHTN, noncompliant with home inhalers and not following with outpatient pulmonology.  Possibly underlying CHF exacerbation, see below.  Presented with anasarca with worsening respiratory status.  Initial ABG 7.30/90/63/49 and worsening/refractory to BiPAP.  Intubated 2/4.  CTA chest demonstrated RLL collapse, segmental atelectasis in RUL, mild emphysema and small bilateral pleural effusions 2/4.  Continue wean as tolerated for SpO2 > 90%.  Repeat CXR check ABG if respiratory status worsens.  COPD: unlikely exacerbation.  On 6 L baseline O2, presented in respiratory distress with history of stable hypercapnia and hypoxia necessitating intubation, see above.  No formal PFTs.  Previously seen by pulmonology, not currently following.  Noncompliant with home inhalers.  Pneumonia panel negative, 
  CRITICAL CARE PROGRESS NOTE      Patient:  Fredo Rod    Unit/Bed:4D-16/016-A  YOB: 1970  MRN: [id number]   PCP: Mohinder Page MD  Date of Admission: 2/3/2024  Chief Complaint:-AMS.    Assessment and Plan:    ARDS: 2/2 septic shock as above.  Improving.  PF ratio initially 75. Ventilator optimization strategies. Decreased tidal volume. Responded well to proning. Started on Decadron 20mg x5 days, followed by 10 mg 5 days.  Extubated 2/15/2024.  Currently on HHFNC.  Will continue to monitor respiratory status as he has been intermittently tachypneic.  Will consider BiPAP prior to repeat intubation.  Acute on chronic hypoxic, hypercapnic respiratory failure: Improving.  Initially suspected 2/2 volume overload with concerns for progressive PHTN.  Likely 2/2 ARDS as above. component of PAH with RVSP 55 mmHg on echo 2/6/6/2024.  Chronically on 6L baseline home O2, with suspicion for noncompliance. History of COPD. Noncompliant with home inhalers not following with outpatient pulmonology. Initial ABG 7.3/90/63/49. Worsening/refractory to BiPAP. Intubated 2/4. CTA chest demonstrated RLL collapse, segmental atelectasis in RUL, mild emphysema and small bilateral pleural effusions.  Underwent multiple episodes of proning.  Extubated 2/15/2024.  On HFNC.  Will do BiPAP tonight.  Will continue to monitor. maintain goal SpO2 >90%.  Constipation: Improving.  KUB 2/15 shows ileus of the large bowel and distal small bowel.  Two small volume BM overnight 2/15. Received 1 dose of  Relistor 2/15.  daily Movantik in a.m.  Continue senna  twice daily.  GlycoLax daily.   Generalized anasarca:improved. Unclear etiology.  Echo 2/6/2024 shows EF 55 to 60%. LV size normal. Mildly increased wall thickness. Normal wall motion. Normal diastolic function. RVSP 55 mmHg. CTA chest negative for PE. Responded well to diuresis with Diamox. Bicarb 32. Responding well to IV Bumex, 1 mg twice daily IV. Follow I's and O's.  
  CRITICAL CARE PROGRESS NOTE      Patient:  Fredo Rod    Unit/Bed:4D-16/016-A  YOB: 1970  MRN: [id number]   PCP: Mohinder Page MD  Date of Admission: 2/3/2024  Chief Complaint:-AMS.    Assessment and Plan:    Septic shock: 2/2 right ventral lateral abdominal wall cellulitis. Sofa score 11. On Levophed. On vancomycin and Zosyn.  ARDS: 2/2 septic shock as above. PF ratio initially 75. Ventilator optimization strategies. Decreased tidal volume. Responded well to proning.   Started on Decadron 20mg x5 days, followed by 10 mg 5 days. PF ratio 84.   Will continue to prone as needed for PF ratio<150 for 16/8 hour schedule.   Acute on chronic hypoxic, hypercapnic respiratory failure: Initially suspected 2/2 volume overload with concerns for progressive PHTN.  Likely 2/2 ARDS as above. component of PAH with RVSP 55 mmHg on echo 2/6/6/2024.  Chronically on 6L baseline home O2, with suspicion for noncompliance. History of COPD. Noncompliant with home inhalers not following with outpatient pulmonology. Initial ABG 7.3/90/63/49. Worsening/refractory to BiPAP. Intubated 2/4. CTA chest demonstrated RLL collapse, segmental atelectasis in RUL, mild emphysema and small bilateral pleural effusions 2/4. Proned 2/6 for 16/8.  PF ratio at that time 75. ABG was 7.3 9/68/68/39. ABG 7.42/54/276/35. PF overnight 2/7. 335 while prone for second time. PF ratio 2/8: 148. Proned for 16 hours. Repeat ABG 2/9: 7.3 8/52/67/30. PF 84.   Wean O2 as tolerated to maintain goal SpO2 >90%  Maintain lung protective strategies + Maximize position / vent for ideal respiratory dynamics   Acute metabolic encephalopathy: Multifactorial, Predominantly 2/2 hypoxic/hypercapnic respiratory failure. Concomitant hyperammonemia since resolved. Serum EtOH and UDS (-). UA negative. CT A/P W abdominal wall cellulitis above. On antibiotics. Hyperammonemia, no history of cirrhosis or significant alcohol use. Given lactulose x 1. Repeat 
  CRITICAL CARE PROGRESS NOTE      Patient:  Fredo Rod    Unit/Bed:4D-16/016-A  YOB: 1970  MRN: [id number]   PCP: Mohinder Page MD  Date of Admission: 2/3/2024  Chief Complaint:-AMS.    Assessment and Plan:    ARDS: 2/2 septic shock as above. PF ratio initially 75. Ventilator optimization strategies. Decreased tidal volume. Responded well to proning. Started on Decadron 20mg x5 days, followed by 10 mg 5 days.  PF ratio 2/11: 81.  PF ratio 2/12: 255. No indication to prone at this time.   Acute on chronic hypoxic, hypercapnic respiratory failure: Initially suspected 2/2 volume overload with concerns for progressive PHTN.  Likely 2/2 ARDS as above. component of PAH with RVSP 55 mmHg on echo 2/66/6/2024.  Chronically on 6L baseline home O2, with suspicion for noncompliance. History of COPD. Noncompliant with home inhalers not following with outpatient pulmonology. Initial ABG 7.3/90/63/49. Worsening/refractory to BiPAP. Intubated 2/4. CTA chest demonstrated RLL collapse, segmental atelectasis in RUL, mild emphysema and small bilateral pleural effusions 2/4. Proned 2/6 for 16/8.  PF ratio at that time 75. ABG was 7.3 9/68/68/39. ABG 7.42/54/276/35. PF overnight 2/7. 335 while prone for second time. PF ratio 2/8: 148. Proned for 16 hours. Repeat ABG 2/9: 7.3 8/52/67/30. PF 84.  ABG 2/11 7.36/55/57/31.  PF 81. ABG 2/13: 7.41/51/153/32. PF ratio 2/12: 255  Wean O2 as tolerated to maintain goal SpO2 >90% Maintain lung protective strategies + Maximize position / vent for ideal respiratory dynamics   Acute metabolic encephalopathy: Improving.  Multifactorial, Predominantly 2/2 hypoxic/hypercapnic respiratory failure. Concomitant hyperammonemia since resolved. Serum EtOH and UDS (-). UA negative. CT A/P W abdominal wall cellulitis above. On antibiotics. Hyperammonemia, no history of cirrhosis or significant alcohol use. Given lactulose x 1. Repeat ammonia level 50. LFTs and INR significant. US 
  CRITICAL CARE PROGRESS NOTE      Patient:  Fredo Rod    Unit/Bed:4D-16/016-A  YOB: 1970  MRN: [id number]   PCP: Mohinder Page MD  Date of Admission: 2/3/2024  Chief Complaint:-AMS.    Assessment and Plan:    Septic shock: 2/2 right ventral lateral abdominal wall cellulitis.  Sofa score 11. On Levophed.  On vancomycin and Zosyn.  ARDS:  2/2 septic shock as above.  PF ratio initially 75.  Ventilator optimization strategies.  TI to 1.  Decreased tidal volume.  Responded well to proning.  Started on Decadron.  To complete 20 mg 5 days, followed by 10 mg 5 days.  PF ratio today 84.  Will continue to prone as needed for PF ratio<150 for 16/8 hour schedule.   Acute on chronic hypoxic, hypercapnic respiratory failure: Initially suspected 2/2 volume overload with concerns for progressive PHTN.  Likely 2/2 ARDS as above. component of PAH with RVSP 55 mmHg on echo 2/6/6/2024.  Chronically on 6L baseline home O2, with suspicion for noncompliance.  History of COPD.  Noncompliant with home inhalers not following with outpatient pulmonology.  Initial ABG 7.3/90/63/49.  Worsening/refractory to BiPAP.  Intubated 2/4.  CTA chest demonstrated RLL collapse, segmental atelectasis in RUL, mild emphysema and small bilateral pleural effusions 2/4.  Proned 2/6 for 16/8.  PF ratio at that time 75.  ABG was 7.3 9/68/68/39. ABG 7.42/54/276/35.  PF overnight 2/7. 335 while prone for second time.  PF ratio 2/8: 148. Proned for 16 hours. Repeat ABG 2/9: 7.3 8/52/67/30. PF 84.   Acute metabolic encephalopathy: Multifactorial.  Predominantly 2/2 hypoxic/hypercapnic respiratory failure.  Concomitant hyperammonemia since resolved.  Serum EtOH and UDS negative.  UA negative.  CT A/P W abdominal wall cellulitis above.  On antibiotics.  Hyperammonemia, no history of cirrhosis or significant alcohol use.  Given lactulose x 1.  Repeat ammonia level 50.  LFTs and INR significant.  US liver shows mild sludge in the 
  CRITICAL CARE PROGRESS NOTE      Patient:  Fredo Rod    Unit/Bed:4D-16/016-A  YOB: 1970  MRN: [id number]   PCP: Mohinder Page MD  Date of Admission: 2/3/2024  Chief Complaint:-AMS.    Assessment and Plan:    ARDS: 2/2 septic shock as above. PF ratio initially 75. Ventilator optimization strategies. Decreased tidal volume. Responded well to proning. Started on Decadron 20mg x5 days, followed by 10 mg 5 days.  PF ratio 2/11: 81.  PF ratio 2/12: 255.  PF ratio 2/14: 190 .No indication to prone at this time.  Continuing to wean as tolerated.  Repeat CXR.  Acute on chronic hypoxic, hypercapnic respiratory failure: Initially suspected 2/2 volume overload with concerns for progressive PHTN.  Likely 2/2 ARDS as above. component of PAH with RVSP 55 mmHg on echo 2/6/6/2024.  Chronically on 6L baseline home O2, with suspicion for noncompliance. History of COPD. Noncompliant with home inhalers not following with outpatient pulmonology. Initial ABG 7.3/90/63/49. Worsening/refractory to BiPAP. Intubated 2/4. CTA chest demonstrated RLL collapse, segmental atelectasis in RUL, mild emphysema and small bilateral pleural effusions 2/4. Proned 2/6 for 16/8.  PF ratio at that time 75. ABG was 7.3 9/68/68/39. ABG 7.42/54/276/35. PF overnight 2/7. 335 while prone for second time. PF ratio 2/8: 148. Proned for 16 hours. Repeat ABG 2/9: 7.3 8/52/67/30. PF 84.  ABG 2/11 7.36/55/57/31.  PF 81. ABG 2/13: 7.41/51/153/32. PF ratio 2/12: 255 PF ratio 2/14: 190.  ABG 2/14: 7.4 1/54/76/35. wean O2 as tolerated to maintain goal SpO2 >90% Maintain lung protective strategies + Maximize position / vent for ideal respiratory dynamics   Acute metabolic encephalopathy: Improving.  Multifactorial, Predominantly 2/2 hypoxic/hypercapnic respiratory failure. Concomitant hyperammonemia since resolved. Serum EtOH and UDS (-). UA negative. CT A/P W abdominal wall cellulitis above. On antibiotics. Hyperammonemia, no history of 
  CRITICAL CARE PROGRESS NOTE      Patient:  Fredo Rod    Unit/Bed:4D-16/016-A  YOB: 1970  MRN: [id number]   PCP: Mohinder Page MD  Date of Admission: 2/3/2024  Chief Complaint:-AMS    Assessment and Plan:    Septic shock: 2/2 right ventral lateral abdominal wall cellulitis.  Sofa score 11. On Levophed.  On vancomycin and Zosyn.  ARDS: Currently improved.  2/2 septic shock as above.  PF ratio initially 75.  Ventilator optimization strategies.  TI to 1.  Decreased tidal volume.  Responded well to proning.  Started on Decadron.  To complete 20 mg 5 days, followed by 10 mg 5 days.  Repeat ABG in a.m. PF ratio 423 today initially following proning.  ABG 7.4 2/54/276/35. Will prone for another 16/8 hours at the 8 hour ramya if PF ratio <150.   Acute on chronic hypoxic, hypercapnic respiratory failure: Initially suspected 2/2 volume overload with concerns for progressive PHTN.  Likely 2/2 ARDS as above. component of PAH with RVSP 55 mmHg on echo 2/6/6/2024.  Chronically on 6L baseline home O2, with suspicion for noncompliance.  History of COPD.  Noncompliant with home inhalers not following with outpatient pulmonology.  Initial ABG 7.3/90/63/49.  Worsening/refractory to BiPAP.  Intubated 2/4.  CTA chest demonstrated RLL collapse, segmental atelectasis in RUL, mild emphysema and small bilateral pleural effusions 2/4.  Proned 2/6 for 16/8.  PF ratio at that time 75.  ABG was 7.3 9/68/68/39.  Today ABG 7.42/54/276/35  Acute metabolic encephalopathy: Multifactorial.  Predominantly 2/2 hypoxic/hypercapnic respiratory failure.  Concomitant hyperammonemia since resolved.  Serum EtOH and UDS negative.  UA negative.  CT A/P W abdominal wall cellulitis above.  On antibiotics.  Hyperammonemia, no history of cirrhosis or significant alcohol use.  Given lactulose x 1.  Repeat ammonia level 50.  LFTs and INR significant.  US liver shows mild sludge in the gallbladder but no evidence of 
  CRITICAL CARE PROGRESS NOTE      Patient:  Fredo Rod    Unit/Bed:4D-16/016-A  YOB: 1970  MRN: [id number]   PCP: Mohinder Page MD  Date of Admission: 2/3/2024  Chief Complaint:-AMS.    Assessment and Plan:    Septic shock: 2/2 right ventral lateral abdominal wall cellulitis. Sofa score 11. Discontinuing antibiotics. Clinically improved. No fever x 3 days.   ARDS: 2/2 septic shock as above. PF ratio initially 75. Ventilator optimization strategies. Decreased tidal volume. Responded well to proning. Started on Decadron 20mg x5 days, followed by 10 mg 5 days.  PF ratio 2/11: 81. Will continue to prone as needed for PF ratio<150 for 16/8 hour schedule.    Acute on chronic hypoxic, hypercapnic respiratory failure: Initially suspected 2/2 volume overload with concerns for progressive PHTN.  Likely 2/2 ARDS as above. component of PAH with RVSP 55 mmHg on echo 2/6/6/2024.  Chronically on 6L baseline home O2, with suspicion for noncompliance. History of COPD. Noncompliant with home inhalers not following with outpatient pulmonology. Initial ABG 7.3/90/63/49. Worsening/refractory to BiPAP. Intubated 2/4. CTA chest demonstrated RLL collapse, segmental atelectasis in RUL, mild emphysema and small bilateral pleural effusions 2/4. Proned 2/6 for 16/8.  PF ratio at that time 75. ABG was 7.3 9/68/68/39. ABG 7.42/54/276/35. PF overnight 2/7. 335 while prone for second time. PF ratio 2/8: 148. Proned for 16 hours. Repeat ABG 2/9: 7.3 8/52/67/30. PF 84.  ABG 2/11 7.36/55/57/31.  PF 81.  Wean O2 as tolerated to maintain goal SpO2 >90% Maintain lung protective strategies + Maximize position / vent for ideal respiratory dynamics   Acute metabolic encephalopathy: Multifactorial, Predominantly 2/2 hypoxic/hypercapnic respiratory failure. Concomitant hyperammonemia since resolved. Serum EtOH and UDS (-). UA negative. CT A/P W abdominal wall cellulitis above. On antibiotics. Hyperammonemia, no history of 
  CRITICAL CARE PROGRESS NOTE      Patient:  Fredo Rod    Unit/Bed:4D-16/016-A  YOB: 1970  MRN: [id number]   PCP: Mohinder Page MD  Date of Admission: 2/3/2024  Chief Complaint:-AMS.    Assessment and Plan:    Septic shock: 2/2 right ventral lateral abdominal wall cellulitis.  Sofa score 11. On Levophed.  On vancomycin and Zosyn.  ARDS: Currently improved.  2/2 septic shock as above.  PF ratio initially 75.  Ventilator optimization strategies.  TI to 1.  Decreased tidal volume.  Responded well to proning.  Started on Decadron.  To complete 20 mg 5 days, followed by 10 mg 5 days.  Repeat ABG in a.m. PF ratio 423 today initially following proning.  ABG 7.4 2/54/276/35. Will prone for another 16/8 hours at the 8 hour ramya if PF ratio <150.   Acute on chronic hypoxic, hypercapnic respiratory failure: Initially suspected 2/2 volume overload with concerns for progressive PHTN.  Likely 2/2 ARDS as above. component of PAH with RVSP 55 mmHg on echo 2/6/6/2024.  Chronically on 6L baseline home O2, with suspicion for noncompliance.  History of COPD.  Noncompliant with home inhalers not following with outpatient pulmonology.  Initial ABG 7.3/90/63/49.  Worsening/refractory to BiPAP.  Intubated 2/4.  CTA chest demonstrated RLL collapse, segmental atelectasis in RUL, mild emphysema and small bilateral pleural effusions 2/4.  Proned 2/6 for 16/8.  PF ratio at that time 75.  ABG was 7.3 9/68/68/39. ABG 7.42/54/276/35.  PF overnight 2/7. 335 while prone for second time.  Will prone again for 16/8 hours if PF ratio less than 150.  Acute metabolic encephalopathy: Multifactorial.  Predominantly 2/2 hypoxic/hypercapnic respiratory failure.  Concomitant hyperammonemia since resolved.  Serum EtOH and UDS negative.  UA negative.  CT A/P W abdominal wall cellulitis above.  On antibiotics.  Hyperammonemia, no history of cirrhosis or significant alcohol use.  Given lactulose x 1.  Repeat ammonia level 50.  LFTs 
  [facility]   Pharmacy Pharmacokinetic Monitoring Service - Vancomycin     Fredo Rod is a 53 y.o. male starting on vancomycin therapy for SSTI. Pharmacy consulted by Dr Vernon for monitoring and adjustment.    Target Concentration: Goal AUC/RYLEY 400-600 mg*hr/L    Additional Antimicrobials: Zosyn    Pertinent Laboratory Values:   Wt Readings from Last 1 Encounters:   02/03/24 83.9 kg (185 lb)     Temp Readings from Last 1 Encounters:   02/03/24 98.7 °F (37.1 °C) (Oral)     Estimated Creatinine Clearance: 154 mL/min (based on SCr of 0.6 mg/dL).  Recent Labs     02/03/24  2351   CREATININE 0.6   BUN 12   WBC 8.9     Procalcitonin: n/a    Pertinent Cultures:  Culture Date Source Results   2/4/24 Blood x 2    MRSA Nasal Swab: N/A. Non-respiratory infection.    Plan:  Dosing recommendations based on Bayesian software  Start vancomycin 2000mg x 1 then 1000mg q12h  Anticipated AUC of 430 and trough concentration of 11.5 at steady state  Renal labs as indicated   Vancomycin concentration ordered for 2/5 @ 0600   Pharmacy will continue to monitor patient and adjust therapy as indicated    Thank you for the consult,  Rosalie Henry RPH  2/4/2024 2:07 AM    
  PROGRESS NOTE      Patient:  Fredo Rod  Unit/Bed:4K-02/002-A  YOB: 1970  MRN: [id number]   Acct: [id number]    PCP: Mohinder Page MD    Date of Admission: 2/3/2024 LOS: 17    Date of Evaluation:  2/21/2024    Anticipated Discharge: 1-2 days pending clinical course    Assessment/Plan:    Acute on chronic hypoxic, hypercapnic respiratory failure, improving:  Initially suspected 2/2 volume overload with concerns for progressive PHTN( seen on echo 2/6/6/2024 PAH with RVSP 55 mmHg).   Likely 2/2 ARDS.   Previously hospitalized previously at [facility] was placed on 6L. Unclear what patient was supposed to be discharged on. Patient was using 6L O2.  History of COPD. No PFTs on file  Initial ABG 7.3/90/63/49. Worsening/refractory to BiPAP. Intubated 2/4.   CTA chest demonstrated RLL collapse, segmental atelectasis in RUL, mild emphysema and small bilateral pleural effusions.   Extubated 2/15/2024.    On 2L NC. BiPAP at night.   Continue Stiolto.    Continue pulmonary hygiene.    maintain goal SpO2 >88-92%.   Outpatient pulmonary follow-up, with PFTs     Ileus, improving  KUB 2/15 shows ileus of the large bowel and distal small bowel.    Received 1 dose of  Relistor 2/15.   KUB 2/18 shows persistent ileus.  Has been having regular bowel movements.    Continue daily Movantik in a.m.    Continue senna twice daily and GlycoLax daily.    -CT abd pelvis 2/20 showed air- fluid levels, mucosal thickening, concern for enterocolitis.   2/21 Patient feeling better  -Continue to monitor.      Prostatomegaly   Seen on CT abd pelvis 2/20, prostate gland is mildly enlarged measuring 3.5 x 5.1 cm   Correlate with PSA   Consider outpatient follow up     HTN:   Had been on losartan 50 Mg daily at home.   Resumed 2/18 with hold parameters.      Sick euthyroid:   TSH:0.2, T4: 0.87. T3 71.   Consider outpatient follow up and recheck in 6-8 weeks     Obstructive CAD:   S/p stents x 2. On aspirin.     Abdominal wall 
  PROGRESS NOTE      Patient:  Fredo Rod  Unit/Bed:4K-02/002-A  YOB: 1970  MRN: [id number]   Acct: [id number]    PCP: Mohinder Page MD    Date of Admission: 2/3/2024 LOS: 15    Date of Evaluation:  2/19/2024    Anticipated Discharge: 1-2 days pending clinical course     Assessment/Plan:    Acute on chronic hypoxic, hypercapnic respiratory failure, improving:  Initially suspected 2/2 volume overload with concerns for progressive PHTN.  Likely 2/2 ARDS as above. component of PAH with RVSP 55 mmHg on echo 2/6/6/2024.  Chronically on 6L baseline home O2, with suspicion for noncompliance. History of COPD. Noncompliant with home inhalers not following with outpatient pulmonology.   Initial ABG 7.3/90/63/49. Worsening/refractory to BiPAP. Intubated 2/4. CTA chest demonstrated RLL collapse, segmental atelectasis in RUL, mild emphysema and small bilateral pleural effusions.  Underwent multiple episodes of proning.  Extubated 2/15/2024.  On HFNC. BiPAP at night. Continue Stiolto.  Continue pulmonary toileting.  maintain goal SpO2 >90%. Outpatient pulmonary follow-up.    Ileus.    KUB 2/15 shows ileus of the large bowel and distal small bowel.    Received 1 dose of  Relistor 2/15.   KUB 2/18 shows persistent ileus.  Has been having regular bowel movements.    Continue daily Movantik in a.m.    Continue senna twice daily and GlycoLax daily.    -Continue to monitor.     HTN:   Had been on losartan 50 Mg daily at home.   Resumed 2/18 with hold parameters.     Sick euthyroid:   TSH:0.2, T4: 0.87. T3 71.   Consider outpatient follow up and recheck in 6-8 weeks    Obstructive CAD:   S/p stents x 2. On aspirin.    Abdominal wall cellulitis- POA, resolved  erythema in abdomen/BLE.   CTA positive for right lateral abdominal wall with inflammatory changes without abscess.   Completed regimen of vancomycin and Zosyn (2/11).    Septic shock: resolved.   2/2 right ventral lateral abdominal wall cellulitis. 
 [facility]  INPATIENT PHYSICAL THERAPY  DAILY NOTE  STRZ ICU STEPDOWN TELEMETRY 4K - 4K-02/002-A    Time In: 1150  Time Out: 1216  Timed Code Treatment Minutes: 26 Minutes  Minutes: 26          Date: 2024  Patient Name: Fredo Rod,  Gender:  male        MRN: [id number]  : 1970  (53 y.o.)     Referring Practitioner: Uziel Zuñiga MD  Diagnosis: Acute respiratory failure with hypoxia and hypercapnia  Additional Pertinent Hx: The patient is a obstructive CAD, HTN and tobacco abuse who presented [facility] ED 2024 for altered mental status, dyspnea exertion, shortness of breath, abdominal distention and lower extremity swelling.  The patient had endorsed a 3-month history of progressive and worsening abdominal distention with acute onset of shortness of breath and lower extremity swelling 1 week ago.  He was started on BiPAP but demonstrated progressively worsening acidosis and hypercapnia.  Patient was transferred to ICU and was found to be lethargic, somnolent and not protecting his airway.  He was intubated .     During hospitalization US liver demonstrate mild sludge and gallstone present within the gallbladder lumen but no pericholecystic fluid or GB wall thickening, CBD normal caliber.  CTA chest was negative for PE but did show a RLL collapse-segmental atelectasis in RUL, mild emphysema and small bilateral pleural effusions.  He was given Diamox for significant metabolic alkalosis.  Pt proned from -.  Pt extubated 2/15.     Prior Level of Function:  Lives With: Other (comment) (13 year old daughter)  Type of Home: Trailer  Home Layout: One level  Home Access: Stairs to enter with rails (2)  Home Equipment: Oxygen (5L O2 baseline)   Bathroom Shower/Tub: Tub/Shower unit  Bathroom Toilet: Standard    ADL Assistance: Independent  Homemaking Assistance: Independent  Ambulation Assistance: Independent  Transfer Assistance: Independent  Active : No  Additional Comments: 
 [facility]  INPATIENT PHYSICAL THERAPY  DAILY NOTE  STRZ ICU STEPDOWN TELEMETRY 4K - 4K-02002-A    Time In: 1118  Time Out: 1149  Timed Code Treatment Minutes: 31 Minutes  Minutes: 31          Date: 2024  Patient Name: Fredo Rod,  Gender:  male        MRN: [id number]  : 1970  (53 y.o.)     Referring Practitioner: Uziel Zuñiga MD  Diagnosis: Acute respiratory failure with hypoxia and hypercapnia  Additional Pertinent Hx: The patient is a obstructive CAD, HTN and tobacco abuse who presented [facility] ED 2024 for altered mental status, dyspnea exertion, shortness of breath, abdominal distention and lower extremity swelling.  The patient had endorsed a 3-month history of progressive and worsening abdominal distention with acute onset of shortness of breath and lower extremity swelling 1 week ago.  He was started on BiPAP but demonstrated progressively worsening acidosis and hypercapnia.  Patient was transferred to ICU and was found to be lethargic, somnolent and not protecting his airway.  He was intubated .     During hospitalization US liver demonstrate mild sludge and gallstone present within the gallbladder lumen but no pericholecystic fluid or GB wall thickening, CBD normal caliber.  CTA chest was negative for PE but did show a RLL collapse-segmental atelectasis in RUL, mild emphysema and small bilateral pleural effusions.  He was given Diamox for significant metabolic alkalosis.  Pt proned from -.  Pt extubated 2/15.     Prior Level of Function:  Lives With: Other (comment) (13 year old daughter)  Type of Home: Trailer  Home Layout: One level  Home Access: Stairs to enter with rails (2)  Home Equipment: Oxygen (5L O2 baseline)   Bathroom Shower/Tub: Tub/Shower unit  Bathroom Toilet: Standard    ADL Assistance: Independent  Homemaking Assistance: Independent  Ambulation Assistance: Independent  Transfer Assistance: Independent  Active : No  Additional Comments: 
 [facility]  INPATIENT PHYSICAL THERAPY  DAILY NOTE  [facility] ICU 4D - 4D-16/016-A    Time In: 1307  Time Out: 1341  Timed Code Treatment Minutes: 34 Minutes  Minutes: 34      Co-tx completed with OT due to orally intubated, medically complex, requiring assist X2 to safely complete, and would not tolerate 2 separate therapy sessions     Date: 2/15/2024  Patient Name: Fredo Rod,  Gender:  male        MRN: [id number]  : 1970  (53 y.o.)     Referring Practitioner: Uziel Zuñiga MD  Diagnosis: Acute respiratory failure with hypoxia and hypercapnia  Additional Pertinent Hx: The patient is a obstructive CAD, HTN and tobacco abuse who presented [facility] ED 2024 for altered mental status, dyspnea exertion, shortness of breath, abdominal distention and lower extremity swelling.  The patient had endorsed a 3-month history of progressive and worsening abdominal distention with acute onset of shortness of breath and lower extremity swelling 1 week ago.  He was started on BiPAP but demonstrated progressively worsening acidosis and hypercapnia.  Patient was transferred to ICU and was found to be lethargic, somnolent and not protecting his airway.  He was intubated .     During hospitalization US liver demonstrate mild sludge and gallstone present within the gallbladder lumen but no pericholecystic fluid or GB wall thickening, CBD normal caliber.  CTA chest was negative for PE but did show a RLL collapse-segmental atelectasis in RUL, mild emphysema and small bilateral pleural effusions.  He was given Diamox for significant metabolic alkalosis.  Pt proned from -.     Prior Level of Function:  Lives With: Other (comment) (13 year old daughter)  Type of Home: Trailer  Home Layout: One level  Home Access: Stairs to enter with rails (2)  Home Equipment: Oxygen (5L O2 baseline)   Bathroom Shower/Tub: Tub/Shower unit  Bathroom Toilet: Standard    ADL Assistance: Independent  Homemaking Assistance: 
 [facility]  INPATIENT PHYSICAL THERAPY  DAILY NOTE  [facility] ICU 4D - 4D-16/016-A    Time In: 09  Time Out: 1004  Timed Code Treatment Minutes: 30 Minutes  Minutes: 30          Date: 2024  Patient Name: Fredo Rod,  Gender:  male        MRN: [id number]  : 1970  (53 y.o.)     Referring Practitioner: Uziel Zuñiga MD  Diagnosis: Acute respiratory failure with hypoxia and hypercapnia  Additional Pertinent Hx: The patient is a obstructive CAD, HTN and tobacco abuse who presented [facility] ED 2024 for altered mental status, dyspnea exertion, shortness of breath, abdominal distention and lower extremity swelling.  The patient had endorsed a 3-month history of progressive and worsening abdominal distention with acute onset of shortness of breath and lower extremity swelling 1 week ago.  He was started on BiPAP but demonstrated progressively worsening acidosis and hypercapnia.  Patient was transferred to ICU and was found to be lethargic, somnolent and not protecting his airway.  He was intubated .     During hospitalization US liver demonstrate mild sludge and gallstone present within the gallbladder lumen but no pericholecystic fluid or GB wall thickening, CBD normal caliber.  CTA chest was negative for PE but did show a RLL collapse-segmental atelectasis in RUL, mild emphysema and small bilateral pleural effusions.  He was given Diamox for significant metabolic alkalosis.  Pt proned from -.  Pt extubated 2/15.     Prior Level of Function:  Lives With: Other (comment) (13 year old daughter)  Type of Home: Trailer  Home Layout: One level  Home Access: Stairs to enter with rails (2)  Home Equipment: Oxygen (5L O2 baseline)   Bathroom Shower/Tub: Tub/Shower unit  Bathroom Toilet: Standard    ADL Assistance: Independent  Homemaking Assistance: Independent  Ambulation Assistance: Independent  Transfer Assistance: Independent  Active : No  Additional Comments: Family reports 
 [facility]  INPATIENT PHYSICAL THERAPY  DAILY NOTE  STRZ ICU STEPDOWN TELEMETRY 4K - 4K-02/002-A    Time In: 0855  Time Out: 09  Timed Code Treatment Minutes: 28 Minutes  Minutes: 28          Date: 2024  Patient Name: Fredo Rod,  Gender:  male        MRN: [id number]  : 1970  (53 y.o.)     Referring Practitioner: Uziel Zuñiga MD  Diagnosis: Acute respiratory failure with hypoxia and hypercapnia  Additional Pertinent Hx: The patient is a obstructive CAD, HTN and tobacco abuse who presented [facility] ED 2024 for altered mental status, dyspnea exertion, shortness of breath, abdominal distention and lower extremity swelling.  The patient had endorsed a 3-month history of progressive and worsening abdominal distention with acute onset of shortness of breath and lower extremity swelling 1 week ago.  He was started on BiPAP but demonstrated progressively worsening acidosis and hypercapnia.  Patient was transferred to ICU and was found to be lethargic, somnolent and not protecting his airway.  He was intubated .     During hospitalization US liver demonstrate mild sludge and gallstone present within the gallbladder lumen but no pericholecystic fluid or GB wall thickening, CBD normal caliber.  CTA chest was negative for PE but did show a RLL collapse-segmental atelectasis in RUL, mild emphysema and small bilateral pleural effusions.  He was given Diamox for significant metabolic alkalosis.  Pt proned from -.  Pt extubated 2/15.     Prior Level of Function:  Lives With: Other (comment) (13 year old daughter)  Type of Home: Trailer  Home Layout: One level  Home Access: Stairs to enter with rails (2)  Home Equipment: Oxygen (5L O2 baseline)   Bathroom Shower/Tub: Tub/Shower unit  Bathroom Toilet: Standard    ADL Assistance: Independent  Homemaking Assistance: Independent  Ambulation Assistance: Independent  Transfer Assistance: Independent  Active : No  Additional Comments: 
0725 Report taken from nurse Bi Shahid    Head to Toe Assessment    [facility] Student Nurse  Head to Toe Assessment Performed at 0806  Skin  General skin appearance / Description: warm, pink, dry  Incisions / Wounds: none    Neuro/Head  LOC: A+O x 4  Speech: clear and appropriate  Eyes PERRLA  3mm, equal round and reactive to light  Symmetry of face / tongue: symmetrical  JVD of neck: None    Chest  Heart sounds / Apical Pulse: S1, S2, regular rhythm  Dyspnea: None  Lung sounds: Rhonchi upper lobes, lower lobes clear  Cough: productive cough    Abdomen/ Pelvis  Bowel sounds: active in all 4 quandrants  Passing flatus: yes  Palpation / Inspection: tenderness RLQ  Last BM: 2/18  Stool assessment: N/A  Any GI issues: mild ileus of large bowel  Urinary issues: None  Continence: continent  Urine color / turbidity: yellow    Extremities  Edema: None  Altered Sensation: full senstaion  Upper extremity push / pulls: 2+ strong bilaterally  Left Arm Radial Pulse: 2+ strong, regular Left Upper extremity Capillary Refill: <3sec.  Right Arm Radial Pulse: 2+ strong, regular   Right Upper extremity Capillary Refill: <3sec.  Lower extremity pedal push / pulls: 2+ strong bilaterally  Left pedal pulse: 2+ strong   Left posterior tibialis pulse: 2+ strong    Left lower extremity capillary refill: <3sec.  Right pedal pulse: 2+ strong   Right posterior tibialis pulse: 2+ strong   Right lower extremity capillary refill: <3sec.  Drift of extremities: negative  Pain: 0/10    Other issues:     1121 Reassessment done  1310 Reported off to primary RN, Bi Dunaway.    Electronically signed by Nicole Braden on 2/20/2024 at 3:23 PM   RSC / SN    
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 He failed BIPAP and was intubated on the day of presentation. EF wnl on echo. RVSP = 55 = pulmonary hypertension. Patient ultimately found to have ARDS with a P/F ratio of 75 and sepsis due to abdominal wall cellulitis which was treated with antibiotics. He was extubated on 2/15 to hi-flow with as needed bipap. This am he is on 30L and 40% FIO2.    BP (!) 123/101   Pulse 83   Temp 98.7 °F (37.1 °C) (Oral)   Resp (!) 31   Ht 1.753 m (5' 9\")   Wt 78 kg (171 lb 15.3 oz)   SpO2 95%   BMI 25.39 kg/m²       Intake/Output Summary (Last 24 hours) at 2/17/2024 1657  Last data filed at 2/17/2024 0826  Gross per 24 hour   Intake --   Output 525 ml   Net -525 ml     Patient is alert and appropriate  I saw a tear fall from his eye when I walked up to him, but he denied crying  Patient does not have any increased WOB  Sat on the monitor is 87%, per RN, this is a little lower than earlier  Abdomen is distended, but soft and non-tender  He has trace edema in bilat LE    Plan  Continue diuresis. Would aim for -500 in next 24 hours  Pulmonary toilet  Continue OOB  Stay in icu for now  
Assess done per flowsheet.  Pt opens eyes to name  Pt has weak hand grasp and wiggles toes to command.  1100- Pt placed in supine position as per order.  Gives thumbs up when asked if he is comfortable.  1630- Family into visit-updated on condition and POC.    
Att: Nursing and RT-          Patient does not lay flat well! Pt layed flat x2 throughout my shift and had tpo bag the pt back to above 93%  
[facility]  INPATIENT SPEECH THERAPY  STRZ ICU STEPDOWN TELEMETRY 4K  DISCHARGE NOTE    TIME   SLP Individual Minutes  Time In: 912  Time Out: 924  Minutes: 12  Timed Code Treatment Minutes: 0 Minutes       Date: 2024  Patient Name: Fredo Rod      CSN: [id number]   : 1970  (53 y.o.)  Gender: male   Referring Physician:  Tony Hdez MD   Diagnosis: Cellulitis  Precautions: fall risk, aspiration risk  Current Diet: Minced and moist and thin liquids - upgraded to regular diet 24  Respiratory Status: Nasal Canula: 2 lpm  Swallowing Strategies: Full Upright Position, Small Bite/Sip, Multiple Swallow, Pulmonary Monitoring, Medications Whole with Puree, Alternate Solids and Liquids, Limit Distractions, and Monitor for Fatigue               Date of Last MBS/FEES:  24    Pain:  No pain reported.    Subjective:  KUSH Cordero approved session. Patient sitting upright in bed upon ST arrival. Agreeable to skilled ST services. No family present. Pleasant and cooperative throughout.     Short-Term Goals:  SHORT TERM GOAL #1:  Goal 1: Patient will consume a minced/moist diet and thin liquids with utilization of skilled swallowing strategies without overt pulmonary compromise to meet nutritional/hydration measures safely. GOAL MET  INTERVENTIONS: Patient consumed advanced PO trials of rory cracker this date. Good bolus control/manipulation and timely rotary mastication with effective textural breakdown. Reduced bolus formation given presence of mild lingual residue. Residue successfully cleared with utilization of prompted liquid wash. No overt signs/symptoms of aspiration across PO trials. Certainly cannot rule out pharyngeal dysfunction at bedside alone.     Recommend patient upgrade to regular diet and thin liquids with use of small bites/sips and alternate bites/sips. No further dysphagia management warranted. KUSH Cordero updated with verbal receptiveness.     SHORT TERM GOAL 
[facility]  STRZ ICU 4D  Occupational Therapy  Daily Note  Time:   Time In: 1132  Time Out: 1220  Timed Code Treatment Minutes: 38 Minutes  Minutes: 48     Variance: -10 (while pt seated on BSC)    Date: 2024  Patient Name: Fredo Rod,   Gender: male      Room: [facility]/016-A  MRN: [id number]  : 1970  (53 y.o.)  Referring Practitioner: Uziel Zuñiga MD  Diagnosis: cellulitis  Additional Pertinent Hx: Per H & P:53 y.o. male with PMH significant for COPD (on 6L O2 via NC at all times at home) and likely CHF who presents to the emergency department from home, brought in by EMS for evaluation of abdominal distention and worsening shortness of breath (SOB).  Patient states that he completes the majority of his care at [facility] however did not like the last time he was evaluated there, therefore decided to come to [facility] today.  Reports that he had worsening SOB over the past 2 hours and abdominal distention for the past 3 months.  States that he has not had a worsening cough from his baseline.  He was last seen in the hospital 3 weeks ago at [facility] in which he is unaware of his final diagnosis or disposition.  Lives with daughter at home.  Of note, patient states that he currently smokes approximately 1 pack of cigarettes every 3 to 4 days which decreased from previously smoking 1 carton every 1.5 days. Pt extubated on 2/15/23/    Restrictions/Precautions:  Restrictions/Precautions: Fall Risk      Social/Functional History:  Lives With: Other (comment) (13 year old daughter)  Type of Home: Trailer  Home Layout: One level  Home Access: Stairs to enter with rails (2)  Home Equipment: Oxygen (5L O2 baseline)   Bathroom Shower/Tub: Tub/Shower unit  Bathroom Toilet: Standard       ADL Assistance: Independent  Homemaking Assistance: Independent  Ambulation Assistance: Independent  Transfer Assistance: Independent    Active : No  Patient's  Info: Brother     Additional Comments: 
[facility]  Fiberoptic Endoscopic Evaluation of Swallowing + Dysphagia therapy  STRZ ICU 4D      SLP Individual Minutes  Time In: 1035  Time Out: 1057  Minutes: 22  Timed Code Treatment Minutes: 0 Minutes  FEES: 14 minutes   Dysphagia therapy: 8 minutes        Date: 2024  Patient Name: Fredo Rod       CSN: [id number]   : 1970  (53 y.o.)  Gender: male   Referring Physician:  Uziel Zuñiga MD  Diagnosis: Alcohol withdrawal syndrome, uncomplicated   Date of Last MBS/FEES:  N/a   Diet:  NPO     History of Present Illness/Injury: Patient admit to [facility] with above medical dx. Please refer to physician H&P for full patient medical history. Patient with ARDS leading to increased respiratory distress and need for oral intubation on 24. H/o COPD. Presence of AMS with metabolic encephalopathy present; however, improving per chart review. Extubated to [facility] 2/15.    2/3 CXR: Moderate cardiomegaly with findings of moderate interstitial and   alveolar edema.   CT Abd/pelvis: Right ventral lateral abdominal wall inflammatory changes consistent with the provided diagnosis of cellulitis. No evidence of abscess or acute   finding within the peritoneal compartment.   US Scrotum/testicles: Small bilateral hydroceles. Otherwise normal scrotal ultrasound    US Liver: There is mild sludge and a gallstone present within the gallbladder lumen. No pericholecystic fluid or gallbladder wall thickening is seen however. The common bile duct is normal in caliber.    Intubated   CVC RIJ   CTA Chest: No CT evidence of pulmonary embolus; Right lower lobe collapse. Segmental atelectasis in the right upper lobe; Mild emphysema; Small bilateral pleural effusions.   Pronation therapy initiated   CT Chest: Left lower lobe collapse.  Right lower lobe consolidation, which could be due to pneumonia. Small bilateral pleural effusions.   ETT exchanged d/t cuff leak   Pronation 
Comprehensive Nutrition Assessment    Type and Reason for Visit:  Reassess    Nutrition Recommendations/Plan:   Continue current diet per SLP/provider.   Send Glucerna TID. Pt not fond of vanilla ensure compact & he was notified we are out of chocolate compact.   Consider MVI as appropriate.      Malnutrition Assessment:  Malnutrition Status:  At risk for malnutrition (Comment) (02/19/24 1094)    Context:  Acute Illness     Findings of the 6 clinical characteristics of malnutrition:  Energy Intake:  Mild decrease in energy intake (Comment) (TF discontinued 2/16/24, po intake varied 0-100%  not consistently good)  Weight Loss:  No significant weight loss (per available EMR)     Body Fat Loss:  No significant body fat loss     Muscle Mass Loss:  No significant muscle mass loss    Fluid Accumulation:  Unable to assess     Strength:  Not Performed    Nutrition Assessment:      Pt. slowly  improving nutritionally AEB extubated 2/15, varied po intake 0-100%, however has had wt loss see below.  At risk for further nutrition compromise r/t admit with respiratory failure, acute metabolic encephalopathy, septic shock d/t abdominal wall cellulitis, abdominal distention with mild ileus, ARDS, intubated 2/4 and extubated 2/15, pronation regimen 2/6-2/13, and underlying medical condition (hx CAD, CHF, suspected COPD)     Nutrition Related Findings:    Pt. Report/Treatments/Miscellaneous: Pt seen, fair appetite, mentions he consumed all of his breakfast  &  1/2 -3/4 at lunch. Denies chewing/swallowing difficulty on current diet. Likes chocolate flavored ONS. Pt s/p FEES (2/16)- Dysphagia Minced & Moist with Thins Liquids Meds in Applesauce. 20% wt loss since admission (43#) however on Bumex.   GI Status: BMx  1 (2/19)  Pertinent Labs: POC Glucose 121-142  Pertinent Meds: Bumex, Glycolax, Senokot     Wound Type:  (cellulitis)       Current Nutrition Intake & Therapies:    Average Meal Intake: 0%, 51-75%, % (po is varied, 
Comprehensive Nutrition Assessment    Type and Reason for Visit:  Reassess (TF monitoring)    Nutrition Recommendations/Plan:   TF discontinued  Diet as per SLP FEES study - Minced and Moist  Will add Compact ONS TID  Continue bowel meds as per Providers     Malnutrition Assessment:  Malnutrition Status:  At risk for malnutrition (Comment) (02/05/24 7277)    Context:  Acute Illness     Findings of the 6 clinical characteristics of malnutrition:  Energy Intake:  Unable to assess  Weight Loss:  No significant weight loss (per available EMR)     Body Fat Loss:  No significant body fat loss     Muscle Mass Loss:  No significant muscle mass loss    Fluid Accumulation:  Unable to assess     Strength:  Not Performed    Nutrition Assessment:      Pt. Improving nutritionally AEB extubated 2/15, starting po diet today.  At risk for further nutrition compromise r/t admit with respiratory failure, acute metabolic encephalopathy, septic shock d/t abdominal wall cellulitis, abdominal distention with mild ileus, ARDS, intubated 2/4 and extubated 2/15, pronation regimen 2/6-2/13, LOS day 12 and underlying medical condition (hx CAD, CHF, suspected COPD).      Nutrition Related Findings:    Pt. Report/Treatments/Miscellaneous: spoke with SLP - FEES done and starting po today; UO 3000ml; TF was held 2/14 and 2/15 d/t mild ileus - bowel meds adjusted  GI Status: BM x2 2/16  Pertinent Labs: glucose 85  BUN 25  creatinine 0.5  Na 144  K+ 3.5    Pertinent Meds: bumex, movantik, protonix, glycolax, senokot, precedex    Wound Type:  (cellulitis)       Current Nutrition Intake & Therapies:    ADULT DIET; Dysphagia - Minced and Moist  ONS: Compact TID    Anthropometric Measures:  Height: 175.3 cm (5' 9\")  Ideal Body Weight (IBW): 160 lbs (73 kg)    Admission Body Weight: 98.2 kg (216 lb 7.9 oz) (2/4 non-pitting edema)  Current Body Weight: 77.6 kg (171 lb) (2/16 with +1 edema - decrease of 14# in past 24h),       Current BMI (kg/m2): 
Comprehensive Nutrition Assessment    Type and Reason for Visit:  Reassess (TF monitoring)    Nutrition Recommendations/Plan:   Tube Feed: Vital 1.2 at 50ml/ hour as goal  Additional free water flush as per Physician   Monitoring GI status, diprivan, labs and adjust tube feed accordingly   Consider swallow evaluation when appropriate      Malnutrition Assessment:  Malnutrition Status:  At risk for malnutrition (Comment) (02/05/24 3009)    Context:  Acute Illness     Findings of the 6 clinical characteristics of malnutrition:  Energy Intake:  Unable to assess  Weight Loss:  No significant weight loss (per available EMR)     Body Fat Loss:  No significant body fat loss     Muscle Mass Loss:  No significant muscle mass loss    Fluid Accumulation:  Unable to assess     Strength:  Not Performed    Nutrition Assessment:     Pt. nutritionally compromised AEB NPO status d/t intubation 2/4.  At risk for further nutrition compromise r/t admit with respiratory failure, acute metabolic encephalopathy, septic shock d/t abdominal wall cellulitis, abdominal distention, ARDS, LOS day 9 and underlying medical condition (hx CAD, CHF, suspected COPD).     Nutrition Related Findings:    Pt. Report/Treatments/Miscellaneous: Patient seen, intubated, awake, RN reports diprivan off with plans for possible extubation and reports no longer proning as of last night, tube feed infusing at goal 50ml/ hour, TF goal adjusted yesterday due to addition of diprivan   GI Status: BM 2/12  Pertinent Labs: Sodium 142, Potassium 4.1, BUN 26, Creatinine 0.5, glucose 94  Pertinent Meds: bumex, decadron, senokot, precedex, fentanyl      Wound Type:  (cellulitis)       Current Nutrition Intake & Therapies:    Average Meal Intake: NPO     Diet NPO  ADULT TUBE FEEDING; Orogastric; Peptide Based; Continuous; 50; No; 30; Q 4 hours  Current Tube Feeding (TF) Orders:  Feeding Route: Orogastric  Formula: Peptide Based (started 2/4)  Schedule: 
Comprehensive Nutrition Assessment    Type and Reason for Visit:  Reassess (TF monitoring)    Nutrition Recommendations/Plan:   Vital 1.2 40ml/hour increase 10ml every 4h as tolerated to goal of 60ml/hour  Discontinue protein modulars  Additional free H20 per Physician     Malnutrition Assessment:  Malnutrition Status:  At risk for malnutrition (Comment) (02/05/24 0944)    Context:  Acute Illness     Findings of the 6 clinical characteristics of malnutrition:  Energy Intake:  Unable to assess  Weight Loss:  No significant weight loss (per available EMR)     Body Fat Loss:  No significant body fat loss     Muscle Mass Loss:  No significant muscle mass loss    Fluid Accumulation:  Unable to assess     Strength:  Not Performed    Nutrition Assessment:      Pt. nutritionally compromised AEB NPO status d/t intubation 2/4.  At risk for further nutrition compromise r/t admit with respiratory failure, acute metabolic encephalopathy, septic shock d/t abdominal wall cellulitis, abdominal distention, ARDS, LOS day 5 and underlying medical condition (hx CAD, CHF, suspected COPD     Nutrition Related Findings:    Pt. Report/Treatments/Miscellaneous: pt. Seen; tolerating TF at 40ml/hour; diprivan discontinued; UO 1990ml; proning 16/8 continues; supine this am  GI Status: BM x2 2/9  Pertinent Labs: glucose 99  BUN 23  creatinine 0.6  Na 142  K+ 4.2  NH3 50 2/5   Pertinent Meds: bumex, decadron, protonix, zosyn, senokot, glycolax, vancomycin, fentanyl    Wound Type: cellulitis     Current Nutrition Intake & Therapies:    Average Meal Intake: NPO  Diet NPO  ADULT TUBE FEEDING; Orogastric; Peptide Based; Continuous; 40; Yes; 10; Q 4 hours; 60; 30; Q 4 hours  Current Tube Feeding (TF) Orders:  Feeding Route: Orogastric  Formula: Peptide Based (started 2/4)  Schedule: Continuous  Feeding Regimen: Vital 1.2 40ml/hour increase 10ml every 4h as tolerated to goal of 60ml/hour  Additives/Modulars: None  Water Flushes: per provider  Goal 
Consult from María CURRIE.   Patient int/NR at this time. Sister in room. .States patient is memebr of [employer] [facility]   updated patient's [facility] home.   Assisted staff by scanning patient documents to Medical Records.     Sister states their mom is patients POA, however we do not have this document on file.     Patient is not .   He does have adult children who are considered his next of kin by the [facility].     Asked sister to have mom bring in the POA documents and request assistance from a  in getting this entered into patient's chart and decision maker designated.   
[facility]  SPEECH THERAPY MISSED TREATMENT NOTE  STRZ ICU 4D      Date: 2024  Patient Name: Fredo Rod        MRN: [id number]    : 1970  (53 y.o.)    REASON FOR MISSED TREATMENT:  Novel orders received for early mobility assessment to assist in development of communication while continuing to require oral intubation; however, RN requesting ST only on this date with reports of patient with increased agitation levels \"pulling at tubes\". ST to f/u on subsequent date as schedule permits to assist in POC development.     Brittaney Gómez MA, CCC-SLP [id number]            
[facility]  STRZ ICU STEPDOWN TELEMETRY 4K  Occupational Therapy  Daily Note  Time:   Time In: 755  Time Out: 0853  Timed Code Treatment Minutes: 58 Minutes  Minutes: 58          Date: 2024  Patient Name: Fredo Rod,   Gender: male      Room: -02/002-A  MRN: [id number]  : 1970  (53 y.o.)  Referring Practitioner: Uziel Zuñiga MD  Diagnosis: cellulitis  Additional Pertinent Hx: Per H & P:53 y.o. male with PMH significant for COPD (on 6L O2 via NC at all times at home) and likely CHF who presents to the emergency department from home, brought in by EMS for evaluation of abdominal distention and worsening shortness of breath (SOB).  Patient states that he completes the majority of his care at [facility] however did not like the last time he was evaluated there, therefore decided to come to [facility] today.  Reports that he had worsening SOB over the past 2 hours and abdominal distention for the past 3 months.  States that he has not had a worsening cough from his baseline.  He was last seen in the hospital 3 weeks ago at [facility] in which he is unaware of his final diagnosis or disposition.  Lives with daughter at home.  Of note, patient states that he currently smokes approximately 1 pack of cigarettes every 3 to 4 days which decreased from previously smoking 1 carton every 1.5 days. Pt extubated on 2/15/23/    Restrictions/Precautions:  Restrictions/Precautions: Fall Risk     Social/Functional History:  Lives With: Other (comment) (13 year old daughter)  Type of Home: Trailer  Home Layout: One level  Home Access: Stairs to enter with rails (2)  Home Equipment: Oxygen (5L O2 baseline)   Bathroom Shower/Tub: Tub/Shower unit  Bathroom Toilet: Standard       ADL Assistance: Independent  Homemaking Assistance: Independent  Ambulation Assistance: Independent  Transfer Assistance: Independent    Active : No  Patient's  Info: Brother     Additional Comments: Family reports 
[facility]   PROGRESS NOTE      Patient: Fredo Rod  Room #: 4K-02/002-A            YOB: 1970  Age: 53 y.o.  Gender: male            Admit Date & Time: 2/3/2024 11:07 PM    Assessment:  Pt is a 53y.o. male, sitting in easychair watching television, in 4K-02. Saad is pleasant, friendly and talkative. He shared that he has been having issues with breathing well for some time now-about two years-and it is wearing on him. He shared story of once being very active, hard worker and parent, now unable to work and lives on assistance. He is frustrated with that, but takes solace in the fact of having full custody of his daughter, which is what is keeping him grounded in the midst of health challenges.     Interventions:   provided active listening, nurtured hope, conversation around his grounding and prayer.    Outcomes:  Saad expressed tearful gratitude for the visit and prayer.    Plan:  Continue spiritual support as he wrestles with his limitations and life.    Electronically signed by Chaplain Flor, on 2/19/2024 at 3:11 PM.  Spiritual Care Department  [facility]  [phone]       02/19/24 1510   Encounter Summary   Encounter Overview/Reason  Spiritual/Emotional Needs   Service Provided For: Patient   Referral/Consult From: [facility]   Support System Family members   Last Encounter  02/19/24   Complexity of Encounter Moderate   Begin Time 1305   End Time  1322   Total Time Calculated 17 min   Spiritual/Emotional needs   Type Spiritual Support   Assessment/Intervention/Outcome   Assessment Compromised coping;Impaired resilience;Hopeful;Tearful   Intervention Active listening;Prayer (assurance of)/Bloomery;Nurtured Hope;Explored/Affirmed feelings, thoughts, concerns;Discussed illness injury and it’s impact   Outcome Expressed Gratitude;Engaged in conversation;Expressed feelings, needs, and concerns;Receptive;Coping 
Medical record release form faxed to [facility] ([phone]) by this RN at [phone]  
[facility]   Pharmacy Pharmacokinetic Monitoring Service - Vancomycin    Consulting Provider: Dr Vernon   Indication: SSTI  Target Concentration: Goal AUC/RYLEY 400-600 mg*hr/L  Day of Therapy: 4  Additional Antimicrobials: Zosyn    Pertinent Laboratory Values:   Wt Readings from Last 1 Encounters:   02/07/24 96.2 kg (212 lb 1.3 oz)     Temp Readings from Last 1 Encounters:   02/07/24 99.7 °F (37.6 °C) (Rectal)     Estimated Creatinine Clearance: 167 mL/min (based on SCr of 0.6 mg/dL).  Recent Labs     02/06/24  0300 02/07/24  0420   CREATININE 0.7 0.6   BUN 18 18   WBC 7.7 10.8     Pertinent Cultures:  Date Source Results   2/4/24 Bcx x 2 NGTD   MRSA Nasal Swab: N/A. Non-respiratory infection.    Recent vancomycin administrations                     vancomycin (VANCOCIN) 1250 mg in sodium chloride 0.9% 250 mL IVPB (mg) 1,250 mg New Bag 02/07/24 0424     1,250 mg New Bag 02/06/24 1703     1,250 mg New Bag  0532     1,250 mg New Bag 02/05/24 1632    vancomycin (VANCOCIN) 1,000 mg in sodium chloride 0.9 % 250 mL IVPB ([id number]) (mg) 1,000 mg New Bag 02/05/24 0514     1,000 mg New Bag 02/04/24 1706                    Assessment:  Date/Time Current Dose Concentration Timing of Concentration (h) AUC C trough,ss   2/7/24 @1135 1250 mg q12h 21.2 7 hr 11 min 531 14.9   Note: Serum concentrations collected for AUC dosing may appear elevated if collected in close proximity to the dose administered, this is not necessarily an indication of toxicity    Plan:  Current dosing regimen is therapeutic  Continue current dose  Repeat vancomycin concentration not ordered at this time  Pharmacy will continue to monitor patient and adjust therapy as indicated    Thank you for the consult,  Bigg Broderick, PharmD  2/7/2024 2:19 PM      
[facility]   Pharmacy Pharmacokinetic Monitoring Service - Vancomycin    Consulting Provider: Dr Vernon   Indication: SSTI  Target Concentration: Goal AUC/RYLEY 400-600 mg*hr/L  Day of Therapy: 2  Additional Antimicrobials: azithromycin and zosyn    Pertinent Laboratory Values:   Wt Readings from Last 1 Encounters:   02/04/24 98.2 kg (216 lb 7.9 oz)     Temp Readings from Last 1 Encounters:   02/05/24 98.1 °F (36.7 °C) (Oral)     Estimated Creatinine Clearance: 144 mL/min (based on SCr of 0.7 mg/dL).  Recent Labs     02/04/24  1025 02/05/24  0505   CREATININE 0.7 0.7   BUN 11 13   WBC 6.4 5.7       Recent vancomycin administrations                     vancomycin (VANCOCIN) 1,000 mg in sodium chloride 0.9 % 250 mL IVPB ([id number]) (mg) 1,000 mg New Bag 02/05/24 0514     1,000 mg New Bag 02/04/24 1706    vancomycin (VANCOCIN) 2,000 mg in sodium chloride 0.9 % 500 mL IVPB (mg) 2,000 mg New Bag 02/04/24 0423                    Assessment:  Date/Time Current Dose Concentration Timing of Concentration (h) AUC   02/05/24 1000 mg IV Q12H 9.0 11 h 59 min 403   Note: Serum concentrations collected for AUC dosing may appear elevated if collected in close proximity to the dose administered, this is not necessarily an indication of toxicity    Plan:  Current dosing regimen is therapeutic  Increase dose to 1250 mg IV Q12H for a predicted AUC of 503 and trough of 13.2  Repeat vancomycin concentration not ordered at this time  Pharmacy will continue to monitor patient and adjust therapy as indicated    Thank you for the consult,  Danii Andersen [facility]  2/5/2024 8:49 AM    
[facility]   Pharmacy Pharmacokinetic Monitoring Service - Vancomycin    Indication: SSTI  Target Concentration: Goal AUC/RYLEY 400-600 mg*hr/L  Day of Therapy: 7  Additional Antimicrobials: pip/tazo    Pertinent Laboratory Values:   Wt Readings from Last 1 Encounters:   02/10/24 96 kg (211 lb 10.3 oz)     Temp Readings from Last 1 Encounters:   02/10/24 99 °F (37.2 °C) (Rectal)     Estimated Creatinine Clearance: 166 mL/min (based on SCr of 0.6 mg/dL).  Recent Labs     02/09/24  0350 02/10/24  0335   CREATININE 0.6 0.6   BUN 23* 24*   WBC 6.7 7.4        Pertinent Cultures:  Date Source Results   2/4/24 Bcx x 2 NGTD   2/4/24 ET tube NGTD   2/9/24 PNA panel Negative   2/9/24 ET tube NGTD   MRSA Nasal Swab: N/A. Non-respiratory infection.       Recent vancomycin administrations                     vancomycin (VANCOCIN) 1250 mg in sodium chloride 0.9% 250 mL IVPB (mg) 1,250 mg New Bag 02/10/24 0535     1,250 mg New Bag 02/09/24 1855     1,250 mg New Bag  0439     1,250 mg New Bag 02/08/24 1630     1,250 mg New Bag  0410     1,250 mg New Bag 02/07/24 1554                    Assessment:  Date/Time Current Dose Concentration Timing of Concentration (h) AUC C trough,ss   2/10/24 1250 mg IV q12 14.6 8.5 hours 496 13.6   Note: Serum concentrations collected for AUC dosing may appear elevated if collected in close proximity to the dose administered, this is not necessarily an indication of toxicity    Plan:  Current dosing regimen is therapeutic  Continue current dose of 1250 mg IV q12h  Repeat vancomycin concentration not reordered at this time  Pharmacy will continue to monitor patient and adjust therapy as indicated    Thank you for the consult,  Jessica Villegas, MariD, BCPS   2/10/2024  7:06 AM        
Obtained discharge summary from 12/2023 from [facility] and had spiritual care scan into patient's chart, Dr. Archuleta notified.   
Opens eyes to name.  Weak hand grasp to command and wiggles feet slt.  Remains in prone position.   1215- More awake at this time.  Incont sm stool- partial bath done.  1300-Pt placed back in supine position.  Pt having large amt oral secretions from mouth and nose. Air leak noted around ETT- resp added air to cuff.  1325- Cont to have airleak for ETT- Dr Yancey notified.    1337- ETT exchanged per DR Yancey- See provider noted.  1500- Restless at intervals - sedation adjusted as needed.  1615- Mother phoned in= updated on pt condition- questions answered.    1810- HOB elevated per pt request.  No resp distress noted.  
[facility]  SPEECH THERAPY  STRZ ICU 4D  Speech Evaluation - Early Mobility + Speech Therapy      SLP Individual Minutes  Time In: 1118  Time Out: 1134  Minutes: 16  Timed Code Treatment Minutes: 0 Minutes   Early mobility evaluation: 8 minutes   Speech therapy: 8 minutes     Date: 2024  Patient Name: Fredo Rod      CSN: [id number]   : 1970  (53 y.o.)  Gender: male   Referring Physician: Uziel Zuñiga MD  Diagnosis: Cellulitis   Restrictions: bilateral soft wrist restraints     History of Present Illness/Injury: Patient admit to [facility] with above medical dx. Please refer to physician H&P for full patient medical history. Patient with ARDS leading to increased respiratory distress and need for oral intubation on 24. H/o COPD. Presence of AMS with metabolic encephalopathy present; however, improving per chart review.   Past Medical History:   Diagnosis Date    Sarcoma of rib (HCC)        SUBJECTIVE:  Session approved by Bert CURRIE. Patient with spontaneous wakefulness with sister and mother present.     OBJECTIVE:    Pain: No biometric measure of pain or discomfort      Sedation: Precedex     Respiratory Status:  Oral Intubation and Ventilator Settings: PEEP 8; 90% FiO2; SPONT    Vitals:  No adverse changes to physiological homeostasis      MOTOR ABILITIES: Appropriate UE gross motor movement. Concerns for fine motor ability impacting ability to utilize low tech AAC options     VISION: Wears glasses for reading per family reports     VISUAL TRACKING: WFL for vertical and horizontal scanning     SPEECH/LANGUAGE SKILLS: 1 step verbal commands: 1/4 indep, 2/4 max cues, 1/4 unable to elicit   Basic y/n questions via head nods/thumbs up/down: 2/4   Mildly complex y/n: 1/2     COMMUNICATION MODALITIES: head nods, gestures, thumbs up/down, raising eye brows     COGNITION: Concerns for some degree of an acute impairment s/t complexity of medical coures and requirement for 
Patient educated on how to use incentive spirometer. Patient verbalized understanding and demonstrated proper use. Emphasized importance and usage of device, with coughing and deep breathing every 2 hours while awake.         
Patient educated on how to use incentive spirometer. Patient verbalized understanding and demonstrated proper use. Emphasized importance and usage of device, with coughing and deep breathing every 2 hours while awake.   Able to achieve 750mL      
Patient has been successfully weaned from Mechanical Ventilation.  RSBI before extubation was 33 with EtCO2 of 43 and SpO2 of 98 on 45% FiO2.  Patient extubated and placed on 40 liters/min via HF nasal cannula.  Post extubation SpO2 is 96% with HR  55 bpm and RR 24 breaths/min.  Patient had strong cough that was productive of clear  sputum.  Extubation Well tolerated by patient..   
Patient proned at 1330. Will remain prone for 16 hours, return to spine at 0530.   
Patient:  Fredo Rod    Unit/Bed:4D-16/016-A  MRN: [id number]   PCP: Mohinder Page MD  Date of Admission: 2/3/2024    Assessment and Plan(All pulmonary edema, renal failure, PE, and respiratory failure diagnoses are acute in nature unless otherwise specified):        Acute hypoxic and hypercapnic respiratory failure: improving. Trend of recent CXR's shows continued improvements.  Status post intubation 2/4/2024, and extubation 2/15/2024.  Currently tolerating heated high flow nasal cannula, weaned to 30 L/min 40% FiO2.  Utilizing BiPAP at night at present.  Continue to wean as tolerated.  Continue to promote out of bed activity.  PT/OT following.  Continue pulmonary hygiene efforts.  Incentive spirometry.  ARDS: improving.  Status post pronation therapy.  Recovered PF ratio. status post steroids.  Continues to undergo diuresis.  Net -13.6 L since admission.  Weight is down significantly since admission as well.  Continue to monitor strict intake and output and daily weights.  COPD: Active-smoker.  Strongly e encouraged patient to consider smoking cessation.  Currently on LAMA/LABA. Issues with non-compliance at home.  Needs close pulmonary follow-up outpatient.  Pulmonary hypertension: RVSP 55 mmHg per echocardiogram.  Associated with history of COPD and nonadherence to home management for this.  No PE on CTA chest recently.  EF is preserved on echocardiogram without significant signs of heart failure.  Continue diuresis as above.  Anasarca: Improving.  Patient has had significant weight loss since admission with diuretic efforts.  Net -13.6 L fluid status since admission.  Echo reviewed.  EF 55-60% without significant signs of heart failure.  Noted chronic pulmonary hypertension findings as above.  Continue diuresis efforts as above.  Ileus: continue on bowel regimen. Follow KUB.  Having bowel movements.  Hypertension, history of: holding losartan at this time.  Resume as indicated.  CAD, history of: 
[facility]  INPATIENT PHYSICAL THERAPY  EVALUATION  [facility] ICU 4D - 4D-16/016-A    Time In: 1015  Time Out: 1052  Timed Code Treatment Minutes: 27 Minutes  Minutes: 37   **Cotx with OT due to orally intubated, requires +2 skilled assist       Date: 2024  Patient Name: Fredo Rod,  Gender:  male        MRN: [id number]  : 1970  (53 y.o.)      Referring Practitioner: Uziel Zuñiga MD  Diagnosis: Acute respiratory failure with hypoxia and hypercapnia  Additional Pertinent Hx: The patient is a obstructive CAD, HTN and tobacco abuse who presented [facility] ED 2024 for altered mental status, dyspnea exertion, shortness of breath, abdominal distention and lower extremity swelling.  The patient had endorsed a 3-month history of progressive and worsening abdominal distention with acute onset of shortness of breath and lower extremity swelling 1 week ago.  He was started on BiPAP but demonstrated progressively worsening acidosis and hypercapnia.  Patient was transferred to ICU and was found to be lethargic, somnolent and not protecting his airway.  He was intubated .     During hospitalization US liver demonstrate mild sludge and gallstone present within the gallbladder lumen but no pericholecystic fluid or GB wall thickening, CBD normal caliber.  CTA chest was negative for PE but did show a RLL collapse-segmental atelectasis in RUL, mild emphysema and small bilateral pleural effusions.  He was given Diamox for significant metabolic alkalosis.  Pt proned from -.     Restrictions/Precautions:  Restrictions/Precautions: Fall Risk    Subjective:  Chart Reviewed: Yes  Patient assessed for rehabilitation services?: Yes  Family / Caregiver Present: No  Subjective: RN approved session, OT, RT and RN present for early mobility session.  Pt able to follow commands during session, attempting to communicate throughout session, unable to write thoughts or use visual board.  Pt able to squeeze 
Pt admitted to  4K23 in a wheelchair and from ED.     Complaints: None.      IV none infusing into the antecubital right, condition patent and no redness and upper arm right, condition patent and no redness at a rate of 0 mls/ hour with about 0   mls in the bag still. IV site free of s/s of infection or infiltration.     Vital signs obtained. Assessment and data collection initiated. Two nurse skin assessment performed by Nabila CURRIE and Helena RN.    Swallow Screen completed and documented for all patients ages 45 and older. Pass  If patient fails bedside swallow, obtain order for speech therapy consult and keep patient NPO.    Policies and procedures for 4 explained. All questions answered with no further questions at this time. Fall prevention and safety brochure discussed with patient.  Bed alarm on. Call light in reach. Oriented to room.          
Pt extubated at 1355 per order, placed on high flow 40 L 62%. Patient has strong cough with clear sputum. Patient tolerated well.   
Pt seen.. pC02 up on bipap.  Discussed with ICU who will accept    Discussed with mother; hx MI, stents, chf.  Chronic smoker.  Hx etoh abuse 25 yr ago.  Disabled.      Anasarca, generalized erythema abdomen, legs. Responds to commands, falls asleep easily.     Abg on bipap pC02 117  pH 7.2  
Pt was unresponsive but was blessed.    02/06/24 1438   Encounter Summary   Service Provided For: Patient and family together   Referral/Consult From: Family   Support System Parent;Family members   Last Encounter  02/06/24  (Anointed)   Complexity of Encounter Low   Begin Time 1028   End Time  1033   Total Time Calculated 5 min   Spiritual/Emotional needs   Type Spiritual Support   Rituals, Rites and Sacraments   Type Anointing   Assessment/Intervention/Outcome   Assessment Unable to assess       
Pt was unresponsive but was blessed.    02/08/24 1642   Encounter Summary   Service Provided For: Patient   Referral/Consult From: Kiarra   Last Encounter  02/08/24  (NR)   Complexity of Encounter Low   Begin Time 1600   End Time  1605   Total Time Calculated 5 min   Spiritual/Emotional needs   Type Spiritual Support   Assessment/Intervention/Outcome   Assessment Unable to assess       
Pt was unresponsive but was blessed.    02/13/24 1322   Encounter Summary   Service Provided For: Patient   Referral/Consult From: Kiarra   Last Encounter  02/13/24  (NR)   Complexity of Encounter Low   Begin Time 1150   End Time  1155   Total Time Calculated 5 min   Spiritual/Emotional needs   Type Spiritual Support   Assessment/Intervention/Outcome   Assessment Unable to assess       
Pt was unresponsive but was blessed.    02/15/24 1218   Encounter Summary   Service Provided For: Patient   Referral/Consult From: Kiarra   Last Encounter  02/15/24  (NR)   Complexity of Encounter Low   Begin Time 1020   End Time  1025   Total Time Calculated 5 min   Spiritual/Emotional needs   Type Spiritual Support   Assessment/Intervention/Outcome   Assessment Unable to assess       
RT and RN x4 at bedside. Patient turned to prone position at this time. Tolerating well.   
Report received from primary RN KUSH Casas. All questions answered. Pt resting in bed eating breakfast. No concerns voiced at this time; call light within reach.  SN Cammie/MACK  
Reported off to primary RN, Yessenia. All questions answered. Pt resting comfortably in bed with visitors at bedside; no concerns voiced at this time. Call light within reach.  SN Randy/MACK  
[facility]  STRZ ICU STEPDOWN TELEMETRY 4K  Occupational Therapy  Daily Note  Time:   Time In: 1417  Time Out: 1434  Timed Code Treatment Minutes: 17 Minutes  Minutes: 17   Session cut short d/t transport arriving       Date: 2024  Patient Name: Fredo Rod,   Gender: male      Room: [facility]/002-A  MRN: [id number]  : 1970  (53 y.o.)  Referring Practitioner: Uziel Zuñiga MD  Diagnosis: cellulitis  Additional Pertinent Hx: Per H & P:53 y.o. male with PMH significant for COPD (on 6L O2 via NC at all times at home) and likely CHF who presents to the emergency department from home, brought in by EMS for evaluation of abdominal distention and worsening shortness of breath (SOB).  Patient states that he completes the majority of his care at [facility] however did not like the last time he was evaluated there, therefore decided to come to [facility] today.  Reports that he had worsening SOB over the past 2 hours and abdominal distention for the past 3 months.  States that he has not had a worsening cough from his baseline.  He was last seen in the hospital 3 weeks ago at [facility] in which he is unaware of his final diagnosis or disposition.  Lives with daughter at home.  Of note, patient states that he currently smokes approximately 1 pack of cigarettes every 3 to 4 days which decreased from previously smoking 1 carton every 1.5 days. Pt extubated on 2/15/23/    Restrictions/Precautions:  Restrictions/Precautions: Fall Risk     Social/Functional History:  Lives With: Other (comment) (13 year old daughter)  Type of Home: Trailer  Home Layout: One level  Home Access: Stairs to enter with rails (2)  Home Equipment: Oxygen (5L O2 baseline)   Bathroom Shower/Tub: Tub/Shower unit  Bathroom Toilet: Standard       ADL Assistance: Independent  Homemaking Assistance: Independent  Ambulation Assistance: Independent  Transfer Assistance: Independent    Active : No  Patient's  Info: Brother   
The transport originated from [facility]. Pt. was transported to CT scanning. Assisting with the transport was KUSH Lan.   A defibrillator was brought along on transport. Appropriate devices were applied to monitor the patient's condition during transport. A transport backpack was brought on transport, to be used in case of emergency. Patient transported  via 100% O2 via ventilator. Patient tolerated procedure well.   Pt returned to [facility].   
Vanc 25mg/kg x 1 ordered as per protocol.  Waiting for continuing order from admitting provider.  Rosalie Herny [facility], BCPS, BCGP  2/4/2024     2:06 AM    
[facility]  INPATIENT OCCUPATIONAL THERAPY  STRZ ICU 4D  EVALUATION    Time:    Time In: 1015  Time Out: 1052  Timed Code Treatment Minutes: 27 Minutes  Minutes: 37         CoTx with PT due to increased medical complexity, 2+ physical assist and inability to tolerate separate sessions at this time.    Date: 2024  Patient Name: Fredo Rod,   Gender: male      MRN: [id number]  : 1970  (53 y.o.)  Referring Practitioner: Uziel Zuñiga MD  Diagnosis: cellulitis  Additional Pertinent Hx: Per H & P:53 y.o. male with PMH significant for COPD (on 6L O2 via NC at all times at home) and likely CHF who presents to the emergency department from home, brought in by EMS for evaluation of abdominal distention and worsening shortness of breath (SOB).  Patient states that he completes the majority of his care at [facility] however did not like the last time he was evaluated there, therefore decided to come to [facility] today.  Reports that he had worsening SOB over the past 2 hours and abdominal distention for the past 3 months.  States that he has not had a worsening cough from his baseline.  He was last seen in the hospital 3 weeks ago at [facility] in which he is unaware of his final diagnosis or disposition.  Lives with daughter at home.  Of note, patient states that he currently smokes approximately 1 pack of cigarettes every 3 to 4 days which decreased from previously smoking 1 carton every 1.5 days.    Restrictions/Precautions:  Restrictions/Precautions: Fall Risk    Subjective  Chart Reviewed: Yes, Orders, Progress Notes, History and Physical  Patient assessed for rehabilitation services?: Yes  Family / Caregiver Present: No    Subjective: Pt alert during session, anxious during the middle of the session-not effectively communicating at that time-attempted to allow Pt to try to write down or use communication board  RT present to assist with ET management and oxygenation     Pain: 0/10: no c/o 
[facility]  STRZ ICU STEPDOWN TELEMETRY 4K  Occupational Therapy  Daily Note  Time:   Time In: 1025  Time Out: 1103  Timed Code Treatment Minutes: 38 Minutes  Minutes: 38          Date: 2024  Patient Name: Fredo Rod,   Gender: male      Room: -02/002-A  MRN: [id number]  : 1970  (53 y.o.)  Referring Practitioner: Uziel Zuñiga MD  Diagnosis: cellulitis  Additional Pertinent Hx: Per H & P:53 y.o. male with PMH significant for COPD (on 6L O2 via NC at all times at home) and likely CHF who presents to the emergency department from home, brought in by EMS for evaluation of abdominal distention and worsening shortness of breath (SOB).  Patient states that he completes the majority of his care at [facility] however did not like the last time he was evaluated there, therefore decided to come to [facility] today.  Reports that he had worsening SOB over the past 2 hours and abdominal distention for the past 3 months.  States that he has not had a worsening cough from his baseline.  He was last seen in the hospital 3 weeks ago at [facility] in which he is unaware of his final diagnosis or disposition.  Lives with daughter at home.  Of note, patient states that he currently smokes approximately 1 pack of cigarettes every 3 to 4 days which decreased from previously smoking 1 carton every 1.5 days. Pt extubated on 2/15/23/    Restrictions/Precautions:  Restrictions/Precautions: Fall Risk on 2L O2 at baseline    Social/Functional History:  Lives With: Other (comment) (13 year old daughter)  Type of Home: Trailer  Home Layout: One level  Home Access: Stairs to enter with rails (2)  Home Equipment: Oxygen (5L O2 baseline)   Bathroom Shower/Tub: Tub/Shower unit  Bathroom Toilet: Standard       ADL Assistance: Independent  Homemaking Assistance: Independent  Ambulation Assistance: Independent  Transfer Assistance: Independent    Active : No  Patient's  Info: Brother     Additional Comments: 
Orogastric  Formula: Peptide Based  Schedule: Continuous  Feeding Regimen: Vital 1.2 at 40 ml/hr  Additives/Modulars: Protein (2.5 oz daily)  Water Flushes: per provider  Current TF & Flush Orders Provides: tolerating at goal  Goal TF & Flush Orders Provides: Vital 1.2 at 40 ml/hr w/ 2.5 oz. Proteinex daily= 1649 kcals (1152 TF, 104 modular, 393 Diprivan), 98 grams protein (72 TF, 26 modular), 106 gm CHO, 5 gm fiber, 779 ml free water in 1035 ml volume (960 TF, 75 modular)/24 hours  Additional Calorie Sources:  Diprivan 14.9 ml/hr providing pt. with 393 kcals from IV lipid emulsion/24 hours    Anthropometric Measures:  Height: 175.3 cm (5' 9\")  Ideal Body Weight (IBW): 160 lbs (73 kg)    Admission Body Weight: 98.2 kg (216 lb 7.9 oz) (2/4 non-pitting edema)  Current Body Weight: 98.2 kg (216 lb 7.9 oz) (2/4 non-pitting edema),      Current BMI (kg/m2): 32  Usual Body Weight:  (per EMR -stated weights 184-185#)                       BMI Categories: Obese Class 1 (BMI 30.0-34.9)    Estimated Daily Nutrient Needs:  Energy Requirements Based On: Kcal/kg  Weight Used for Energy Requirements: Other (Comment) (98)  Energy (kcal/day): 9541-9241 kcals (15-18)  Weight Used for Protein Requirements: Ideal (73)  Protein (g/day):  grams (1.2-2)  Method Used for Fluid Requirements: Other (Comment)  Fluid (ml/day): per provider    Nutrition Diagnosis:   Inadequate oral intake related to impaired respiratory function as evidenced by NPO or clear liquid status due to medical condition, intubation, nutrition support - enteral nutrition    Nutrition Interventions:   Food and/or Nutrient Delivery: Modify Tube Feeding  Nutrition Education/Counseling: No recommendation at this time  Coordination of Nutrition Care: Continue to monitor while inpatient       Goals:     Goals: Tolerate nutrition support at goal rate, by next RD assessment       Nutrition Monitoring and Evaluation:      Food/Nutrient Intake Outcomes: Diet 
well as small bowel.\"    Past Medical History:   Diagnosis Date    Sarcoma of rib (HCC)        SUBJECTIVE:  Spoke with KUSH Velazquez for approval of CSE. Upon arrival, patient laying in bed. Alert and cooperative. Reports of 6LPM NC at baseline.    OBJECTIVE:    Pain:  No pain reported.    Current Diet: NPO    Respiratory Status:  HHFNC: 40 LPM, 60% FiO2    Behavioral Observation:  Alert    CRANIAL NERVE ASSESSMENT   CN V (Trigeminal) Closes and Opens Mandible WFL    Rotary Jaw Movement Not Tested      CN VII (Facial) Cheeks Hold Food out of Sulci Not Tested    Opens, Closes/Seals, Protrudes, Retracts Lips WFL    General Appearance WFL    Sensation WFL      CN X (Vagus - Pharyngeal) Raises Back of Tongue WFL      CN XI (Accessory) Lifts Soft Palate WFL      CN XII (Hypoglossal) Elevates Tongue Up and Back WFL    Protrusion   WFL    Lateralizes Tongue WFL    Sensation Not Tested      Other Observations Dentition Sparse decaying dentition     Vocal Quality Aphonic    Cough Weak     Patient Evaluated Using: Ice Chips, Thin Liquids, and Puree    Oral Phase:  WFL    Pharyngeal Phase: WFL:  Pharyngeal phase appears WFL but cannot rule out pharyngeal phase deficits from a bedside swallowing evaluation alone.    Signs and Symptoms of Laryngeal Penetration/Aspiration: No signs/symptoms of aspiration evident in this evaluation, but cannot rule out silent aspiration.    Aspiration Pneumonia Predictors:  Decreased Cognition, Compromised Oral Integrity, Compromised Pulmonary Status, Currently a Smoker, and Prolonged Intubation    Functional Oral Intake Scale: Functional oral intake testing not appropriate at this time    Impressions: Patient presents with oral phase that is essentially WFL with inability to fully discern potential presence of pharyngeal phase deficits without formal instrumentation. Patient demonstrated adequate bolus control and manipulation based on conservative PO trials. Suspect acute onset of pharyngeal weakness 
  **Completed X3 trials, able to progress to CGA X2 during final transfer.    FUNCTIONAL MOBILITY:  Assistive Device: hand held assist  Assist Level:  Minimal Assistance, X 2, and with increased time for completion.   Distance:  lateral steps towards HOB  Unsteady, quickly fatigues. Difficulty weight shifting at times.     AM-PAC Inpatient Daily Activity Raw Score: 12  AM-PAC Inpatient ADL T-Scale Score : 30.6  ADL Inpatient CMS 0-100% Score: 66.57    Modified [place] Scale:  Not Applicable    ASSESSMENT:     Activity Tolerance:  Patient tolerance of  treatment: Good treatment tolerance       Discharge Recommendations: Continue to assess pending progress and Patient would benefit from continued OT at discharge (will monitor needs after extubation)  Equipment Recommendations: Other: monitor pending progress  Plan: Times Per Week: 5x  Current Treatment Recommendations: Balance training, Strengthening, ROM, Functional mobility training, Endurance training, Self-Care / ADL, Safety education & training    Education:  Learners: Patient  Plan of Care, ADL's, Importance of Increasing Activity, and transfer training, posture    Goals  Short Term Goals  Time Frame for Short Term Goals: until discharge  Short Term Goal 1: Pt will tolerate sitting EOB 6-8 min with CGA for increased ease of EOB ADL tasks. Goal met, revised.   Short Term Goal 2: Pt will tolerate BUE AROM exercises x 8 reps with min RBs to increase indep & safety for returning to ADLs at this time.  Short Term Goal 3: Pt will tolerate further assessment of functional t/fs by OTR when appropriate  Long Term Goals  Time Frame for Long Term Goals : No LTG set d/t short ELOS    Revised Short-Term Goals  Short Term Goals  Time Frame for Short Term Goals: until discharge  Short Term Goal 1: Pt will increase activity tolerance for functional mobility to/from BSC or chair, progressing to bathroom as able, with MI in prep for ADL completion.  Short Term Goal 2: Pt will 
  During hospitalization US liver demonstrate mild sludge and gallstone present within the gallbladder lumen but no pericholecystic fluid or GB wall thickening, CBD normal caliber.  CTA chest was negative for PE but did show a RLL collapse-segmental atelectasis in RUL, mild emphysema and small bilateral pleural effusions.  He was given Diamox for significant metabolic alkalosis.     2/5 No significant events overnight. He has remained sinus bradycardic in the 50s. This morning he is resting, sedated and on the ventilatory. Weaned off pressors. Otherwise hemodynamically stable, vitals WNL.    2/6: Worsening respiratory status overnight requiring intermittent BVM ventilation to increase O2 sats.  PF ratio of 75.  Proned today.  On 5 mcg Levophed.    Past Medical History:  Per HPI.  Family History:  Unknown.  Social History:  Current smoker 15.5 PPY, occasional EtOH use, denies illict/recreational drug use.     ROS   Unable to assess secondary to sedation and mechanical ventilation    Scheduled Meds:   LORazepam        dexAMETHasone  20 mg IntraVENous Daily    [START ON 2/11/2024] dexAMETHasone  10 mg IntraVENous Daily    tiotropium-olodaterol  2 puff Inhalation Daily    albuterol  2.5 mg Nebulization BID    vancomycin  1,250 mg IntraVENous Q12H    chlorhexidine  15 mL Mouth/Throat BID    methylPREDNISolone  60 mg IntraVENous Daily    vancomycin (VANCOCIN) intermittent dosing (placeholder)   Other RX Placeholder    aspirin  81 mg Oral Daily    sodium chloride flush  10 mL IntraVENous 2 times per day    enoxaparin  40 mg SubCUTAneous Daily    piperacillin-tazobactam  3,375 mg IntraVENous Q8H    azithromycin  500 mg IntraVENous Q24H    pantoprazole  40 mg IntraVENous Daily    acetylcysteine  600 mg Inhalation BID RT     Continuous Infusions:   fentaNYL 200 mcg/hr (02/06/24 1153)    midazolam 6 mg/hr (02/06/24 1153)    propofol 50 mcg/kg/min (02/06/24 1153)    sodium chloride      norepinephrine 5 mcg/min (02/06/24 1153) 
training, Endurance training, Therapeutic activities, Patient/Caregiver education & training, Safety education & training, Gait training, Stair training, Transfer training, Neuromuscular re-education  General Plan:  (5x GM)    Education  Education:  Learners: Patient  Patient Education: Plan of Care, Verbal Exercise Instruction, Health Promotion and Wellness Education, Energy Conservation    Goals:  Patient Goals : unable to state  Short Term Goals  Time Frame for Short Term Goals: by discharge  Short Term Goal 1: Pt to transfer supine <--> sit SBA to enable pt to get in/out of bed.  Short Term Goal 2: Pt to sit with SBA for improved upright tolerance in prep for transfers.  Short Term Goal 3: Pt to transfer sit <--> stand SBA for increased functional mobility.  Short Term Goal 4: Pt to ambulate >50 feet with RW SBA for household ambulaton.  Long Term Goals  Time Frame for Long Term Goals : NA due to short length of stay.    Following session, patient left in safe position with all fall risk precautions in place.      
majority of the night.  This morning he was on the ventilator at 16/6.  This was decreased to 14/6.  He is near baseline O2 requirements. Will continue to wean as tolerated today.  Will attempt to extubate today if clinically indicated.     2/16/2024: Patient was seen at the bedside this morning.  He is doing well.  He did have 2 bowel movements overnight. Multiple over the course of the day today.  Will continue to monitor this.  Speech evaluated recommending minced/moist and thin liquids.  He denies any headaches, vision changes, chest pain.  He does note some right-sided abdominal cramping.  States this is minor.  No abdominal guarding or other peritoneal signs.  If he does well overnight likely can be transferred to the stepdown unit tomorrow.     2/17: Maintains on high flow nasal cannula with intermittent BiPAP.     2/18: Patient was seen at the bedside this morning.  He notes no pain.  Notes he still having bowel movements.  Denies headache, vision changes, chest pain, shortness of breath, abdominal pain, lower extremity edema.\"     2/19: Patient was transferred to step down on 2/18.   PT/OT evaluated, are recommending ECF.    2/20: Doing well, weaned down to 2L. Obtained records from [facility].     Subjective/HPI:   Fredo Rod seen and examined at bedside this morning feels better overall. Has abdominal distention. Ordered CTAP. He denies HA, SOB, CP, N/V/D.       PMH, SURGICAL HX, FH, SOCIAL HX reviewed and updated as needed.    Medications:  Reviewed    Infusion Medications    sodium chloride Stopped (02/11/24 2025)     Scheduled Medications    bumetanide  1 mg Oral BID    pantoprazole  40 mg Oral QAM AC    losartan  50 mg Oral Daily    senna  1 tablet Oral BID    naloxegol  12.5 mg Oral Daily    polyethylene glycol  17 g Oral Daily    tiotropium-olodaterol  2 puff Inhalation Daily    aspirin  81 mg Oral Daily    sodium chloride flush  10 mL IntraVENous 2 times per day    enoxaparin  40 mg SubCUTAneous 
requirements. Will continue to wean as tolerated today.  Will attempt to extubate today if clinically indicated.    Past Medical History:  Per HPI.  Family History:  Unknown.  Social History:  Current smoker 15.5 PPY, occasional EtOH use, denies illict/recreational drug use.     ROS   Complete review of systems completed and negative unless otherwise noted in HPI.    Scheduled Meds:   naloxegol  12.5 mg Oral QAM AC    bumetanide  1 mg IntraVENous BID    senna  10 mL Oral BID    polyethylene glycol  17 g Oral Daily    dexAMETHasone  10 mg IntraVENous Daily    tiotropium-olodaterol  2 puff Inhalation Daily    albuterol  2.5 mg Nebulization BID    chlorhexidine  15 mL Mouth/Throat BID    aspirin  81 mg Oral Daily    sodium chloride flush  10 mL IntraVENous 2 times per day    enoxaparin  40 mg SubCUTAneous Daily    pantoprazole  40 mg IntraVENous Daily     Continuous Infusions:   dexmedeTOMIDine 1.2 mcg/kg/hr (02/15/24 0743)    fentaNYL 75 mcg/hr (02/15/24 0109)    midazolam 3 mg/hr (02/14/24 1953)    propofol 5 mcg/kg/min (02/13/24 0940)    sodium chloride Stopped (02/11/24 2025)       PHYSICAL EXAMINATION:  T: 97.9.  P: 50. RR: 13. B/P: 112/69.  FiO2: 40. O2 Sat: 96.  I/O:  -3.8L.  -10.6 L since admit.  Body mass index is 27.38 kg/m².   GCS:   12  PC: 14/6: TV: 412: RRTotal: 13: Ti:1 sec:     General:   Acutely ill-appearing. intubated and minimally sedated.  HEENT:  normocephalic and atraumatic.  No scleral icterus. PERR  Neck: supple.  No Thyromegaly.  Lungs: Diffusely diminished.  No retractions  Cardiac: RRR.  No JVD.  Abdomen: soft.  Protuberant. distended.  Extremities:  No clubbing, cyanosis,.  Diffuse anasarca with trace edema, continues to improve.  Vasculature: capillary refill < 3 seconds. Palpable dorsalis pedis pulses.  Skin:  warm and wet.   Psych: Unable to assess 2/2 mechanical ventilation.  Lymph:  No supraclavicular adenopathy.  Neurologic:  No focal deficit. No seizures.  Follows commands.    Data: 
  WBC 6.7 7.4 7.9   HGB 12.5* 12.6* 12.6*   HCT 43.8 43.1 43.5    314 286     BMP:  Recent Labs     02/09/24  0350 02/10/24  0335 02/11/24 0319    142 140   K 4.2 3.9 4.1    103 99   CO2 30 30 32   BUN 23* 24* 25*   CREATININE 0.6 0.6 0.5   GLUCOSE 99 93 123*   CALCIUM 8.6 8.7 8.4*     Hepatic:   Recent Labs     02/10/24  0335 02/11/24 0319   AST 17 19   ALT 62 61   BILITOT 0.6 0.6   ALKPHOS 110 103     Cardiac Enzymes: No results for input(s): \"CKTOTAL\", \"CKMB\", \"TROPONINI\" in the last 72 hours.  BNP: No results for input(s): \"BNP\" in the last 72 hours.  INR: No results for input(s): \"INR\", \"PROTIME\" in the last 72 hours.  POC No results for input(s): \"POCGLU\" in the last 72 hours.  No results for input(s): \"LACTA\" in the last 72 hours.     propofol 10 mcg/kg/min (02/11/24 1242)    fentaNYL 200 mcg/hr (02/11/24 1055)    midazolam 5 mg/hr (02/10/24 2341)    sodium chloride 10 mL/hr at 02/10/24 1400    norepinephrine Stopped (02/09/24 2206)        vancomycin  1,250 mg IntraVENous Once    scopolamine  1 patch TransDERmal Q72H    bumetanide  1 mg IntraVENous BID    senna  10 mL Oral BID    polyethylene glycol  17 g Oral Daily    dexAMETHasone  10 mg IntraVENous Daily    tiotropium-olodaterol  2 puff Inhalation Daily    albuterol  2.5 mg Nebulization BID    chlorhexidine  15 mL Mouth/Throat BID    aspirin  81 mg Oral Daily    sodium chloride flush  10 mL IntraVENous 2 times per day    enoxaparin  40 mg SubCUTAneous Daily    pantoprazole  40 mg IntraVENous Daily       24HR INTAKE/OUTPUT:    Intake/Output Summary (Last 24 hours) at 2/11/2024 1244  Last data filed at 2/11/2024 0601  Gross per 24 hour   Intake 2291 ml   Output 4300 ml   Net -2009 ml     CT/KO/SR - CTA CHEST W WO CONTRAST - 02/04/2024 12:31 PM EST  IMPRESSION:  Left lower lobe collapse.  Right lower lobe consolidation, which could be due to pneumonia.  Small bilateral pleural effusions.  Support devices as above.       PUD and DVT 
appropriate  Lymph:  No supraclavicular adenopathy.  Neurologic:  No focal deficit. No seizures.    Data: (All radiographs, tracings, PFTs, and imaging are personally viewed and interpreted unless otherwise noted).   Sodium 144, potassium 3.4, chloride 97, CO2 31, BUN 31, creatinine 0.7, calcium 9.1.  Magnesium 2.1.  WBC 16.1, RBC 5.2, hemoglobin 11.4, hematocrit 49, platelets 384.  Telemetry shows normal sinus rhythm.    KUB shows no significant change in appearance of the mild ileus involving colon and small bowel.    CXR shows decreased left basilar atelectasis/pneumonia.  Tiny BL pleural effusions.        Meets Continued ICU Level Care Criteria:    [] Yes   [x] No - Transfer Planned to listed location: Plan to transfer to stepdown today.  [] HOSPITALIST CONTACTED- DR Chase and plan discussed with Dr. Zuñiga.      Electronically signed by Jose Juan Yancey DO  CRITICAL CARE SPECIALIST

## 2024-02-22 NOTE — DISCHARGE SUMMARY
DISCHARGE SUMMARY      Patient Identification:   Fredo Rod   : 1970  MRN: [id number]   Account: [id number]      Patient's PCP: Mohinder Page MD    Admit Date: 2/3/2024     Discharge Date:   24    Admitting Physician: No admitting provider for patient encounter.     Discharge Physician: Tony Hdez MD     Discharge Diagnoses:    Acute on chronic hypoxic, hypercapnic respiratory failure, improving:  Ileus, improving  Prostatomegaly   HTN:   Sick euthyroid:   Obstructive CAD:   Abdominal wall cellulitis- POA, resolved  Septic shock: resolved.   Leukocytosis, improving   Acute metabolic encephalopathy, resolved.   ARDS: 2/2 septic shock as above. resolved.   Generalized anasarca: Resolved   Tobacco abuse:   Dysphagia/ malnutrition   Deconditioning     Acute on chronic hypoxic, hypercapnic respiratory failure, improving:  Initially suspected 2/2 volume overload with concerns for progressive PHTN( seen on echo  PAH with RVSP 55 mmHg).   Previously hospitalized previously at [facility] was placed on 6L. Unclear what patient was supposed to be discharged on. Patient was using 6L O2.  History of COPD. No PFTs on file  Initial ABG 7.3/90/63/49. Worsening/refractory to BiPAP. Intubated .   CTA chest demonstrated RLL collapse, segmental atelectasis in RUL, mild emphysema and small bilateral pleural effusions.   Extubated 2/15/2024.    On 2L NC. BiPAP at night.   Continue Stiolto.    Outpatient pulmonary follow-up, with PFTs     Ileus, improving  KUB 2/15 shows ileus of the large bowel and distal small bowel.    Received 1 dose of  Relistor 2/15.   KUB  shows persistent ileus.  Has been having regular bowel movements.    Continue daily Movantik in a.m.    Continue senna twice daily and GlycoLax daily.    -CT abd pelvis  showed air- fluid levels, mucosal thickening, concern for enterocolitis.     Prostatomegaly   Seen on CT abd pelvis , prostate gland is mildly enlarged

## 2024-02-22 NOTE — CARE COORDINATION
2/22/24, 9:18 AM EST    Patient goals/plan/ treatment preferences discussed by  and .  Patient goals/plan/ treatment preferences reviewed with patient/ family.  Patient/ family verbalize understanding of discharge plan and are in agreement with goal/plan/treatment preferences.  Understanding was demonstrated using the teach back method.  AVS provided by RN at time of discharge, which includes all necessary medical information pertaining to the patients current course of illness, treatment, post-discharge goals of care, and treatment preferences.     Services At/After Discharge: Skilled Nursing Facility (SNF) [facility]    Patient to discharge to [facility]. [facility] has been approved.     [facility] scheduled transportation for 2pm.    SW notified [facility] of discharge today at 2pm.    RN made aware and discharge instructions placed on chart.

## 2024-02-22 NOTE — RT PROTOCOL NOTE
RT Inhaler-Nebulizer Bronchodilator Protocol Note    There is a bronchodilator order in the chart from a provider indicating to follow the RT Bronchodilator Protocol and there is an “Initiate RT Inhaler-Nebulizer Bronchodilator Protocol” order as well (see protocol at bottom of note).    CXR Findings:  No results found.    The findings from the last RT Protocol Assessment were as follows:   History Pulmonary Disease: Chronic pulmonary disease  Respiratory Pattern: Regular pattern and RR 12-20 bpm  Breath Sounds: Slightly diminished and/or crackles  Cough: Strong, productive  Indication for Bronchodilator Therapy: Decreased or absent breath sounds  Bronchodilator Assessment Score: 5    Aerosolized bronchodilator medication orders have been revised according to the RT Inhaler-Nebulizer Bronchodilator Protocol below.    Respiratory Therapist to perform RT Therapy Protocol Assessment initially then follow the protocol.  Repeat RT Therapy Protocol Assessment PRN for score 0-3 or on second treatment, BID, and PRN for scores above 3.    No Indications - adjust the frequency to every 6 hours PRN wheezing or bronchospasm, if no treatments needed after 48 hours then discontinue using Per Protocol order mode.     If indication present, adjust the RT bronchodilator orders based on the Bronchodilator Assessment Score as indicated below.  Use Inhaler orders unless patient has one or more of the following: on home nebulizer, not able to hold breath for 10 seconds, is not alert and oriented, cannot activate and use MDI correctly, or respiratory rate 25 breaths per minute or more, then use the equivalent nebulizer order(s) with same Frequency and PRN reasons based on the score.  If a patient is on this medication at home then do not decrease Frequency below that used at home.    0-3 - enter or revise RT bronchodilator order(s) to equivalent RT Bronchodilator order with Frequency of every 4 hours PRN for wheezing or increased work of 
RT Inhaler-Nebulizer Bronchodilator Protocol Note    There is a bronchodilator order in the chart from a provider indicating to follow the RT Bronchodilator Protocol and there is an “Initiate RT Inhaler-Nebulizer Bronchodilator Protocol” order as well (see protocol at bottom of note).    CXR Findings:  XR CHEST PORTABLE    Result Date: 2/17/2024  Impression: Decreased left basilar atelectasis/pneumonia. This document has been electronically signed by: Josef Ingram MD on 02/17/2024 03:38 AM    XR CHEST PORTABLE    Result Date: 2/16/2024  1. Normal heart size. Right jugular line with catheter tip at cavoatrial junction. 2. Tiny bilateral pleural effusions. 3. Moderate left basilar atelectasis/pneumonia. Question minimal residual infiltrate along right hemidiaphragm. 4. Overall appearance of chest has improved since prior. **This report has been created using voice recognition software.  It may contain minor errors which are inherent in voice recognition technology.** Final report electronically signed by Dr. Stevie Paulino on 2/16/2024 3:00 PM      The findings from the last RT Protocol Assessment were as follows:   History Pulmonary Disease: Chronic pulmonary disease  Respiratory Pattern: Mild dyspnea at rest, irregular pattern, or RR 21-25 bpm  Breath Sounds: Slightly diminished and/or crackles  Cough: Strong, spontaneous, non-productive  Indication for Bronchodilator Therapy: Decreased or absent breath sounds  Bronchodilator Assessment Score: 8    Aerosolized bronchodilator medication orders have been revised according to the RT Inhaler-Nebulizer Bronchodilator Protocol below.    Respiratory Therapist to perform RT Therapy Protocol Assessment initially then follow the protocol.  Repeat RT Therapy Protocol Assessment PRN for score 0-3 or on second treatment, BID, and PRN for scores above 3.    No Indications - adjust the frequency to every 6 hours PRN wheezing or bronchospasm, if no treatments needed after 48 hours 
RT Inhaler-Nebulizer Bronchodilator Protocol Note    There is a bronchodilator order in the chart from a provider indicating to follow the RT Bronchodilator Protocol and there is an “Initiate RT Inhaler-Nebulizer Bronchodilator Protocol” order as well (see protocol at bottom of note).    CXR Findings:  XR CHEST PORTABLE    Result Date: 2/21/2024  Small bilateral pleural effusions with worsening bibasilar infiltrates. **This report has been created using voice recognition software. It may contain minor errors which are inherent in voice recognition technology.** Final report electronically signed by Dr. Blanche Neal on 2/21/2024 7:51 AM      The findings from the last RT Protocol Assessment were as follows:   History Pulmonary Disease: Chronic pulmonary disease  Respiratory Pattern: Dyspnea on exertion or RR 21-25 bpm  Breath Sounds: Slightly diminished and/or crackles  Cough: Strong, spontaneous, non-productive  Indication for Bronchodilator Therapy: Decreased or absent breath sounds  Bronchodilator Assessment Score: 6    Aerosolized bronchodilator medication orders have been revised according to the RT Inhaler-Nebulizer Bronchodilator Protocol below.    Respiratory Therapist to perform RT Therapy Protocol Assessment initially then follow the protocol.  Repeat RT Therapy Protocol Assessment PRN for score 0-3 or on second treatment, BID, and PRN for scores above 3.    No Indications - adjust the frequency to every 6 hours PRN wheezing or bronchospasm, if no treatments needed after 48 hours then discontinue using Per Protocol order mode.     If indication present, adjust the RT bronchodilator orders based on the Bronchodilator Assessment Score as indicated below.  Use Inhaler orders unless patient has one or more of the following: on home nebulizer, not able to hold breath for 10 seconds, is not alert and oriented, cannot activate and use MDI correctly, or respiratory rate 25 breaths per minute or more, then use the 
or more of the following: on home nebulizer, not able to hold breath for 10 seconds, is not alert and oriented, cannot activate and use MDI correctly, or respiratory rate 25 breaths per minute or more, then use the equivalent nebulizer order(s) with same Frequency and PRN reasons based on the score.  If a patient is on this medication at home then do not decrease Frequency below that used at home.    0-3 - enter or revise RT bronchodilator order(s) to equivalent RT Bronchodilator order with Frequency of every 4 hours PRN for wheezing or increased work of breathing using Per Protocol order mode.        4-6 - enter or revise RT Bronchodilator order(s) to two equivalent RT bronchodilator orders with one order with BID Frequency and one order with Frequency of every 4 hours PRN wheezing or increased work of breathing using Per Protocol order mode.        7-10 - enter or revise RT Bronchodilator order(s) to two equivalent RT bronchodilator orders with one order with TID Frequency and one order with Frequency of every 4 hours PRN wheezing or increased work of breathing using Per Protocol order mode.       11-13 - enter or revise RT Bronchodilator order(s) to one equivalent RT bronchodilator order with QID Frequency and an Albuterol order with Frequency of every 4 hours PRN wheezing or increased work of breathing using Per Protocol order mode.      Greater than 13 - enter or revise RT Bronchodilator order(s) to one equivalent RT bronchodilator order with every 4 hours Frequency and an Albuterol order with Frequency of every 2 hours PRN wheezing or increased work of breathing using Per Protocol order mode.     RT to enter RT Home Evaluation for COPD & MDI Assessment order using Per Protocol order mode.    Electronically signed by Nicole Cohen RCP on 2/16/2024 at 8:39 PM

## 2024-03-01 ENCOUNTER — APPOINTMENT (OUTPATIENT)
Dept: GENERAL RADIOLOGY | Age: 54
End: 2024-03-01
Payer: MEDICAID

## 2024-03-01 ENCOUNTER — HOSPITAL ENCOUNTER (EMERGENCY)
Age: 54
Discharge: HOME | End: 2024-03-01
Attending: EMERGENCY MEDICINE
Payer: MEDICAID

## 2024-03-01 VITALS
WEIGHT: 177 LBS | TEMPERATURE: 98.8 F | HEART RATE: 76 BPM | HEIGHT: 69 IN | DIASTOLIC BLOOD PRESSURE: 84 MMHG | OXYGEN SATURATION: 95 % | RESPIRATION RATE: 27 BRPM | SYSTOLIC BLOOD PRESSURE: 133 MMHG | BODY MASS INDEX: 26.22 KG/M2

## 2024-03-01 DIAGNOSIS — Z11.1 VISIT FOR TB SKIN TEST: Primary | ICD-10-CM

## 2024-03-01 LAB
ANION GAP SERPL CALC-SCNC: 12 MEQ/L (ref 8–16)
BASOPHILS ABSOLUTE: 0.1 THOU/MM3 (ref 0–0.1)
BASOPHILS NFR BLD AUTO: 0.9 %
BUN SERPL-MCNC: 10 MG/DL (ref 7–22)
CALCIUM SERPL-MCNC: 9.1 MG/DL (ref 8.5–10.5)
CHLORIDE SERPL-SCNC: 98 MEQ/L (ref 98–111)
CO2 SERPL-SCNC: 30 MEQ/L (ref 23–33)
CREAT SERPL-MCNC: 0.5 MG/DL (ref 0.4–1.2)
DEPRECATED RDW RBC AUTO: 59.7 FL (ref 35–45)
EKG ATRIAL RATE: 84 BPM
EKG P AXIS: 24 DEGREES
EKG P-R INTERVAL: 166 MS
EKG Q-T INTERVAL: 390 MS
EKG QRS DURATION: 102 MS
EKG QTC CALCULATION (BAZETT): 460 MS
EKG R AXIS: -38 DEGREES
EKG T AXIS: 17 DEGREES
EKG VENTRICULAR RATE: 84 BPM
EOSINOPHIL NFR BLD AUTO: 5.2 %
EOSINOPHILS ABSOLUTE: 0.5 THOU/MM3 (ref 0–0.4)
ERYTHROCYTE [DISTWIDTH] IN BLOOD BY AUTOMATED COUNT: 17.9 % (ref 11.5–14.5)
GFR SERPL CREATININE-BSD FRML MDRD: > 60 ML/MIN/1.73M2
GLUCOSE SERPL-MCNC: 134 MG/DL (ref 70–108)
HCT VFR BLD AUTO: 42.9 % (ref 42–52)
HGB BLD-MCNC: 13.2 GM/DL (ref 14–18)
IMM GRANULOCYTES # BLD AUTO: 0.1 THOU/MM3 (ref 0–0.07)
IMM GRANULOCYTES NFR BLD AUTO: 1.1 %
LYMPHOCYTES ABSOLUTE: 1.6 THOU/MM3 (ref 1–4.8)
LYMPHOCYTES NFR BLD AUTO: 17.6 %
MCH RBC QN AUTO: 28.1 PG (ref 26–33)
MCHC RBC AUTO-ENTMCNC: 30.8 GM/DL (ref 32.2–35.5)
MCV RBC AUTO: 91.5 FL (ref 80–94)
MONOCYTES ABSOLUTE: 0.8 THOU/MM3 (ref 0.4–1.3)
MONOCYTES NFR BLD AUTO: 8.9 %
NEUTROPHILS NFR BLD AUTO: 66.3 %
NRBC BLD AUTO-RTO: 0 /100 WBC
OSMOLALITY SERPL CALC.SUM OF ELEC: 280.4 MOSMOL/KG (ref 275–300)
PLATELET # BLD AUTO: 276 THOU/MM3 (ref 130–400)
PMV BLD AUTO: 10.4 FL (ref 9.4–12.4)
POTASSIUM SERPL-SCNC: 3.7 MEQ/L (ref 3.5–5.2)
RBC # BLD AUTO: 4.69 MILL/MM3 (ref 4.7–6.1)
SEGMENTED NEUTROPHILS ABSOLUTE COUNT: 6.1 THOU/MM3 (ref 1.8–7.7)
SODIUM SERPL-SCNC: 140 MEQ/L (ref 135–145)
WBC # BLD AUTO: 9.2 THOU/MM3 (ref 4.8–10.8)

## 2024-03-01 PROCEDURE — 93005 ELECTROCARDIOGRAM TRACING: CPT | Performed by: EMERGENCY MEDICINE

## 2024-03-01 PROCEDURE — 36415 COLL VENOUS BLD VENIPUNCTURE: CPT

## 2024-03-01 PROCEDURE — 85025 COMPLETE CBC W/AUTO DIFF WBC: CPT

## 2024-03-01 PROCEDURE — 93010 ELECTROCARDIOGRAM REPORT: CPT | Performed by: INTERNAL MEDICINE

## 2024-03-01 PROCEDURE — 99285 EMERGENCY DEPT VISIT HI MDM: CPT

## 2024-03-01 PROCEDURE — 71045 X-RAY EXAM CHEST 1 VIEW: CPT

## 2024-03-01 PROCEDURE — 80048 BASIC METABOLIC PNL TOTAL CA: CPT

## 2024-03-01 NOTE — DISCHARGE INSTRUCTIONS
You were seen here for concern for TB.  At this time, there is no overt evidence that points towards active TB infection.  As discussed, have PPD completed at [facility].     PLEASE RETURN TO THE EMERGENCY DEPARTMENT IMMEDIATELY for worsening symptoms of increasing pain, shortness of breath, feeling of your heart fluttering or racing, swelling to your feet, unable to lay flat, or if you develop any concerning symptoms such as: high fever not relieved by acetaminophen (Tylenol) and/or ibuprofen (Motrin / Advil), chills, persistent nausea and/or vomiting, loss of consciousness, numbness, weakness or tingling in the arms or legs or change in color of the extremities, changes in mental status, persistent headache, blurry vision, loss of bladder / bowel control, unable to follow up with your physician, or other any other care or concern.

## 2024-03-01 NOTE — ED PROVIDER NOTES
ATTENDING NOTE:    I supervised and discussed the history, physical exam and the management of this patient with the resident. I reviewed the resident's note and agree with the documented findings and plan of care.  Please see my additional note.    Patient was recently hospitalized with acute on chronic resp failure, abd wall cellulitis, ileus, sepsis, deconditioning. Patient was released to rehab, states he was told he needed to be tested for TB. Patient states he refused because he believed that since he'd been hospitalized for such a long time recently, if there'd been any concern for TB he would have been tested while hospitalized and didn't understand why the facility wanted to test him. He states next a cxr was done and someone came in with a paper saying he had to go to the ED. Here he has no complaints. Denies exposure to TB or history of it in the past.  Denies any ongoing cough, night sweats, fever.  Denies any travel internationally.  Denies previous work exposure to TB.  Denies history of IV drug use or HIV.  Denies (?  Healthcare related industry.  Denies any history of immunocompromise.  Has not worked in mining or other such industries.  Discussed with patient PPD process.  Patient states he is perfectly willing to undergo a PPD, this was in his opinion that may cleared him by the facility and he wishes to return there to have a PPD placed.  His chest x-ray here is similar to an unchanged from his most recent chest x-ray.  Remainder of labs reviewed.  We will return him to his facility.    I personally saw and examined the patient.  I have reviewed and agree with the resident's findings, including all diagnostic interpretations and treatment plans as written.  I was present for the key portion of any procedures performed and the inclusive time noted in any critical care statement.    Electronically verified by Brittaney Haney MD  03/01/24 2052

## 2024-03-01 NOTE — ED PROVIDER NOTES
[facility] EMERGENCY DEPT  EMERGENCY DEPARTMENT ENCOUNTER          Pt Name: Fredo Rod  MRN: [id number]  Birthdate 1970  Date of evaluation: 3/1/2024  Physician: Jack Tavares MD  Supervising Attending Physician: Brittaney Wilder MD       CHIEF COMPLAINT       Chief Complaint   Patient presents with    Abnormal Chest X-ray     Concern for TB         HISTORY OF PRESENT ILLNESS    HPI  Fredo Rod is a 53 y.o. male who presents to the emergency department  from [place] , brought in by EMS for concern for TB.  Patient states that he was at his rehab facility after recent hospitalization with ARDS when all of a sudden staff came into his room, told him that there was concern for active TB, and placed him \"on lockdown\".  Earlier in the day, patient states that he refused a TB test due to not knowing the reasoning why it needed to be completed.  CXR was obtained at the time and from that, there was alleged concern for active TB.  Patient denies any symptoms at this time.  No recent travel or oversees travel ever in lifetime.  No prior TB test.1w stay in [facility] in early 20s. States that he has a smoking history, however has not smoked since most recent admission (2/3/2024); denies EtOH or drug use. Patient currently on 2L O2 via NC - states that he is on 2L at all times at home as well.  States that he is willing to have a PPD done at the facility at this time, he just did not understand why it needs to be done to begin with.    Denies headache, changes in vision, neck pain, fever, chills, night sweats, cough, hemoptysis, congestion, chest pain, shortness of breath, abdominal pain, N/V/C/D, dysuria, hematuria, hematochezia, melena, numbness/tingling/weakness in extremities.    The patient has no other acute complaints at this time.      PAST MEDICAL AND SURGICAL HISTORY     Past Medical History:   Diagnosis Date    Sarcoma of rib (HCC)      Past Surgical History:    tiotropium-olodaterol (STIOLTO) 2.5-2.5 MCG/ACT AERS Inhale 2 puffs into the lungs daily, R-0DC to SNF      losartan (COZAAR) 50 MG tablet Take 1 tablet by mouth dailyHistorical Med      acetaminophen (TYLENOL) 325 MG tablet Take 2 tablets by mouth as needed for PainHistorical Med      ASPIRIN 81 PO Take by mouthHistorical Med      naloxone 4 MG/0.1ML LIQD nasal spray 1 spray by Nasal route as needed for Opioid Reversal, Disp-1 each, R-0Normal      Walking Boot MISC Starting 2/6/2017, Until Discontinued, Disp-1 each, R-0, PrintDx: Left Calcaneal Stress Fracture, Hemorrhagic Fracture Blisters  Sig: Please fit/dispense offloading pneumatic fracture boot, to help offload with the assistance of crutches.               SOCIAL HISTORY     Social History     Social History Narrative    Not on file     Social History     Tobacco Use    Smoking status: Every Day     Current packs/day: 0.50     Average packs/day: 0.5 packs/day for 31.0 years (15.5 ttl pk-yrs)     Types: Cigarettes   Substance Use Topics    Alcohol use: Yes     Comment: occasional    Drug use: No         ALLERGIES   No Known Allergies      FAMILY HISTORY   History reviewed. No pertinent family history.      PHYSICAL EXAM     ED Triage Vitals [03/01/24 1155]   BP Temp Temp Source Pulse Respirations SpO2 Height Weight - Scale   134/83 98.8 °F (37.1 °C) Oral 83 13 96 % 1.753 m (5' 9\") 80.3 kg (177 lb)     Initial vital signs and nursing assessment reviewed and normal. Body mass index is 26.14 kg/m².     Additional Vital Signs:  Vitals:    03/01/24 1312   BP: 133/84   Pulse: 76   Resp: 27   Temp:    SpO2: 95%       Physical Exam  Vitals and nursing note reviewed.   Constitutional:       General: He is not in acute distress.     Appearance: He is not ill-appearing, toxic-appearing or diaphoretic.   HENT:      Head: Normocephalic and atraumatic.      Nose: No congestion or rhinorrhea.      Mouth/Throat:      Mouth: Mucous membranes are moist.      Pharynx:

## 2024-03-01 NOTE — ED TRIAGE NOTES
Patient to room 15 via [facility] for concern for TB.  Per report, patient refused a TB test so the facility had a mobile x-ray performed.  EMS states that the facility said it was concerning for active TB.  Patient is at [place] for rehab following a hospital stay for respiratory failure.  Patient denies any symptoms at this time and states that he was to be discharged this week.

## 2024-04-24 ENCOUNTER — OFFICE VISIT (OUTPATIENT)
Dept: PULMONOLOGY | Age: 54
End: 2024-04-24
Payer: MEDICAID

## 2024-04-24 VITALS
SYSTOLIC BLOOD PRESSURE: 132 MMHG | HEART RATE: 98 BPM | TEMPERATURE: 99.1 F | WEIGHT: 210.2 LBS | HEIGHT: 67 IN | OXYGEN SATURATION: 93 % | BODY MASS INDEX: 32.99 KG/M2 | DIASTOLIC BLOOD PRESSURE: 90 MMHG

## 2024-04-24 DIAGNOSIS — J98.11 COLLAPSE OF LUNG TISSUE: Primary | ICD-10-CM

## 2024-04-24 DIAGNOSIS — E66.9 OBESITY (BMI 30-39.9): ICD-10-CM

## 2024-04-24 DIAGNOSIS — Z87.891 PERSONAL HISTORY OF SMOKING: ICD-10-CM

## 2024-04-24 DIAGNOSIS — J44.9 CHRONIC OBSTRUCTIVE PULMONARY DISEASE, UNSPECIFIED COPD TYPE (HCC): ICD-10-CM

## 2024-04-24 DIAGNOSIS — J96.11 CHRONIC RESPIRATORY FAILURE WITH HYPOXIA (HCC): ICD-10-CM

## 2024-04-24 PROCEDURE — 99204 OFFICE O/P NEW MOD 45 MIN: CPT | Performed by: INTERNAL MEDICINE

## 2024-04-24 RX ORDER — POTASSIUM CHLORIDE 1500 MG/1
20 TABLET, EXTENDED RELEASE ORAL DAILY
COMMUNITY
Start: 2024-03-28

## 2024-04-24 RX ORDER — POLYETHYLENE GLYCOL 3350 17 G/17G
POWDER, FOR SOLUTION ORAL
COMMUNITY
Start: 2024-04-02

## 2024-04-24 RX ORDER — FLUTICASONE PROPIONATE AND SALMETEROL XINAFOATE 115; 21 UG/1; UG/1
2 AEROSOL, METERED RESPIRATORY (INHALATION) 2 TIMES DAILY
COMMUNITY
Start: 2024-04-07

## 2024-04-24 RX ORDER — ALBUTEROL SULFATE 90 UG/1
AEROSOL, METERED RESPIRATORY (INHALATION)
COMMUNITY
Start: 2024-03-12

## 2024-04-24 NOTE — PROGRESS NOTES
[facility]    Patient: JEFF BARNETT, 53 y.o.   : 1970    Patient of Mohinder Page MD   Referring Provider: Mohinder Page, *       Subjective     Chief Complaint   Patient presents with    New Patient     New patient. COPD. CT Chest - 24. CXR - 3.1.24. No PFT.         HPI    Saad comes for pulmonary evaluation after being released from the hospital with diagnosis of COPD.    Saad has had a but couple of years he developed myocardial infarction about 3 years ago requiring hospitalization and PCI with stenting, a few months ago he was admitted to [facility] with respiratory failure, he was discharged and presented to [facility] on hypoxemic respiratory failure requiring endotracheal intubation and mechanical ventilatory support.  Working diagnosis was heart failure, COPD exacerbation, pneumonia, CTA of the chest failed to reveal pulmonary embolism however revealed left lower lobe pneumonia and right lower lobe collapse.    Hospitalist service referred him to our clinic upon discharge for pulmonary evaluation and follow-up, Saad reports that he has COPD he is regimen includes supplemental oxygen at 2 L/min this was initiated 3 years ago after his heart attack, Advair 1 inhalations twice a day, albuterol inhaler.    Presented in my office without supplemental oxygen he tells me he uses it to sleep and as needed during the day according to how he feels.    Smoker of up to 2-1/2 pack cigarettes a day quit the daily he was admitted to [facility] with pneumonia so about 3 months ago he is not planning to go back to it      No pulmonary function test available in epic.  CT of the chest reveals a collapsed left lower lobe no mediastinal lymphadenopathy, a posterior right lower lobe infiltrate and no pulmonary embolism  Transesophageal echocardiogram dated 2024 reveals normal left ventricular

## 2024-05-13 ENCOUNTER — HOSPITAL ENCOUNTER (OUTPATIENT)
Age: 54
Discharge: HOME | End: 2024-05-13
Payer: MEDICAID

## 2024-05-13 ENCOUNTER — HOSPITAL ENCOUNTER (OUTPATIENT)
Dept: GENERAL RADIOLOGY | Age: 54
Discharge: HOME | End: 2024-05-13
Payer: MEDICAID

## 2024-05-13 DIAGNOSIS — R14.0 ABDOMINAL DISTENTION: ICD-10-CM

## 2024-05-13 LAB
ALBUMIN SERPL BCG-MCNC: 4 G/DL (ref 3.5–5.1)
ALP SERPL-CCNC: 140 U/L (ref 38–126)
ALT SERPL W/O P-5'-P-CCNC: 22 U/L (ref 11–66)
ANION GAP SERPL CALC-SCNC: 15 MEQ/L (ref 8–16)
AST SERPL-CCNC: 13 U/L (ref 5–40)
BASOPHILS ABSOLUTE: 0.1 THOU/MM3 (ref 0–0.1)
BASOPHILS NFR BLD AUTO: 0.9 %
BILIRUB SERPL-MCNC: 0.2 MG/DL (ref 0.3–1.2)
BUN SERPL-MCNC: 22 MG/DL (ref 7–22)
CALCIUM SERPL-MCNC: 9.5 MG/DL (ref 8.5–10.5)
CHLORIDE SERPL-SCNC: 101 MEQ/L (ref 98–111)
CO2 SERPL-SCNC: 27 MEQ/L (ref 23–33)
CREAT SERPL-MCNC: 0.9 MG/DL (ref 0.4–1.2)
DEPRECATED RDW RBC AUTO: 54.2 FL (ref 35–45)
EOSINOPHIL NFR BLD AUTO: 2.2 %
EOSINOPHILS ABSOLUTE: 0.2 THOU/MM3 (ref 0–0.4)
ERYTHROCYTE [DISTWIDTH] IN BLOOD BY AUTOMATED COUNT: 14.6 % (ref 11.5–14.5)
GFR SERPL CREATININE-BSD FRML MDRD: > 90 ML/MIN/1.73M2
GLUCOSE SERPL-MCNC: 108 MG/DL (ref 70–108)
HCT VFR BLD AUTO: 46.4 % (ref 42–52)
HGB BLD-MCNC: 14.7 GM/DL (ref 14–18)
IMM GRANULOCYTES # BLD AUTO: 0.05 THOU/MM3 (ref 0–0.07)
IMM GRANULOCYTES NFR BLD AUTO: 0.5 %
LYMPHOCYTES ABSOLUTE: 2.1 THOU/MM3 (ref 1–4.8)
LYMPHOCYTES NFR BLD AUTO: 20 %
MCH RBC QN AUTO: 31.8 PG (ref 26–33)
MCHC RBC AUTO-ENTMCNC: 31.7 GM/DL (ref 32.2–35.5)
MCV RBC AUTO: 100.4 FL (ref 80–94)
MONOCYTES ABSOLUTE: 0.9 THOU/MM3 (ref 0.4–1.3)
MONOCYTES NFR BLD AUTO: 8.8 %
NEUTROPHILS ABSOLUTE: 7.1 THOU/MM3 (ref 1.8–7.7)
NEUTROPHILS NFR BLD AUTO: 67.6 %
NRBC BLD AUTO-RTO: 0 /100 WBC
PLATELET # BLD AUTO: 347 THOU/MM3 (ref 130–400)
PMV BLD AUTO: 10.6 FL (ref 9.4–12.4)
POTASSIUM SERPL-SCNC: 4.4 MEQ/L (ref 3.5–5.2)
PROT SERPL-MCNC: 7.4 G/DL (ref 6.1–8)
RBC # BLD AUTO: 4.62 MILL/MM3 (ref 4.7–6.1)
SODIUM SERPL-SCNC: 143 MEQ/L (ref 135–145)
WBC # BLD AUTO: 10.5 THOU/MM3 (ref 4.8–10.8)

## 2024-05-13 PROCEDURE — 36415 COLL VENOUS BLD VENIPUNCTURE: CPT

## 2024-05-13 PROCEDURE — 74018 RADEX ABDOMEN 1 VIEW: CPT

## 2024-05-13 PROCEDURE — 80053 COMPREHEN METABOLIC PANEL: CPT

## 2024-05-13 PROCEDURE — 85025 COMPLETE CBC W/AUTO DIFF WBC: CPT

## 2024-05-31 ENCOUNTER — HOSPITAL ENCOUNTER (OUTPATIENT)
Dept: ULTRASOUND IMAGING | Age: 54
Discharge: HOME | End: 2024-05-31
Payer: MEDICAID

## 2024-05-31 DIAGNOSIS — R14.0 ABDOMINAL DISTENTION: ICD-10-CM

## 2024-05-31 PROCEDURE — 76705 ECHO EXAM OF ABDOMEN: CPT

## 2024-07-01 ENCOUNTER — TELEPHONE (OUTPATIENT)
Dept: CARDIOLOGY CLINIC | Age: 54
End: 2024-07-01

## 2024-07-02 NOTE — TELEPHONE ENCOUNTER
Pts phone still rings busy.  Call to pt's alt contact, Berna. Spoke to her.Pt's number in chart is correct. She will have patient contact the office.

## 2024-07-03 NOTE — TELEPHONE ENCOUNTER
Patient's phone line just rings busy.   Called Berna and they state they gave him the message and will await his phone call.

## 2024-09-13 ENCOUNTER — APPOINTMENT (OUTPATIENT)
Dept: ULTRASOUND IMAGING | Age: 54
DRG: 133 | End: 2024-09-13
Payer: MEDICAID

## 2024-09-13 ENCOUNTER — APPOINTMENT (OUTPATIENT)
Dept: GENERAL RADIOLOGY | Age: 54
DRG: 133 | End: 2024-09-13
Payer: MEDICAID

## 2024-09-13 ENCOUNTER — HOSPITAL ENCOUNTER (INPATIENT)
Age: 54
LOS: 6 days | Discharge: HOME | DRG: 133 | End: 2024-09-19
Attending: HOSPITALIST | Admitting: HOSPITALIST
Payer: MEDICAID

## 2024-09-13 DIAGNOSIS — J96.01 ACUTE HYPOXIC RESPIRATORY FAILURE: ICD-10-CM

## 2024-09-13 DIAGNOSIS — J96.01 ACUTE RESPIRATORY FAILURE WITH HYPOXIA AND HYPERCAPNIA: Primary | ICD-10-CM

## 2024-09-13 DIAGNOSIS — E87.5 HYPERKALEMIA: ICD-10-CM

## 2024-09-13 DIAGNOSIS — I50.9 ACUTE ON CHRONIC CONGESTIVE HEART FAILURE, UNSPECIFIED HEART FAILURE TYPE (HCC): ICD-10-CM

## 2024-09-13 DIAGNOSIS — J44.1 COPD EXACERBATION (HCC): ICD-10-CM

## 2024-09-13 DIAGNOSIS — R06.02 SHORTNESS OF BREATH: ICD-10-CM

## 2024-09-13 DIAGNOSIS — J96.02 ACUTE RESPIRATORY FAILURE WITH HYPOXIA AND HYPERCAPNIA: Primary | ICD-10-CM

## 2024-09-13 LAB
ALBUMIN SERPL BCG-MCNC: 3.4 G/DL (ref 3.5–5.1)
ALP SERPL-CCNC: 155 U/L (ref 38–126)
ALT SERPL W/O P-5'-P-CCNC: 21 U/L (ref 11–66)
AMMONIA PLAS-MCNC: 78 UMOL/L (ref 11–60)
ANION GAP SERPL CALC-SCNC: 10 MEQ/L (ref 8–16)
ANION GAP SERPL CALC-SCNC: 6 MEQ/L (ref 8–16)
ARTERIAL PATENCY WRIST A: POSITIVE
AST SERPL-CCNC: 16 U/L (ref 5–40)
BASE EXCESS BLDA CALC-SCNC: 18.8 MMOL/L (ref -2.5–2.5)
BASE EXCESS BLDA CALC-SCNC: 20.6 MMOL/L (ref -2.5–2.5)
BASE EXCESS BLDA CALC-SCNC: 21.9 MMOL/L (ref -2.5–2.5)
BASOPHILS ABSOLUTE: 0.1 THOU/MM3 (ref 0–0.1)
BASOPHILS NFR BLD AUTO: 0.8 %
BDY SITE: ABNORMAL
BILIRUB CONJ SERPL-MCNC: 0.2 MG/DL (ref 0.1–13.8)
BILIRUB SERPL-MCNC: 0.5 MG/DL (ref 0.3–1.2)
BREATHS SETTING VENT: 12 BPM
BUN SERPL-MCNC: 10 MG/DL (ref 7–22)
BUN SERPL-MCNC: 9 MG/DL (ref 7–22)
CALCIUM SERPL-MCNC: 8.1 MG/DL (ref 8.5–10.5)
CALCIUM SERPL-MCNC: 8.3 MG/DL (ref 8.5–10.5)
CHLORIDE SERPL-SCNC: 93 MEQ/L (ref 98–111)
CHLORIDE SERPL-SCNC: 96 MEQ/L (ref 98–111)
CO2 SERPL-SCNC: 41 MEQ/L (ref 23–33)
CO2 SERPL-SCNC: 44 MEQ/L (ref 23–33)
COLLECTED BY:: ABNORMAL
CREAT SERPL-MCNC: 0.7 MG/DL (ref 0.4–1.2)
CREAT SERPL-MCNC: 0.7 MG/DL (ref 0.4–1.2)
DEPRECATED RDW RBC AUTO: 61.8 FL (ref 35–45)
DEVICE: ABNORMAL
EKG ATRIAL RATE: 95 BPM
EKG P AXIS: 47 DEGREES
EKG P-R INTERVAL: 172 MS
EKG Q-T INTERVAL: 392 MS
EKG QRS DURATION: 90 MS
EKG QTC CALCULATION (BAZETT): 492 MS
EKG R AXIS: 129 DEGREES
EKG T AXIS: 2 DEGREES
EKG VENTRICULAR RATE: 95 BPM
EOSINOPHIL NFR BLD AUTO: 1.3 %
EOSINOPHILS ABSOLUTE: 0.1 THOU/MM3 (ref 0–0.4)
ERYTHROCYTE [DISTWIDTH] IN BLOOD BY AUTOMATED COUNT: 15.9 % (ref 11.5–14.5)
FIO2 ON VENT O2 ANALYZER: 40 %
FIO2 ON VENT O2 ANALYZER: 40 %
FIO2 ON VENT O2 ANALYZER: 6 %
FLUAV RNA RESP QL NAA+PROBE: NOT DETECTED
FLUBV RNA RESP QL NAA+PROBE: NOT DETECTED
GFR SERPL CREATININE-BSD FRML MDRD: > 90 ML/MIN/1.73M2
GFR SERPL CREATININE-BSD FRML MDRD: > 90 ML/MIN/1.73M2
GLUCOSE SERPL-MCNC: 109 MG/DL (ref 70–108)
GLUCOSE SERPL-MCNC: 99 MG/DL (ref 70–108)
HCO3 BLDA-SCNC: 53 MMOL/L (ref 23–28)
HCO3 BLDA-SCNC: 56 MMOL/L (ref 23–28)
HCO3 BLDA-SCNC: 58 MMOL/L (ref 23–28)
HCT VFR BLD AUTO: 52.7 % (ref 42–52)
HGB BLD-MCNC: 15.1 GM/DL (ref 14–18)
HYPOCHROMIA BLD QL SMEAR: PRESENT
IMM GRANULOCYTES # BLD AUTO: 0.06 THOU/MM3 (ref 0–0.07)
IMM GRANULOCYTES NFR BLD AUTO: 0.7 %
LYMPHOCYTES ABSOLUTE: 1.1 THOU/MM3 (ref 1–4.8)
LYMPHOCYTES NFR BLD AUTO: 12.8 %
MAGNESIUM SERPL-MCNC: 2.2 MG/DL (ref 1.6–2.4)
MCH RBC QN AUTO: 30 PG (ref 26–33)
MCHC RBC AUTO-ENTMCNC: 28.7 GM/DL (ref 32.2–35.5)
MCV RBC AUTO: 104.8 FL (ref 80–94)
MONOCYTES ABSOLUTE: 0.8 THOU/MM3 (ref 0.4–1.3)
MONOCYTES NFR BLD AUTO: 9.5 %
NEUTROPHILS ABSOLUTE: 6.2 THOU/MM3 (ref 1.8–7.7)
NEUTROPHILS NFR BLD AUTO: 74.9 %
NRBC BLD AUTO-RTO: 0 /100 WBC
NT-PROBNP SERPL IA-MCNC: 4220 PG/ML (ref 0–124)
OSMOLALITY SERPL CALC.SUM OF ELEC: 285.6 MOSMOL/KG (ref 275–300)
OSMOLALITY SERPL CALC.SUM OF ELEC: 290.2 MOSMOL/KG (ref 275–300)
PCO2 BLDA: 127 MMHG (ref 35–45)
PCO2 BLDA: 94 MMHG (ref 35–45)
PCO2 BLDA: 97 MMHG (ref 35–45)
PEEP SETTING VENT: 6 MMHG
PEEP SETTING VENT: 6 MMHG
PH BLDA: 7.27 [PH] (ref 7.35–7.45)
PH BLDA: 7.34 [PH] (ref 7.35–7.45)
PH BLDA: 7.38 [PH] (ref 7.35–7.45)
PIP: 20 CMH2O
PLATELET # BLD AUTO: 343 THOU/MM3 (ref 130–400)
PLATELET BLD QL SMEAR: ADEQUATE
PMV BLD AUTO: 10.1 FL (ref 9.4–12.4)
PO2 BLDA: 61 MMHG (ref 71–104)
PO2 BLDA: 62 MMHG (ref 71–104)
PO2 BLDA: 68 MMHG (ref 71–104)
POLYCHROMASIA BLD QL SMEAR: ABNORMAL
POTASSIUM SERPL-SCNC: 3.6 MEQ/L (ref 3.5–5.2)
POTASSIUM SERPL-SCNC: 3.7 MEQ/L (ref 3.5–5.2)
PRESSURE SUPPORT SETTING VENT: 14 CMH2O
PRESSURE SUPPORT SETTING VENT: 14 CMH2O
PROCALCITONIN SERPL IA-MCNC: 0.11 NG/ML (ref 0.01–0.09)
PROT SERPL-MCNC: 6.7 G/DL (ref 6.1–8)
RBC # BLD AUTO: 5.03 MILL/MM3 (ref 4.7–6.1)
SAO2 % BLDA: 87 %
SAO2 % BLDA: 88 %
SAO2 % BLDA: 88 %
SARS-COV-2 RNA RESP QL NAA+PROBE: NOT DETECTED
SCAN OF BLOOD SMEAR: NORMAL
SODIUM SERPL-SCNC: 144 MEQ/L (ref 135–145)
SODIUM SERPL-SCNC: 146 MEQ/L (ref 135–145)
STOMATOCYTES: ABNORMAL
TOXIC GRANULES BLD QL SMEAR: PRESENT
TROPONIN, HIGH SENSITIVITY: 37 NG/L (ref 0–12)
TROPONIN, HIGH SENSITIVITY: 37 NG/L (ref 0–12)
VENTILATION MODE VENT: ABNORMAL
WBC # BLD AUTO: 8.3 THOU/MM3 (ref 4.8–10.8)

## 2024-09-13 PROCEDURE — 82803 BLOOD GASES ANY COMBINATION: CPT

## 2024-09-13 PROCEDURE — 83880 ASSAY OF NATRIURETIC PEPTIDE: CPT

## 2024-09-13 PROCEDURE — 6370000000 HC RX 637 (ALT 250 FOR IP)

## 2024-09-13 PROCEDURE — 93005 ELECTROCARDIOGRAM TRACING: CPT | Performed by: HOSPITALIST

## 2024-09-13 PROCEDURE — 94660 CPAP INITIATION&MGMT: CPT

## 2024-09-13 PROCEDURE — 2060000000 HC ICU INTERMEDIATE R&B

## 2024-09-13 PROCEDURE — 36600 WITHDRAWAL OF ARTERIAL BLOOD: CPT

## 2024-09-13 PROCEDURE — 84484 ASSAY OF TROPONIN QUANT: CPT

## 2024-09-13 PROCEDURE — 82140 ASSAY OF AMMONIA: CPT

## 2024-09-13 PROCEDURE — 99285 EMERGENCY DEPT VISIT HI MDM: CPT

## 2024-09-13 PROCEDURE — 2580000003 HC RX 258

## 2024-09-13 PROCEDURE — 5A09457 ASSISTANCE WITH RESPIRATORY VENTILATION, 24-96 CONSECUTIVE HOURS, CONTINUOUS POSITIVE AIRWAY PRESSURE: ICD-10-PCS | Performed by: HOSPITALIST

## 2024-09-13 PROCEDURE — 82248 BILIRUBIN DIRECT: CPT

## 2024-09-13 PROCEDURE — 76705 ECHO EXAM OF ABDOMEN: CPT

## 2024-09-13 PROCEDURE — 84145 PROCALCITONIN (PCT): CPT

## 2024-09-13 PROCEDURE — 6370000000 HC RX 637 (ALT 250 FOR IP): Performed by: NURSE PRACTITIONER

## 2024-09-13 PROCEDURE — 94640 AIRWAY INHALATION TREATMENT: CPT

## 2024-09-13 PROCEDURE — 80053 COMPREHEN METABOLIC PANEL: CPT

## 2024-09-13 PROCEDURE — 37799 UNLISTED PX VASCULAR SURGERY: CPT

## 2024-09-13 PROCEDURE — 71045 X-RAY EXAM CHEST 1 VIEW: CPT

## 2024-09-13 PROCEDURE — 2580000003 HC RX 258: Performed by: NURSE PRACTITIONER

## 2024-09-13 PROCEDURE — 6360000002 HC RX W HCPCS: Performed by: NURSE PRACTITIONER

## 2024-09-13 PROCEDURE — 2700000000 HC OXYGEN THERAPY PER DAY

## 2024-09-13 PROCEDURE — 6360000002 HC RX W HCPCS

## 2024-09-13 PROCEDURE — 87636 SARSCOV2 & INF A&B AMP PRB: CPT

## 2024-09-13 PROCEDURE — 83735 ASSAY OF MAGNESIUM: CPT

## 2024-09-13 PROCEDURE — 94761 N-INVAS EAR/PLS OXIMETRY MLT: CPT

## 2024-09-13 PROCEDURE — 99223 1ST HOSP IP/OBS HIGH 75: CPT | Performed by: HOSPITALIST

## 2024-09-13 PROCEDURE — 36415 COLL VENOUS BLD VENIPUNCTURE: CPT

## 2024-09-13 PROCEDURE — 93010 ELECTROCARDIOGRAM REPORT: CPT | Performed by: NUCLEAR MEDICINE

## 2024-09-13 PROCEDURE — 85025 COMPLETE CBC W/AUTO DIFF WBC: CPT

## 2024-09-13 RX ORDER — ENOXAPARIN SODIUM 100 MG/ML
40 INJECTION SUBCUTANEOUS EVERY 24 HOURS
Status: DISCONTINUED | OUTPATIENT
Start: 2024-09-13 | End: 2024-09-19 | Stop reason: HOSPADM

## 2024-09-13 RX ORDER — ALBUTEROL SULFATE 0.83 MG/ML
5 SOLUTION RESPIRATORY (INHALATION) EVERY 4 HOURS PRN
Status: DISCONTINUED | OUTPATIENT
Start: 2024-09-13 | End: 2024-09-19 | Stop reason: HOSPADM

## 2024-09-13 RX ORDER — ONDANSETRON 2 MG/ML
4 INJECTION INTRAMUSCULAR; INTRAVENOUS EVERY 6 HOURS PRN
Status: DISCONTINUED | OUTPATIENT
Start: 2024-09-13 | End: 2024-09-19 | Stop reason: HOSPADM

## 2024-09-13 RX ORDER — SODIUM CHLORIDE 0.9 % (FLUSH) 0.9 %
5-40 SYRINGE (ML) INJECTION PRN
Status: DISCONTINUED | OUTPATIENT
Start: 2024-09-13 | End: 2024-09-19 | Stop reason: HOSPADM

## 2024-09-13 RX ORDER — MAGNESIUM SULFATE IN WATER 40 MG/ML
2000 INJECTION, SOLUTION INTRAVENOUS PRN
Status: DISCONTINUED | OUTPATIENT
Start: 2024-09-13 | End: 2024-09-16

## 2024-09-13 RX ORDER — BUMETANIDE 0.25 MG/ML
1 INJECTION INTRAMUSCULAR; INTRAVENOUS ONCE
Status: COMPLETED | OUTPATIENT
Start: 2024-09-13 | End: 2024-09-13

## 2024-09-13 RX ORDER — ACETAMINOPHEN 325 MG/1
650 TABLET ORAL EVERY 6 HOURS PRN
Status: DISCONTINUED | OUTPATIENT
Start: 2024-09-13 | End: 2024-09-19 | Stop reason: HOSPADM

## 2024-09-13 RX ORDER — POLYETHYLENE GLYCOL 3350 17 G/17G
17 POWDER, FOR SOLUTION ORAL DAILY PRN
Status: DISCONTINUED | OUTPATIENT
Start: 2024-09-13 | End: 2024-09-19 | Stop reason: HOSPADM

## 2024-09-13 RX ORDER — IPRATROPIUM BROMIDE AND ALBUTEROL SULFATE 2.5; .5 MG/3ML; MG/3ML
2 SOLUTION RESPIRATORY (INHALATION) ONCE
Status: COMPLETED | OUTPATIENT
Start: 2024-09-13 | End: 2024-09-13

## 2024-09-13 RX ORDER — BUMETANIDE 0.25 MG/ML
1 INJECTION INTRAMUSCULAR; INTRAVENOUS 2 TIMES DAILY
Status: DISCONTINUED | OUTPATIENT
Start: 2024-09-14 | End: 2024-09-18

## 2024-09-13 RX ORDER — ONDANSETRON 4 MG/1
4 TABLET, ORALLY DISINTEGRATING ORAL EVERY 8 HOURS PRN
Status: DISCONTINUED | OUTPATIENT
Start: 2024-09-13 | End: 2024-09-19 | Stop reason: HOSPADM

## 2024-09-13 RX ORDER — POTASSIUM CHLORIDE 1500 MG/1
40 TABLET, EXTENDED RELEASE ORAL PRN
Status: DISCONTINUED | OUTPATIENT
Start: 2024-09-13 | End: 2024-09-16

## 2024-09-13 RX ORDER — ASPIRIN 81 MG/1
81 TABLET, CHEWABLE ORAL DAILY
Status: DISCONTINUED | OUTPATIENT
Start: 2024-09-14 | End: 2024-09-19 | Stop reason: HOSPADM

## 2024-09-13 RX ORDER — LACTULOSE 10 G/15ML
20 SOLUTION ORAL 2 TIMES DAILY
Status: COMPLETED | OUTPATIENT
Start: 2024-09-13 | End: 2024-09-14

## 2024-09-13 RX ORDER — SODIUM CHLORIDE 0.9 % (FLUSH) 0.9 %
5-40 SYRINGE (ML) INJECTION EVERY 12 HOURS SCHEDULED
Status: DISCONTINUED | OUTPATIENT
Start: 2024-09-13 | End: 2024-09-19 | Stop reason: HOSPADM

## 2024-09-13 RX ORDER — BUMETANIDE 1 MG/1
1 TABLET ORAL 2 TIMES DAILY
Status: DISCONTINUED | OUTPATIENT
Start: 2024-09-13 | End: 2024-09-19 | Stop reason: HOSPADM

## 2024-09-13 RX ORDER — IPRATROPIUM BROMIDE AND ALBUTEROL SULFATE 2.5; .5 MG/3ML; MG/3ML
1 SOLUTION RESPIRATORY (INHALATION)
Status: DISCONTINUED | OUTPATIENT
Start: 2024-09-13 | End: 2024-09-19 | Stop reason: HOSPADM

## 2024-09-13 RX ORDER — LOSARTAN POTASSIUM 50 MG/1
50 TABLET ORAL DAILY
Status: DISCONTINUED | OUTPATIENT
Start: 2024-09-13 | End: 2024-09-19

## 2024-09-13 RX ORDER — ALBUTEROL SULFATE 0.83 MG/ML
2.5 SOLUTION RESPIRATORY (INHALATION) ONCE
Status: COMPLETED | OUTPATIENT
Start: 2024-09-13 | End: 2024-09-13

## 2024-09-13 RX ORDER — POTASSIUM CHLORIDE 7.45 MG/ML
10 INJECTION INTRAVENOUS
Status: DISPENSED | OUTPATIENT
Start: 2024-09-13 | End: 2024-09-13

## 2024-09-13 RX ORDER — ACETAMINOPHEN 650 MG/1
650 SUPPOSITORY RECTAL EVERY 6 HOURS PRN
Status: DISCONTINUED | OUTPATIENT
Start: 2024-09-13 | End: 2024-09-19 | Stop reason: HOSPADM

## 2024-09-13 RX ORDER — SODIUM CHLORIDE 9 MG/ML
INJECTION, SOLUTION INTRAVENOUS PRN
Status: DISCONTINUED | OUTPATIENT
Start: 2024-09-13 | End: 2024-09-19 | Stop reason: HOSPADM

## 2024-09-13 RX ORDER — PREDNISONE 20 MG/1
40 TABLET ORAL DAILY
Status: COMPLETED | OUTPATIENT
Start: 2024-09-14 | End: 2024-09-17

## 2024-09-13 RX ORDER — POTASSIUM CHLORIDE 7.45 MG/ML
10 INJECTION INTRAVENOUS PRN
Status: DISCONTINUED | OUTPATIENT
Start: 2024-09-13 | End: 2024-09-16

## 2024-09-13 RX ORDER — IPRATROPIUM BROMIDE AND ALBUTEROL SULFATE 2.5; .5 MG/3ML; MG/3ML
1 SOLUTION RESPIRATORY (INHALATION)
Status: DISCONTINUED | OUTPATIENT
Start: 2024-09-13 | End: 2024-09-13

## 2024-09-13 RX ADMIN — IPRATROPIUM BROMIDE AND ALBUTEROL SULFATE 2 DOSE: .5; 3 SOLUTION RESPIRATORY (INHALATION) at 16:54

## 2024-09-13 RX ADMIN — LOSARTAN POTASSIUM 50 MG: 25 TABLET, FILM COATED ORAL at 23:19

## 2024-09-13 RX ADMIN — WATER 125 MG: 1 INJECTION INTRAMUSCULAR; INTRAVENOUS; SUBCUTANEOUS at 17:33

## 2024-09-13 RX ADMIN — AZITHROMYCIN DIHYDRATE 500 MG: 500 INJECTION, POWDER, LYOPHILIZED, FOR SOLUTION INTRAVENOUS at 20:32

## 2024-09-13 RX ADMIN — ENOXAPARIN SODIUM 40 MG: 100 INJECTION SUBCUTANEOUS at 21:48

## 2024-09-13 RX ADMIN — POTASSIUM CHLORIDE 10 MEQ: 7.46 INJECTION, SOLUTION INTRAVENOUS at 23:04

## 2024-09-13 RX ADMIN — CEFTRIAXONE SODIUM 1000 MG: 1 INJECTION, POWDER, FOR SOLUTION INTRAMUSCULAR; INTRAVENOUS at 21:53

## 2024-09-13 RX ADMIN — ALBUTEROL SULFATE 2.5 MG: 2.5 SOLUTION RESPIRATORY (INHALATION) at 17:02

## 2024-09-13 RX ADMIN — SODIUM CHLORIDE, PRESERVATIVE FREE 10 ML: 5 INJECTION INTRAVENOUS at 21:55

## 2024-09-13 RX ADMIN — SODIUM CHLORIDE: 9 INJECTION, SOLUTION INTRAVENOUS at 20:28

## 2024-09-13 RX ADMIN — BUMETANIDE 1 MG: 0.25 INJECTION INTRAMUSCULAR; INTRAVENOUS at 17:34

## 2024-09-13 RX ADMIN — LACTULOSE 20 G: 20 SOLUTION ORAL at 21:48

## 2024-09-13 RX ADMIN — IPRATROPIUM BROMIDE AND ALBUTEROL SULFATE 1 DOSE: .5; 3 SOLUTION RESPIRATORY (INHALATION) at 19:47

## 2024-09-13 ASSESSMENT — PAIN - FUNCTIONAL ASSESSMENT
PAIN_FUNCTIONAL_ASSESSMENT: NONE - DENIES PAIN

## 2024-09-14 ENCOUNTER — APPOINTMENT (OUTPATIENT)
Dept: CT IMAGING | Age: 54
DRG: 133 | End: 2024-09-14
Payer: MEDICAID

## 2024-09-14 ENCOUNTER — APPOINTMENT (OUTPATIENT)
Age: 54
DRG: 133 | End: 2024-09-14
Payer: MEDICAID

## 2024-09-14 PROBLEM — I50.9 ACUTE ON CHRONIC CONGESTIVE HEART FAILURE (HCC): Status: ACTIVE | Noted: 2024-09-14

## 2024-09-14 LAB
ANION GAP SERPL CALC-SCNC: 6 MEQ/L (ref 8–16)
ARTERIAL PATENCY WRIST A: POSITIVE
BASE EXCESS BLDA CALC-SCNC: 17.1 MMOL/L (ref -2.5–2.5)
BASE EXCESS BLDA CALC-SCNC: 17.9 MMOL/L (ref -2.5–2.5)
BASE EXCESS BLDA CALC-SCNC: 21.9 MMOL/L (ref -2.5–2.5)
BDY SITE: ABNORMAL
BREATHS SETTING VENT: 12 BPM
BUN SERPL-MCNC: 11 MG/DL (ref 7–22)
CALCIUM SERPL-MCNC: 8.2 MG/DL (ref 8.5–10.5)
CHLORIDE SERPL-SCNC: 93 MEQ/L (ref 98–111)
CO2 SERPL-SCNC: 45 MEQ/L (ref 23–33)
COLLECTED BY:: ABNORMAL
CREAT SERPL-MCNC: 0.6 MG/DL (ref 0.4–1.2)
DEPRECATED RDW RBC AUTO: 61.6 FL (ref 35–45)
DEVICE: ABNORMAL
ECHO AO ASC DIAM: 3.4 CM
ECHO AO ASCENDING AORTA INDEX: 1.59 CM/M2
ECHO AV CUSP MM: 2.2 CM
ECHO BSA: 2.2 M2
ECHO IVC PROX: 2.5 CM
ECHO LA AREA 2C: 25.6 CM2
ECHO LA AREA 4C: 24.1 CM2
ECHO LA DIAMETER INDEX: 1.78 CM/M2
ECHO LA DIAMETER: 3.8 CM
ECHO LA MAJOR AXIS: 7.4 CM
ECHO LA MINOR AXIS: 6.5 CM
ECHO LA VOL BP: 77 ML (ref 18–58)
ECHO LA VOL MOD A2C: 84 ML (ref 18–58)
ECHO LA VOL MOD A4C: 64 ML (ref 18–58)
ECHO LA VOL/BSA BIPLANE: 36 ML/M2 (ref 16–34)
ECHO LA VOLUME INDEX MOD A2C: 39 ML/M2 (ref 16–34)
ECHO LA VOLUME INDEX MOD A4C: 30 ML/M2 (ref 16–34)
ECHO LV EF PHYSICIAN: 55 %
ECHO LV FRACTIONAL SHORTENING: 30 % (ref 28–44)
ECHO LV INTERNAL DIMENSION DIASTOLE INDEX: 2.2 CM/M2
ECHO LV INTERNAL DIMENSION DIASTOLIC: 4.7 CM (ref 4.2–5.9)
ECHO LV INTERNAL DIMENSION SYSTOLIC INDEX: 1.54 CM/M2
ECHO LV INTERNAL DIMENSION SYSTOLIC: 3.3 CM
ECHO LV IVSD: 1.2 CM (ref 0.6–1)
ECHO LV MASS 2D: 212 G (ref 88–224)
ECHO LV MASS INDEX 2D: 99.1 G/M2 (ref 49–115)
ECHO LV POSTERIOR WALL DIASTOLIC: 1.2 CM (ref 0.6–1)
ECHO LV RELATIVE WALL THICKNESS RATIO: 0.51
ECHO RV INTERNAL DIMENSION: 4.3 CM
ECHO RV TAPSE: 2.9 CM (ref 1.7–?)
ERYTHROCYTE [DISTWIDTH] IN BLOOD BY AUTOMATED COUNT: 15.5 % (ref 11.5–14.5)
FIO2 ON VENT O2 ANALYZER: 45 %
FIO2 ON VENT O2 ANALYZER: 45 %
FIO2 ON VENT O2 ANALYZER: 50 %
GFR SERPL CREATININE-BSD FRML MDRD: > 90 ML/MIN/1.73M2
GLUCOSE SERPL-MCNC: 110 MG/DL (ref 70–108)
HCO3 BLDA-SCNC: 45 MMOL/L (ref 23–28)
HCO3 BLDA-SCNC: 52 MMOL/L (ref 23–28)
HCO3 BLDA-SCNC: 54 MMOL/L (ref 23–28)
HCT VFR BLD AUTO: 51.5 % (ref 42–52)
HGB BLD-MCNC: 15.1 GM/DL (ref 14–18)
LACTATE SERPL-SCNC: 0.8 MMOL/L (ref 0.5–2)
MAGNESIUM SERPL-MCNC: 2 MG/DL (ref 1.6–2.4)
MCH RBC QN AUTO: 30.9 PG (ref 26–33)
MCHC RBC AUTO-ENTMCNC: 29.3 GM/DL (ref 32.2–35.5)
MCV RBC AUTO: 105.5 FL (ref 80–94)
OSMOLALITY SERPL CALC.SUM OF ELEC: 286.9 MOSMOL/KG (ref 275–300)
PCO2 BLDA: 103 MMHG (ref 35–45)
PCO2 BLDA: 62 MMHG (ref 35–45)
PCO2 BLDA: 90 MMHG (ref 35–45)
PEEP SETTING VENT: 6 MMHG
PH BLDA: 7.31 [PH] (ref 7.35–7.45)
PH BLDA: 7.39 [PH] (ref 7.35–7.45)
PH BLDA: 7.47 [PH] (ref 7.35–7.45)
PIP: 20 CMH2O
PIP: 24 CMH2O
PLATELET # BLD AUTO: 330 THOU/MM3 (ref 130–400)
PMV BLD AUTO: 10.1 FL (ref 9.4–12.4)
PO2 BLDA: 71 MMHG (ref 71–104)
PO2 BLDA: 76 MMHG (ref 71–104)
PO2 BLDA: 87 MMHG (ref 71–104)
POTASSIUM SERPL-SCNC: 3.6 MEQ/L (ref 3.5–5.2)
PRESSURE SUPPORT SETTING VENT: 14 CMH2O
PRESSURE SUPPORT SETTING VENT: 18 CMH2O
RBC # BLD AUTO: 4.88 MILL/MM3 (ref 4.7–6.1)
SAO2 % BLDA: 92 %
SAO2 % BLDA: 94 %
SAO2 % BLDA: 95 %
SODIUM SERPL-SCNC: 144 MEQ/L (ref 135–145)
VENTILATION MODE VENT: ABNORMAL
WBC # BLD AUTO: 6.3 THOU/MM3 (ref 4.8–10.8)

## 2024-09-14 PROCEDURE — 99233 SBSQ HOSP IP/OBS HIGH 50: CPT | Performed by: INTERNAL MEDICINE

## 2024-09-14 PROCEDURE — 36415 COLL VENOUS BLD VENIPUNCTURE: CPT

## 2024-09-14 PROCEDURE — 94761 N-INVAS EAR/PLS OXIMETRY MLT: CPT

## 2024-09-14 PROCEDURE — 80048 BASIC METABOLIC PNL TOTAL CA: CPT

## 2024-09-14 PROCEDURE — 85027 COMPLETE CBC AUTOMATED: CPT

## 2024-09-14 PROCEDURE — 2060000000 HC ICU INTERMEDIATE R&B

## 2024-09-14 PROCEDURE — 6360000004 HC RX CONTRAST MEDICATION: Performed by: INTERNAL MEDICINE

## 2024-09-14 PROCEDURE — 2700000000 HC OXYGEN THERAPY PER DAY

## 2024-09-14 PROCEDURE — 71275 CT ANGIOGRAPHY CHEST: CPT

## 2024-09-14 PROCEDURE — 6360000002 HC RX W HCPCS

## 2024-09-14 PROCEDURE — 6370000000 HC RX 637 (ALT 250 FOR IP)

## 2024-09-14 PROCEDURE — 93307 TTE W/O DOPPLER COMPLETE: CPT

## 2024-09-14 PROCEDURE — 36600 WITHDRAWAL OF ARTERIAL BLOOD: CPT

## 2024-09-14 PROCEDURE — 5A0935A ASSISTANCE WITH RESPIRATORY VENTILATION, LESS THAN 24 CONSECUTIVE HOURS, HIGH NASAL FLOW/VELOCITY: ICD-10-PCS

## 2024-09-14 PROCEDURE — 2580000003 HC RX 258

## 2024-09-14 PROCEDURE — 82803 BLOOD GASES ANY COMBINATION: CPT

## 2024-09-14 PROCEDURE — 94660 CPAP INITIATION&MGMT: CPT

## 2024-09-14 PROCEDURE — 94640 AIRWAY INHALATION TREATMENT: CPT

## 2024-09-14 PROCEDURE — 83735 ASSAY OF MAGNESIUM: CPT

## 2024-09-14 PROCEDURE — 83605 ASSAY OF LACTIC ACID: CPT

## 2024-09-14 PROCEDURE — 94669 MECHANICAL CHEST WALL OSCILL: CPT

## 2024-09-14 PROCEDURE — 93307 TTE W/O DOPPLER COMPLETE: CPT | Performed by: INTERNAL MEDICINE

## 2024-09-14 RX ORDER — IOPAMIDOL 755 MG/ML
80 INJECTION, SOLUTION INTRAVASCULAR
Status: COMPLETED | OUTPATIENT
Start: 2024-09-14 | End: 2024-09-14

## 2024-09-14 RX ADMIN — IPRATROPIUM BROMIDE AND ALBUTEROL SULFATE 1 DOSE: .5; 3 SOLUTION RESPIRATORY (INHALATION) at 20:16

## 2024-09-14 RX ADMIN — AZITHROMYCIN DIHYDRATE 500 MG: 500 INJECTION, POWDER, LYOPHILIZED, FOR SOLUTION INTRAVENOUS at 20:31

## 2024-09-14 RX ADMIN — PREDNISONE 40 MG: 20 TABLET ORAL at 07:52

## 2024-09-14 RX ADMIN — LACTULOSE 20 G: 20 SOLUTION ORAL at 07:52

## 2024-09-14 RX ADMIN — POTASSIUM CHLORIDE 10 MEQ: 7.46 INJECTION, SOLUTION INTRAVENOUS at 00:08

## 2024-09-14 RX ADMIN — IPRATROPIUM BROMIDE AND ALBUTEROL SULFATE 1 DOSE: .5; 3 SOLUTION RESPIRATORY (INHALATION) at 16:29

## 2024-09-14 RX ADMIN — BUMETANIDE 1 MG: 0.25 INJECTION INTRAMUSCULAR; INTRAVENOUS at 07:52

## 2024-09-14 RX ADMIN — IOPAMIDOL 80 ML: 755 INJECTION, SOLUTION INTRAVENOUS at 09:43

## 2024-09-14 RX ADMIN — IPRATROPIUM BROMIDE AND ALBUTEROL SULFATE 1 DOSE: .5; 3 SOLUTION RESPIRATORY (INHALATION) at 12:17

## 2024-09-14 RX ADMIN — ASPIRIN 81 MG CHEWABLE TABLET 81 MG: 81 TABLET CHEWABLE at 07:52

## 2024-09-14 RX ADMIN — LOSARTAN POTASSIUM 50 MG: 25 TABLET, FILM COATED ORAL at 07:53

## 2024-09-14 RX ADMIN — ENOXAPARIN SODIUM 40 MG: 100 INJECTION SUBCUTANEOUS at 20:34

## 2024-09-14 RX ADMIN — SODIUM CHLORIDE, PRESERVATIVE FREE 10 ML: 5 INJECTION INTRAVENOUS at 20:33

## 2024-09-14 RX ADMIN — CEFTRIAXONE SODIUM 1000 MG: 1 INJECTION, POWDER, FOR SOLUTION INTRAMUSCULAR; INTRAVENOUS at 20:31

## 2024-09-14 RX ADMIN — IPRATROPIUM BROMIDE AND ALBUTEROL SULFATE 1 DOSE: .5; 3 SOLUTION RESPIRATORY (INHALATION) at 08:08

## 2024-09-14 RX ADMIN — SODIUM CHLORIDE, PRESERVATIVE FREE 10 ML: 5 INJECTION INTRAVENOUS at 07:53

## 2024-09-14 RX ADMIN — BUMETANIDE 1 MG: 0.25 INJECTION INTRAMUSCULAR; INTRAVENOUS at 17:16

## 2024-09-15 LAB
ANION GAP SERPL CALC-SCNC: 4 MEQ/L (ref 8–16)
ARTERIAL PATENCY WRIST A: POSITIVE
BASE EXCESS BLDA CALC-SCNC: 20.9 MMOL/L (ref -2.5–2.5)
BDY SITE: ABNORMAL
BREATHS SETTING VENT: 12 BPM
BUN SERPL-MCNC: 16 MG/DL (ref 7–22)
CA-I BLD ISE-SCNC: 0.92 MMOL/L (ref 1.12–1.32)
CA-I BLD ISE-SCNC: 1.02 MMOL/L (ref 1.12–1.32)
CALCIUM SERPL-MCNC: 7.9 MG/DL (ref 8.5–10.5)
CHLORIDE SERPL-SCNC: 92 MEQ/L (ref 98–111)
CO2 SERPL-SCNC: 48 MEQ/L (ref 23–33)
COLLECTED BY:: ABNORMAL
CREAT SERPL-MCNC: 0.7 MG/DL (ref 0.4–1.2)
DEPRECATED RDW RBC AUTO: 60.2 FL (ref 35–45)
DEVICE: ABNORMAL
ERYTHROCYTE [DISTWIDTH] IN BLOOD BY AUTOMATED COUNT: 15.3 % (ref 11.5–14.5)
FIO2 ON VENT O2 ANALYZER: 45 %
GFR SERPL CREATININE-BSD FRML MDRD: > 90 ML/MIN/1.73M2
GLUCOSE SERPL-MCNC: 90 MG/DL (ref 70–108)
HCO3 BLDA-SCNC: 53 MMOL/L (ref 23–28)
HCT VFR BLD AUTO: 49.6 % (ref 42–52)
HGB BLD-MCNC: 14.3 GM/DL (ref 14–18)
MAGNESIUM SERPL-MCNC: 2 MG/DL (ref 1.6–2.4)
MCH RBC QN AUTO: 30.4 PG (ref 26–33)
MCHC RBC AUTO-ENTMCNC: 28.8 GM/DL (ref 32.2–35.5)
MCV RBC AUTO: 105.3 FL (ref 80–94)
PCO2 BLDA: 93 MMHG (ref 35–45)
PEEP SETTING VENT: 6 MMHG
PH BLDA: 7.37 [PH] (ref 7.35–7.45)
PHOSPHATE SERPL-MCNC: 3.6 MG/DL (ref 2.4–4.7)
PIP: 24 CMH2O
PLATELET # BLD AUTO: 343 THOU/MM3 (ref 130–400)
PMV BLD AUTO: 10.4 FL (ref 9.4–12.4)
PO2 BLDA: 64 MMHG (ref 71–104)
POTASSIUM SERPL-SCNC: 2.7 MEQ/L (ref 3.5–5.2)
POTASSIUM SERPL-SCNC: 4.1 MEQ/L (ref 3.5–5.2)
RBC # BLD AUTO: 4.71 MILL/MM3 (ref 4.7–6.1)
SAO2 % BLDA: 89 %
SODIUM SERPL-SCNC: 144 MEQ/L (ref 135–145)
VENTILATION MODE VENT: ABNORMAL
WBC # BLD AUTO: 8.3 THOU/MM3 (ref 4.8–10.8)

## 2024-09-15 PROCEDURE — 94660 CPAP INITIATION&MGMT: CPT

## 2024-09-15 PROCEDURE — 82803 BLOOD GASES ANY COMBINATION: CPT

## 2024-09-15 PROCEDURE — 80048 BASIC METABOLIC PNL TOTAL CA: CPT

## 2024-09-15 PROCEDURE — 84132 ASSAY OF SERUM POTASSIUM: CPT

## 2024-09-15 PROCEDURE — 36415 COLL VENOUS BLD VENIPUNCTURE: CPT

## 2024-09-15 PROCEDURE — 6360000002 HC RX W HCPCS

## 2024-09-15 PROCEDURE — 6370000000 HC RX 637 (ALT 250 FOR IP)

## 2024-09-15 PROCEDURE — 36600 WITHDRAWAL OF ARTERIAL BLOOD: CPT

## 2024-09-15 PROCEDURE — 2700000000 HC OXYGEN THERAPY PER DAY

## 2024-09-15 PROCEDURE — 82330 ASSAY OF CALCIUM: CPT

## 2024-09-15 PROCEDURE — 94761 N-INVAS EAR/PLS OXIMETRY MLT: CPT

## 2024-09-15 PROCEDURE — 2580000003 HC RX 258

## 2024-09-15 PROCEDURE — 84100 ASSAY OF PHOSPHORUS: CPT

## 2024-09-15 PROCEDURE — 94640 AIRWAY INHALATION TREATMENT: CPT

## 2024-09-15 PROCEDURE — 2060000000 HC ICU INTERMEDIATE R&B

## 2024-09-15 PROCEDURE — 85027 COMPLETE CBC AUTOMATED: CPT

## 2024-09-15 PROCEDURE — 99254 IP/OBS CNSLTJ NEW/EST MOD 60: CPT | Performed by: NURSE PRACTITIONER

## 2024-09-15 PROCEDURE — 83735 ASSAY OF MAGNESIUM: CPT

## 2024-09-15 PROCEDURE — 99233 SBSQ HOSP IP/OBS HIGH 50: CPT | Performed by: INTERNAL MEDICINE

## 2024-09-15 RX ORDER — POTASSIUM CHLORIDE 1500 MG/1
40 TABLET, EXTENDED RELEASE ORAL ONCE
Status: COMPLETED | OUTPATIENT
Start: 2024-09-15 | End: 2024-09-15

## 2024-09-15 RX ORDER — CALCIUM GLUCONATE 20 MG/ML
2000 INJECTION, SOLUTION INTRAVENOUS ONCE
Status: COMPLETED | OUTPATIENT
Start: 2024-09-15 | End: 2024-09-15

## 2024-09-15 RX ORDER — LIDOCAINE 4 G/G
1 PATCH TOPICAL DAILY
Status: DISCONTINUED | OUTPATIENT
Start: 2024-09-15 | End: 2024-09-15

## 2024-09-15 RX ORDER — CALCIUM GLUCONATE 10 MG/ML
1000 INJECTION, SOLUTION INTRAVENOUS ONCE
Status: DISCONTINUED | OUTPATIENT
Start: 2024-09-15 | End: 2024-09-15

## 2024-09-15 RX ORDER — POTASSIUM CHLORIDE 7.45 MG/ML
10 INJECTION INTRAVENOUS
Status: ACTIVE | OUTPATIENT
Start: 2024-09-15 | End: 2024-09-15

## 2024-09-15 RX ADMIN — AZITHROMYCIN DIHYDRATE 500 MG: 500 INJECTION, POWDER, LYOPHILIZED, FOR SOLUTION INTRAVENOUS at 21:00

## 2024-09-15 RX ADMIN — POTASSIUM CHLORIDE 10 MEQ: 7.46 INJECTION, SOLUTION INTRAVENOUS at 07:11

## 2024-09-15 RX ADMIN — ENOXAPARIN SODIUM 40 MG: 100 INJECTION SUBCUTANEOUS at 20:55

## 2024-09-15 RX ADMIN — BUMETANIDE 1 MG: 0.25 INJECTION INTRAMUSCULAR; INTRAVENOUS at 17:20

## 2024-09-15 RX ADMIN — IPRATROPIUM BROMIDE AND ALBUTEROL SULFATE 1 DOSE: .5; 3 SOLUTION RESPIRATORY (INHALATION) at 07:57

## 2024-09-15 RX ADMIN — POTASSIUM CHLORIDE 10 MEQ: 7.46 INJECTION, SOLUTION INTRAVENOUS at 10:43

## 2024-09-15 RX ADMIN — POTASSIUM CHLORIDE 10 MEQ: 7.46 INJECTION, SOLUTION INTRAVENOUS at 06:14

## 2024-09-15 RX ADMIN — IPRATROPIUM BROMIDE AND ALBUTEROL SULFATE 1 DOSE: .5; 3 SOLUTION RESPIRATORY (INHALATION) at 21:26

## 2024-09-15 RX ADMIN — POTASSIUM CHLORIDE 10 MEQ: 7.46 INJECTION, SOLUTION INTRAVENOUS at 11:48

## 2024-09-15 RX ADMIN — IPRATROPIUM BROMIDE AND ALBUTEROL SULFATE 1 DOSE: .5; 3 SOLUTION RESPIRATORY (INHALATION) at 16:15

## 2024-09-15 RX ADMIN — ASPIRIN 81 MG CHEWABLE TABLET 81 MG: 81 TABLET CHEWABLE at 09:12

## 2024-09-15 RX ADMIN — BUMETANIDE 1 MG: 0.25 INJECTION INTRAMUSCULAR; INTRAVENOUS at 08:45

## 2024-09-15 RX ADMIN — POTASSIUM CHLORIDE 10 MEQ: 7.46 INJECTION, SOLUTION INTRAVENOUS at 08:30

## 2024-09-15 RX ADMIN — POTASSIUM CHLORIDE 10 MEQ: 7.46 INJECTION, SOLUTION INTRAVENOUS at 09:56

## 2024-09-15 RX ADMIN — PREDNISONE 40 MG: 20 TABLET ORAL at 09:12

## 2024-09-15 RX ADMIN — IPRATROPIUM BROMIDE AND ALBUTEROL SULFATE 1 DOSE: .5; 3 SOLUTION RESPIRATORY (INHALATION) at 12:05

## 2024-09-15 RX ADMIN — SODIUM CHLORIDE, PRESERVATIVE FREE 10 ML: 5 INJECTION INTRAVENOUS at 21:03

## 2024-09-15 RX ADMIN — CALCIUM GLUCONATE 2000 MG: 20 INJECTION, SOLUTION INTRAVENOUS at 08:45

## 2024-09-15 RX ADMIN — POTASSIUM CHLORIDE 40 MEQ: 1500 TABLET, EXTENDED RELEASE ORAL at 09:14

## 2024-09-15 RX ADMIN — LOSARTAN POTASSIUM 50 MG: 25 TABLET, FILM COATED ORAL at 09:12

## 2024-09-16 LAB
ANION GAP SERPL CALC-SCNC: 6 MEQ/L (ref 8–16)
BUN SERPL-MCNC: 13 MG/DL (ref 7–22)
CA-I BLD ISE-SCNC: 1.7 MMOL/L (ref 1.12–1.32)
CALCIUM SERPL-MCNC: 7.9 MG/DL (ref 8.5–10.5)
CHLORIDE SERPL-SCNC: 91 MEQ/L (ref 98–111)
CO2 SERPL-SCNC: 47 MEQ/L (ref 23–33)
CREAT SERPL-MCNC: 0.6 MG/DL (ref 0.4–1.2)
DEPRECATED RDW RBC AUTO: 59.8 FL (ref 35–45)
ERYTHROCYTE [DISTWIDTH] IN BLOOD BY AUTOMATED COUNT: 15.4 % (ref 11.5–14.5)
GFR SERPL CREATININE-BSD FRML MDRD: > 90 ML/MIN/1.73M2
GLUCOSE SERPL-MCNC: 89 MG/DL (ref 70–108)
HCT VFR BLD AUTO: 48.6 % (ref 42–52)
HGB BLD-MCNC: 14 GM/DL (ref 14–18)
MAGNESIUM SERPL-MCNC: 1.9 MG/DL (ref 1.6–2.4)
MCH RBC QN AUTO: 30.1 PG (ref 26–33)
MCHC RBC AUTO-ENTMCNC: 28.8 GM/DL (ref 32.2–35.5)
MCV RBC AUTO: 104.5 FL (ref 80–94)
PHOSPHATE SERPL-MCNC: 4.3 MG/DL (ref 2.4–4.7)
PLATELET # BLD AUTO: 331 THOU/MM3 (ref 130–400)
PMV BLD AUTO: 10.4 FL (ref 9.4–12.4)
POTASSIUM SERPL-SCNC: 3 MEQ/L (ref 3.5–5.2)
POTASSIUM SERPL-SCNC: 4.6 MEQ/L (ref 3.5–5.2)
POTASSIUM SERPL-SCNC: 4.8 MEQ/L (ref 3.5–5.2)
RBC # BLD AUTO: 4.65 MILL/MM3 (ref 4.7–6.1)
SODIUM SERPL-SCNC: 144 MEQ/L (ref 135–145)
WBC # BLD AUTO: 7.7 THOU/MM3 (ref 4.8–10.8)

## 2024-09-16 PROCEDURE — 6360000002 HC RX W HCPCS

## 2024-09-16 PROCEDURE — 83735 ASSAY OF MAGNESIUM: CPT

## 2024-09-16 PROCEDURE — 36415 COLL VENOUS BLD VENIPUNCTURE: CPT

## 2024-09-16 PROCEDURE — 94640 AIRWAY INHALATION TREATMENT: CPT

## 2024-09-16 PROCEDURE — 94660 CPAP INITIATION&MGMT: CPT

## 2024-09-16 PROCEDURE — 2700000000 HC OXYGEN THERAPY PER DAY

## 2024-09-16 PROCEDURE — 99233 SBSQ HOSP IP/OBS HIGH 50: CPT | Performed by: INTERNAL MEDICINE

## 2024-09-16 PROCEDURE — 82330 ASSAY OF CALCIUM: CPT

## 2024-09-16 PROCEDURE — 80048 BASIC METABOLIC PNL TOTAL CA: CPT

## 2024-09-16 PROCEDURE — 94669 MECHANICAL CHEST WALL OSCILL: CPT

## 2024-09-16 PROCEDURE — 84100 ASSAY OF PHOSPHORUS: CPT

## 2024-09-16 PROCEDURE — 2580000003 HC RX 258

## 2024-09-16 PROCEDURE — 6370000000 HC RX 637 (ALT 250 FOR IP)

## 2024-09-16 PROCEDURE — 94761 N-INVAS EAR/PLS OXIMETRY MLT: CPT

## 2024-09-16 PROCEDURE — 85027 COMPLETE CBC AUTOMATED: CPT

## 2024-09-16 PROCEDURE — 2060000000 HC ICU INTERMEDIATE R&B

## 2024-09-16 PROCEDURE — 84132 ASSAY OF SERUM POTASSIUM: CPT

## 2024-09-16 RX ORDER — POTASSIUM CHLORIDE 1500 MG/1
40 TABLET, EXTENDED RELEASE ORAL ONCE
Status: COMPLETED | OUTPATIENT
Start: 2024-09-16 | End: 2024-09-16

## 2024-09-16 RX ORDER — POTASSIUM CHLORIDE 1500 MG/1
40 TABLET, EXTENDED RELEASE ORAL
Status: DISCONTINUED | OUTPATIENT
Start: 2024-09-17 | End: 2024-09-19

## 2024-09-16 RX ORDER — POTASSIUM CHLORIDE 7.45 MG/ML
10 INJECTION INTRAVENOUS
Status: DISCONTINUED | OUTPATIENT
Start: 2024-09-16 | End: 2024-09-16

## 2024-09-16 RX ADMIN — POTASSIUM CHLORIDE 10 MEQ: 7.46 INJECTION, SOLUTION INTRAVENOUS at 08:49

## 2024-09-16 RX ADMIN — BUMETANIDE 1 MG: 0.25 INJECTION INTRAMUSCULAR; INTRAVENOUS at 17:28

## 2024-09-16 RX ADMIN — SODIUM CHLORIDE, PRESERVATIVE FREE 10 ML: 5 INJECTION INTRAVENOUS at 09:02

## 2024-09-16 RX ADMIN — POTASSIUM CHLORIDE 40 MEQ: 1500 TABLET, EXTENDED RELEASE ORAL at 11:59

## 2024-09-16 RX ADMIN — IPRATROPIUM BROMIDE AND ALBUTEROL SULFATE 1 DOSE: .5; 3 SOLUTION RESPIRATORY (INHALATION) at 12:35

## 2024-09-16 RX ADMIN — IPRATROPIUM BROMIDE AND ALBUTEROL SULFATE 1 DOSE: .5; 3 SOLUTION RESPIRATORY (INHALATION) at 15:52

## 2024-09-16 RX ADMIN — ENOXAPARIN SODIUM 40 MG: 100 INJECTION SUBCUTANEOUS at 19:47

## 2024-09-16 RX ADMIN — LOSARTAN POTASSIUM 50 MG: 25 TABLET, FILM COATED ORAL at 08:55

## 2024-09-16 RX ADMIN — SODIUM CHLORIDE: 9 INJECTION, SOLUTION INTRAVENOUS at 07:46

## 2024-09-16 RX ADMIN — IPRATROPIUM BROMIDE AND ALBUTEROL SULFATE 1 DOSE: .5; 3 SOLUTION RESPIRATORY (INHALATION) at 08:03

## 2024-09-16 RX ADMIN — POTASSIUM CHLORIDE 10 MEQ: 7.46 INJECTION, SOLUTION INTRAVENOUS at 07:45

## 2024-09-16 RX ADMIN — POTASSIUM CHLORIDE 40 MEQ: 1500 TABLET, EXTENDED RELEASE ORAL at 09:01

## 2024-09-16 RX ADMIN — PREDNISONE 40 MG: 20 TABLET ORAL at 08:54

## 2024-09-16 RX ADMIN — BUMETANIDE 1 MG: 0.25 INJECTION INTRAMUSCULAR; INTRAVENOUS at 08:54

## 2024-09-16 RX ADMIN — IPRATROPIUM BROMIDE AND ALBUTEROL SULFATE 1 DOSE: .5; 3 SOLUTION RESPIRATORY (INHALATION) at 20:52

## 2024-09-16 RX ADMIN — ASPIRIN 81 MG CHEWABLE TABLET 81 MG: 81 TABLET CHEWABLE at 09:03

## 2024-09-17 LAB
ANION GAP SERPL CALC-SCNC: 4 MEQ/L (ref 8–16)
BUN SERPL-MCNC: 13 MG/DL (ref 7–22)
CA-I BLD ISE-SCNC: 1.01 MMOL/L (ref 1.12–1.32)
CALCIUM SERPL-MCNC: 8.1 MG/DL (ref 8.5–10.5)
CHLORIDE SERPL-SCNC: 93 MEQ/L (ref 98–111)
CO2 SERPL-SCNC: 43 MEQ/L (ref 23–33)
CREAT SERPL-MCNC: 0.7 MG/DL (ref 0.4–1.2)
DEPRECATED RDW RBC AUTO: 57.6 FL (ref 35–45)
ERYTHROCYTE [DISTWIDTH] IN BLOOD BY AUTOMATED COUNT: 15.2 % (ref 11.5–14.5)
GFR SERPL CREATININE-BSD FRML MDRD: > 90 ML/MIN/1.73M2
GLUCOSE SERPL-MCNC: 88 MG/DL (ref 70–108)
HCT VFR BLD AUTO: 50.7 % (ref 42–52)
HGB BLD-MCNC: 14.7 GM/DL (ref 14–18)
MAGNESIUM SERPL-MCNC: 2.1 MG/DL (ref 1.6–2.4)
MCH RBC QN AUTO: 29.6 PG (ref 26–33)
MCHC RBC AUTO-ENTMCNC: 29 GM/DL (ref 32.2–35.5)
MCV RBC AUTO: 102.2 FL (ref 80–94)
PHOSPHATE SERPL-MCNC: 3.3 MG/DL (ref 2.4–4.7)
PLATELET # BLD AUTO: 301 THOU/MM3 (ref 130–400)
PMV BLD AUTO: 10.5 FL (ref 9.4–12.4)
POTASSIUM SERPL-SCNC: 5 MEQ/L (ref 3.5–5.2)
POTASSIUM SERPL-SCNC: 5 MEQ/L (ref 3.5–5.2)
POTASSIUM SERPL-SCNC: 5.1 MEQ/L (ref 3.5–5.2)
POTASSIUM SERPL-SCNC: 5.2 MEQ/L (ref 3.5–5.2)
RBC # BLD AUTO: 4.96 MILL/MM3 (ref 4.7–6.1)
SODIUM SERPL-SCNC: 140 MEQ/L (ref 135–145)
WBC # BLD AUTO: 8.5 THOU/MM3 (ref 4.8–10.8)

## 2024-09-17 PROCEDURE — 99233 SBSQ HOSP IP/OBS HIGH 50: CPT | Performed by: INTERNAL MEDICINE

## 2024-09-17 PROCEDURE — 2060000000 HC ICU INTERMEDIATE R&B

## 2024-09-17 PROCEDURE — 6360000002 HC RX W HCPCS

## 2024-09-17 PROCEDURE — 94669 MECHANICAL CHEST WALL OSCILL: CPT

## 2024-09-17 PROCEDURE — 83735 ASSAY OF MAGNESIUM: CPT

## 2024-09-17 PROCEDURE — 36415 COLL VENOUS BLD VENIPUNCTURE: CPT

## 2024-09-17 PROCEDURE — 6370000000 HC RX 637 (ALT 250 FOR IP)

## 2024-09-17 PROCEDURE — 85027 COMPLETE CBC AUTOMATED: CPT

## 2024-09-17 PROCEDURE — 94660 CPAP INITIATION&MGMT: CPT

## 2024-09-17 PROCEDURE — 2580000003 HC RX 258

## 2024-09-17 PROCEDURE — 94640 AIRWAY INHALATION TREATMENT: CPT

## 2024-09-17 PROCEDURE — 80048 BASIC METABOLIC PNL TOTAL CA: CPT

## 2024-09-17 PROCEDURE — 84100 ASSAY OF PHOSPHORUS: CPT

## 2024-09-17 PROCEDURE — 82330 ASSAY OF CALCIUM: CPT

## 2024-09-17 PROCEDURE — 94010 BREATHING CAPACITY TEST: CPT

## 2024-09-17 PROCEDURE — 2700000000 HC OXYGEN THERAPY PER DAY

## 2024-09-17 PROCEDURE — 84132 ASSAY OF SERUM POTASSIUM: CPT

## 2024-09-17 RX ORDER — METOPROLOL SUCCINATE 25 MG/1
12.5 TABLET, EXTENDED RELEASE ORAL EVERY EVENING
Status: DISCONTINUED | OUTPATIENT
Start: 2024-09-17 | End: 2024-09-19 | Stop reason: HOSPADM

## 2024-09-17 RX ORDER — CALCIUM GLUCONATE 10 MG/ML
1000 INJECTION, SOLUTION INTRAVENOUS ONCE
Status: COMPLETED | OUTPATIENT
Start: 2024-09-17 | End: 2024-09-17

## 2024-09-17 RX ORDER — LOSARTAN POTASSIUM 25 MG/1
25 TABLET ORAL NIGHTLY
Status: DISCONTINUED | OUTPATIENT
Start: 2024-09-17 | End: 2024-09-17

## 2024-09-17 RX ADMIN — POTASSIUM CHLORIDE 40 MEQ: 1500 TABLET, EXTENDED RELEASE ORAL at 08:24

## 2024-09-17 RX ADMIN — ENOXAPARIN SODIUM 40 MG: 100 INJECTION SUBCUTANEOUS at 20:47

## 2024-09-17 RX ADMIN — CALCIUM GLUCONATE 1000 MG: 10 INJECTION, SOLUTION INTRAVENOUS at 08:34

## 2024-09-17 RX ADMIN — LOSARTAN POTASSIUM 50 MG: 25 TABLET, FILM COATED ORAL at 08:24

## 2024-09-17 RX ADMIN — BUMETANIDE 1 MG: 0.25 INJECTION INTRAMUSCULAR; INTRAVENOUS at 18:21

## 2024-09-17 RX ADMIN — IPRATROPIUM BROMIDE AND ALBUTEROL SULFATE 1 DOSE: .5; 3 SOLUTION RESPIRATORY (INHALATION) at 09:17

## 2024-09-17 RX ADMIN — SODIUM CHLORIDE, PRESERVATIVE FREE 10 ML: 5 INJECTION INTRAVENOUS at 20:47

## 2024-09-17 RX ADMIN — PREDNISONE 40 MG: 20 TABLET ORAL at 08:25

## 2024-09-17 RX ADMIN — SODIUM CHLORIDE, PRESERVATIVE FREE 10 ML: 5 INJECTION INTRAVENOUS at 08:25

## 2024-09-17 RX ADMIN — IPRATROPIUM BROMIDE AND ALBUTEROL SULFATE 1 DOSE: .5; 3 SOLUTION RESPIRATORY (INHALATION) at 12:06

## 2024-09-17 RX ADMIN — IPRATROPIUM BROMIDE AND ALBUTEROL SULFATE 1 DOSE: .5; 3 SOLUTION RESPIRATORY (INHALATION) at 15:55

## 2024-09-17 RX ADMIN — IPRATROPIUM BROMIDE AND ALBUTEROL SULFATE 1 DOSE: .5; 3 SOLUTION RESPIRATORY (INHALATION) at 19:50

## 2024-09-17 RX ADMIN — METOPROLOL SUCCINATE 12.5 MG: 25 TABLET, FILM COATED, EXTENDED RELEASE ORAL at 18:21

## 2024-09-17 RX ADMIN — BUMETANIDE 1 MG: 0.25 INJECTION INTRAMUSCULAR; INTRAVENOUS at 08:24

## 2024-09-17 RX ADMIN — ASPIRIN 81 MG CHEWABLE TABLET 81 MG: 81 TABLET CHEWABLE at 08:24

## 2024-09-17 ASSESSMENT — PAIN SCALES - GENERAL
PAINLEVEL_OUTOF10: 0
PAINLEVEL_OUTOF10: 0

## 2024-09-18 ENCOUNTER — APPOINTMENT (OUTPATIENT)
Dept: GENERAL RADIOLOGY | Age: 54
DRG: 133 | End: 2024-09-18
Payer: MEDICAID

## 2024-09-18 LAB
ANION GAP SERPL CALC-SCNC: 9 MEQ/L (ref 8–16)
BUN SERPL-MCNC: 14 MG/DL (ref 7–22)
CA-I BLD ISE-SCNC: 1.04 MMOL/L (ref 1.12–1.32)
CALCIUM SERPL-MCNC: 8.5 MG/DL (ref 8.5–10.5)
CHLORIDE SERPL-SCNC: 93 MEQ/L (ref 98–111)
CO2 SERPL-SCNC: 41 MEQ/L (ref 23–33)
CREAT SERPL-MCNC: 0.7 MG/DL (ref 0.4–1.2)
DEPRECATED RDW RBC AUTO: 57.8 FL (ref 35–45)
ERYTHROCYTE [DISTWIDTH] IN BLOOD BY AUTOMATED COUNT: 15.1 % (ref 11.5–14.5)
GFR SERPL CREATININE-BSD FRML MDRD: > 90 ML/MIN/1.73M2
GLUCOSE SERPL-MCNC: 91 MG/DL (ref 70–108)
HCT VFR BLD AUTO: 53.1 % (ref 42–52)
HGB BLD-MCNC: 15.3 GM/DL (ref 14–18)
MCH RBC QN AUTO: 29.7 PG (ref 26–33)
MCHC RBC AUTO-ENTMCNC: 28.8 GM/DL (ref 32.2–35.5)
MCV RBC AUTO: 102.9 FL (ref 80–94)
PHOSPHATE SERPL-MCNC: 3.9 MG/DL (ref 2.4–4.7)
PLATELET # BLD AUTO: 293 THOU/MM3 (ref 130–400)
PMV BLD AUTO: 10.8 FL (ref 9.4–12.4)
POTASSIUM SERPL-SCNC: 5.2 MEQ/L (ref 3.5–5.2)
POTASSIUM SERPL-SCNC: 5.7 MEQ/L (ref 3.5–5.2)
POTASSIUM SERPL-SCNC: 5.7 MEQ/L (ref 3.5–5.2)
RBC # BLD AUTO: 5.16 MILL/MM3 (ref 4.7–6.1)
SCAN OF BLOOD SMEAR: NORMAL
SODIUM SERPL-SCNC: 143 MEQ/L (ref 135–145)
WBC # BLD AUTO: 9 THOU/MM3 (ref 4.8–10.8)

## 2024-09-18 PROCEDURE — 6370000000 HC RX 637 (ALT 250 FOR IP)

## 2024-09-18 PROCEDURE — 94761 N-INVAS EAR/PLS OXIMETRY MLT: CPT

## 2024-09-18 PROCEDURE — 71045 X-RAY EXAM CHEST 1 VIEW: CPT

## 2024-09-18 PROCEDURE — 94660 CPAP INITIATION&MGMT: CPT

## 2024-09-18 PROCEDURE — 94640 AIRWAY INHALATION TREATMENT: CPT

## 2024-09-18 PROCEDURE — 99233 SBSQ HOSP IP/OBS HIGH 50: CPT | Performed by: INTERNAL MEDICINE

## 2024-09-18 PROCEDURE — 85027 COMPLETE CBC AUTOMATED: CPT

## 2024-09-18 PROCEDURE — 2700000000 HC OXYGEN THERAPY PER DAY

## 2024-09-18 PROCEDURE — 6360000002 HC RX W HCPCS

## 2024-09-18 PROCEDURE — 2580000003 HC RX 258

## 2024-09-18 PROCEDURE — 84132 ASSAY OF SERUM POTASSIUM: CPT

## 2024-09-18 PROCEDURE — 1200000000 HC SEMI PRIVATE

## 2024-09-18 PROCEDURE — 82330 ASSAY OF CALCIUM: CPT

## 2024-09-18 PROCEDURE — 36415 COLL VENOUS BLD VENIPUNCTURE: CPT

## 2024-09-18 PROCEDURE — 80048 BASIC METABOLIC PNL TOTAL CA: CPT

## 2024-09-18 PROCEDURE — 84100 ASSAY OF PHOSPHORUS: CPT

## 2024-09-18 RX ORDER — CALCIUM GLUCONATE 10 MG/ML
1000 INJECTION, SOLUTION INTRAVENOUS ONCE
Status: COMPLETED | OUTPATIENT
Start: 2024-09-18 | End: 2024-09-18

## 2024-09-18 RX ADMIN — SODIUM CHLORIDE, PRESERVATIVE FREE 10 ML: 5 INJECTION INTRAVENOUS at 20:02

## 2024-09-18 RX ADMIN — BUMETANIDE 1 MG: 1 TABLET ORAL at 09:29

## 2024-09-18 RX ADMIN — IPRATROPIUM BROMIDE AND ALBUTEROL SULFATE 1 DOSE: .5; 3 SOLUTION RESPIRATORY (INHALATION) at 21:15

## 2024-09-18 RX ADMIN — IPRATROPIUM BROMIDE AND ALBUTEROL SULFATE 1 DOSE: .5; 3 SOLUTION RESPIRATORY (INHALATION) at 11:40

## 2024-09-18 RX ADMIN — ASPIRIN 81 MG CHEWABLE TABLET 81 MG: 81 TABLET CHEWABLE at 09:29

## 2024-09-18 RX ADMIN — BUMETANIDE 1 MG: 1 TABLET ORAL at 18:47

## 2024-09-18 RX ADMIN — LOSARTAN POTASSIUM 50 MG: 25 TABLET, FILM COATED ORAL at 09:29

## 2024-09-18 RX ADMIN — POLYETHYLENE GLYCOL 3350 17 G: 17 POWDER, FOR SOLUTION ORAL at 09:29

## 2024-09-18 RX ADMIN — SODIUM CHLORIDE, PRESERVATIVE FREE 10 ML: 5 INJECTION INTRAVENOUS at 09:31

## 2024-09-18 RX ADMIN — ENOXAPARIN SODIUM 40 MG: 100 INJECTION SUBCUTANEOUS at 20:02

## 2024-09-18 RX ADMIN — SODIUM ZIRCONIUM CYCLOSILICATE 5 G: 5 POWDER, FOR SUSPENSION ORAL at 15:22

## 2024-09-18 RX ADMIN — ACETAMINOPHEN 650 MG: 325 TABLET ORAL at 09:29

## 2024-09-18 RX ADMIN — SODIUM ZIRCONIUM CYCLOSILICATE 5 G: 5 POWDER, FOR SUSPENSION ORAL at 11:51

## 2024-09-18 RX ADMIN — CALCIUM GLUCONATE 1000 MG: 10 INJECTION, SOLUTION INTRAVENOUS at 09:47

## 2024-09-18 RX ADMIN — METOPROLOL SUCCINATE 12.5 MG: 25 TABLET, FILM COATED, EXTENDED RELEASE ORAL at 18:48

## 2024-09-18 RX ADMIN — IPRATROPIUM BROMIDE AND ALBUTEROL SULFATE 1 DOSE: .5; 3 SOLUTION RESPIRATORY (INHALATION) at 16:04

## 2024-09-18 RX ADMIN — IPRATROPIUM BROMIDE AND ALBUTEROL SULFATE 1 DOSE: .5; 3 SOLUTION RESPIRATORY (INHALATION) at 08:16

## 2024-09-18 ASSESSMENT — PAIN SCALES - GENERAL
PAINLEVEL_OUTOF10: 0
PAINLEVEL_OUTOF10: 0
PAINLEVEL_OUTOF10: 3
PAINLEVEL_OUTOF10: 2
PAINLEVEL_OUTOF10: 0

## 2024-09-19 VITALS
HEART RATE: 75 BPM | TEMPERATURE: 98.8 F | RESPIRATION RATE: 18 BRPM | SYSTOLIC BLOOD PRESSURE: 104 MMHG | WEIGHT: 215 LBS | OXYGEN SATURATION: 94 % | DIASTOLIC BLOOD PRESSURE: 65 MMHG | HEIGHT: 69 IN | BODY MASS INDEX: 31.84 KG/M2

## 2024-09-19 LAB
ANION GAP SERPL CALC-SCNC: 14 MEQ/L (ref 8–16)
BUN SERPL-MCNC: 18 MG/DL (ref 7–22)
CA-I BLD ISE-SCNC: 1.03 MMOL/L (ref 1.12–1.32)
CALCIUM SERPL-MCNC: 8.6 MG/DL (ref 8.5–10.5)
CHLORIDE SERPL-SCNC: 94 MEQ/L (ref 98–111)
CO2 SERPL-SCNC: 34 MEQ/L (ref 23–33)
CREAT SERPL-MCNC: 0.6 MG/DL (ref 0.4–1.2)
DEPRECATED RDW RBC AUTO: 59.1 FL (ref 35–45)
ERYTHROCYTE [DISTWIDTH] IN BLOOD BY AUTOMATED COUNT: 15.4 % (ref 11.5–14.5)
GFR SERPL CREATININE-BSD FRML MDRD: > 90 ML/MIN/1.73M2
GLUCOSE SERPL-MCNC: 79 MG/DL (ref 70–108)
HCT VFR BLD AUTO: 55.6 % (ref 42–52)
HGB BLD-MCNC: 15.8 GM/DL (ref 14–18)
MCH RBC QN AUTO: 29.5 PG (ref 26–33)
MCHC RBC AUTO-ENTMCNC: 28.4 GM/DL (ref 32.2–35.5)
MCV RBC AUTO: 103.9 FL (ref 80–94)
PHOSPHATE SERPL-MCNC: 4.7 MG/DL (ref 2.4–4.7)
PLATELET # BLD AUTO: 286 THOU/MM3 (ref 130–400)
PMV BLD AUTO: 11.6 FL (ref 9.4–12.4)
POTASSIUM SERPL-SCNC: 5.1 MEQ/L (ref 3.5–5.2)
RBC # BLD AUTO: 5.35 MILL/MM3 (ref 4.7–6.1)
SODIUM SERPL-SCNC: 142 MEQ/L (ref 135–145)
WBC # BLD AUTO: 8 THOU/MM3 (ref 4.8–10.8)

## 2024-09-19 PROCEDURE — 80048 BASIC METABOLIC PNL TOTAL CA: CPT

## 2024-09-19 PROCEDURE — 2700000000 HC OXYGEN THERAPY PER DAY

## 2024-09-19 PROCEDURE — 6370000000 HC RX 637 (ALT 250 FOR IP)

## 2024-09-19 PROCEDURE — 36415 COLL VENOUS BLD VENIPUNCTURE: CPT

## 2024-09-19 PROCEDURE — 85027 COMPLETE CBC AUTOMATED: CPT

## 2024-09-19 PROCEDURE — 94640 AIRWAY INHALATION TREATMENT: CPT

## 2024-09-19 PROCEDURE — 6370000000 HC RX 637 (ALT 250 FOR IP): Performed by: INTERNAL MEDICINE

## 2024-09-19 PROCEDURE — 94660 CPAP INITIATION&MGMT: CPT

## 2024-09-19 PROCEDURE — 82330 ASSAY OF CALCIUM: CPT

## 2024-09-19 PROCEDURE — 94761 N-INVAS EAR/PLS OXIMETRY MLT: CPT

## 2024-09-19 PROCEDURE — 84100 ASSAY OF PHOSPHORUS: CPT

## 2024-09-19 PROCEDURE — 99232 SBSQ HOSP IP/OBS MODERATE 35: CPT | Performed by: INTERNAL MEDICINE

## 2024-09-19 PROCEDURE — 2580000003 HC RX 258

## 2024-09-19 PROCEDURE — 99239 HOSP IP/OBS DSCHRG MGMT >30: CPT | Performed by: INTERNAL MEDICINE

## 2024-09-19 RX ORDER — POTASSIUM CHLORIDE 1500 MG/1
20 TABLET, EXTENDED RELEASE ORAL DAILY
Qty: 60 TABLET | Refills: 0 | Status: SHIPPED | OUTPATIENT
Start: 2024-09-19

## 2024-09-19 RX ORDER — METOPROLOL SUCCINATE 25 MG/1
12.5 TABLET, EXTENDED RELEASE ORAL EVERY EVENING
Qty: 30 TABLET | Refills: 3 | Status: SHIPPED | OUTPATIENT
Start: 2024-09-19

## 2024-09-19 RX ADMIN — SODIUM ZIRCONIUM CYCLOSILICATE 5 G: 5 POWDER, FOR SUSPENSION ORAL at 11:54

## 2024-09-19 RX ADMIN — IPRATROPIUM BROMIDE AND ALBUTEROL SULFATE 1 DOSE: .5; 3 SOLUTION RESPIRATORY (INHALATION) at 16:29

## 2024-09-19 RX ADMIN — ASPIRIN 81 MG CHEWABLE TABLET 81 MG: 81 TABLET CHEWABLE at 08:11

## 2024-09-19 RX ADMIN — SODIUM ZIRCONIUM CYCLOSILICATE 5 G: 5 POWDER, FOR SUSPENSION ORAL at 17:40

## 2024-09-19 RX ADMIN — ACETAMINOPHEN 650 MG: 325 TABLET ORAL at 10:23

## 2024-09-19 RX ADMIN — IPRATROPIUM BROMIDE AND ALBUTEROL SULFATE 1 DOSE: .5; 3 SOLUTION RESPIRATORY (INHALATION) at 08:19

## 2024-09-19 RX ADMIN — IPRATROPIUM BROMIDE AND ALBUTEROL SULFATE 1 DOSE: .5; 3 SOLUTION RESPIRATORY (INHALATION) at 12:59

## 2024-09-19 RX ADMIN — SODIUM CHLORIDE, PRESERVATIVE FREE 10 ML: 5 INJECTION INTRAVENOUS at 08:11

## 2024-09-19 RX ADMIN — BUMETANIDE 1 MG: 1 TABLET ORAL at 08:11

## 2024-09-19 RX ADMIN — BUMETANIDE 1 MG: 1 TABLET ORAL at 17:40

## 2024-09-19 RX ADMIN — METOPROLOL SUCCINATE 12.5 MG: 25 TABLET, FILM COATED, EXTENDED RELEASE ORAL at 17:40

## 2024-09-19 ASSESSMENT — PAIN SCALES - GENERAL
PAINLEVEL_OUTOF10: 0
PAINLEVEL_OUTOF10: 1

## 2024-09-19 ASSESSMENT — PAIN DESCRIPTION - LOCATION: LOCATION: BACK

## 2024-12-16 ENCOUNTER — APPOINTMENT (OUTPATIENT)
Dept: GENERAL RADIOLOGY | Age: 54
End: 2024-12-16
Payer: MEDICAID

## 2024-12-16 ENCOUNTER — HOSPITAL ENCOUNTER (INPATIENT)
Age: 54
LOS: 4 days | Discharge: HOME | End: 2024-12-21
Attending: STUDENT IN AN ORGANIZED HEALTH CARE EDUCATION/TRAINING PROGRAM | Admitting: STUDENT IN AN ORGANIZED HEALTH CARE EDUCATION/TRAINING PROGRAM
Payer: MEDICAID

## 2024-12-16 DIAGNOSIS — L03.90 CELLULITIS, UNSPECIFIED CELLULITIS SITE: ICD-10-CM

## 2024-12-16 DIAGNOSIS — M79.89 LEG SWELLING: ICD-10-CM

## 2024-12-16 DIAGNOSIS — R41.82 ALTERED MENTAL STATUS, UNSPECIFIED ALTERED MENTAL STATUS TYPE: Primary | ICD-10-CM

## 2024-12-16 DIAGNOSIS — R06.02 SHORTNESS OF BREATH: ICD-10-CM

## 2024-12-16 DIAGNOSIS — Z72.0 TOBACCO ABUSE: ICD-10-CM

## 2024-12-16 DIAGNOSIS — E87.70 HYPERVOLEMIA, UNSPECIFIED HYPERVOLEMIA TYPE: ICD-10-CM

## 2024-12-16 DIAGNOSIS — J96.01 ACUTE HYPOXIC ON CHRONIC HYPERCAPNIC RESPIRATORY FAILURE: ICD-10-CM

## 2024-12-16 DIAGNOSIS — J96.12 ACUTE HYPOXIC ON CHRONIC HYPERCAPNIC RESPIRATORY FAILURE: ICD-10-CM

## 2024-12-16 LAB
ALBUMIN SERPL BCG-MCNC: 3.5 G/DL (ref 3.5–5.1)
ALP SERPL-CCNC: 134 U/L (ref 38–126)
ALT SERPL W/O P-5'-P-CCNC: 25 U/L (ref 11–66)
ANION GAP SERPL CALC-SCNC: 7 MEQ/L (ref 8–16)
AST SERPL-CCNC: 17 U/L (ref 5–40)
BASE EXCESS BLDA CALC-SCNC: 15.2 MMOL/L (ref -2–3)
BILIRUB SERPL-MCNC: 0.8 MG/DL (ref 0.3–1.2)
BUN SERPL-MCNC: 10 MG/DL (ref 7–22)
CALCIUM SERPL-MCNC: 8.6 MG/DL (ref 8.5–10.5)
CHLORIDE SERPL-SCNC: 94 MEQ/L (ref 98–111)
CO2 SERPL-SCNC: 46 MEQ/L (ref 23–33)
COLLECTED BY:: ABNORMAL
CREAT SERPL-MCNC: 0.5 MG/DL (ref 0.4–1.2)
DEVICE: ABNORMAL
GFR SERPL CREATININE-BSD FRML MDRD: > 90 ML/MIN/1.73M2
GLUCOSE SERPL-MCNC: 91 MG/DL (ref 70–108)
HCO3 BLDA-SCNC: 45 MMOL/L (ref 23–28)
LACTATE SERPL-SCNC: 0.7 MMOL/L (ref 0.5–2)
NT-PROBNP SERPL IA-MCNC: 1612 PG/ML (ref 0–124)
OSMOLALITY SERPL CALC.SUM OF ELEC: 291 MOSMOL/KG (ref 275–300)
PCO2 TEMP ADJ BLDMV: 69 MMHG (ref 41–51)
PH BLDMV: 7.42 [PH] (ref 7.31–7.41)
PO2 BLDMV: 34 MMHG (ref 25–40)
POTASSIUM SERPL-SCNC: 4.3 MEQ/L (ref 3.5–5.2)
PROCALCITONIN SERPL IA-MCNC: 0.07 NG/ML (ref 0.01–0.09)
PROT SERPL-MCNC: 6.4 G/DL (ref 6.1–8)
SAO2 % BLDMV: 63 %
SITE: ABNORMAL
SODIUM SERPL-SCNC: 147 MEQ/L (ref 135–145)
TROPONIN, HIGH SENSITIVITY: 19 NG/L (ref 0–12)
VENTILATION MODE VENT: ABNORMAL

## 2024-12-16 PROCEDURE — 71045 X-RAY EXAM CHEST 1 VIEW: CPT

## 2024-12-16 PROCEDURE — 83605 ASSAY OF LACTIC ACID: CPT

## 2024-12-16 PROCEDURE — 84484 ASSAY OF TROPONIN QUANT: CPT

## 2024-12-16 PROCEDURE — 94660 CPAP INITIATION&MGMT: CPT

## 2024-12-16 PROCEDURE — 85025 COMPLETE CBC W/AUTO DIFF WBC: CPT

## 2024-12-16 PROCEDURE — 94761 N-INVAS EAR/PLS OXIMETRY MLT: CPT

## 2024-12-16 PROCEDURE — 82803 BLOOD GASES ANY COMBINATION: CPT

## 2024-12-16 PROCEDURE — 6360000002 HC RX W HCPCS

## 2024-12-16 PROCEDURE — 93005 ELECTROCARDIOGRAM TRACING: CPT | Performed by: STUDENT IN AN ORGANIZED HEALTH CARE EDUCATION/TRAINING PROGRAM

## 2024-12-16 PROCEDURE — 2580000003 HC RX 258

## 2024-12-16 PROCEDURE — 96365 THER/PROPH/DIAG IV INF INIT: CPT

## 2024-12-16 PROCEDURE — 99285 EMERGENCY DEPT VISIT HI MDM: CPT

## 2024-12-16 PROCEDURE — 84145 PROCALCITONIN (PCT): CPT

## 2024-12-16 PROCEDURE — 80053 COMPREHEN METABOLIC PANEL: CPT

## 2024-12-16 PROCEDURE — 96375 TX/PRO/DX INJ NEW DRUG ADDON: CPT

## 2024-12-16 PROCEDURE — 83880 ASSAY OF NATRIURETIC PEPTIDE: CPT

## 2024-12-16 PROCEDURE — 36415 COLL VENOUS BLD VENIPUNCTURE: CPT

## 2024-12-16 RX ORDER — BUMETANIDE 0.25 MG/ML
1 INJECTION, SOLUTION INTRAMUSCULAR; INTRAVENOUS ONCE
Status: COMPLETED | OUTPATIENT
Start: 2024-12-16 | End: 2024-12-16

## 2024-12-16 RX ADMIN — BUMETANIDE 1 MG: 0.25 INJECTION INTRAMUSCULAR; INTRAVENOUS at 23:20

## 2024-12-16 RX ADMIN — VANCOMYCIN HYDROCHLORIDE 2500 MG: 5 INJECTION, POWDER, LYOPHILIZED, FOR SOLUTION INTRAVENOUS at 23:43

## 2024-12-16 RX ADMIN — WATER 2000 MG: 1 INJECTION INTRAMUSCULAR; INTRAVENOUS; SUBCUTANEOUS at 23:35

## 2024-12-16 ASSESSMENT — PAIN - FUNCTIONAL ASSESSMENT: PAIN_FUNCTIONAL_ASSESSMENT: NONE - DENIES PAIN

## 2024-12-17 ENCOUNTER — APPOINTMENT (OUTPATIENT)
Age: 54
End: 2024-12-17
Payer: MEDICAID

## 2024-12-17 ENCOUNTER — APPOINTMENT (OUTPATIENT)
Dept: CT IMAGING | Age: 54
End: 2024-12-17
Payer: MEDICAID

## 2024-12-17 PROBLEM — R06.02 SHORTNESS OF BREATH: Status: ACTIVE | Noted: 2024-12-17

## 2024-12-17 PROBLEM — E87.70 HYPERVOLEMIA: Status: ACTIVE | Noted: 2024-12-17

## 2024-12-17 PROBLEM — R41.82 ALTERED MENTAL STATUS: Status: ACTIVE | Noted: 2024-12-17

## 2024-12-17 LAB
AMMONIA PLAS-MCNC: 47 UMOL/L (ref 11–60)
ANION GAP SERPL CALC-SCNC: 7 MEQ/L (ref 8–16)
ANION GAP SERPL CALC-SCNC: 7 MEQ/L (ref 8–16)
ANISOCYTOSIS BLD QL SMEAR: PRESENT
ANISOCYTOSIS BLD QL SMEAR: PRESENT
ARTERIAL PATENCY WRIST A: POSITIVE
ARTERIAL PATENCY WRIST A: POSITIVE
BACTERIA: ABNORMAL
BASE EXCESS BLDA CALC-SCNC: 16.5 MMOL/L (ref -2.5–2.5)
BASE EXCESS BLDA CALC-SCNC: 25.3 MMOL/L (ref -2.5–2.5)
BASOPHILS ABSOLUTE: 0 THOU/MM3 (ref 0–0.1)
BASOPHILS ABSOLUTE: 0 THOU/MM3 (ref 0–0.1)
BASOPHILS NFR BLD AUTO: 0.4 %
BASOPHILS NFR BLD AUTO: 0.6 %
BDY SITE: ABNORMAL
BDY SITE: ABNORMAL
BILIRUB UR QL STRIP: NEGATIVE
BREATHS SETTING VENT: 12 BPM
BUN SERPL-MCNC: 11 MG/DL (ref 7–22)
BUN SERPL-MCNC: 12 MG/DL (ref 7–22)
CALCIUM SERPL-MCNC: 8.3 MG/DL (ref 8.5–10.5)
CALCIUM SERPL-MCNC: 8.4 MG/DL (ref 8.5–10.5)
CASTS #/AREA URNS LPF: ABNORMAL /LPF
CASTS #/AREA URNS LPF: ABNORMAL /LPF
CHARACTER UR: CLEAR
CHARCOAL URNS QL MICRO: ABNORMAL
CHLORIDE SERPL-SCNC: 89 MEQ/L (ref 98–111)
CHLORIDE SERPL-SCNC: 93 MEQ/L (ref 98–111)
CO2 SERPL-SCNC: 43 MEQ/L (ref 23–33)
CO2 SERPL-SCNC: 48 MEQ/L (ref 23–33)
COLLECTED BY:: ABNORMAL
COLLECTED BY:: ABNORMAL
COLOR UR: YELLOW
CREAT SERPL-MCNC: 0.6 MG/DL (ref 0.4–1.2)
CREAT SERPL-MCNC: 0.7 MG/DL (ref 0.4–1.2)
CRYSTALS URNS QL MICRO: ABNORMAL
DEPRECATED RDW RBC AUTO: 68.6 FL (ref 35–45)
DEPRECATED RDW RBC AUTO: 69.8 FL (ref 35–45)
DEVICE: ABNORMAL
DEVICE: ABNORMAL
ECHO AO ASC DIAM: 3.4 CM
ECHO AV CUSP MM: 1.9 CM
ECHO LA AREA 2C: 22.1 CM2
ECHO LA AREA 4C: 24.8 CM2
ECHO LA DIAMETER: 3.8 CM
ECHO LA MAJOR AXIS: 6.9 CM
ECHO LA MINOR AXIS: 6.1 CM
ECHO LA VOL BP: 73 ML (ref 18–58)
ECHO LA VOL MOD A2C: 64 ML (ref 18–58)
ECHO LA VOL MOD A4C: 74 ML (ref 18–58)
ECHO LV EDV A2C: 135 ML
ECHO LV EDV A4C: 101 ML
ECHO LV EJECTION FRACTION A2C: 62 %
ECHO LV EJECTION FRACTION A4C: 60 %
ECHO LV EJECTION FRACTION BIPLANE: 60 % (ref 55–100)
ECHO LV ESV A2C: 51 ML
ECHO LV ESV A4C: 41 ML
ECHO LV FRACTIONAL SHORTENING: 36 % (ref 28–44)
ECHO LV INTERNAL DIMENSION DIASTOLIC: 3.9 CM (ref 4.2–5.9)
ECHO LV INTERNAL DIMENSION SYSTOLIC: 2.5 CM
ECHO LV IVSD: 1.5 CM (ref 0.6–1)
ECHO LV MASS 2D: 224.6 G (ref 88–224)
ECHO LV POSTERIOR WALL DIASTOLIC: 1.5 CM (ref 0.6–1)
ECHO LV RELATIVE WALL THICKNESS RATIO: 0.77
ECHO RV INTERNAL DIMENSION: 4.5 CM
ECHO RV TAPSE: 2.3 CM (ref 1.7–?)
EKG ATRIAL RATE: 97 BPM
EKG P AXIS: 59 DEGREES
EKG P-R INTERVAL: 144 MS
EKG Q-T INTERVAL: 356 MS
EKG QRS DURATION: 86 MS
EKG QTC CALCULATION (BAZETT): 452 MS
EKG R AXIS: 142 DEGREES
EKG T AXIS: 31 DEGREES
EKG VENTRICULAR RATE: 97 BPM
EOSINOPHIL NFR BLD AUTO: 0.5 %
EOSINOPHIL NFR BLD AUTO: 1 %
EOSINOPHILS ABSOLUTE: 0 THOU/MM3 (ref 0–0.4)
EOSINOPHILS ABSOLUTE: 0.1 THOU/MM3 (ref 0–0.4)
EPITHELIAL CELLS, UA: ABNORMAL /HPF
ERYTHROCYTE [DISTWIDTH] IN BLOOD BY AUTOMATED COUNT: 17 % (ref 11.5–14.5)
ERYTHROCYTE [DISTWIDTH] IN BLOOD BY AUTOMATED COUNT: 17.2 % (ref 11.5–14.5)
FIO2 ON VENT O2 ANALYZER: 50 %
FIO2 ON VENT O2 ANALYZER: 50 %
FLUAV RNA RESP QL NAA+PROBE: NOT DETECTED
FLUBV RNA RESP QL NAA+PROBE: NOT DETECTED
GFR SERPL CREATININE-BSD FRML MDRD: > 90 ML/MIN/1.73M2
GFR SERPL CREATININE-BSD FRML MDRD: > 90 ML/MIN/1.73M2
GLUCOSE SERPL-MCNC: 58 MG/DL (ref 70–108)
GLUCOSE SERPL-MCNC: 73 MG/DL (ref 70–108)
GLUCOSE UR QL STRIP.AUTO: NEGATIVE MG/DL
HCO3 BLDA-SCNC: 48 MMOL/L (ref 23–28)
HCO3 BLDA-SCNC: 59 MMOL/L (ref 23–28)
HCT VFR BLD AUTO: 50.6 % (ref 42–52)
HCT VFR BLD AUTO: 51.6 % (ref 42–52)
HGB BLD-MCNC: 13.9 GM/DL (ref 14–18)
HGB BLD-MCNC: 14.2 GM/DL (ref 14–18)
HGB UR QL STRIP.AUTO: NEGATIVE
HYPOCHROMIA BLD QL SMEAR: PRESENT
HYPOCHROMIA BLD QL SMEAR: PRESENT
IMM GRANULOCYTES # BLD AUTO: 0.04 THOU/MM3 (ref 0–0.07)
IMM GRANULOCYTES # BLD AUTO: 0.05 THOU/MM3 (ref 0–0.07)
IMM GRANULOCYTES NFR BLD AUTO: 0.5 %
IMM GRANULOCYTES NFR BLD AUTO: 0.6 %
KETONES UR QL STRIP.AUTO: NEGATIVE
LEUKOCYTE ESTERASE UR QL STRIP.AUTO: NEGATIVE
LYMPHOCYTES ABSOLUTE: 0.7 THOU/MM3 (ref 1–4.8)
LYMPHOCYTES ABSOLUTE: 0.7 THOU/MM3 (ref 1–4.8)
LYMPHOCYTES NFR BLD AUTO: 7.6 %
LYMPHOCYTES NFR BLD AUTO: 9 %
MCH RBC QN AUTO: 29.9 PG (ref 26–33)
MCH RBC QN AUTO: 30.2 PG (ref 26–33)
MCHC RBC AUTO-ENTMCNC: 27.5 GM/DL (ref 32.2–35.5)
MCHC RBC AUTO-ENTMCNC: 27.5 GM/DL (ref 32.2–35.5)
MCV RBC AUTO: 108.8 FL (ref 80–94)
MCV RBC AUTO: 109.8 FL (ref 80–94)
MONOCYTES ABSOLUTE: 0.8 THOU/MM3 (ref 0.4–1.3)
MONOCYTES ABSOLUTE: 0.9 THOU/MM3 (ref 0.4–1.3)
MONOCYTES NFR BLD AUTO: 10.1 %
MONOCYTES NFR BLD AUTO: 10.6 %
MRSA DNA SPEC QL NAA+PROBE: NEGATIVE
NEUTROPHILS ABSOLUTE: 6.3 THOU/MM3 (ref 1.8–7.7)
NEUTROPHILS ABSOLUTE: 7.2 THOU/MM3 (ref 1.8–7.7)
NEUTROPHILS NFR BLD AUTO: 78.8 %
NEUTROPHILS NFR BLD AUTO: 80.3 %
NITRITE UR QL STRIP.AUTO: NEGATIVE
NRBC BLD AUTO-RTO: 0 /100 WBC
NRBC BLD AUTO-RTO: 0 /100 WBC
PCO2 BLDA: 100 MMHG (ref 35–45)
PCO2 BLDA: 87 MMHG (ref 35–45)
PEEP SETTING VENT: 8 MMHG
PEEP SETTING VENT: 8 MMHG
PH BLDA: 7.35 [PH] (ref 7.35–7.45)
PH BLDA: 7.38 [PH] (ref 7.35–7.45)
PH UR STRIP.AUTO: 5.5 [PH] (ref 5–9)
PLATELET # BLD AUTO: 267 THOU/MM3 (ref 130–400)
PLATELET # BLD AUTO: 277 THOU/MM3 (ref 130–400)
PLATELET BLD QL SMEAR: ADEQUATE
PMV BLD AUTO: 10.5 FL (ref 9.4–12.4)
PMV BLD AUTO: 10.8 FL (ref 9.4–12.4)
PO2 BLDA: 63 MMHG (ref 71–104)
PO2 BLDA: 70 MMHG (ref 71–104)
POTASSIUM SERPL-SCNC: 4.4 MEQ/L (ref 3.5–5.2)
POTASSIUM SERPL-SCNC: 5 MEQ/L (ref 3.5–5.2)
PRESSURE SUPPORT SETTING VENT: 10 CMH2O
PRESSURE SUPPORT SETTING VENT: 18 CMH2O
PROT UR STRIP.AUTO-MCNC: 100 MG/DL
RBC # BLD AUTO: 4.65 MILL/MM3 (ref 4.7–6.1)
RBC # BLD AUTO: 4.7 MILL/MM3 (ref 4.7–6.1)
RBC #/AREA URNS HPF: ABNORMAL /HPF
RENAL EPI CELLS #/AREA URNS HPF: ABNORMAL /[HPF]
SAO2 % BLDA: 89 %
SAO2 % BLDA: 91 %
SARS-COV-2 RNA RESP QL NAA+PROBE: NOT DETECTED
SCAN OF BLOOD SMEAR: NORMAL
SODIUM SERPL-SCNC: 143 MEQ/L (ref 135–145)
SODIUM SERPL-SCNC: 144 MEQ/L (ref 135–145)
SPECIFIC GRAVITY UA: 1.01 (ref 1–1.03)
STOMATOCYTES: ABNORMAL
T4 FREE SERPL-MCNC: 1.04 NG/DL (ref 0.93–1.68)
TSH SERPL DL<=0.005 MIU/L-ACNC: 0.58 UIU/ML (ref 0.4–4.2)
UROBILINOGEN, URINE: 0.2 EU/DL (ref 0–1)
VENTILATION MODE VENT: ABNORMAL
VENTILATION MODE VENT: ABNORMAL
WBC # BLD AUTO: 8 THOU/MM3 (ref 4.8–10.8)
WBC # BLD AUTO: 9 THOU/MM3 (ref 4.8–10.8)
WBC #/AREA URNS HPF: ABNORMAL /HPF
YEAST LIKE FUNGI URNS QL MICRO: ABNORMAL

## 2024-12-17 PROCEDURE — 2580000003 HC RX 258

## 2024-12-17 PROCEDURE — 36600 WITHDRAWAL OF ARTERIAL BLOOD: CPT

## 2024-12-17 PROCEDURE — 6360000002 HC RX W HCPCS

## 2024-12-17 PROCEDURE — 80048 BASIC METABOLIC PNL TOTAL CA: CPT

## 2024-12-17 PROCEDURE — 87641 MR-STAPH DNA AMP PROBE: CPT

## 2024-12-17 PROCEDURE — 82140 ASSAY OF AMMONIA: CPT

## 2024-12-17 PROCEDURE — 96366 THER/PROPH/DIAG IV INF ADDON: CPT

## 2024-12-17 PROCEDURE — 6360000004 HC RX CONTRAST MEDICATION

## 2024-12-17 PROCEDURE — 5A09457 ASSISTANCE WITH RESPIRATORY VENTILATION, 24-96 CONSECUTIVE HOURS, CONTINUOUS POSITIVE AIRWAY PRESSURE: ICD-10-PCS | Performed by: STUDENT IN AN ORGANIZED HEALTH CARE EDUCATION/TRAINING PROGRAM

## 2024-12-17 PROCEDURE — 93010 ELECTROCARDIOGRAM REPORT: CPT | Performed by: NUCLEAR MEDICINE

## 2024-12-17 PROCEDURE — 6370000000 HC RX 637 (ALT 250 FOR IP)

## 2024-12-17 PROCEDURE — 99223 1ST HOSP IP/OBS HIGH 75: CPT | Performed by: STUDENT IN AN ORGANIZED HEALTH CARE EDUCATION/TRAINING PROGRAM

## 2024-12-17 PROCEDURE — 84443 ASSAY THYROID STIM HORMONE: CPT

## 2024-12-17 PROCEDURE — 82803 BLOOD GASES ANY COMBINATION: CPT

## 2024-12-17 PROCEDURE — 71275 CT ANGIOGRAPHY CHEST: CPT

## 2024-12-17 PROCEDURE — 94640 AIRWAY INHALATION TREATMENT: CPT

## 2024-12-17 PROCEDURE — 2700000000 HC OXYGEN THERAPY PER DAY

## 2024-12-17 PROCEDURE — 6360000002 HC RX W HCPCS: Performed by: INTERNAL MEDICINE

## 2024-12-17 PROCEDURE — 99223 1ST HOSP IP/OBS HIGH 75: CPT | Performed by: INTERNAL MEDICINE

## 2024-12-17 PROCEDURE — 94660 CPAP INITIATION&MGMT: CPT

## 2024-12-17 PROCEDURE — 81001 URINALYSIS AUTO W/SCOPE: CPT

## 2024-12-17 PROCEDURE — 93307 TTE W/O DOPPLER COMPLETE: CPT | Performed by: NUCLEAR MEDICINE

## 2024-12-17 PROCEDURE — 2500000003 HC RX 250 WO HCPCS

## 2024-12-17 PROCEDURE — 85025 COMPLETE CBC W/AUTO DIFF WBC: CPT

## 2024-12-17 PROCEDURE — 6370000000 HC RX 637 (ALT 250 FOR IP): Performed by: INTERNAL MEDICINE

## 2024-12-17 PROCEDURE — 93307 TTE W/O DOPPLER COMPLETE: CPT

## 2024-12-17 PROCEDURE — 2060000000 HC ICU INTERMEDIATE R&B

## 2024-12-17 PROCEDURE — 84439 ASSAY OF FREE THYROXINE: CPT

## 2024-12-17 PROCEDURE — 36415 COLL VENOUS BLD VENIPUNCTURE: CPT

## 2024-12-17 PROCEDURE — 87636 SARSCOV2 & INF A&B AMP PRB: CPT

## 2024-12-17 RX ORDER — ALBUTEROL SULFATE 90 UG/1
2 INHALANT RESPIRATORY (INHALATION) EVERY 4 HOURS PRN
Status: DISCONTINUED | OUTPATIENT
Start: 2024-12-17 | End: 2024-12-21 | Stop reason: HOSPADM

## 2024-12-17 RX ORDER — POTASSIUM CHLORIDE 1500 MG/1
20 TABLET, EXTENDED RELEASE ORAL DAILY
Status: DISCONTINUED | OUTPATIENT
Start: 2024-12-17 | End: 2024-12-21 | Stop reason: HOSPADM

## 2024-12-17 RX ORDER — BUMETANIDE 0.25 MG/ML
1 INJECTION, SOLUTION INTRAMUSCULAR; INTRAVENOUS 2 TIMES DAILY
Status: DISCONTINUED | OUTPATIENT
Start: 2024-12-17 | End: 2024-12-17

## 2024-12-17 RX ORDER — LOSARTAN POTASSIUM 50 MG/1
50 TABLET ORAL DAILY
Status: DISCONTINUED | OUTPATIENT
Start: 2024-12-17 | End: 2024-12-21 | Stop reason: HOSPADM

## 2024-12-17 RX ORDER — ARFORMOTEROL TARTRATE 15 UG/2ML
15 SOLUTION RESPIRATORY (INHALATION)
Status: DISCONTINUED | OUTPATIENT
Start: 2024-12-17 | End: 2024-12-21 | Stop reason: HOSPADM

## 2024-12-17 RX ORDER — SODIUM CHLORIDE 9 MG/ML
INJECTION, SOLUTION INTRAVENOUS PRN
Status: DISCONTINUED | OUTPATIENT
Start: 2024-12-17 | End: 2024-12-21 | Stop reason: HOSPADM

## 2024-12-17 RX ORDER — MAGNESIUM SULFATE IN WATER 40 MG/ML
2000 INJECTION, SOLUTION INTRAVENOUS PRN
Status: DISCONTINUED | OUTPATIENT
Start: 2024-12-17 | End: 2024-12-21 | Stop reason: HOSPADM

## 2024-12-17 RX ORDER — POTASSIUM CHLORIDE 1500 MG/1
40 TABLET, EXTENDED RELEASE ORAL PRN
Status: DISCONTINUED | OUTPATIENT
Start: 2024-12-17 | End: 2024-12-21 | Stop reason: HOSPADM

## 2024-12-17 RX ORDER — ALBUTEROL SULFATE 0.83 MG/ML
2.5 SOLUTION RESPIRATORY (INHALATION)
Status: DISCONTINUED | OUTPATIENT
Start: 2024-12-17 | End: 2024-12-21 | Stop reason: HOSPADM

## 2024-12-17 RX ORDER — BUMETANIDE 0.25 MG/ML
2 INJECTION, SOLUTION INTRAMUSCULAR; INTRAVENOUS 2 TIMES DAILY
Status: DISCONTINUED | OUTPATIENT
Start: 2024-12-17 | End: 2024-12-18

## 2024-12-17 RX ORDER — ONDANSETRON 4 MG/1
4 TABLET, ORALLY DISINTEGRATING ORAL EVERY 8 HOURS PRN
Status: DISCONTINUED | OUTPATIENT
Start: 2024-12-17 | End: 2024-12-21 | Stop reason: HOSPADM

## 2024-12-17 RX ORDER — NICOTINE 21 MG/24HR
1 PATCH, TRANSDERMAL 24 HOURS TRANSDERMAL DAILY
Status: DISCONTINUED | OUTPATIENT
Start: 2024-12-18 | End: 2024-12-21 | Stop reason: HOSPADM

## 2024-12-17 RX ORDER — HALOPERIDOL 5 MG/ML
2 INJECTION INTRAMUSCULAR ONCE
Status: COMPLETED | OUTPATIENT
Start: 2024-12-17 | End: 2024-12-17

## 2024-12-17 RX ORDER — ACETAMINOPHEN 650 MG/1
650 SUPPOSITORY RECTAL EVERY 6 HOURS PRN
Status: DISCONTINUED | OUTPATIENT
Start: 2024-12-17 | End: 2024-12-21 | Stop reason: HOSPADM

## 2024-12-17 RX ORDER — SODIUM CHLORIDE 0.9 % (FLUSH) 0.9 %
5-40 SYRINGE (ML) INJECTION PRN
Status: DISCONTINUED | OUTPATIENT
Start: 2024-12-17 | End: 2024-12-21 | Stop reason: HOSPADM

## 2024-12-17 RX ORDER — ENOXAPARIN SODIUM 100 MG/ML
40 INJECTION SUBCUTANEOUS DAILY
Status: DISCONTINUED | OUTPATIENT
Start: 2024-12-17 | End: 2024-12-21 | Stop reason: HOSPADM

## 2024-12-17 RX ORDER — ALBUTEROL SULFATE 0.83 MG/ML
2.5 SOLUTION RESPIRATORY (INHALATION)
Status: DISCONTINUED | OUTPATIENT
Start: 2024-12-17 | End: 2024-12-17

## 2024-12-17 RX ORDER — ONDANSETRON 2 MG/ML
4 INJECTION INTRAMUSCULAR; INTRAVENOUS EVERY 6 HOURS PRN
Status: DISCONTINUED | OUTPATIENT
Start: 2024-12-17 | End: 2024-12-21 | Stop reason: HOSPADM

## 2024-12-17 RX ORDER — METOPROLOL SUCCINATE 25 MG/1
12.5 TABLET, EXTENDED RELEASE ORAL EVERY EVENING
Status: DISCONTINUED | OUTPATIENT
Start: 2024-12-17 | End: 2024-12-21 | Stop reason: HOSPADM

## 2024-12-17 RX ORDER — IPRATROPIUM BROMIDE AND ALBUTEROL SULFATE 2.5; .5 MG/3ML; MG/3ML
1 SOLUTION RESPIRATORY (INHALATION)
Status: DISCONTINUED | OUTPATIENT
Start: 2024-12-17 | End: 2024-12-17 | Stop reason: CLARIF

## 2024-12-17 RX ORDER — ALBUTEROL SULFATE 0.83 MG/ML
2.5 SOLUTION RESPIRATORY (INHALATION)
Status: DISCONTINUED | OUTPATIENT
Start: 2024-12-17 | End: 2024-12-18

## 2024-12-17 RX ORDER — BUMETANIDE 0.25 MG/ML
1 INJECTION, SOLUTION INTRAMUSCULAR; INTRAVENOUS ONCE
Status: COMPLETED | OUTPATIENT
Start: 2024-12-17 | End: 2024-12-17

## 2024-12-17 RX ORDER — AZITHROMYCIN 250 MG/1
250 TABLET, FILM COATED ORAL DAILY
Status: DISCONTINUED | OUTPATIENT
Start: 2024-12-17 | End: 2024-12-21 | Stop reason: HOSPADM

## 2024-12-17 RX ORDER — POLYETHYLENE GLYCOL 3350 17 G/17G
17 POWDER, FOR SOLUTION ORAL DAILY PRN
Status: DISCONTINUED | OUTPATIENT
Start: 2024-12-17 | End: 2024-12-21 | Stop reason: HOSPADM

## 2024-12-17 RX ORDER — BUDESONIDE 0.5 MG/2ML
0.5 INHALANT ORAL
Status: DISCONTINUED | OUTPATIENT
Start: 2024-12-17 | End: 2024-12-21 | Stop reason: HOSPADM

## 2024-12-17 RX ORDER — ALBUTEROL SULFATE 0.83 MG/ML
2.5 SOLUTION RESPIRATORY (INHALATION) EVERY 4 HOURS
Status: DISCONTINUED | OUTPATIENT
Start: 2024-12-17 | End: 2024-12-17

## 2024-12-17 RX ORDER — POTASSIUM CHLORIDE 7.45 MG/ML
10 INJECTION INTRAVENOUS PRN
Status: DISCONTINUED | OUTPATIENT
Start: 2024-12-17 | End: 2024-12-21 | Stop reason: HOSPADM

## 2024-12-17 RX ORDER — IOPAMIDOL 755 MG/ML
80 INJECTION, SOLUTION INTRAVASCULAR
Status: COMPLETED | OUTPATIENT
Start: 2024-12-17 | End: 2024-12-17

## 2024-12-17 RX ORDER — BUDESONIDE AND FORMOTEROL FUMARATE DIHYDRATE 160; 4.5 UG/1; UG/1
2 AEROSOL RESPIRATORY (INHALATION)
Status: DISCONTINUED | OUTPATIENT
Start: 2024-12-17 | End: 2024-12-17

## 2024-12-17 RX ORDER — ACETAMINOPHEN 325 MG/1
650 TABLET ORAL EVERY 6 HOURS PRN
Status: DISCONTINUED | OUTPATIENT
Start: 2024-12-17 | End: 2024-12-21 | Stop reason: HOSPADM

## 2024-12-17 RX ORDER — ASPIRIN 81 MG/1
81 TABLET ORAL DAILY
Status: DISCONTINUED | OUTPATIENT
Start: 2024-12-17 | End: 2024-12-21 | Stop reason: HOSPADM

## 2024-12-17 RX ORDER — SODIUM CHLORIDE 0.9 % (FLUSH) 0.9 %
5-40 SYRINGE (ML) INJECTION EVERY 12 HOURS SCHEDULED
Status: DISCONTINUED | OUTPATIENT
Start: 2024-12-17 | End: 2024-12-21 | Stop reason: HOSPADM

## 2024-12-17 RX ADMIN — ALBUTEROL SULFATE 2.5 MG: 2.5 SOLUTION RESPIRATORY (INHALATION) at 08:34

## 2024-12-17 RX ADMIN — IPRATROPIUM BROMIDE AND ALBUTEROL SULFATE 1 DOSE: .5; 3 SOLUTION RESPIRATORY (INHALATION) at 12:10

## 2024-12-17 RX ADMIN — ENOXAPARIN SODIUM 40 MG: 100 INJECTION SUBCUTANEOUS at 08:27

## 2024-12-17 RX ADMIN — BUDESONIDE AND FORMOTEROL FUMARATE DIHYDRATE 2 PUFF: 160; 4.5 AEROSOL RESPIRATORY (INHALATION) at 08:35

## 2024-12-17 RX ADMIN — ARFORMOTEROL TARTRATE 15 MCG: 15 SOLUTION RESPIRATORY (INHALATION) at 20:39

## 2024-12-17 RX ADMIN — LOSARTAN POTASSIUM 50 MG: 50 TABLET, FILM COATED ORAL at 08:31

## 2024-12-17 RX ADMIN — SODIUM CHLORIDE, PRESERVATIVE FREE 10 ML: 5 INJECTION INTRAVENOUS at 03:53

## 2024-12-17 RX ADMIN — BUMETANIDE 2 MG: 0.25 INJECTION INTRAMUSCULAR; INTRAVENOUS at 08:27

## 2024-12-17 RX ADMIN — AZITHROMYCIN DIHYDRATE 250 MG: 250 TABLET ORAL at 17:30

## 2024-12-17 RX ADMIN — ASPIRIN 81 MG: 81 TABLET, COATED ORAL at 08:27

## 2024-12-17 RX ADMIN — IOPAMIDOL 80 ML: 755 INJECTION, SOLUTION INTRAVENOUS at 13:57

## 2024-12-17 RX ADMIN — POTASSIUM CHLORIDE 20 MEQ: 1500 TABLET, EXTENDED RELEASE ORAL at 08:27

## 2024-12-17 RX ADMIN — WATER 40 MG: 1 INJECTION INTRAMUSCULAR; INTRAVENOUS; SUBCUTANEOUS at 12:50

## 2024-12-17 RX ADMIN — WATER 1000 MG: 1 INJECTION INTRAMUSCULAR; INTRAVENOUS; SUBCUTANEOUS at 22:45

## 2024-12-17 RX ADMIN — SODIUM CHLORIDE, PRESERVATIVE FREE 10 ML: 5 INJECTION INTRAVENOUS at 08:27

## 2024-12-17 RX ADMIN — METOPROLOL SUCCINATE 12.5 MG: 25 TABLET, FILM COATED, EXTENDED RELEASE ORAL at 17:30

## 2024-12-17 RX ADMIN — BUMETANIDE 1 MG: 0.25 INJECTION INTRAMUSCULAR; INTRAVENOUS at 03:53

## 2024-12-17 RX ADMIN — IPRATROPIUM BROMIDE AND ALBUTEROL SULFATE 1 DOSE: .5; 3 SOLUTION RESPIRATORY (INHALATION) at 15:05

## 2024-12-17 RX ADMIN — BUDESONIDE 500 MCG: 0.5 INHALANT RESPIRATORY (INHALATION) at 20:44

## 2024-12-17 RX ADMIN — SODIUM CHLORIDE, PRESERVATIVE FREE 10 ML: 5 INJECTION INTRAVENOUS at 20:44

## 2024-12-17 RX ADMIN — ALBUTEROL SULFATE 2.5 MG: 2.5 SOLUTION RESPIRATORY (INHALATION) at 20:36

## 2024-12-17 RX ADMIN — HALOPERIDOL LACTATE 2 MG: 5 INJECTION, SOLUTION INTRAMUSCULAR at 23:16

## 2024-12-17 RX ADMIN — ALBUTEROL SULFATE 2.5 MG: 2.5 SOLUTION RESPIRATORY (INHALATION) at 06:19

## 2024-12-17 NOTE — PLAN OF CARE
Problem: Discharge Planning  Goal: Discharge to home or other facility with appropriate resources  Outcome: Progressing   See SW note

## 2024-12-17 NOTE — ED TRIAGE NOTES
Patient into the ED by wheelchair with his daughter with c/o altered mental status. Daughter states her dad became confused 1-2 days ago. She reports he normally wears 2 L of oxygen, but states her uncle put him on 4 L due to shortness of breath. Patient A&O to self, place and situation. Pt 83% on baseline of 4 L - titrated to 5 L. EKG complete.

## 2024-12-17 NOTE — PLAN OF CARE
Problem: Chronic Conditions and Co-morbidities  Goal: Patient's chronic conditions and co-morbidity symptoms are monitored and maintained or improved  Outcome: Progressing     Problem: Discharge Planning  Goal: Discharge to home or other facility with appropriate resources  12/17/2024 1834 by Susu Navarrete RN  Outcome: Progressing  12/17/2024 1423 by Julien Mckinney, NATHANW  Outcome: Progressing     Problem: Safety - Adult  Goal: Free from fall injury  Outcome: Progressing

## 2024-12-17 NOTE — CARE COORDINATION
DISCHARGE PLANNING EVALUATION  12/17/24, 2:21 PM EST    Reason for Referral: Discharge planning  Decision Maker: Self  Current Services: None  New Services Requested: None  Family/ Social/ Home environment: Patient lives with his daughter who is 14 years old. Patient reported that he has supportive family and friends. He reported that his daughter assists him with cooking and cleaning. He reported that he is able to manage his personal care needs himself.   Payment Source: [facility] Medicaid  Transportation at Discharge: Family  Post-acute (PAC) provider list was provided to patient. Patient was informed of their freedom to choose PAC provider. Discussed and offered to show the patient the relevant PAC Providers quality and resource use measures on Medicare Compare web site via computer based on patient's goals of care and treatment preferences. Questions regarding selection process were answered.      Teach Back Method used with patient regarding care plan and needs  Patient verbalized understanding of the plan of care and contribute to goal setting.       Patient preferences and discharge plan: Patient plans to return home at discharge with his daughter. He reported that his daughter is staying with his brother Abdullahi while he is in the hospital. He denied any discharge needs.     Electronically signed by ALEXANDER Mclean on 12/17/2024 at 2:21 PM

## 2024-12-17 NOTE — H&P
Hospitalist H&P Note  Internal Medicine Resident      Patient: Fredo Rod 54 y.o. male      Unit/Bed: ELISE /ELISE    Admit Date: 12/16/2024      ASSESSMENT AND PLAN  Active Problems  Acute hypoxic respiratory failure 2/2 suspected CHF exacerbation 2/2 medication noncompliance: Patient has had 2 weeks of SOB and lower extremity edema.  1 week history of abdominal distention.  Patient quit taking his Lasix 2 weeks ago on his own without any reason given.  BNP 1600 on arrival VBG in ED showed pCO2 69, pO2 34, HCO3 45, pH 7.42.  He was initiated on BiPAP.  CXR shows interstitial edema, small left pleural effusion.  -Limited echo pending  -Wean oxygen as tolerable to keep O2 saturation > 92%  -Strict I's and O's, daily weights  -GDMT: Metoprolol and losartan  -Diuretics: Bumex 2 mg twice daily  -Potassium 20 mill equivalents daily    Suspected abdominal wall cellulitis: Patient's abdominal wall is erythematous with indurated skin.  Patient did not endorse any abdominal pain.  Nonpurulent.  Patient was given ceftriaxone and vancomycin in ED.  -Ceftriaxone    Chronic Conditions (reviewed and stable unless otherwise stated)  COPD: Symbicort  HTN: Losartan and metoprolol        ===================================================================    Chief Complaint: Lower extremity      HPI / Hospital Course:  52 male with PMH of LINDA, pulmonary hypertension, COPD.  He presents today with lower extremity edema.  This has been ongoing for over 2 weeks, he also endorses SOB during this time.  Patient's family at bedside also states that his abdomen has been distended for over a week.  Patient has been taking his medications except for his diuretic which he stopped on his own 2 weeks ago not stating a reason why.  Today while in the ED it was noticed that the patient's abdomen was erythematous states he has longstanding diarrhea.  Denies fever, chills, lightheadedness, chest pain, abdominal pain, nausea, vomiting,

## 2024-12-17 NOTE — PROGRESS NOTES
Attempted to complete admission and med req. Patient on BiPap and unable to answer questions due to lethargy. Patients daughter at bedside but unable to answer any questions as well.

## 2024-12-17 NOTE — PLAN OF CARE
Problem: Chronic Conditions and Co-morbidities  Goal: Patient's chronic conditions and co-morbidity symptoms are monitored and maintained or improved  12/17/2024 1836 by Susu Navarrete RN  Outcome: Progressing  12/17/2024 1834 by Susu Navarrete RN  Outcome: Progressing     Problem: Discharge Planning  Goal: Discharge to home or other facility with appropriate resources  12/17/2024 1836 by Susu Navarrete RN  Outcome: Progressing  12/17/2024 1834 by Susu Navarrete RN  Outcome: Progressing  12/17/2024 1423 by Julien Mckinney, ALEXANDER  Outcome: Progressing     Problem: Safety - Adult  Goal: Free from fall injury  12/17/2024 1836 by Susu Navarrete RN  Outcome: Progressing  12/17/2024 1834 by Susu Navarrete RN  Outcome: Progressing

## 2024-12-17 NOTE — ED NOTES
Patient transported to  [facility] by cart in stable condition.   Patient monitored on cardiac telemetry.   Patient on O2 via BiPap  IV infusing and line is patent.

## 2024-12-17 NOTE — PROGRESS NOTES
Patient received sacramental anointing by a . If you are in need of  support, please call [phone]. If you are in need of a  after 6:30pm, please call the house supervisor for the on-call .

## 2024-12-17 NOTE — CARE COORDINATION
Case Management Assessment Initial Evaluation    Date/Time of Evaluation: 2024 8:52 AM  Assessment Completed by: Iesha Villanueva RN    If patient is discharged prior to next notation, then this note serves as note for discharge by case management.    Patient Name: Fredo Rod                   YOB: 1970  Diagnosis: Altered mental status, unspecified altered mental status type [R41.82]  Hypervolemia, unspecified hypervolemia type [E87.70]  Cellulitis, unspecified cellulitis site [L03.90]  Acute hypoxic respiratory failure [J96.01]  Acute hypoxic on chronic hypercapnic respiratory failure [J96.01, J96.12]                   Date / Time: 2024  9:16 PM  Location: [facility]     Patient Admission Status: Inpatient   Readmission Risk Low 0-14, Mod 15-19), High > 20: Readmission Risk Score: 14.1    Current PCP: Mohinder Page MD  Health Care Decision Makers:   Primary Decision Maker: Berna Rod - Parent - [phone]    Additional Case Management Notes: Presented to ED with c/o lower extremity edema and SOB for 2 weeks. Also reports abdominal distention for over a week, longstanding diarrhea. Pt stopped taking his diuretic 2 weeks ago, no reason given why he stopped. Abdomen erythematous. Placed on bipap. Admitted to [address] [place] [address]. Suspected abdominal wall cellulitis. Pulmonology consulted for severe hypercapnea with altered mentation. IV bumex bid stopped today. Echo and CTA chest ordered.     On bipap with 50% FIO2. Afebrile. NSR. Oriented to person and place. Telemetry, external urinary catheter. IV rocephin, Inhaler, lovenox, nebs, Electrolyte replacement protocols. Received 1 mg IV bumex x1 and IV vancomycin x1 yesterday. Received IV bumex and 40 mg IV solumedrol x1 yesterday. Rapid flu & COVID 19 were negative. Na+ 147 - now 143, NT Pro-bnp 1612, trop 19, alk phos 134, hgb 13.9.     Procedures: none    Imagin/16 CXR: 1. Mild interstitial edema/pneumonitis.  2.

## 2024-12-17 NOTE — PLAN OF CARE
Plan of care:    Subjective: Patient seen and evaluated bedside. A&Ox3. Vitally stable saturating at 90% on BiPAP; FiO2 70%.  Per nursing patient was swabbed for COVID and flu; patient only alert to self earlier and is impulsive. Patient currently reports doing okay. Reports chest pain and SOB. Patient currently n.p.o. Reports normal urination and bowel movements. Reports not sleeping good.  Denies fever, nausea/vomiting, constipation/diarrhea, headache, dizziness, fatigue, lightheadedness/tingling. Reports chest pain, SOB.       Plan to be continue as previously stated in H&P note; appreciate changes:    Likely to be COPD exacerbation over CHF exacerbation. Patient currently receiving high oxygen requirement. Pulmonology consulted; recommended continued diuresis. Continue diuresis with home medication. Recommended continuing BiPAP 18/8 at bedtime and with naps. FEV1 less than 1 L; switching Symbicort to nebulized ICS.  Pending echocardiogram results. Continue DuoNebs every 6 hours while awake.  Patient given course of Solu-Medrol; recommended switching to prednisone 40 Mg patient can take p.o. medications. Patient currently on Rocephin and azithromycin.      Electronically signed by Nanci Gould DO on 12/17/2024 at 5:38 PM

## 2024-12-17 NOTE — PROGRESS NOTES
Although patient alert to self, place and month he continues to pull off bipap, ecg leads and other monitoring devices as well as getting up without assistance. Attempted use of virtual , unsuccessful. Live sitter at bedside at this time.

## 2024-12-17 NOTE — ED NOTES
ED to inpatient nurses report      Chief Complaint:  Chief Complaint   Patient presents with    Altered Mental Status     Present to ED from: home    MOA:     LOC: alert and orientated to name and place  Mobility: Requires assistance * 1  Oxygen Baseline: 2 L NC    Current needs required: Bipap     Code Status:   Prior    What abnormal results were found and what did you give/do to treat them? Hypercapnic respiratory failure, Hypervolemia - Bumex, Rocephin, Vancocin       Mental Status:  Level of Consciousness: Alert (0)    Psych Assessment:        Vitals:  Patient Vitals for the past 24 hrs:   BP Temp Temp src Pulse Resp SpO2 Weight   12/17/24 0124 -- -- -- -- 29 -- --   12/17/24 0123 118/71 -- -- 87 -- 95 % --   12/17/24 0108 (!) 149/80 -- -- 91 -- 96 % --   12/17/24 0105 -- -- -- -- 26 -- --   12/17/24 0041 -- -- -- 83 28 98 % --   12/17/24 0004 -- -- -- 95 27 97 % --   12/16/24 2342 (!) 164/83 -- -- 92 -- -- --   12/16/24 2341 -- -- -- -- (!) 31 -- --   12/16/24 2327 (!) 148/74 -- -- 84 29 93 % --   12/16/24 2312 128/73 -- -- 83 -- 94 % --   12/16/24 2253 -- -- -- 93 25 95 % --   12/16/24 2229 -- -- -- -- 28 -- --   12/16/24 2227 (!) 165/85 -- -- 96 -- 90 % --   12/16/24 2213 -- -- -- -- 30 -- --   12/16/24 2212 (!) 169/117 -- -- 100 -- -- --   12/16/24 2146 -- -- -- -- 29 -- --   12/16/24 2142 (!) 164/94 -- -- 94 -- 90 % --   12/16/24 2124 (!) 160/92 98 °F (36.7 °C) Oral 89 28 90 % 99.8 kg (220 lb)        LDAs:   Peripheral IV 12/16/24 Left;Posterior;Distal Forearm (Active)   Site Assessment Clean, dry & intact 12/16/24 2141   Line Status Normal saline locked;Specimen collected;Brisk blood return 12/16/24 2141       Ambulatory Status:  No data recorded    Diagnosis:  DISPOSITION Admitted 12/17/2024 01:30:46 AM   Final diagnoses:   Altered mental status, unspecified altered mental status type   Acute hypoxic on chronic hypercapnic respiratory failure   Hypervolemia, unspecified hypervolemia type   Cellulitis,

## 2024-12-17 NOTE — ED NOTES
Pt assisted to bedside commode. Pt returned to bed. Respirations unlabored while on bipap. Call light within reach.

## 2024-12-17 NOTE — ED NOTES
This RN assisted pt to urinal. Pt & family deny further needs at this time. Call light within reach.

## 2024-12-17 NOTE — RT PROTOCOL NOTE
RT Inhaler-Nebulizer Bronchodilator Protocol Note    There is a bronchodilator order in the chart from a provider indicating to follow the RT Bronchodilator Protocol and there is an “Initiate RT Inhaler-Nebulizer Bronchodilator Protocol” order as well (see protocol at bottom of note).    CXR Findings:  XR CHEST PORTABLE    Result Date: 12/16/2024  Impression: 1. Mild interstitial edema/pneumonitis. 2. Unchanged cardiomegaly. 3. Small left pleural effusion. 4. Mild left basilar atelectasis versus infiltrate. This document has been electronically signed by: Josef Ingram MD on 12/16/2024 10:25 PM      The findings from the last RT Protocol Assessment were as follows:   History Pulmonary Disease: Chronic pulmonary disease  Respiratory Pattern: Use of accessory muscles, prolonged exhalation, or RR 26-30 bpm  Breath Sounds: Inspiratory and expiratory or bilateral wheezing and/or rhonchi  Cough: Strong, spontaneous, non-productive  Indication for Bronchodilator Therapy: Wheezing associated with pulm disorder  Bronchodilator Assessment Score: 14    Aerosolized bronchodilator medication orders have been revised according to the RT Inhaler-Nebulizer Bronchodilator Protocol below.    Respiratory Therapist to perform RT Therapy Protocol Assessment initially then follow the protocol.  Repeat RT Therapy Protocol Assessment PRN for score 0-3 or on second treatment, BID, and PRN for scores above 3.    No Indications - adjust the frequency to every 6 hours PRN wheezing or bronchospasm, if no treatments needed after 48 hours then discontinue using Per Protocol order mode.     If indication present, adjust the RT bronchodilator orders based on the Bronchodilator Assessment Score as indicated below.  Use Inhaler orders unless patient has one or more of the following: on home nebulizer, not able to hold breath for 10 seconds, is not alert and oriented, cannot activate and use MDI correctly, or respiratory rate 25 breaths per minute or  more, then use the equivalent nebulizer order(s) with same Frequency and PRN reasons based on the score.  If a patient is on this medication at home then do not decrease Frequency below that used at home.    0-3 - enter or revise RT bronchodilator order(s) to equivalent RT Bronchodilator order with Frequency of every 4 hours PRN for wheezing or increased work of breathing using Per Protocol order mode.        4-6 - enter or revise RT Bronchodilator order(s) to two equivalent RT bronchodilator orders with one order with BID Frequency and one order with Frequency of every 4 hours PRN wheezing or increased work of breathing using Per Protocol order mode.        7-10 - enter or revise RT Bronchodilator order(s) to two equivalent RT bronchodilator orders with one order with TID Frequency and one order with Frequency of every 4 hours PRN wheezing or increased work of breathing using Per Protocol order mode.       11-13 - enter or revise RT Bronchodilator order(s) to one equivalent RT bronchodilator order with QID Frequency and an Albuterol order with Frequency of every 4 hours PRN wheezing or increased work of breathing using Per Protocol order mode.      Greater than 13 - enter or revise RT Bronchodilator order(s) to one equivalent RT bronchodilator order with every 4 hours Frequency and an Albuterol order with Frequency of every 2 hours PRN wheezing or increased work of breathing using Per Protocol order mode.     RT to enter RT Home Evaluation for COPD & MDI Assessment order using Per Protocol order mode.    Electronically signed by Gela Rod RCP on 12/17/2024 at 4:44 AM

## 2024-12-17 NOTE — RT PROTOCOL NOTE
RT Inhaler-Nebulizer Bronchodilator Protocol Note    There is a bronchodilator order in the chart from a provider indicating to follow the RT Bronchodilator Protocol and there is an “Initiate RT Inhaler-Nebulizer Bronchodilator Protocol” order as well (see protocol at bottom of note).    CXR Findings:  XR CHEST PORTABLE    Result Date: 12/16/2024  Impression: 1. Mild interstitial edema/pneumonitis. 2. Unchanged cardiomegaly. 3. Small left pleural effusion. 4. Mild left basilar atelectasis versus infiltrate. This document has been electronically signed by: Josef Ingram MD on 12/16/2024 10:25 PM      The findings from the last RT Protocol Assessment were as follows:   History Pulmonary Disease: Chronic pulmonary disease  Respiratory Pattern: Dyspnea on exertion or RR 21-25 bpm  Breath Sounds: Inspiratory and expiratory or bilateral wheezing and/or rhonchi  Cough: Strong, spontaneous, non-productive  Indication for Bronchodilator Therapy: Wheezing associated with pulm disorder  Bronchodilator Assessment Score: 10    Aerosolized bronchodilator medication orders have been revised according to the RT Inhaler-Nebulizer Bronchodilator Protocol below.    Respiratory Therapist to perform RT Therapy Protocol Assessment initially then follow the protocol.  Repeat RT Therapy Protocol Assessment PRN for score 0-3 or on second treatment, BID, and PRN for scores above 3.    No Indications - adjust the frequency to every 6 hours PRN wheezing or bronchospasm, if no treatments needed after 48 hours then discontinue using Per Protocol order mode.     If indication present, adjust the RT bronchodilator orders based on the Bronchodilator Assessment Score as indicated below.  Use Inhaler orders unless patient has one or more of the following: on home nebulizer, not able to hold breath for 10 seconds, is not alert and oriented, cannot activate and use MDI correctly, or respiratory rate 25 breaths per minute or more, then use the equivalent  nebulizer order(s) with same Frequency and PRN reasons based on the score.  If a patient is on this medication at home then do not decrease Frequency below that used at home.    0-3 - enter or revise RT bronchodilator order(s) to equivalent RT Bronchodilator order with Frequency of every 4 hours PRN for wheezing or increased work of breathing using Per Protocol order mode.        4-6 - enter or revise RT Bronchodilator order(s) to two equivalent RT bronchodilator orders with one order with BID Frequency and one order with Frequency of every 4 hours PRN wheezing or increased work of breathing using Per Protocol order mode.        7-10 - enter or revise RT Bronchodilator order(s) to two equivalent RT bronchodilator orders with one order with TID Frequency and one order with Frequency of every 4 hours PRN wheezing or increased work of breathing using Per Protocol order mode.       11-13 - enter or revise RT Bronchodilator order(s) to one equivalent RT bronchodilator order with QID Frequency and an Albuterol order with Frequency of every 4 hours PRN wheezing or increased work of breathing using Per Protocol order mode.      Greater than 13 - enter or revise RT Bronchodilator order(s) to one equivalent RT bronchodilator order with every 4 hours Frequency and an Albuterol order with Frequency of every 2 hours PRN wheezing or increased work of breathing using Per Protocol order mode.     RT to enter RT Home Evaluation for COPD & MDI Assessment order using Per Protocol order mode.    Electronically signed by Nicole Cohen RCP on 12/17/2024 at 8:42 AM

## 2024-12-17 NOTE — PROGRESS NOTES
Pt admitted to  [facility] from ED.     Complaints: Shortness of breath.      V site free of s/s of infection or infiltration.     Vital signs obtained. Assessment and data collection initiated. Two nurse skin assessment performed by Senia CURRIE and Jean CURRIE.    Swallow Screen completed and documented for all patients ages 45 and older. Pt NPO at this time    Policies and procedures for [facility] explained. All questions answered with no further questions at this time. Fall prevention and safety brochure discussed with patient.  Bed alarm on. Call light in reach. Oriented to room.

## 2024-12-17 NOTE — CONSULTS
96 hrs (Last 3 readings):   Weight   12/17/24 0224 90.8 kg (200 lb 2.8 oz)   12/16/24 2124 99.8 kg (220 lb)       Exam   Nursing note and vitals reviewed.  Constitutional: Obese male on BiPAP  Head: The head is normocephalic and atraumatic. No signs of craniosynostosis or other abnormalities.  Eyes: Pupils are equal, round, and reactive to light and accommodation (PERRLA). Extraocular movements are intact. No conjunctival injection or discharge. The red reflex is present bilaterally.  Ears: Tympanic membranes are clear and intact with normal landmarks. No erythema, bulging, or retraction.  Nose: Nasal mucosa is pink and moist. No septal deviation, polyps, or discharge.  Throat: Oropharynx is clear. Tonsils are not enlarged. No erythema, exudates, or lesions.  Neck: The neck is supple with full range of motion. No lymphadenopathy, masses, or thyromegaly.  Cardiovascular: Heart sounds are normal with regular rate and rhythm. No murmurs, rubs, or gallops. Peripheral pulses are equal and strong bilaterally.  Pulmonary/Chest: Lungs are clear to auscultation bilaterally. No wheezes, rales, or rhonchi. Chest wall is symmetric with no deformities.  Abdominal: The abdomen is soft, non-tender, and non-distended. Bowel sounds are present and normal. No hepatosplenomegaly or masses.  Musculoskeletal: Full range of motion in all joints. No swelling, deformities, or tenderness.  Extremities: 3+ pitting edema BL lower extremities  Lymphadenopathy: No palpable lymph nodes in the cervical, axillary, or inguinal regions.  Neurological: The patient is alert and oriented. Cranial nerves II-XII are intact. Motor strength is 5/5 in all extremities. Sensation is intact. Reflexes are 2+ and symmetric.  Skin: Skin is warm, dry, and intact. No rashes, lesions, or abnormal pigmentation.  Psychiatric: The patient has a normal mood and affect. No signs of anxiety, depression, or other psychiatric disorders.    Labs  - Old records and notes have

## 2024-12-17 NOTE — PROGRESS NOTES
Patient states he sees Dr. Juares at [facility] for pulm. He said he is supposed to wear a sleep machine at bedtime but does not.
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
## 2024-12-18 PROBLEM — J96.21 ACUTE ON CHRONIC RESPIRATORY FAILURE WITH HYPOXIA AND HYPERCAPNIA: Status: ACTIVE | Noted: 2024-12-18

## 2024-12-18 PROBLEM — J96.22 ACUTE ON CHRONIC RESPIRATORY FAILURE WITH HYPOXIA AND HYPERCAPNIA: Status: ACTIVE | Noted: 2024-12-18

## 2024-12-18 LAB
ANION GAP SERPL CALC-SCNC: 6 MEQ/L (ref 8–16)
ARTERIAL PATENCY WRIST A: NEGATIVE
ARTERIAL PATENCY WRIST A: POSITIVE
BASE EXCESS BLDA CALC-SCNC: 17.5 MMOL/L (ref -2.5–2.5)
BASE EXCESS BLDA CALC-SCNC: 22.7 MMOL/L (ref -2.5–2.5)
BDY SITE: ABNORMAL
BDY SITE: ABNORMAL
BREATHS SETTING VENT: 12 BPM
BUN SERPL-MCNC: 17 MG/DL (ref 7–22)
CALCIUM SERPL-MCNC: 8.5 MG/DL (ref 8.5–10.5)
CHLORIDE SERPL-SCNC: 92 MEQ/L (ref 98–111)
CO2 SERPL-SCNC: 49 MEQ/L (ref 23–33)
COLLECTED BY:: ABNORMAL
COLLECTED BY:: ABNORMAL
CREAT SERPL-MCNC: 0.8 MG/DL (ref 0.4–1.2)
DEPRECATED RDW RBC AUTO: 68.5 FL (ref 35–45)
DEVICE: ABNORMAL
DEVICE: ABNORMAL
EKG ATRIAL RATE: 66 BPM
EKG ATRIAL RATE: 68 BPM
EKG P AXIS: 100 DEGREES
EKG P AXIS: 98 DEGREES
EKG P-R INTERVAL: 182 MS
EKG P-R INTERVAL: 186 MS
EKG Q-T INTERVAL: 450 MS
EKG Q-T INTERVAL: 464 MS
EKG QRS DURATION: 86 MS
EKG QRS DURATION: 92 MS
EKG QTC CALCULATION (BAZETT): 471 MS
EKG QTC CALCULATION (BAZETT): 493 MS
EKG R AXIS: 77 DEGREES
EKG R AXIS: 94 DEGREES
EKG T AXIS: 43 DEGREES
EKG T AXIS: 49 DEGREES
EKG VENTRICULAR RATE: 66 BPM
EKG VENTRICULAR RATE: 68 BPM
ERYTHROCYTE [DISTWIDTH] IN BLOOD BY AUTOMATED COUNT: 17.1 % (ref 11.5–14.5)
FIO2 ON VENT O2 ANALYZER: 3 %
FIO2 ON VENT O2 ANALYZER: 50 %
FOLATE SERPL-MCNC: 11.7 NG/ML (ref 4.8–24.2)
GFR SERPL CREATININE-BSD FRML MDRD: > 90 ML/MIN/1.73M2
GLUCOSE SERPL-MCNC: 77 MG/DL (ref 70–108)
HCO3 BLDA-SCNC: 49 MMOL/L (ref 23–28)
HCO3 BLDA-SCNC: 57 MMOL/L (ref 23–28)
HCT VFR BLD AUTO: 46.7 % (ref 42–52)
HGB BLD-MCNC: 13.1 GM/DL (ref 14–18)
MAGNESIUM SERPL-MCNC: 1.9 MG/DL (ref 1.6–2.4)
MAGNESIUM SERPL-MCNC: 1.9 MG/DL (ref 1.6–2.4)
MCH RBC QN AUTO: 30.1 PG (ref 26–33)
MCHC RBC AUTO-ENTMCNC: 28.1 GM/DL (ref 32.2–35.5)
MCV RBC AUTO: 107.4 FL (ref 80–94)
PATHOLOGIST REVIEW: ABNORMAL
PCO2 BLDA: 109 MMHG (ref 35–45)
PCO2 BLDA: 86 MMHG (ref 35–45)
PEEP SETTING VENT: 8 MMHG
PH BLDA: 7.32 [PH] (ref 7.35–7.45)
PH BLDA: 7.36 [PH] (ref 7.35–7.45)
PHOSPHATE SERPL-MCNC: 2.7 MG/DL (ref 2.4–4.7)
PLATELET # BLD AUTO: 307 THOU/MM3 (ref 130–400)
PMV BLD AUTO: 11 FL (ref 9.4–12.4)
PO2 BLDA: 66 MMHG (ref 71–104)
PO2 BLDA: 84 MMHG (ref 71–104)
POTASSIUM SERPL-SCNC: 4.4 MEQ/L (ref 3.5–5.2)
PRESSURE SUPPORT SETTING VENT: 18 CMH2O
RBC # BLD AUTO: 4.35 MILL/MM3 (ref 4.7–6.1)
SAO2 % BLDA: 90 %
SAO2 % BLDA: 94 %
SCAN OF BLOOD SMEAR: NORMAL
SODIUM SERPL-SCNC: 147 MEQ/L (ref 135–145)
VENTILATION MODE VENT: ABNORMAL
VENTILATION MODE VENT: ABNORMAL
VIT B12 SERPL-MCNC: 920 PG/ML (ref 211–911)
WBC # BLD AUTO: 8.5 THOU/MM3 (ref 4.8–10.8)

## 2024-12-18 PROCEDURE — 6360000002 HC RX W HCPCS: Performed by: INTERNAL MEDICINE

## 2024-12-18 PROCEDURE — 6360000002 HC RX W HCPCS

## 2024-12-18 PROCEDURE — 2060000000 HC ICU INTERMEDIATE R&B

## 2024-12-18 PROCEDURE — 82746 ASSAY OF FOLIC ACID SERUM: CPT

## 2024-12-18 PROCEDURE — 99232 SBSQ HOSP IP/OBS MODERATE 35: CPT | Performed by: INTERNAL MEDICINE

## 2024-12-18 PROCEDURE — 2500000003 HC RX 250 WO HCPCS

## 2024-12-18 PROCEDURE — 94660 CPAP INITIATION&MGMT: CPT

## 2024-12-18 PROCEDURE — 93005 ELECTROCARDIOGRAM TRACING: CPT | Performed by: INTERNAL MEDICINE

## 2024-12-18 PROCEDURE — 93010 ELECTROCARDIOGRAM REPORT: CPT | Performed by: NUCLEAR MEDICINE

## 2024-12-18 PROCEDURE — 83735 ASSAY OF MAGNESIUM: CPT

## 2024-12-18 PROCEDURE — 94761 N-INVAS EAR/PLS OXIMETRY MLT: CPT

## 2024-12-18 PROCEDURE — 84100 ASSAY OF PHOSPHORUS: CPT

## 2024-12-18 PROCEDURE — 36600 WITHDRAWAL OF ARTERIAL BLOOD: CPT

## 2024-12-18 PROCEDURE — 2580000003 HC RX 258: Performed by: INTERNAL MEDICINE

## 2024-12-18 PROCEDURE — 99233 SBSQ HOSP IP/OBS HIGH 50: CPT | Performed by: INTERNAL MEDICINE

## 2024-12-18 PROCEDURE — 80048 BASIC METABOLIC PNL TOTAL CA: CPT

## 2024-12-18 PROCEDURE — 2700000000 HC OXYGEN THERAPY PER DAY

## 2024-12-18 PROCEDURE — 6370000000 HC RX 637 (ALT 250 FOR IP): Performed by: INTERNAL MEDICINE

## 2024-12-18 PROCEDURE — 93005 ELECTROCARDIOGRAM TRACING: CPT

## 2024-12-18 PROCEDURE — 36415 COLL VENOUS BLD VENIPUNCTURE: CPT

## 2024-12-18 PROCEDURE — 85027 COMPLETE CBC AUTOMATED: CPT

## 2024-12-18 PROCEDURE — 82803 BLOOD GASES ANY COMBINATION: CPT

## 2024-12-18 PROCEDURE — 94640 AIRWAY INHALATION TREATMENT: CPT

## 2024-12-18 PROCEDURE — 2580000003 HC RX 258

## 2024-12-18 PROCEDURE — 82607 VITAMIN B-12: CPT

## 2024-12-18 RX ORDER — BUMETANIDE 0.25 MG/ML
0.5 INJECTION, SOLUTION INTRAMUSCULAR; INTRAVENOUS DAILY
Status: DISCONTINUED | OUTPATIENT
Start: 2024-12-18 | End: 2024-12-21 | Stop reason: HOSPADM

## 2024-12-18 RX ORDER — BUMETANIDE 0.25 MG/ML
2 INJECTION, SOLUTION INTRAMUSCULAR; INTRAVENOUS 2 TIMES DAILY
Status: DISCONTINUED | OUTPATIENT
Start: 2024-12-18 | End: 2024-12-18

## 2024-12-18 RX ORDER — MAGNESIUM SULFATE 1 G/100ML
1000 INJECTION INTRAVENOUS ONCE
Status: COMPLETED | OUTPATIENT
Start: 2024-12-18 | End: 2024-12-18

## 2024-12-18 RX ORDER — DEXTROSE, SODIUM CHLORIDE, SODIUM LACTATE, POTASSIUM CHLORIDE, AND CALCIUM CHLORIDE 5; .6; .31; .03; .02 G/100ML; G/100ML; G/100ML; G/100ML; G/100ML
INJECTION, SOLUTION INTRAVENOUS CONTINUOUS
Status: DISCONTINUED | OUTPATIENT
Start: 2024-12-18 | End: 2024-12-19

## 2024-12-18 RX ORDER — ALBUTEROL SULFATE 0.83 MG/ML
2.5 SOLUTION RESPIRATORY (INHALATION)
Status: DISCONTINUED | OUTPATIENT
Start: 2024-12-18 | End: 2024-12-21 | Stop reason: HOSPADM

## 2024-12-18 RX ORDER — PREDNISONE 20 MG/1
40 TABLET ORAL DAILY
Status: DISCONTINUED | OUTPATIENT
Start: 2024-12-18 | End: 2024-12-18

## 2024-12-18 RX ADMIN — TIOTROPIUM BROMIDE INHALATION SPRAY 2 PUFF: 3.12 SPRAY, METERED RESPIRATORY (INHALATION) at 09:01

## 2024-12-18 RX ADMIN — WATER 1000 MG: 1 INJECTION INTRAMUSCULAR; INTRAVENOUS; SUBCUTANEOUS at 23:04

## 2024-12-18 RX ADMIN — ARFORMOTEROL TARTRATE 15 MCG: 15 SOLUTION RESPIRATORY (INHALATION) at 21:18

## 2024-12-18 RX ADMIN — BUMETANIDE 0.5 MG: 0.25 INJECTION INTRAMUSCULAR; INTRAVENOUS at 10:46

## 2024-12-18 RX ADMIN — ENOXAPARIN SODIUM 40 MG: 100 INJECTION SUBCUTANEOUS at 09:15

## 2024-12-18 RX ADMIN — SODIUM CHLORIDE, PRESERVATIVE FREE 10 ML: 5 INJECTION INTRAVENOUS at 21:42

## 2024-12-18 RX ADMIN — BUDESONIDE 500 MCG: 0.5 INHALANT RESPIRATORY (INHALATION) at 09:01

## 2024-12-18 RX ADMIN — ARFORMOTEROL TARTRATE 15 MCG: 15 SOLUTION RESPIRATORY (INHALATION) at 09:01

## 2024-12-18 RX ADMIN — BUDESONIDE 500 MCG: 0.5 INHALANT RESPIRATORY (INHALATION) at 21:18

## 2024-12-18 RX ADMIN — SODIUM CHLORIDE, SODIUM LACTATE, POTASSIUM CHLORIDE, CALCIUM CHLORIDE AND DEXTROSE MONOHYDRATE: 5; 600; 310; 30; 20 INJECTION, SOLUTION INTRAVENOUS at 08:52

## 2024-12-18 RX ADMIN — ALBUTEROL SULFATE 2.5 MG: 2.5 SOLUTION RESPIRATORY (INHALATION) at 09:01

## 2024-12-18 RX ADMIN — MAGNESIUM SULFATE HEPTAHYDRATE 1000 MG: 10 INJECTION, SOLUTION INTRAVENOUS at 09:15

## 2024-12-18 RX ADMIN — ALBUTEROL SULFATE 2.5 MG: 2.5 SOLUTION RESPIRATORY (INHALATION) at 21:17

## 2024-12-18 RX ADMIN — ACETAZOLAMIDE SODIUM 500 MG: 500 INJECTION, POWDER, LYOPHILIZED, FOR SOLUTION INTRAVENOUS at 21:46

## 2024-12-18 RX ADMIN — WATER 40 MG: 1 INJECTION INTRAMUSCULAR; INTRAVENOUS; SUBCUTANEOUS at 10:46

## 2024-12-18 NOTE — PROGRESS NOTES
Hospitalist Progress Note  Internal Medicine Resident      Patient: Fredo Rod 54 y.o. male      Unit/Bed: -28/028-A    Admit Date: 12/16/2024      ASSESSMENT AND PLAN  Active Problems  Acute on chronic hypoxic, hypercapnic respiratory failure: Likely secondary to acute COPD exacerbation as opposed to acute heart failure exacerbation. Patient has had 2 weeks of SOB and lower extremity edema. Patient quit taking his Lasix 2 weeks ago on his own without any reason given. BNP 1600 on arrival VBG in ED showed pCO2 69, pO2 34, HCO3 45, pH 7.42. He was initiated on BiPAP. CXR shows interstitial edema, small left pleural effusion. 12/18 repeat ABG ordered in setting of confusion and AMS; pH 7.32 pCO2 109 pO2 84 HCO3 57. Patient's baseline CO2  in setting of severe chronic COPD. Pulmonology consulted; recommended continue diuresis given imaging. Planning to give Diamox. Recommended patient be taken off BiPAP during the day and not be placed until pH less than 7 on ABG are 7.25 on VBG. Recommended BiPAP use with bedtime and naps. Further pulmonology recommendations as noted below.  Continue BiPAP use at bedtime and with naps with pulmonology  Hold BiPAP during the day unless pH less than 7.3 on ABG 7.25 on VBG  Continue nebulized ICS and LABA; plan to add nebulized LAMA to home regimen upon discharge  Continue Solu-Medrol 40 Mg IV daily; transition to prednisone 40 Mg p.o. daily starting p.o. medications  Continue DuoNebs every 6 hours  Continue azithromycin 250 Mg p.o. daily  Per pulmonology patient needs lung transplant however would not qualify due to continued smoking  ABG as needed in setting of worsening mentation  Suspected abdominal wall cellulitis:  Patient's abdominal wall is erythematous with indurated skin; improved from yesterday. Patient did not endorse any abdominal pain. Nonpurulent. Patient was given ceftriaxone and vancomycin in ED. currently on Rocephin and azithromycin in setting of    HENT: Head normal appearing. Nares normal. Oral mucosa moist.  Hearing intact.   Neck: Supple, with full range of motion. Trachea midline.  No gross JVD appreciated.  Respiratory: Wheezes to auscultation bilaterally.   Cardiovascular: Normal rate, regular rhythm with normal S1/S2 without murmurs.    No lower extremity edema.   Abdomen: Soft, non-tender, non-distended with normal bowel sounds.  Musculoskeletal: No joint swelling or tenderness. Normal tone. No abnormal movements.   Skin: Warm and dry. No rashes or lesions.  Neurologic:  No focal sensory/motor deficits in the upper or lower extremities. Cranial nerves:  grossly non-focal 2-12.     Psychiatric: Alert and oriented, normal insight and thought content. Patient having intermittent alterations in mentation.  Capillary Refill: Brisk,< 3 seconds.  Peripheral Pulses: +2 palpable, equal bilaterally.       Labs/Radiology: See chart or assessment above.     Electronically signed by Nanci Gould DO on 12/18/2024 at 5:35 PM  Case was discussed with Attending, Dr. Mathews.

## 2024-12-18 NOTE — PROCEDURES
PROCEDURE NOTE  Date: 12/18/2024   Name: Fredo Rod  YOB: 1970    Procedures    12 lead EKG completed. Results handed to RN

## 2024-12-18 NOTE — PROCEDURES
PROCEDURE NOTE  Date: 12/18/2024   Name: Fredo Rod  YOB: 1970    Procedures  EKG completed, given to Sherley CURRIE

## 2024-12-18 NOTE — PROGRESS NOTES
[facility] Pulmonary, Sleep and Critical Care Medicine      Patient - Fredo Rod   MRN -  [id number]   [facility] # - [id number]   - 1970      Date of Admission -  2024  9:16 PM  Date of evaluation -  2024  Room - [facility]28/028-   Hospital Day - 1  Consulting - Kraig Mathews MD Primary Care Physician - Mohinder Page MD     Problem List      Active Hospital Problems    Diagnosis Date Noted    Altered mental status [R41.82] 2024    Hypervolemia [E87.70] 2024    Shortness of breath [R06.02] 2024    Acute hypoxic respiratory failure [J96.01] 2024     Reason for Consult    Hypercapnic Respiratory Failure  HPI   History Obtained From: Patient and electronic medical record.    Fredo Rod is a 54 y.o. male with obesity, LINDA, COPD, pulmonary hypertension who presented with Lower extremity edema and SOB. Patient reportedly stopped taking all medications except for his diuretic two weeks before initial presentation. He has an extensive smoking history and recently started smoking again.    Pulmonary medicine was consulted for acute on chronic hypercapnic Respiratory Failure.    He is having shortness of breath: No  He  Does not report  paroxysmal nocturnal dyspnea.    He is having cough: No    He is having chest pain:No    PMHx   Past Medical History      Diagnosis Date    (HFpEF) heart failure with preserved ejection fraction (HCC)     CAD (coronary artery disease)     Hepatic steatosis     LINDA (obstructive sleep apnea)     Pulmonary hypertension (HCC)     Sarcoma of rib (HCC)       Past Surgical History        Procedure Laterality Date    ARM SURGERY Left     BONE RESECTION, RIB       Meds    Current Medications    albuterol  2.5 mg Nebulization BID RT    methylPREDNISolone  40 mg IntraVENous Daily    bumetanide  0.5 mg IntraVENous Daily    sodium chloride flush  5-40 mL IntraVENous 2 times per day    enoxaparin  40 mg SubCUTAneous Daily    aspirin  81 mg Oral  head is normocephalic and atraumatic. No signs of craniosynostosis or other abnormalities.  Eyes: Pupils are equal, round, and reactive to light and accommodation (PERRLA). Extraocular movements are intact. No conjunctival injection or discharge. The red reflex is present bilaterally.  Ears: Tympanic membranes are clear and intact with normal landmarks. No erythema, bulging, or retraction.  Nose: Nasal mucosa is pink and moist. No septal deviation, polyps, or discharge.  Throat: Oropharynx is clear. Tonsils are not enlarged. No erythema, exudates, or lesions.  Neck: The neck is supple with full range of motion. No lymphadenopathy, masses, or thyromegaly.  Cardiovascular: Heart sounds are normal with regular rate and rhythm. No murmurs, rubs, or gallops. Peripheral pulses are equal and strong bilaterally.  Pulmonary/Chest: Lungs are clear to auscultation bilaterally. No wheezes, rales, or rhonchi. Chest wall is symmetric with no deformities.  Abdominal: The abdomen is soft, non-tender, and non-distended. Bowel sounds are present and normal. No hepatosplenomegaly or masses.  Musculoskeletal: Full range of motion in all joints. No swelling, deformities, or tenderness.  Extremities: 3+ pitting edema BL lower extremities  Lymphadenopathy: No palpable lymph nodes in the cervical, axillary, or inguinal regions.  Neurological: The patient is alert and oriented. Cranial nerves II-XII are intact. Motor strength is 5/5 in all extremities. Sensation is intact. Reflexes are 2+ and symmetric.  Skin: Skin is warm, dry, and intact. No rashes, lesions, or abnormal pigmentation.  Psychiatric: The patient has a normal mood and affect. No signs of anxiety, depression, or other psychiatric disorders.    Labs  - [facility]   AB  Lab Results   Component Value Date/Time    PH 7.32 12/18/2024 09:16 AM    PO2 84 12/18/2024 09:16 AM    PCO2 109 12/18/2024 09:16 AM    HCO3 57 12/18/2024 09:16 AM

## 2024-12-18 NOTE — PLAN OF CARE
Problem: Chronic Conditions and Co-morbidities  Goal: Patient's chronic conditions and co-morbidity symptoms are monitored and maintained or improved  12/18/2024 1042 by Sherley Mcintyre RN  Outcome: Progressing  12/17/2024 2321 by Jany Sotelo RN  Outcome: Progressing     Problem: Discharge Planning  Goal: Discharge to home or other facility with appropriate resources  12/18/2024 1042 by Sherley Mcintyre RN  Outcome: Progressing  12/17/2024 2321 by Jany Sotelo RN  Outcome: Progressing     Problem: Safety - Adult  Goal: Free from fall injury  12/18/2024 1042 by Sherley Mcintyre RN  Outcome: Progressing  12/17/2024 2321 by Jany Sotelo RN  Outcome: Progressing     Problem: Skin/Tissue Integrity  Goal: Absence of new skin breakdown  Description: 1.  Monitor for areas of redness and/or skin breakdown  2.  Assess vascular access sites hourly  3.  Every 4-6 hours minimum:  Change oxygen saturation probe site  4.  Every 4-6 hours:  If on nasal continuous positive airway pressure, respiratory therapy assess nares and determine need for appliance change or resting period.  Outcome: Progressing

## 2024-12-18 NOTE — RT PROTOCOL NOTE
RT Inhaler-Nebulizer Bronchodilator Protocol Note    There is a bronchodilator order in the chart from a provider indicating to follow the RT Bronchodilator Protocol and there is an “Initiate RT Inhaler-Nebulizer Bronchodilator Protocol” order as well (see protocol at bottom of note).    CXR Findings:  XR CHEST PORTABLE    Result Date: 12/16/2024  Impression: 1. Mild interstitial edema/pneumonitis. 2. Unchanged cardiomegaly. 3. Small left pleural effusion. 4. Mild left basilar atelectasis versus infiltrate. This document has been electronically signed by: Josef Ingram MD on 12/16/2024 10:25 PM      The findings from the last RT Protocol Assessment were as follows:   History Pulmonary Disease: Chronic pulmonary disease  Respiratory Pattern: Dyspnea on exertion or RR 21-25 bpm  Breath Sounds: Slightly diminished and/or crackles  Cough: Strong, spontaneous, non-productive  Indication for Bronchodilator Therapy: Decreased or absent breath sounds  Bronchodilator Assessment Score: 6    Aerosolized bronchodilator medication orders have been revised according to the RT Inhaler-Nebulizer Bronchodilator Protocol below.    Respiratory Therapist to perform RT Therapy Protocol Assessment initially then follow the protocol.  Repeat RT Therapy Protocol Assessment PRN for score 0-3 or on second treatment, BID, and PRN for scores above 3.    No Indications - adjust the frequency to every 6 hours PRN wheezing or bronchospasm, if no treatments needed after 48 hours then discontinue using Per Protocol order mode.     If indication present, adjust the RT bronchodilator orders based on the Bronchodilator Assessment Score as indicated below.  Use Inhaler orders unless patient has one or more of the following: on home nebulizer, not able to hold breath for 10 seconds, is not alert and oriented, cannot activate and use MDI correctly, or respiratory rate 25 breaths per minute or more, then use the equivalent nebulizer order(s) with same

## 2024-12-19 ENCOUNTER — APPOINTMENT (OUTPATIENT)
Dept: INTERVENTIONAL RADIOLOGY/VASCULAR | Age: 54
End: 2024-12-19
Payer: MEDICAID

## 2024-12-19 LAB
ANION GAP SERPL CALC-SCNC: 6 MEQ/L (ref 8–16)
BUN SERPL-MCNC: 12 MG/DL (ref 7–22)
CA-I BLD ISE-SCNC: 1.11 MMOL/L (ref 1.12–1.32)
CALCIUM SERPL-MCNC: 8.2 MG/DL (ref 8.5–10.5)
CHLORIDE SERPL-SCNC: 95 MEQ/L (ref 98–111)
CO2 SERPL-SCNC: 43 MEQ/L (ref 23–33)
CREAT SERPL-MCNC: 0.6 MG/DL (ref 0.4–1.2)
DEPRECATED RDW RBC AUTO: 69 FL (ref 35–45)
ERYTHROCYTE [DISTWIDTH] IN BLOOD BY AUTOMATED COUNT: 17.1 % (ref 11.5–14.5)
GFR SERPL CREATININE-BSD FRML MDRD: > 90 ML/MIN/1.73M2
GLUCOSE SERPL-MCNC: 95 MG/DL (ref 70–108)
HCT VFR BLD AUTO: 46.8 % (ref 42–52)
HGB BLD-MCNC: 13.1 GM/DL (ref 14–18)
MAGNESIUM SERPL-MCNC: 2.1 MG/DL (ref 1.6–2.4)
MCH RBC QN AUTO: 30.3 PG (ref 26–33)
MCHC RBC AUTO-ENTMCNC: 28 GM/DL (ref 32.2–35.5)
MCV RBC AUTO: 108.3 FL (ref 80–94)
PHOSPHATE SERPL-MCNC: 3 MG/DL (ref 2.4–4.7)
PLATELET # BLD AUTO: 295 THOU/MM3 (ref 130–400)
PMV BLD AUTO: 10.7 FL (ref 9.4–12.4)
POTASSIUM SERPL-SCNC: 3.9 MEQ/L (ref 3.5–5.2)
POTASSIUM SERPL-SCNC: 4.4 MEQ/L (ref 3.5–5.2)
RBC # BLD AUTO: 4.32 MILL/MM3 (ref 4.7–6.1)
SODIUM SERPL-SCNC: 144 MEQ/L (ref 135–145)
WBC # BLD AUTO: 6.6 THOU/MM3 (ref 4.8–10.8)

## 2024-12-19 PROCEDURE — 6370000000 HC RX 637 (ALT 250 FOR IP)

## 2024-12-19 PROCEDURE — 85027 COMPLETE CBC AUTOMATED: CPT

## 2024-12-19 PROCEDURE — 99232 SBSQ HOSP IP/OBS MODERATE 35: CPT | Performed by: INTERNAL MEDICINE

## 2024-12-19 PROCEDURE — 99233 SBSQ HOSP IP/OBS HIGH 50: CPT | Performed by: INTERNAL MEDICINE

## 2024-12-19 PROCEDURE — 6360000002 HC RX W HCPCS: Performed by: INTERNAL MEDICINE

## 2024-12-19 PROCEDURE — 2500000003 HC RX 250 WO HCPCS

## 2024-12-19 PROCEDURE — 36415 COLL VENOUS BLD VENIPUNCTURE: CPT

## 2024-12-19 PROCEDURE — 94660 CPAP INITIATION&MGMT: CPT

## 2024-12-19 PROCEDURE — 6370000000 HC RX 637 (ALT 250 FOR IP): Performed by: INTERNAL MEDICINE

## 2024-12-19 PROCEDURE — 2060000000 HC ICU INTERMEDIATE R&B

## 2024-12-19 PROCEDURE — 6360000002 HC RX W HCPCS

## 2024-12-19 PROCEDURE — 84100 ASSAY OF PHOSPHORUS: CPT

## 2024-12-19 PROCEDURE — 2580000003 HC RX 258

## 2024-12-19 PROCEDURE — 80048 BASIC METABOLIC PNL TOTAL CA: CPT

## 2024-12-19 PROCEDURE — 94640 AIRWAY INHALATION TREATMENT: CPT

## 2024-12-19 PROCEDURE — 94761 N-INVAS EAR/PLS OXIMETRY MLT: CPT

## 2024-12-19 PROCEDURE — 2700000000 HC OXYGEN THERAPY PER DAY

## 2024-12-19 PROCEDURE — 82330 ASSAY OF CALCIUM: CPT

## 2024-12-19 PROCEDURE — 83735 ASSAY OF MAGNESIUM: CPT

## 2024-12-19 PROCEDURE — 84132 ASSAY OF SERUM POTASSIUM: CPT

## 2024-12-19 PROCEDURE — 93970 EXTREMITY STUDY: CPT

## 2024-12-19 RX ORDER — PREDNISONE 20 MG/1
40 TABLET ORAL DAILY
Status: DISCONTINUED | OUTPATIENT
Start: 2024-12-20 | End: 2024-12-21 | Stop reason: HOSPADM

## 2024-12-19 RX ORDER — POTASSIUM CHLORIDE 1500 MG/1
20 TABLET, EXTENDED RELEASE ORAL ONCE
Status: COMPLETED | OUTPATIENT
Start: 2024-12-19 | End: 2024-12-19

## 2024-12-19 RX ADMIN — ARFORMOTEROL TARTRATE 15 MCG: 15 SOLUTION RESPIRATORY (INHALATION) at 21:11

## 2024-12-19 RX ADMIN — SODIUM CHLORIDE, SODIUM LACTATE, POTASSIUM CHLORIDE, CALCIUM CHLORIDE AND DEXTROSE MONOHYDRATE: 5; 600; 310; 30; 20 INJECTION, SOLUTION INTRAVENOUS at 06:29

## 2024-12-19 RX ADMIN — POTASSIUM CHLORIDE 20 MEQ: 1500 TABLET, EXTENDED RELEASE ORAL at 08:52

## 2024-12-19 RX ADMIN — ARFORMOTEROL TARTRATE 15 MCG: 15 SOLUTION RESPIRATORY (INHALATION) at 08:37

## 2024-12-19 RX ADMIN — LOSARTAN POTASSIUM 50 MG: 50 TABLET, FILM COATED ORAL at 08:48

## 2024-12-19 RX ADMIN — TIOTROPIUM BROMIDE INHALATION SPRAY 2 PUFF: 3.12 SPRAY, METERED RESPIRATORY (INHALATION) at 08:37

## 2024-12-19 RX ADMIN — BUMETANIDE 0.5 MG: 0.25 INJECTION INTRAMUSCULAR; INTRAVENOUS at 08:51

## 2024-12-19 RX ADMIN — WATER 40 MG: 1 INJECTION INTRAMUSCULAR; INTRAVENOUS; SUBCUTANEOUS at 08:49

## 2024-12-19 RX ADMIN — AZITHROMYCIN DIHYDRATE 250 MG: 250 TABLET ORAL at 08:48

## 2024-12-19 RX ADMIN — ASPIRIN 81 MG: 81 TABLET, COATED ORAL at 08:48

## 2024-12-19 RX ADMIN — BUDESONIDE 500 MCG: 0.5 INHALANT RESPIRATORY (INHALATION) at 21:11

## 2024-12-19 RX ADMIN — ENOXAPARIN SODIUM 40 MG: 100 INJECTION SUBCUTANEOUS at 08:48

## 2024-12-19 RX ADMIN — SODIUM CHLORIDE, PRESERVATIVE FREE 10 ML: 5 INJECTION INTRAVENOUS at 08:52

## 2024-12-19 RX ADMIN — BUDESONIDE 500 MCG: 0.5 INHALANT RESPIRATORY (INHALATION) at 08:37

## 2024-12-19 RX ADMIN — ALBUTEROL SULFATE 2.5 MG: 2.5 SOLUTION RESPIRATORY (INHALATION) at 08:37

## 2024-12-19 RX ADMIN — METOPROLOL SUCCINATE 12.5 MG: 25 TABLET, FILM COATED, EXTENDED RELEASE ORAL at 18:29

## 2024-12-19 RX ADMIN — SODIUM CHLORIDE, PRESERVATIVE FREE 10 ML: 5 INJECTION INTRAVENOUS at 20:35

## 2024-12-19 RX ADMIN — POTASSIUM CHLORIDE 20 MEQ: 1500 TABLET, EXTENDED RELEASE ORAL at 08:48

## 2024-12-19 RX ADMIN — ALBUTEROL SULFATE 2.5 MG: 2.5 SOLUTION RESPIRATORY (INHALATION) at 21:11

## 2024-12-19 RX ADMIN — WATER 1000 MG: 1 INJECTION INTRAMUSCULAR; INTRAVENOUS; SUBCUTANEOUS at 23:37

## 2024-12-19 ASSESSMENT — PAIN SCALES - GENERAL: PAINLEVEL_OUTOF10: 0

## 2024-12-19 NOTE — PROGRESS NOTES
[place] for Pulmonary, Sleep and Critical Care Medicine      Patient - Fredo Rod   MRN -  [id number]   [facility] # - [id number]   - 1970      Date of Admission -  2024  9:16 PM  Date of evaluation -  2024  Room - [facility]28/028-   Hospital Day - 2  Consulting - Kraig Mathews MD Primary Care Physician - Mohinder Page MD     Problem List      Active Hospital Problems    Diagnosis Date Noted    Acute on chronic respiratory failure with hypoxia and hypercapnia [J96.21, J96.22] 2024    Altered mental status [R41.82] 2024    Hypervolemia [E87.70] 2024    Shortness of breath [R06.02] 2024    Acute hypoxic respiratory failure [J96.01] 2024     Reason for Consult    Hypercapnic Respiratory Failure  HPI   History Obtained From: Patient and electronic medical record.    Fredo Rod is a 54 y.o. male with obesity, LINDA, COPD, pulmonary hypertension who presented with Lower extremity edema and SOB. Patient reportedly stopped taking all medications except for his diuretic two weeks before initial presentation. He has an extensive smoking history and recently started smoking again.    Pulmonary medicine was consulted for acute on chronic hypercapnic Respiratory Failure.    He is having shortness of breath: No  He  Does not report  paroxysmal nocturnal dyspnea.    He is having cough: No    He is having chest pain:No    PMHx   Past Medical History      Diagnosis Date    (HFpEF) heart failure with preserved ejection fraction (HCC)     CAD (coronary artery disease)     Hepatic steatosis     LINDA (obstructive sleep apnea)     Pulmonary hypertension (HCC)     Sarcoma of rib (HCC)       Past Surgical History        Procedure Laterality Date    ARM SURGERY Left     BONE RESECTION, RIB       Meds    Current Medications    [START ON 2024] predniSONE  40 mg Oral Daily    albuterol  2.5 mg Nebulization BID RT    bumetanide  0.5 mg IntraVENous Daily    sodium chloride  (yupelri) nebs.   - Cont duonebs q6h while awake  - cont prednisone for 7 day course, then stop  - cont chronic azithro as well. 250 mg qday, cont on discharge as well.   - Pt willing to stop smoking and reports he wants to live and consider getting a lung transplant. Of note pt is hospice appropriate too, and would benefit from palliative care consult as well.   - See's Dr. Kaur, please get short term follow up with him in clinic.   - Pt seems at his baseline from a pulmonary perspective. Ok to d/c home from pulmonary standpoint.     Thank you for asking us to see this patient. Pulmonary will sign off at this time. Please use perfect serve for any questions or concerns.     Electronically signed by   Maik Tolliver III, DO on 12/19/2024 at 10:55 AM     Addendum by Dr. Manuela MD:  Patient seen by me independently including key components of medical care. Face to face evaluation and examination was performed. Case discussed with the resident physician/EDUARDO. Agree with their findings and plan as documented in the resident/EDUARDO's note.   More than 50% of the encounter time involved with patient care by the Pulmonary  service team spent by me.  I have modified the note above.     Electronically signed by   Rafa Roy MD on 12/19/2024 at 5:14 PM

## 2024-12-19 NOTE — RT PROTOCOL NOTE
RT Inhaler-Nebulizer Bronchodilator Protocol Note    There is a bronchodilator order in the chart from a provider indicating to follow the RT Bronchodilator Protocol and there is an “Initiate RT Inhaler-Nebulizer Bronchodilator Protocol” order as well (see protocol at bottom of note).    CXR Findings:  No results found.    The findings from the last RT Protocol Assessment were as follows:   History Pulmonary Disease: Chronic pulmonary disease  Respiratory Pattern: Regular pattern and RR 12-20 bpm  Breath Sounds: Slightly diminished and/or crackles  Cough: Strong, spontaneous, non-productive  Indication for Bronchodilator Therapy: Decreased or absent breath sounds  Bronchodilator Assessment Score: 4    Aerosolized bronchodilator medication orders have been revised according to the RT Inhaler-Nebulizer Bronchodilator Protocol below.    Respiratory Therapist to perform RT Therapy Protocol Assessment initially then follow the protocol.  Repeat RT Therapy Protocol Assessment PRN for score 0-3 or on second treatment, BID, and PRN for scores above 3.    No Indications - adjust the frequency to every 6 hours PRN wheezing or bronchospasm, if no treatments needed after 48 hours then discontinue using Per Protocol order mode.     If indication present, adjust the RT bronchodilator orders based on the Bronchodilator Assessment Score as indicated below.  Use Inhaler orders unless patient has one or more of the following: on home nebulizer, not able to hold breath for 10 seconds, is not alert and oriented, cannot activate and use MDI correctly, or respiratory rate 25 breaths per minute or more, then use the equivalent nebulizer order(s) with same Frequency and PRN reasons based on the score.  If a patient is on this medication at home then do not decrease Frequency below that used at home.    0-3 - enter or revise RT bronchodilator order(s) to equivalent RT Bronchodilator order with Frequency of every 4 hours PRN for wheezing or  increased work of breathing using Per Protocol order mode.        4-6 - enter or revise RT Bronchodilator order(s) to two equivalent RT bronchodilator orders with one order with BID Frequency and one order with Frequency of every 4 hours PRN wheezing or increased work of breathing using Per Protocol order mode.        7-10 - enter or revise RT Bronchodilator order(s) to two equivalent RT bronchodilator orders with one order with TID Frequency and one order with Frequency of every 4 hours PRN wheezing or increased work of breathing using Per Protocol order mode.       11-13 - enter or revise RT Bronchodilator order(s) to one equivalent RT bronchodilator order with QID Frequency and an Albuterol order with Frequency of every 4 hours PRN wheezing or increased work of breathing using Per Protocol order mode.      Greater than 13 - enter or revise RT Bronchodilator order(s) to one equivalent RT bronchodilator order with every 4 hours Frequency and an Albuterol order with Frequency of every 2 hours PRN wheezing or increased work of breathing using Per Protocol order mode.     RT to enter RT Home Evaluation for COPD & MDI Assessment order using Per Protocol order mode.    Electronically signed by Kari Avila RCP on 12/19/2024 at 8:40 AM

## 2024-12-19 NOTE — PLAN OF CARE
Problem: Respiratory - Adult  Goal: Achieves optimal ventilation and oxygenation  12/19/2024 0840 by Kari Avila RCP  Outcome: Progressing  12/18/2024 2136 by Brittaney Oleary RCP  Outcome: Progressing  Goal: Clear lung sounds  Description: Clear lung sounds  Outcome: Progressing   Patient lung sounds are considered normal for their current lung condition. No signs of distress noted. Current treatment regimen appropriate

## 2024-12-19 NOTE — PROGRESS NOTES
Hospitalist Progress Note  Internal Medicine Resident      Patient: Fredo Rod 54 y.o. male      Unit/Bed: -28/028-A    Admit Date: 12/16/2024      ASSESSMENT AND PLAN  Active Problems  Acute on chronic hypoxic, hypercapnic respiratory failure: Likely secondary to acute COPD exacerbation as opposed to acute heart failure exacerbation. Patient has had 2 weeks of SOB and lower extremity edema. Patient quit taking his Lasix 2 weeks ago on his own without any reason given. BNP 1600 on arrival VBG in ED showed pCO2 69, pO2 34, HCO3 45, pH 7.42. He was initiated on BiPAP. CXR shows interstitial edema, small left pleural effusion. 12/19 patient currently on 4L NC; reports no SOB. A&Ox4. 12/18 ABG pH 7.36 pCO2 86 pO2 66 HCO3 49. Patient's baseline CO2  in setting of severe chronic COPD.  Bicarb on BMP 43. Patient currently uses 3 L NC at home at rest and 4 L NC on exertion. Pulmonology consulted; recommended continue diuresis given imaging. Dose of Diamox given. Recommended patient be taken off BiPAP during the day and not be placed until pH less than 7 on ABG are 7.25 on VBG. Recommended BiPAP use with bedtime and naps. Patient tolerating p.o. intake; transition from Solu-Medrol to prednisone p.o. Further pulmonology recommendations as noted below.  Continue BiPAP use at bedtime and with naps with pulmonology  Hold BiPAP during the day unless pH less than 7.3 on ABG 7.25 on VBG  Continue nebulized ICS and LABA; plan to add nebulized LAMA to home regimen upon discharge  Continue prednisone 40 Mg p.o. daily per pulmonology; patient tolerating p.o. intake now  Continue DuoNebs every 6 hours  Continue azithromycin 250 Mg p.o. daily  Continue Bumex 0.5 Mg IV daily  Per pulmonology patient needs lung transplant however would not qualify due to continued smoking  ABG as needed in setting of worsening mentation  Suspected abdominal wall cellulitis:  Patient's abdominal wall is erythematous with indurated skin;  normal urination and bowel movements. Reports sleeping well. Denies fever, nausea/vomiting, chest pain, SOB constipation/diarrhea, headache, dizziness, fatigue, diaphoresis, numbness/tingling.    HPI / Hospital Course:  52 male with PMH of LINDA, pulmonary hypertension, COPD.  He presents today with lower extremity edema.  This has been ongoing for over 2 weeks, he also endorses SOB during this time.  Patient's family at bedside also states that his abdomen has been distended for over a week.  Patient has been taking his medications except for his diuretic which he stopped on his own 2 weeks ago not stating a reason why.  Today while in the ED it was noticed that the patient's abdomen was erythematous states he has longstanding diarrhea.  Denies fever, chills, lightheadedness, chest pain, abdominal pain, nausea, vomiting, constipation, dysuria.     Medications:    Infusion Medications    sodium chloride      Scheduled Medications    [START ON 12/20/2024] predniSONE  40 mg Oral Daily    albuterol  2.5 mg Nebulization BID RT    bumetanide  0.5 mg IntraVENous Daily    sodium chloride flush  5-40 mL IntraVENous 2 times per day    enoxaparin  40 mg SubCUTAneous Daily    aspirin  81 mg Oral Daily    losartan  50 mg Oral Daily    metoprolol succinate  12.5 mg Oral QPM    potassium chloride  20 mEq Oral Daily    cefTRIAXone (ROCEPHIN) IV  1,000 mg IntraVENous Q24H    budesonide  0.5 mg Nebulization BID RT    arformoterol tartrate  15 mcg Nebulization BID RT    tiotropium  2 puff Inhalation Daily RT    azithromycin  250 mg Oral Daily    nicotine  1 patch TransDERmal Daily    PRN Meds: sodium chloride flush, sodium chloride, potassium chloride **OR** potassium alternative oral replacement **OR** potassium chloride, magnesium sulfate, ondansetron **OR** ondansetron, polyethylene glycol, acetaminophen **OR** acetaminophen, albuterol sulfate HFA, albuterol    Exam:  /84   Pulse 72   Temp 98 °F (36.7 °C) (Oral)   Resp 18

## 2024-12-19 NOTE — PLAN OF CARE
Problem: Chronic Conditions and Co-morbidities  Goal: Patient's chronic conditions and co-morbidity symptoms are monitored and maintained or improved  12/19/2024 0929 by Yessenia Peña RN  Outcome: Progressing  Flowsheets (Taken 12/19/2024 0929)  Care Plan - Patient's Chronic Conditions and Co-Morbidity Symptoms are Monitored and Maintained or Improved: Monitor and assess patient's chronic conditions and comorbid symptoms for stability, deterioration, or improvement  12/18/2024 2054 by Jany Sotelo RN  Outcome: Progressing     Problem: Discharge Planning  Goal: Discharge to home or other facility with appropriate resources  12/19/2024 0929 by Yessenia Peña RN  Outcome: Progressing  Flowsheets (Taken 12/19/2024 0929)  Discharge to home or other facility with appropriate resources:   Identify barriers to discharge with patient and caregiver   Identify discharge learning needs (meds, wound care, etc)   Arrange for needed discharge resources and transportation as appropriate  12/18/2024 2054 by Jany Sotelo RN  Outcome: Progressing     Problem: Safety - Adult  Goal: Free from fall injury  12/19/2024 0929 by Yessenia Peña RN  Outcome: Progressing  Flowsheets (Taken 12/19/2024 0929)  Free From Fall Injury: Instruct family/caregiver on patient safety  12/18/2024 2054 by Jany Sotelo RN  Outcome: Progressing     Problem: Skin/Tissue Integrity  Goal: Absence of new skin breakdown  Description: 1.  Monitor for areas of redness and/or skin breakdown  2.  Assess vascular access sites hourly  3.  Every 4-6 hours minimum:  Change oxygen saturation probe site  4.  Every 4-6 hours:  If on nasal continuous positive airway pressure, respiratory therapy assess nares and determine need for appliance change or resting period.  12/19/2024 0929 by Yessenia Peña RN  Outcome: Progressing  12/18/2024 2054 by Jany Sotelo RN  Outcome: Progressing     Problem: Respiratory -

## 2024-12-19 NOTE — CARE COORDINATION
CM Note  Broken NIV Trilogy Mask; called [employer] who will mail to home; collaborated w Pulmonary/Attending  Electronically signed by Stevie Crum RN on 12/19/2024 at 1:44 PM

## 2024-12-20 LAB
ANION GAP SERPL CALC-SCNC: 7 MEQ/L (ref 8–16)
BUN SERPL-MCNC: 14 MG/DL (ref 7–22)
CA-I BLD ISE-SCNC: 1.16 MMOL/L (ref 1.12–1.32)
CALCIUM SERPL-MCNC: 8.4 MG/DL (ref 8.5–10.5)
CHLORIDE SERPL-SCNC: 98 MEQ/L (ref 98–111)
CO2 SERPL-SCNC: 35 MEQ/L (ref 23–33)
CREAT SERPL-MCNC: 0.6 MG/DL (ref 0.4–1.2)
DEPRECATED RDW RBC AUTO: 69 FL (ref 35–45)
ERYTHROCYTE [DISTWIDTH] IN BLOOD BY AUTOMATED COUNT: 17.1 % (ref 11.5–14.5)
GFR SERPL CREATININE-BSD FRML MDRD: > 90 ML/MIN/1.73M2
GLUCOSE SERPL-MCNC: 82 MG/DL (ref 70–108)
HCT VFR BLD AUTO: 47.8 % (ref 42–52)
HGB BLD-MCNC: 13.4 GM/DL (ref 14–18)
MAGNESIUM SERPL-MCNC: 2.1 MG/DL (ref 1.6–2.4)
MCH RBC QN AUTO: 30.2 PG (ref 26–33)
MCHC RBC AUTO-ENTMCNC: 28 GM/DL (ref 32.2–35.5)
MCV RBC AUTO: 107.9 FL (ref 80–94)
PHOSPHATE SERPL-MCNC: 3 MG/DL (ref 2.4–4.7)
PLATELET # BLD AUTO: 293 THOU/MM3 (ref 130–400)
PMV BLD AUTO: 11.1 FL (ref 9.4–12.4)
POTASSIUM SERPL-SCNC: 4.5 MEQ/L (ref 3.5–5.2)
RBC # BLD AUTO: 4.43 MILL/MM3 (ref 4.7–6.1)
SODIUM SERPL-SCNC: 140 MEQ/L (ref 135–145)
WBC # BLD AUTO: 6.7 THOU/MM3 (ref 4.8–10.8)

## 2024-12-20 PROCEDURE — 82330 ASSAY OF CALCIUM: CPT

## 2024-12-20 PROCEDURE — 6360000002 HC RX W HCPCS

## 2024-12-20 PROCEDURE — 6370000000 HC RX 637 (ALT 250 FOR IP): Performed by: INTERNAL MEDICINE

## 2024-12-20 PROCEDURE — 6370000000 HC RX 637 (ALT 250 FOR IP)

## 2024-12-20 PROCEDURE — 84100 ASSAY OF PHOSPHORUS: CPT

## 2024-12-20 PROCEDURE — 94660 CPAP INITIATION&MGMT: CPT

## 2024-12-20 PROCEDURE — 2500000003 HC RX 250 WO HCPCS

## 2024-12-20 PROCEDURE — 94640 AIRWAY INHALATION TREATMENT: CPT

## 2024-12-20 PROCEDURE — 94761 N-INVAS EAR/PLS OXIMETRY MLT: CPT

## 2024-12-20 PROCEDURE — 2700000000 HC OXYGEN THERAPY PER DAY

## 2024-12-20 PROCEDURE — 99233 SBSQ HOSP IP/OBS HIGH 50: CPT | Performed by: INTERNAL MEDICINE

## 2024-12-20 PROCEDURE — 80048 BASIC METABOLIC PNL TOTAL CA: CPT

## 2024-12-20 PROCEDURE — 6360000002 HC RX W HCPCS: Performed by: INTERNAL MEDICINE

## 2024-12-20 PROCEDURE — 83735 ASSAY OF MAGNESIUM: CPT

## 2024-12-20 PROCEDURE — 1200000003 HC TELEMETRY R&B

## 2024-12-20 PROCEDURE — 85027 COMPLETE CBC AUTOMATED: CPT

## 2024-12-20 PROCEDURE — 36415 COLL VENOUS BLD VENIPUNCTURE: CPT

## 2024-12-20 RX ORDER — CALCIUM CARBONATE 500 MG/1
500 TABLET, CHEWABLE ORAL 3 TIMES DAILY PRN
Status: DISCONTINUED | OUTPATIENT
Start: 2024-12-20 | End: 2024-12-21 | Stop reason: HOSPADM

## 2024-12-20 RX ADMIN — ARFORMOTEROL TARTRATE 15 MCG: 15 SOLUTION RESPIRATORY (INHALATION) at 07:24

## 2024-12-20 RX ADMIN — ARFORMOTEROL TARTRATE 15 MCG: 15 SOLUTION RESPIRATORY (INHALATION) at 21:46

## 2024-12-20 RX ADMIN — AZITHROMYCIN DIHYDRATE 250 MG: 250 TABLET ORAL at 09:48

## 2024-12-20 RX ADMIN — SODIUM CHLORIDE, PRESERVATIVE FREE 10 ML: 5 INJECTION INTRAVENOUS at 21:42

## 2024-12-20 RX ADMIN — PREDNISONE 40 MG: 20 TABLET ORAL at 09:49

## 2024-12-20 RX ADMIN — TIOTROPIUM BROMIDE INHALATION SPRAY 2 PUFF: 3.12 SPRAY, METERED RESPIRATORY (INHALATION) at 07:34

## 2024-12-20 RX ADMIN — SODIUM CHLORIDE, PRESERVATIVE FREE 10 ML: 5 INJECTION INTRAVENOUS at 09:47

## 2024-12-20 RX ADMIN — LOSARTAN POTASSIUM 50 MG: 50 TABLET, FILM COATED ORAL at 09:49

## 2024-12-20 RX ADMIN — POTASSIUM CHLORIDE 20 MEQ: 1500 TABLET, EXTENDED RELEASE ORAL at 09:48

## 2024-12-20 RX ADMIN — BUMETANIDE 0.5 MG: 0.25 INJECTION INTRAMUSCULAR; INTRAVENOUS at 09:48

## 2024-12-20 RX ADMIN — METOPROLOL SUCCINATE 12.5 MG: 25 TABLET, FILM COATED, EXTENDED RELEASE ORAL at 16:07

## 2024-12-20 RX ADMIN — ENOXAPARIN SODIUM 40 MG: 100 INJECTION SUBCUTANEOUS at 09:47

## 2024-12-20 RX ADMIN — BUDESONIDE 500 MCG: 0.5 INHALANT RESPIRATORY (INHALATION) at 07:24

## 2024-12-20 RX ADMIN — ALBUTEROL SULFATE 2.5 MG: 2.5 SOLUTION RESPIRATORY (INHALATION) at 21:45

## 2024-12-20 RX ADMIN — BUDESONIDE 500 MCG: 0.5 INHALANT RESPIRATORY (INHALATION) at 21:46

## 2024-12-20 RX ADMIN — ALBUTEROL SULFATE 2.5 MG: 2.5 SOLUTION RESPIRATORY (INHALATION) at 07:24

## 2024-12-20 RX ADMIN — ASPIRIN 81 MG: 81 TABLET, COATED ORAL at 09:48

## 2024-12-20 ASSESSMENT — PAIN SCALES - GENERAL
PAINLEVEL_OUTOF10: 0
PAINLEVEL_OUTOF10: 0

## 2024-12-20 NOTE — CARE COORDINATION
12/20/24, 12:13 PM EST    Patient goals/plan/ treatment preferences discussed by  and .  Patient goals/plan/ treatment preferences reviewed with patient/ family.  Patient/ family verbalize understanding of discharge plan and are in agreement with goal/plan/treatment preferences.  Understanding was demonstrated using the teach back method.  AVS provided by RN at time of discharge, which includes all necessary medical information pertaining to the patients current course of illness, treatment, post-discharge goals of care, and treatment preferences.     Services At/After Discharge: None

## 2024-12-20 NOTE — PLAN OF CARE
Problem: Respiratory - Adult  Goal: Achieves optimal ventilation and oxygenation  12/20/2024 0734 by Beatriz Velasquez RCP  Outcome: Progressing     Problem: Respiratory - Adult  Goal: Clear lung sounds  Description: Clear lung sounds  Outcome: Progressing   Continue therapy as ordered to achieve and maintain clear breath sounds and improve aeration.

## 2024-12-20 NOTE — PROGRESS NOTES
Hospitalist Progress Note  Internal Medicine Resident      Patient: Fredo Rod 54 y.o. male      Unit/Bed: 8A-14/014-A    Admit Date: 12/16/2024      ASSESSMENT AND PLAN  Active Problems  Acute on chronic hypoxic, hypercapnic respiratory failure (improved): Likely secondary to acute COPD exacerbation as opposed to acute heart failure exacerbation. Patient has had 2 weeks of SOB and lower extremity edema. Patient quit taking his Lasix 2 weeks ago on his own without any reason given. BNP 1600 on arrival VBG in ED showed pCO2 69, pO2 34, HCO3 45, pH 7.42. He was initiated on BiPAP. CXR shows interstitial edema, small left pleural effusion. 12/20 patient currently on 4L NC; reports no SOB. A&Ox4.  Patient's baseline CO2  in setting of severe chronic COPD.  Bicarb on BMP 35. Patient currently uses 3 L NC at home at rest and 4 L NC on exertion. Pulmonology consulted; patient evaluated for noninvasive ventilator therapy deemed a medical necessity.  Appreciate recommendations below.   Continue BiPAP use at bedtime and with naps with pulmonology  Hold BiPAP during the day unless pH less than 7.3 on ABG 7.25 on VBG  Continue nebulized ICS and LABA; plan to add nebulized LAMA (Revefenacin) to home regimen upon discharge  Continue prednisone 40 Mg p.o. daily per pulmonology for 7-day course  Continue DuoNebs every 6 hours  Continue azithromycin 250 Mg p.o. daily; continue upon discharge per pulmonology  Continue Bumex 0.5 Mg IV daily  Per pulmonology patient willing to stop smoking reports he wants to live and consider getting a lung transplant.  Hospice is appropriate as well; would benefit from palliative care consult.   ABG as needed in setting of worsening mentation  Patient follows with Dr. Carlos outpatient; will follow-up upon discharge  Patient okay to discharge home from pulmonary standpoint; case management working on on getting patient's home BiPAP in order  Suspected abdominal wall cellulitis:

## 2024-12-20 NOTE — CARE COORDINATION
12/20/24, 1:35 PM EST    DISCHARGE ON GOING EVALUATION    Fredo Rod       [facility] day: 3  Location: 8A-14/014-A Reason for admit: Altered mental status, unspecified altered mental status type [R41.82]  Hypervolemia, unspecified hypervolemia type [E87.70]  Cellulitis, unspecified cellulitis site [L03.90]  Acute hypoxic respiratory failure [J96.01]  Acute hypoxic on chronic hypercapnic respiratory failure [J96.01, J96.12]     Procedures:   12/17 ECHO: EF 55-60%. Small pericardial effusion present. No cardiac tamponade.     Imaging since last note:   12/17 CTA Chest: No filling defects are noted within the pulmonary arterial vasculature to suggest the presence of pulmonary embolus. The small pericardial effusion has increased in volume measuring up to 20 mm in thickness. Bilateral lower lobe consolidation and patchy bilateral upper lobe groundglass opacities, left greater than right, suggest pneumonia in the appropriate clinical setting. Follow-up to ensure resolution is advised.  12/19 BLE Venous DUP: Negative     Barriers to Discharge: Hospitalist and Pulmonology following. Fluid restriction. Telesitter at bedside. 91% on 4L NC, Bipap at HS. Awaiting delivery of NIV mask      Latest Reference Range & Units 12/19/24 03:51 12/20/24 04:07   CARBON DIOXIDE 23 - 33 meq/L 43 (HH) 35 (H)     PCP: Mohinder Page MD  Readmission Risk Score: 14.2    Patient Goals/Plan/Treatment Preferences: Return home w/ 15yo daughter. 2L ATC through [employer].     0815: Spoke w/ Brittaney at [employer]. [facility] [place] [facility] NIV mask was processed yesterday; however it doesn't look like it has been shipped yet which may take a couple days. Notified Dr. Mcintosh; states patient will remain inpatient until NIV mask delivered.

## 2024-12-20 NOTE — PLAN OF CARE
Problem: Respiratory - Adult    Goal: Achieves optimal ventilation and oxygenation  Achieves optimal ventilation and oxygenation:   Assess for changes in respiratory status   Position to facilitate oxygenation and minimize respiratory effort   Respiratory therapy support as indicated   Initiate smoking cessation protocol as indicated    Outcome: Progressing

## 2024-12-21 VITALS
TEMPERATURE: 98.7 F | HEART RATE: 58 BPM | BODY MASS INDEX: 28.94 KG/M2 | OXYGEN SATURATION: 96 % | SYSTOLIC BLOOD PRESSURE: 134 MMHG | WEIGHT: 195.99 LBS | RESPIRATION RATE: 18 BRPM | DIASTOLIC BLOOD PRESSURE: 84 MMHG

## 2024-12-21 PROCEDURE — 2500000003 HC RX 250 WO HCPCS

## 2024-12-21 PROCEDURE — 6360000002 HC RX W HCPCS

## 2024-12-21 PROCEDURE — 6360000002 HC RX W HCPCS: Performed by: INTERNAL MEDICINE

## 2024-12-21 PROCEDURE — 94640 AIRWAY INHALATION TREATMENT: CPT

## 2024-12-21 PROCEDURE — 6370000000 HC RX 637 (ALT 250 FOR IP)

## 2024-12-21 PROCEDURE — 99239 HOSP IP/OBS DSCHRG MGMT >30: CPT | Performed by: INTERNAL MEDICINE

## 2024-12-21 PROCEDURE — 94760 N-INVAS EAR/PLS OXIMETRY 1: CPT

## 2024-12-21 PROCEDURE — 6370000000 HC RX 637 (ALT 250 FOR IP): Performed by: INTERNAL MEDICINE

## 2024-12-21 PROCEDURE — 94660 CPAP INITIATION&MGMT: CPT

## 2024-12-21 PROCEDURE — 2700000000 HC OXYGEN THERAPY PER DAY

## 2024-12-21 RX ORDER — PREDNISONE 20 MG/1
40 TABLET ORAL DAILY
Qty: 2 TABLET | Refills: 0 | Status: SHIPPED | OUTPATIENT
Start: 2024-12-22 | End: 2024-12-23

## 2024-12-21 RX ORDER — ARFORMOTEROL TARTRATE 15 UG/2ML
1 SOLUTION RESPIRATORY (INHALATION)
Qty: 120 ML | Refills: 0 | Status: SHIPPED | OUTPATIENT
Start: 2024-12-21 | End: 2025-01-20

## 2024-12-21 RX ORDER — BUDESONIDE 0.5 MG/2ML
1 INHALANT ORAL 2 TIMES DAILY
Qty: 120 ML | Refills: 0 | Status: SHIPPED | OUTPATIENT
Start: 2024-12-21 | End: 2025-01-20

## 2024-12-21 RX ORDER — NICOTINE 21 MG/24HR
1 PATCH, TRANSDERMAL 24 HOURS TRANSDERMAL DAILY
Qty: 30 PATCH | Refills: 0 | Status: SHIPPED | OUTPATIENT
Start: 2024-12-21

## 2024-12-21 RX ORDER — AZITHROMYCIN 250 MG/1
250 TABLET, FILM COATED ORAL DAILY
Qty: 30 TABLET | Refills: 0 | Status: SHIPPED | OUTPATIENT
Start: 2024-12-21 | End: 2025-01-20

## 2024-12-21 RX ADMIN — ENOXAPARIN SODIUM 40 MG: 100 INJECTION SUBCUTANEOUS at 09:08

## 2024-12-21 RX ADMIN — LOSARTAN POTASSIUM 50 MG: 50 TABLET, FILM COATED ORAL at 09:09

## 2024-12-21 RX ADMIN — WATER 1000 MG: 1 INJECTION INTRAMUSCULAR; INTRAVENOUS; SUBCUTANEOUS at 01:23

## 2024-12-21 RX ADMIN — SODIUM CHLORIDE, PRESERVATIVE FREE 10 ML: 5 INJECTION INTRAVENOUS at 09:09

## 2024-12-21 RX ADMIN — AZITHROMYCIN DIHYDRATE 250 MG: 250 TABLET ORAL at 09:08

## 2024-12-21 RX ADMIN — POTASSIUM CHLORIDE 20 MEQ: 1500 TABLET, EXTENDED RELEASE ORAL at 09:07

## 2024-12-21 RX ADMIN — BUMETANIDE 0.5 MG: 0.25 INJECTION INTRAMUSCULAR; INTRAVENOUS at 09:08

## 2024-12-21 RX ADMIN — PREDNISONE 40 MG: 20 TABLET ORAL at 09:08

## 2024-12-21 RX ADMIN — ASPIRIN 81 MG: 81 TABLET, COATED ORAL at 09:08

## 2024-12-21 RX ADMIN — TIOTROPIUM BROMIDE INHALATION SPRAY 2 PUFF: 3.12 SPRAY, METERED RESPIRATORY (INHALATION) at 08:37

## 2024-12-21 RX ADMIN — ARFORMOTEROL TARTRATE 15 MCG: 15 SOLUTION RESPIRATORY (INHALATION) at 08:37

## 2024-12-21 RX ADMIN — ALBUTEROL SULFATE 2.5 MG: 2.5 SOLUTION RESPIRATORY (INHALATION) at 08:37

## 2024-12-21 RX ADMIN — BUDESONIDE 500 MCG: 0.5 INHALANT RESPIRATORY (INHALATION) at 08:37

## 2024-12-21 ASSESSMENT — PAIN SCALES - GENERAL
PAINLEVEL_OUTOF10: 0
PAINLEVEL_OUTOF10: 0

## 2024-12-21 NOTE — PLAN OF CARE
Problem: Respiratory - Adult  Goal: Clear lung sounds  Description: Clear lung sounds  12/21/2024 0845 by Sally Osorio, RCP  Outcome: Progressing

## 2024-12-21 NOTE — PLAN OF CARE
Problem: Respiratory - Adult  Goal: Clear lung sounds  Description: Clear lung sounds  Outcome: Progressing     Problem: Respiratory - Adult  Goal: Achieves optimal ventilation and oxygenation  Outcome: Progressing   Patient mutually agrees with goal of care

## 2024-12-21 NOTE — PROGRESS NOTES
A home oxygen evaluation has been completed.     [x]Patient is an inpatient. It is expected that the patient will be discharged within the next 48 hours. Qualified provider to write order for home prescription if patient qualifies. Social service/care managers will arrange for home oxygen.  If patient is active, arrange for Home Medical supplier to assess for Oxygen Conserving Device per pulse oximetry.  []Patient is an outpatient. Results will be faxed to the ordering provider. Qualified provider to write order for home prescription if patient qualifies and arranges for home oxygen.    Patient was placed on room air . SpO2 was 83 % on room air at rest.  2 liters is 91% Patient was walked .SpO2 was 79 % during walking. Patients SpO2 was below 89% and qualified for home oxygen. Oxygen was applied at 4 lpm via nasal cannula to maintain a SpO2 between 90-92% while walking. Actual SpO2 was 90 %.

## 2024-12-21 NOTE — PLAN OF CARE
This patient will require oxygen at discharge:    Stationary Oxygen Concentrator at 2 lpm via Nasal Cannula at rest and 4 lpm with exertion    Stationary Prescribed at:  Continuous    Portable Gaseous O2 System and contents at 2 lpm at rest and 4 lpm at rest via Nasal Cannula    3-4 hrs/day    Diagnosis: Severe COPD with chronic hypoxia  Length of need: Lifetime    Patient was evaluated today for the diagnosis of COPD.  I entered a DME order for home oxygen at 4 lpm because the diagnosis and testing require the patient to have supplemental oxygen.  Condition will improve or be benefited by oxygen use.  The patient is  able to perform good mobility in a home setting and therefore does require the use of a portable oxygen system.  The need for this equipment was discussed with the patient and he understands and is in agreement.       Electronically signed by Kraig Mathews MD on 12/21/2024 at 2:06 PM

## 2024-12-21 NOTE — RT PROTOCOL NOTE
RT Nebulizer Bronchodilator Protocol Note    There is a bronchodilator order in the chart from a provider indicating to follow the RT Bronchodilator Protocol and there is an “Initiate RT Bronchodilator Protocol” order as well (see protocol at bottom of note).    CXR Findings:  No results found.    The findings from the last RT Protocol Assessment were as follows:  Smoking: Chronic pulmonary disease  Respiratory Pattern: Regular pattern and RR 12-20 bpm  Breath Sounds: Slightly diminished and/or crackles  Cough: Strong, spontaneous, non-productive  Indication for Bronchodilator Therapy: Decreased or absent breath sounds  Bronchodilator Assessment Score: 4    Aerosolized bronchodilator medication orders have been revised according to the RT Nebulizer Bronchodilator Protocol below.    Respiratory Therapist to perform RT Therapy Protocol Assessment initially then follow the protocol.  Repeat RT Therapy Protocol Assessment PRN for score 0-3 or on second treatment, BID, and PRN for scores above 3.    No Indications - adjust the frequency to every 6 hours PRN wheezing or bronchospasm, if no treatments needed after 48 hours then discontinue using Per Protocol order mode.     If indication present, adjust the RT bronchodilator orders based on the Bronchodilator Assessment Score as indicated below.  If a patient is on this medication at home then do not decrease Frequency below that used at home.    0-3 - enter or revise RT bronchodilator order(s) to equivalent RT Bronchodilator order with Frequency of every 4 hours PRN for wheezing or increased work of breathing using Per Protocol order mode.       4-6 - enter or revise RT Bronchodilator order(s) to two equivalent RT bronchodilator orders with one order with BID Frequency and one order with Frequency of every 4 hours PRN wheezing or increased work of breathing using Per Protocol order mode.         7-10 - enter or revise RT Bronchodilator order(s) to two equivalent RT  bronchodilator orders with one order with TID Frequency and one order with Frequency of every 4 hours PRN wheezing or increased work of breathing using Per Protocol order mode.       11-13 - enter or revise RT Bronchodilator order(s) to one equivalent RT bronchodilator order with QID Frequency and an Albuterol order with Frequency of every 4 hours PRN wheezing or increased work of breathing using Per Protocol order mode.      Greater than 13 - enter or revise RT Bronchodilator order(s) to one equivalent RT bronchodilator order with every 4 hours Frequency and an Albuterol order with Frequency of every 2 hours PRN wheezing or increased work of breathing using Per Protocol order mode.     RT to enter RT Home Evaluation for COPD & MDI Assessment order using Per Protocol order mode.    Electronically signed by Manoj Awad RCP on 12/20/2024 at 9:50 PM

## 2024-12-21 NOTE — CARE COORDINATION
12/21/24, 1:44 PM EST    Patient goals/plan/ treatment preferences discussed by  and .  Patient goals/plan/ treatment preferences reviewed with patient/ family.  Patient/ family verbalize understanding of discharge plan and are in agreement with goal/plan/treatment preferences.  Understanding was demonstrated using the teach back method.  AVS provided by RN at time of discharge, which includes all necessary medical information pertaining to the patients current course of illness, treatment, post-discharge goals of care, and treatment preferences.     Services At/After Discharge: DME     Notified by nurse pt will need home oxygen set up; spoke with pt he came to hospital and was initially in 4K28; his oxygen tank and cart was left on 4th floor. On Friday he was transferred to 8A without tank and cart; he requires home O2 with new settings (see RT note).  1:46 PM  Called [employer] earlier; faxed all orders  including med necessity note to company.     2:09 PM  [employer] rep called back, they will deliver O2 tank to pt room.

## 2024-12-22 NOTE — DISCHARGE SUMMARY
seconds.  Peripheral Pulses: +2 palpable, equal bilaterally.       Labs: For convenience the most recent labs are provided:  CBC:    Lab Results   Component Value Date/Time    WBC 6.7 12/20/2024 04:07 AM    HGB 13.4 12/20/2024 04:07 AM    HCT 47.8 12/20/2024 04:07 AM     12/20/2024 04:07 AM     Renal:    Lab Results   Component Value Date/Time     12/20/2024 04:07 AM    K 4.5 12/20/2024 04:07 AM    K 4.3 12/16/2024 09:35 PM    CL 98 12/20/2024 04:07 AM    CO2 35 12/20/2024 04:07 AM    BUN 14 12/20/2024 04:07 AM    CREATININE 0.6 12/20/2024 04:07 AM    CALCIUM 8.4 12/20/2024 04:07 AM    PHOS 3.0 12/20/2024 04:07 AM     Liver:   Lab Results   Component Value Date/Time    AST 17 12/16/2024 09:35 PM    ALT 25 12/16/2024 09:35 PM         Significant Diagnostic Studies    Radiology:   Vascular duplex lower extremity venous bilateral   Final Result   Normal venous ultrasound. No evidence for deep venous thrombosis.            **This report has been created using voice recognition software.  It may contain   minor errors which are inherent in voice recognition technology.**      Electronically signed by Dr. Stevie Paulino      CTA CHEST W WO CONTRAST   Final Result   1. No filling defects are noted within the pulmonary arterial vasculature to   suggest the presence of pulmonary embolus. The small pericardial effusion has   increased in volume measuring up to 20 mm in thickness. Bilateral lower lobe   consolidation and patchy bilateral upper lobe groundglass opacities, left   greater than right, suggest pneumonia in the appropriate clinical setting.   Follow-up to ensure resolution is advised.      2. Chronic findings are discussed.            **This report has been created using voice recognition software.  It may contain   minor errors which are inherent in voice recognition technology.**      Electronically signed by Dr. Opal Chavis      XR CHEST PORTABLE   Final Result   Impression:   1. Mild interstitial

## 2024-12-24 NOTE — PROGRESS NOTES
Physician Progress Note      PATIENT:               JEFF BARNETT  [facility] #:                  [id number]  :                       1970  ADMIT DATE:       2024 9:16 PM  DISCH DATE:        2024 5:21 PM  RESPONDING  PROVIDER #:        INO GUDINO          QUERY TEXT:    Patient admitted with acute on chronic hypoxic, hypercapnic respiratory   failure. Noted documentation of sepsis. In order to support the diagnosis of   sepsis, please include additional clinical indicators in your documentation.    Or please document if the diagnosis of sepsis has been ruled out after further   study    The medical record reflects the following:  Risk Factors: Cellulitis, COPD exacerbation, metabolic encephalopathy  Clinical Indicators: Per notes \"Metabolic encephalopathy secondary to   hypercapnia and sepsis\"  WBC, LA: WNL, Afebrile  Treatment: Rocephin, Zithromax  Options provided:  -- Sepsis was ruled out after study  -- Sepsis present as evidenced by, Please document evidence.  -- Other - I will add my own diagnosis  -- Disagree - Not applicable / Not valid  -- Disagree - Clinically unable to determine / Unknown  -- Refer to Clinical Documentation Reviewer    PROVIDER RESPONSE TEXT:    Sepsis was ruled out after study.    Query created by: Jaelyn Ramirez on 2024 8:42 AM      Electronically signed by:  INO GUDINO 2024 8:53 AM

## 2025-01-08 ENCOUNTER — TELEPHONE (OUTPATIENT)
Age: 55
End: 2025-01-08

## 2025-01-08 NOTE — TELEPHONE ENCOUNTER
Referral to [facility] received from Dr. Mcintosh for Smoking Cessation Counseling and Medication Management per Consult Agreement.      Called patient, contact number was for his mother - Berna.  She states that patient does have a cell phone, but she does not know the number.  She will give him the message.  Also informed of 1-800-quit-now option.

## 2025-01-30 RX ORDER — AZITHROMYCIN 250 MG/1
250 TABLET, FILM COATED ORAL DAILY
Qty: 30 TABLET | Refills: 0 | OUTPATIENT
Start: 2025-01-30

## 2025-03-29 ENCOUNTER — APPOINTMENT (OUTPATIENT)
Dept: GENERAL RADIOLOGY | Age: 55
DRG: 194 | End: 2025-03-29
Payer: MEDICAID

## 2025-03-29 ENCOUNTER — HOSPITAL ENCOUNTER (INPATIENT)
Age: 55
LOS: 10 days | Discharge: HOME | DRG: 194 | End: 2025-04-08
Attending: EMERGENCY MEDICINE | Admitting: STUDENT IN AN ORGANIZED HEALTH CARE EDUCATION/TRAINING PROGRAM
Payer: MEDICAID

## 2025-03-29 DIAGNOSIS — J96.22 ACUTE ON CHRONIC RESPIRATORY FAILURE WITH HYPERCAPNIA: Primary | ICD-10-CM

## 2025-03-29 DIAGNOSIS — R06.02 SHORTNESS OF BREATH: ICD-10-CM

## 2025-03-29 DIAGNOSIS — E72.20 HYPERAMMONEMIA: ICD-10-CM

## 2025-03-29 DIAGNOSIS — J96.02 ACUTE RESPIRATORY FAILURE WITH HYPOXIA AND HYPERCAPNIA: ICD-10-CM

## 2025-03-29 DIAGNOSIS — I50.9 ACUTE ON CHRONIC CONGESTIVE HEART FAILURE, UNSPECIFIED HEART FAILURE TYPE (HCC): ICD-10-CM

## 2025-03-29 DIAGNOSIS — J44.9 STAGE 4 VERY SEVERE COPD BY GOLD CLASSIFICATION (HCC): ICD-10-CM

## 2025-03-29 DIAGNOSIS — J96.01 ACUTE RESPIRATORY FAILURE WITH HYPOXIA AND HYPERCAPNIA: ICD-10-CM

## 2025-03-29 DIAGNOSIS — J44.1 COPD EXACERBATION (HCC): ICD-10-CM

## 2025-03-29 PROBLEM — R74.8 ELEVATED ALKALINE PHOSPHATASE LEVEL: Status: ACTIVE | Noted: 2025-03-29

## 2025-03-29 PROBLEM — I16.0 HYPERTENSIVE URGENCY: Status: ACTIVE | Noted: 2025-03-29

## 2025-03-29 PROBLEM — G93.41 METABOLIC ENCEPHALOPATHY: Status: ACTIVE | Noted: 2025-03-29

## 2025-03-29 PROBLEM — I10 PRIMARY HYPERTENSION: Status: ACTIVE | Noted: 2025-03-29

## 2025-03-29 PROBLEM — Z86.79 HISTORY OF CAD (CORONARY ARTERY DISEASE): Status: ACTIVE | Noted: 2025-03-29

## 2025-03-29 PROBLEM — D53.9 MACROCYTIC ANEMIA: Status: ACTIVE | Noted: 2025-03-29

## 2025-03-29 PROBLEM — I50.33 ACUTE ON CHRONIC HEART FAILURE WITH PRESERVED EJECTION FRACTION (HFPEF) (HCC): Status: ACTIVE | Noted: 2024-09-14

## 2025-03-29 LAB
ALBUMIN SERPL BCG-MCNC: 3.4 G/DL (ref 3.4–4.9)
ALP SERPL-CCNC: 130 U/L (ref 40–129)
ALT SERPL W/O P-5'-P-CCNC: 25 U/L (ref 10–50)
ANION GAP SERPL CALC-SCNC: 5 MEQ/L (ref 8–16)
ARTERIAL PATENCY WRIST A: POSITIVE
AST SERPL-CCNC: 23 U/L (ref 10–50)
BACTERIA URNS QL MICRO: ABNORMAL /HPF
BASE EXCESS BLDA CALC-SCNC: 15.8 MMOL/L (ref -2.5–2.5)
BASE EXCESS BLDA CALC-SCNC: 16.3 MMOL/L (ref -2.5–2.5)
BASE EXCESS BLDA CALC-SCNC: 19.1 MMOL/L (ref -2.5–2.5)
BASE EXCESS BLDA CALC-SCNC: 20 MMOL/L (ref -2.5–2.5)
BASOPHILS ABSOLUTE: 0 THOU/MM3 (ref 0–0.1)
BASOPHILS NFR BLD AUTO: 0.6 %
BDY SITE: ABNORMAL
BILIRUB CONJ SERPL-MCNC: 0.3 MG/DL (ref 0–0.2)
BILIRUB SERPL-MCNC: 0.5 MG/DL (ref 0.3–1.2)
BILIRUB UR QL STRIP.AUTO: NEGATIVE
BREATHS SETTING VENT: 16 BPM
BUN SERPL-MCNC: 11 MG/DL (ref 8–23)
CALCIUM SERPL-MCNC: 8.2 MG/DL (ref 8.6–10)
CASTS #/AREA URNS LPF: ABNORMAL /LPF
CASTS 2: ABNORMAL /LPF
CHARACTER UR: CLEAR
CHLORIDE SERPL-SCNC: 98 MEQ/L (ref 98–111)
CO2 SERPL-SCNC: 43 MEQ/L (ref 22–29)
COLLECTED BY:: ABNORMAL
COLOR, UA: YELLOW
CREAT SERPL-MCNC: 0.7 MG/DL (ref 0.7–1.2)
CRYSTALS URNS MICRO: ABNORMAL
DEPRECATED RDW RBC AUTO: 63.5 FL (ref 35–45)
DEVICE: ABNORMAL
EKG ATRIAL RATE: 87 BPM
EKG ATRIAL RATE: 90 BPM
EKG P AXIS: 62 DEGREES
EKG P-R INTERVAL: 146 MS
EKG P-R INTERVAL: 186 MS
EKG Q-T INTERVAL: 364 MS
EKG Q-T INTERVAL: 366 MS
EKG QRS DURATION: 86 MS
EKG QRS DURATION: 86 MS
EKG QTC CALCULATION (BAZETT): 438 MS
EKG QTC CALCULATION (BAZETT): 447 MS
EKG R AXIS: 102 DEGREES
EKG R AXIS: 112 DEGREES
EKG T AXIS: -19 DEGREES
EKG T AXIS: 19 DEGREES
EKG VENTRICULAR RATE: 87 BPM
EKG VENTRICULAR RATE: 90 BPM
EOSINOPHIL NFR BLD AUTO: 0.9 %
EOSINOPHILS ABSOLUTE: 0.1 THOU/MM3 (ref 0–0.4)
EPITHELIAL CELLS, UA: ABNORMAL /HPF
ERYTHROCYTE [DISTWIDTH] IN BLOOD BY AUTOMATED COUNT: 15.7 % (ref 11.5–14.5)
FIO2 ON VENT O2 ANALYZER: 40 %
FIO2 ON VENT O2 ANALYZER: 50 %
FOLATE SERPL-MCNC: 10.9 NG/ML (ref 4.6–34.8)
GFR SERPL CREATININE-BSD FRML MDRD: > 90 ML/MIN/1.73M2
GLUCOSE BLD STRIP.AUTO-MCNC: 107 MG/DL (ref 70–108)
GLUCOSE SERPL-MCNC: 109 MG/DL (ref 74–109)
GLUCOSE UR QL STRIP.AUTO: NEGATIVE MG/DL
HCO3 BLDA-SCNC: 49 MMOL/L (ref 23–28)
HCO3 BLDA-SCNC: 52 MMOL/L (ref 23–28)
HCO3 BLDA-SCNC: 53 MMOL/L (ref 23–28)
HCO3 BLDA-SCNC: 53 MMOL/L (ref 23–28)
HCT VFR BLD AUTO: 46.5 % (ref 42–52)
HGB BLD-MCNC: 12.8 GM/DL (ref 14–18)
HGB UR QL STRIP.AUTO: NEGATIVE
HYPOCHROMIA BLD QL SMEAR: PRESENT
IMM GRANULOCYTES # BLD AUTO: 0.02 THOU/MM3 (ref 0–0.07)
IMM GRANULOCYTES NFR BLD AUTO: 0.2 %
KETONES UR QL STRIP.AUTO: NEGATIVE
LACTATE SERPL-SCNC: 1.7 MMOL/L (ref 0.5–2)
LYMPHOCYTES ABSOLUTE: 0.6 THOU/MM3 (ref 1–4.8)
LYMPHOCYTES NFR BLD AUTO: 7.7 %
MAGNESIUM SERPL-MCNC: 2.2 MG/DL (ref 1.6–2.6)
MCH RBC QN AUTO: 30 PG (ref 26–33)
MCHC RBC AUTO-ENTMCNC: 27.5 GM/DL (ref 32.2–35.5)
MCV RBC AUTO: 108.9 FL (ref 80–94)
MISCELLANEOUS 2: ABNORMAL
MONOCYTES ABSOLUTE: 1 THOU/MM3 (ref 0.4–1.3)
MONOCYTES NFR BLD AUTO: 11.8 %
NEUTROPHILS ABSOLUTE: 6.4 THOU/MM3 (ref 1.8–7.7)
NEUTROPHILS NFR BLD AUTO: 78.8 %
NITRITE UR QL STRIP: NEGATIVE
NRBC BLD AUTO-RTO: 0 /100 WBC
NT-PROBNP SERPL IA-MCNC: 1255 PG/ML (ref 0–124)
OSMOLALITY SERPL CALC.SUM OF ELEC: 290.5 MOSMOL/KG (ref 275–300)
PCO2 BLDA: 106 MMHG (ref 35–45)
PCO2 BLDA: 109 MMHG (ref 35–45)
PCO2 BLDA: 124 MMHG (ref 35–45)
PCO2 BLDA: 99 MMHG (ref 35–45)
PEEP SETTING VENT: 6 MMHG
PH BLDA: 7.23 [PH] (ref 7.35–7.45)
PH BLDA: 7.28 [PH] (ref 7.35–7.45)
PH BLDA: 7.3 [PH] (ref 7.35–7.45)
PH BLDA: 7.33 [PH] (ref 7.35–7.45)
PH UR STRIP.AUTO: 7.5 [PH] (ref 5–9)
PIP: 24 CMH2O
PIP: 24 CMH2O
PLATELET # BLD AUTO: 251 THOU/MM3 (ref 130–400)
PMV BLD AUTO: 10.9 FL (ref 9.4–12.4)
PO2 BLDA: 79 MMHG (ref 71–104)
PO2 BLDA: 80 MMHG (ref 71–104)
PO2 BLDA: 95 MMHG (ref 71–104)
PO2 BLDA: 95 MMHG (ref 71–104)
POTASSIUM SERPL-SCNC: 4.7 MEQ/L (ref 3.5–5.2)
PRESSURE SUPPORT SETTING VENT: 18 CMH2O
PROCALCITONIN SERPL IA-MCNC: 0.06 NG/ML (ref 0.01–0.09)
PROT SERPL-MCNC: 6.4 G/DL (ref 6.4–8.3)
PROT UR STRIP.AUTO-MCNC: 30 MG/DL
RBC # BLD AUTO: 4.27 MILL/MM3 (ref 4.7–6.1)
RBC URINE: ABNORMAL /HPF
RENAL EPI CELLS #/AREA URNS HPF: ABNORMAL /[HPF]
SAO2 % BLDA: 91 %
SAO2 % BLDA: 92 %
SAO2 % BLDA: 95 %
SAO2 % BLDA: 96 %
SCAN OF BLOOD SMEAR: NORMAL
SODIUM SERPL-SCNC: 146 MEQ/L (ref 135–145)
SP GR UR REFRACT.AUTO: 1.01 (ref 1–1.03)
TROPONIN, HIGH SENSITIVITY: 32 NG/L (ref 0–12)
UROBILINOGEN, URINE: 0.2 EU/DL (ref 0–1)
VENTILATION MODE VENT: ABNORMAL
VIT B12 SERPL-MCNC: 1141 PG/ML (ref 232–1245)
WBC # BLD AUTO: 8.1 THOU/MM3 (ref 4.8–10.8)
WBC #/AREA URNS HPF: ABNORMAL /HPF
WBC #/AREA URNS HPF: NEGATIVE /[HPF]
YEAST LIKE FUNGI URNS QL MICRO: ABNORMAL

## 2025-03-29 PROCEDURE — 6360000002 HC RX W HCPCS

## 2025-03-29 PROCEDURE — 84145 PROCALCITONIN (PCT): CPT

## 2025-03-29 PROCEDURE — 84484 ASSAY OF TROPONIN QUANT: CPT

## 2025-03-29 PROCEDURE — 93005 ELECTROCARDIOGRAM TRACING: CPT | Performed by: EMERGENCY MEDICINE

## 2025-03-29 PROCEDURE — 83735 ASSAY OF MAGNESIUM: CPT

## 2025-03-29 PROCEDURE — 82948 REAGENT STRIP/BLOOD GLUCOSE: CPT

## 2025-03-29 PROCEDURE — 2500000003 HC RX 250 WO HCPCS: Performed by: STUDENT IN AN ORGANIZED HEALTH CARE EDUCATION/TRAINING PROGRAM

## 2025-03-29 PROCEDURE — 71045 X-RAY EXAM CHEST 1 VIEW: CPT

## 2025-03-29 PROCEDURE — 36600 WITHDRAWAL OF ARTERIAL BLOOD: CPT

## 2025-03-29 PROCEDURE — 94761 N-INVAS EAR/PLS OXIMETRY MLT: CPT

## 2025-03-29 PROCEDURE — 83880 ASSAY OF NATRIURETIC PEPTIDE: CPT

## 2025-03-29 PROCEDURE — 85025 COMPLETE CBC W/AUTO DIFF WBC: CPT

## 2025-03-29 PROCEDURE — 94660 CPAP INITIATION&MGMT: CPT

## 2025-03-29 PROCEDURE — 6370000000 HC RX 637 (ALT 250 FOR IP)

## 2025-03-29 PROCEDURE — 82746 ASSAY OF FOLIC ACID SERUM: CPT

## 2025-03-29 PROCEDURE — 80053 COMPREHEN METABOLIC PANEL: CPT

## 2025-03-29 PROCEDURE — 82607 VITAMIN B-12: CPT

## 2025-03-29 PROCEDURE — 94640 AIRWAY INHALATION TREATMENT: CPT

## 2025-03-29 PROCEDURE — 2060000000 HC ICU INTERMEDIATE R&B

## 2025-03-29 PROCEDURE — 82803 BLOOD GASES ANY COMBINATION: CPT

## 2025-03-29 PROCEDURE — 6360000002 HC RX W HCPCS: Performed by: STUDENT IN AN ORGANIZED HEALTH CARE EDUCATION/TRAINING PROGRAM

## 2025-03-29 PROCEDURE — 99285 EMERGENCY DEPT VISIT HI MDM: CPT

## 2025-03-29 PROCEDURE — 81001 URINALYSIS AUTO W/SCOPE: CPT

## 2025-03-29 PROCEDURE — 83605 ASSAY OF LACTIC ACID: CPT

## 2025-03-29 PROCEDURE — 36415 COLL VENOUS BLD VENIPUNCTURE: CPT

## 2025-03-29 PROCEDURE — 99223 1ST HOSP IP/OBS HIGH 75: CPT | Performed by: STUDENT IN AN ORGANIZED HEALTH CARE EDUCATION/TRAINING PROGRAM

## 2025-03-29 PROCEDURE — 5A09557 ASSISTANCE WITH RESPIRATORY VENTILATION, GREATER THAN 96 CONSECUTIVE HOURS, CONTINUOUS POSITIVE AIRWAY PRESSURE: ICD-10-PCS | Performed by: STUDENT IN AN ORGANIZED HEALTH CARE EDUCATION/TRAINING PROGRAM

## 2025-03-29 PROCEDURE — 2580000003 HC RX 258: Performed by: STUDENT IN AN ORGANIZED HEALTH CARE EDUCATION/TRAINING PROGRAM

## 2025-03-29 PROCEDURE — 2700000000 HC OXYGEN THERAPY PER DAY

## 2025-03-29 PROCEDURE — 82248 BILIRUBIN DIRECT: CPT

## 2025-03-29 RX ORDER — NITROGLYCERIN 20 MG/100ML
5-200 INJECTION INTRAVENOUS CONTINUOUS
Status: DISCONTINUED | OUTPATIENT
Start: 2025-03-29 | End: 2025-03-30

## 2025-03-29 RX ORDER — ACETAMINOPHEN 325 MG/1
650 TABLET ORAL PRN
Status: DISCONTINUED | OUTPATIENT
Start: 2025-03-29 | End: 2025-03-29 | Stop reason: SDUPTHER

## 2025-03-29 RX ORDER — PREDNISONE 20 MG/1
40 TABLET ORAL DAILY
Status: DISCONTINUED | OUTPATIENT
Start: 2025-03-29 | End: 2025-03-29

## 2025-03-29 RX ORDER — ARFORMOTEROL TARTRATE 15 UG/2ML
15 SOLUTION RESPIRATORY (INHALATION)
Status: DISCONTINUED | OUTPATIENT
Start: 2025-03-29 | End: 2025-04-08 | Stop reason: HOSPADM

## 2025-03-29 RX ORDER — METOPROLOL SUCCINATE 25 MG/1
12.5 TABLET, EXTENDED RELEASE ORAL EVERY EVENING
Status: DISCONTINUED | OUTPATIENT
Start: 2025-03-29 | End: 2025-04-08 | Stop reason: HOSPADM

## 2025-03-29 RX ORDER — ENOXAPARIN SODIUM 100 MG/ML
40 INJECTION SUBCUTANEOUS EVERY 24 HOURS
Status: DISCONTINUED | OUTPATIENT
Start: 2025-03-29 | End: 2025-04-08 | Stop reason: HOSPADM

## 2025-03-29 RX ORDER — BUMETANIDE 0.25 MG/ML
1 INJECTION, SOLUTION INTRAMUSCULAR; INTRAVENOUS ONCE
Status: COMPLETED | OUTPATIENT
Start: 2025-03-29 | End: 2025-03-29

## 2025-03-29 RX ORDER — ONDANSETRON 2 MG/ML
4 INJECTION INTRAMUSCULAR; INTRAVENOUS EVERY 6 HOURS PRN
Status: DISCONTINUED | OUTPATIENT
Start: 2025-03-29 | End: 2025-04-08 | Stop reason: HOSPADM

## 2025-03-29 RX ORDER — BUMETANIDE 0.25 MG/ML
1 INJECTION, SOLUTION INTRAMUSCULAR; INTRAVENOUS DAILY
Status: DISCONTINUED | OUTPATIENT
Start: 2025-03-29 | End: 2025-03-29

## 2025-03-29 RX ORDER — POLYETHYLENE GLYCOL 3350 17 G/17G
17 POWDER, FOR SOLUTION ORAL DAILY PRN
Status: DISCONTINUED | OUTPATIENT
Start: 2025-03-29 | End: 2025-04-08 | Stop reason: HOSPADM

## 2025-03-29 RX ORDER — ASPIRIN 81 MG/1
81 TABLET, CHEWABLE ORAL DAILY
Status: DISCONTINUED | OUTPATIENT
Start: 2025-03-29 | End: 2025-04-08 | Stop reason: HOSPADM

## 2025-03-29 RX ORDER — ALBUTEROL SULFATE 90 UG/1
2 INHALANT RESPIRATORY (INHALATION) EVERY 4 HOURS PRN
Status: DISCONTINUED | OUTPATIENT
Start: 2025-03-29 | End: 2025-04-01

## 2025-03-29 RX ORDER — SODIUM CHLORIDE 0.9 % (FLUSH) 0.9 %
5-40 SYRINGE (ML) INJECTION PRN
Status: DISCONTINUED | OUTPATIENT
Start: 2025-03-29 | End: 2025-04-08 | Stop reason: HOSPADM

## 2025-03-29 RX ORDER — LOSARTAN POTASSIUM 50 MG/1
50 TABLET ORAL DAILY
Status: DISCONTINUED | OUTPATIENT
Start: 2025-03-29 | End: 2025-04-08 | Stop reason: HOSPADM

## 2025-03-29 RX ORDER — ONDANSETRON 4 MG/1
4 TABLET, ORALLY DISINTEGRATING ORAL EVERY 8 HOURS PRN
Status: DISCONTINUED | OUTPATIENT
Start: 2025-03-29 | End: 2025-04-08 | Stop reason: HOSPADM

## 2025-03-29 RX ORDER — ACETAMINOPHEN 650 MG/1
650 SUPPOSITORY RECTAL EVERY 6 HOURS PRN
Status: DISCONTINUED | OUTPATIENT
Start: 2025-03-29 | End: 2025-04-08 | Stop reason: HOSPADM

## 2025-03-29 RX ORDER — BUMETANIDE 1 MG/1
1 TABLET ORAL 2 TIMES DAILY
Status: DISCONTINUED | OUTPATIENT
Start: 2025-03-29 | End: 2025-04-08 | Stop reason: HOSPADM

## 2025-03-29 RX ORDER — MAGNESIUM SULFATE IN WATER 40 MG/ML
2000 INJECTION, SOLUTION INTRAVENOUS PRN
Status: DISCONTINUED | OUTPATIENT
Start: 2025-03-29 | End: 2025-04-08 | Stop reason: HOSPADM

## 2025-03-29 RX ORDER — AZITHROMYCIN 250 MG/1
250 TABLET, FILM COATED ORAL DAILY
Status: DISCONTINUED | OUTPATIENT
Start: 2025-03-29 | End: 2025-03-29

## 2025-03-29 RX ORDER — POTASSIUM CHLORIDE 1500 MG/1
40 TABLET, EXTENDED RELEASE ORAL PRN
Status: DISCONTINUED | OUTPATIENT
Start: 2025-03-29 | End: 2025-04-08 | Stop reason: HOSPADM

## 2025-03-29 RX ORDER — POTASSIUM CHLORIDE 750 MG/1
20 TABLET, EXTENDED RELEASE ORAL DAILY
Status: DISCONTINUED | OUTPATIENT
Start: 2025-03-30 | End: 2025-04-08 | Stop reason: HOSPADM

## 2025-03-29 RX ORDER — POTASSIUM CHLORIDE 7.45 MG/ML
10 INJECTION INTRAVENOUS PRN
Status: DISCONTINUED | OUTPATIENT
Start: 2025-03-29 | End: 2025-04-08 | Stop reason: HOSPADM

## 2025-03-29 RX ORDER — SODIUM CHLORIDE 9 MG/ML
INJECTION, SOLUTION INTRAVENOUS PRN
Status: DISCONTINUED | OUTPATIENT
Start: 2025-03-29 | End: 2025-04-08 | Stop reason: HOSPADM

## 2025-03-29 RX ORDER — SODIUM CHLORIDE 0.9 % (FLUSH) 0.9 %
5-40 SYRINGE (ML) INJECTION EVERY 12 HOURS SCHEDULED
Status: DISCONTINUED | OUTPATIENT
Start: 2025-03-29 | End: 2025-04-08 | Stop reason: HOSPADM

## 2025-03-29 RX ORDER — IPRATROPIUM BROMIDE AND ALBUTEROL SULFATE 2.5; .5 MG/3ML; MG/3ML
1 SOLUTION RESPIRATORY (INHALATION)
Status: DISCONTINUED | OUTPATIENT
Start: 2025-03-29 | End: 2025-03-30

## 2025-03-29 RX ORDER — ACETAMINOPHEN 325 MG/1
650 TABLET ORAL EVERY 6 HOURS PRN
Status: DISCONTINUED | OUTPATIENT
Start: 2025-03-29 | End: 2025-04-08 | Stop reason: HOSPADM

## 2025-03-29 RX ORDER — BUMETANIDE 0.25 MG/ML
1 INJECTION, SOLUTION INTRAMUSCULAR; INTRAVENOUS 2 TIMES DAILY
Status: DISCONTINUED | OUTPATIENT
Start: 2025-03-30 | End: 2025-04-04

## 2025-03-29 RX ADMIN — BUMETANIDE 1 MG: 0.25 INJECTION INTRAMUSCULAR; INTRAVENOUS at 16:24

## 2025-03-29 RX ADMIN — WATER 60 MG: 1 INJECTION INTRAMUSCULAR; INTRAVENOUS; SUBCUTANEOUS at 22:42

## 2025-03-29 RX ADMIN — ARFORMOTEROL TARTRATE 15 MCG: 15 SOLUTION RESPIRATORY (INHALATION) at 20:34

## 2025-03-29 RX ADMIN — AZITHROMYCIN MONOHYDRATE 500 MG: 500 INJECTION, POWDER, LYOPHILIZED, FOR SOLUTION INTRAVENOUS at 19:56

## 2025-03-29 RX ADMIN — WATER 2000 MG: 1 INJECTION INTRAMUSCULAR; INTRAVENOUS; SUBCUTANEOUS at 19:51

## 2025-03-29 RX ADMIN — IPRATROPIUM BROMIDE AND ALBUTEROL SULFATE 1 DOSE: .5; 3 SOLUTION RESPIRATORY (INHALATION) at 20:27

## 2025-03-29 RX ADMIN — ENOXAPARIN SODIUM 40 MG: 100 INJECTION SUBCUTANEOUS at 19:52

## 2025-03-29 RX ADMIN — NITROGLYCERIN 5 MCG/MIN: 20 INJECTION INTRAVENOUS at 16:25

## 2025-03-29 NOTE — H&P
History & Physical  Internal Medicine Resident         Patient: Fredo Rod 54 y.o. male      : 1970  Date of Admission: 3/29/2025  Date of Service: Pt seen/examined on 25 and Admitted to Inpatient with expected LOS greater than two midnights due to medical therapy.       ASSESSMENT AND PLAN  Acute hypoxic respiratory failure: Likely 2/2 acute CHF exacerbation vs. BiPAP noncompliance at night. NT-proBNP 1,255. Saturating at 62% on RA on arrival. Currently on BiPAP /; FiO2 40%. ABG: pH 7.23, pCO2 124, pO2 80, HCO3 52. CXR shows cardiomegaly and probable congestive heart failure. Patient supposed to use BiPAP at night; unable to obtain BiPAP due to insurance issues. Patient uses oxygen at home; 3 L at rest and 4 L with activity.   Consider pulmonology consult for home BiPAP set up  Continue NIV; titration of settings per primary service  Keep n.p.o.  Consider pulmonology consult for home BiPAP set up  Trend vitals  Acute HFpEF exacerbation: NT-proBNP 1,255. 2024 echocardiogram shows EF 55 to 60%. CXR findings as noted above. On Bumex 1 Mg p.o. twice daily at home. Reports compliance. Reports his legs have gotten more swollen. Echocardiogram ordered.  Diuresis: Bumex 1 Mg IV twice daily  Pending echocardiogram  Strict I's and O's  Daily weights  Currently n.p.o; 2 g sodium and 2L fluid restriction upon restart of diet  Hypertensive urgency: ED /147. On Toprol and Cozaar at home. No evidence of endorgan damage. placed on nitroglycerin drip.   Continue nitroglycerin gtt.  Toprol 12.5 Mg p.o. every  Cozaar 50 Mg p.o. daily  Metabolic encephalopathy (improving): Likely secondary to CO2 retention from BiPAP noncompliance. Initial pCO2 124. Currently A&Ox3. UA positive for protein 30.  Continue NIV as noted above  Trend BMP  Severe COPD: PFT showing FEV1 24%. Patient supposed to use BiPAP at night; unable to obtain BiPAP due to insurance issues. Steroids, DuoNebs and antibiotics

## 2025-03-29 NOTE — ED NOTES
ED to inpatient nurses report      Chief Complaint:  Chief Complaint   Patient presents with    Leg Swelling    Polydipsia    Altered Mental Status     Present to ED from: Home    MOA:     LOC: alert and orientated to name and place  Mobility: Requires assistance * 1  Oxygen Baseline: 96% on positive pressure mask    Current needs required: CPAP    Code Status:   Full Code    What abnormal results were found and what did you give/do to treat them?   See MAR  Any procedures or intervention occur?   Cpap    Mental Status:  Level of Consciousness: Alert (0)    Psych Assessment:        Vitals:  Patient Vitals for the past 24 hrs:   BP Temp Temp src Pulse Resp SpO2   03/29/25 1643 -- -- -- 84 18 --   03/29/25 1640 -- -- -- 80 17 --   03/29/25 1620 (!) 166/102 -- -- 79 (!) 34 96 %   03/29/25 1445 (!) 183/147 -- -- 94 21 --   03/29/25 1432 -- -- -- 87 (!) 40 --   03/29/25 1403 -- -- -- -- 27 95 %   03/29/25 1400 (!) 155/87 98.9 °F (37.2 °C) Oral 89 25 95 %   03/29/25 1359 -- -- -- -- -- (!) 62 %        LDAs:   Peripheral IV 03/29/25 Left Hand (Active)   Site Assessment Clean, dry & intact 03/29/25 1413   Line Status Normal saline locked;Flushed;Blood return noted 03/29/25 1413   Dressing Status Clean, dry & intact 03/29/25 1413   Dressing Type Transparent 03/29/25 1413   Dressing Intervention New 03/29/25 1413       Ambulatory Status:  No data recorded    Diagnosis:  DISPOSITION Admitted 03/29/2025 04:03:09 PM   Final diagnoses:   Acute on chronic respiratory failure with hypercapnia (HCC)   Acute on chronic congestive heart failure, unspecified heart failure type (HCC)   Shortness of breath        Consults:  None     Pain Score:       C-SSRS:        Sepsis Screening:       Teresa Fall Risk:       Swallow Screening        Preferred Language:   English      ALLERGIES     Patient has no known allergies.    SURGICAL HISTORY       Past Surgical History:   Procedure Laterality Date    ARM SURGERY Left 1997    BONE RESECTION,

## 2025-03-29 NOTE — ED NOTES
Presents with family for increase in altered mentation and increase in thirst over the past month. Family also concerned that his lower legs are swelling more. States he has been taking his bumex at home. He currently wears 3L O2 via NC at all times. Pt presents with oxygen turned off.

## 2025-03-29 NOTE — ED PROVIDER NOTES
failure type (HCC)       Condition: condition: good  Dispo: Admit to med/surg floor  DISPOSITION Decision To Admit 03/29/2025 03:47:46 PM   DISPOSITION CONDITION Stable           This transcription was electronically signed. It was dictated by use of voice recognition software and electronically transcribed. The transcription may contain errors not detected in proofreading.    Electronically signed by Kt Gonzales MD on 3/29/25 at 2:10 PM EDT

## 2025-03-30 LAB
ANION GAP SERPL CALC-SCNC: 7 MEQ/L (ref 8–16)
ARTERIAL PATENCY WRIST A: POSITIVE
ARTERIAL PATENCY WRIST A: POSITIVE
BASE EXCESS BLDA CALC-SCNC: 16.3 MMOL/L (ref -2.5–2.5)
BASE EXCESS BLDA CALC-SCNC: 23.7 MMOL/L (ref -2.5–2.5)
BASOPHILS ABSOLUTE: 0 THOU/MM3 (ref 0–0.1)
BASOPHILS NFR BLD AUTO: 0.5 %
BDY SITE: ABNORMAL
BDY SITE: ABNORMAL
BUN SERPL-MCNC: 10 MG/DL (ref 8–23)
C DIFF GDH STL QL: NEGATIVE
CALCIUM SERPL-MCNC: 8.4 MG/DL (ref 8.6–10)
CHLORIDE SERPL-SCNC: 97 MEQ/L (ref 98–111)
CO2 SERPL-SCNC: 39 MEQ/L (ref 22–29)
COLLECTED BY:: ABNORMAL
COLLECTED BY:: ABNORMAL
CREAT SERPL-MCNC: 0.7 MG/DL (ref 0.7–1.2)
DEPRECATED RDW RBC AUTO: 63.6 FL (ref 35–45)
DEVICE: ABNORMAL
DEVICE: ABNORMAL
EOSINOPHIL NFR BLD AUTO: 0 %
EOSINOPHILS ABSOLUTE: 0 THOU/MM3 (ref 0–0.4)
ERYTHROCYTE [DISTWIDTH] IN BLOOD BY AUTOMATED COUNT: 15.4 % (ref 11.5–14.5)
GFR SERPL CREATININE-BSD FRML MDRD: > 90 ML/MIN/1.73M2
GLUCOSE SERPL-MCNC: 124 MG/DL (ref 74–109)
HCO3 BLDA-SCNC: 47 MMOL/L (ref 23–28)
HCO3 BLDA-SCNC: 56 MMOL/L (ref 23–28)
HCT VFR BLD AUTO: 46.8 % (ref 42–52)
HGB BLD-MCNC: 12.9 GM/DL (ref 14–18)
HYPOCHROMIA BLD QL SMEAR: PRESENT
IMM GRANULOCYTES # BLD AUTO: 0.02 THOU/MM3 (ref 0–0.07)
IMM GRANULOCYTES NFR BLD AUTO: 0.4 %
LYMPHOCYTES ABSOLUTE: 0.3 THOU/MM3 (ref 1–4.8)
LYMPHOCYTES NFR BLD AUTO: 5.6 %
MACROCYTES BLD QL SMEAR: PRESENT
MCH RBC QN AUTO: 30.4 PG (ref 26–33)
MCHC RBC AUTO-ENTMCNC: 27.6 GM/DL (ref 32.2–35.5)
MCV RBC AUTO: 110.1 FL (ref 80–94)
MONOCYTES ABSOLUTE: 0 THOU/MM3 (ref 0.4–1.3)
MONOCYTES NFR BLD AUTO: 0.7 %
NEUTROPHILS ABSOLUTE: 5.3 THOU/MM3 (ref 1.8–7.7)
NEUTROPHILS NFR BLD AUTO: 92.8 %
NRBC BLD AUTO-RTO: 0 /100 WBC
PCO2 BLDA: 82 MMHG (ref 35–45)
PCO2 BLDA: 96 MMHG (ref 35–45)
PH BLDA: 7.37 [PH] (ref 7.35–7.45)
PH BLDA: 7.37 [PH] (ref 7.35–7.45)
PLATELET # BLD AUTO: 226 THOU/MM3 (ref 130–400)
PMV BLD AUTO: 10.9 FL (ref 9.4–12.4)
PO2 BLDA: 87 MMHG (ref 71–104)
PO2 BLDA: 92 MMHG (ref 71–104)
POTASSIUM SERPL-SCNC: 4.7 MEQ/L (ref 3.5–5.2)
RBC # BLD AUTO: 4.25 MILL/MM3 (ref 4.7–6.1)
SAO2 % BLDA: 95 %
SAO2 % BLDA: 96 %
SODIUM SERPL-SCNC: 143 MEQ/L (ref 135–145)
VENTILATION MODE VENT: ABNORMAL
VENTILATION MODE VENT: ABNORMAL
WBC # BLD AUTO: 5.7 THOU/MM3 (ref 4.8–10.8)

## 2025-03-30 PROCEDURE — 2500000003 HC RX 250 WO HCPCS

## 2025-03-30 PROCEDURE — 6360000002 HC RX W HCPCS: Performed by: STUDENT IN AN ORGANIZED HEALTH CARE EDUCATION/TRAINING PROGRAM

## 2025-03-30 PROCEDURE — 2500000003 HC RX 250 WO HCPCS: Performed by: STUDENT IN AN ORGANIZED HEALTH CARE EDUCATION/TRAINING PROGRAM

## 2025-03-30 PROCEDURE — 2700000000 HC OXYGEN THERAPY PER DAY

## 2025-03-30 PROCEDURE — 87449 NOS EACH ORGANISM AG IA: CPT

## 2025-03-30 PROCEDURE — 94761 N-INVAS EAR/PLS OXIMETRY MLT: CPT

## 2025-03-30 PROCEDURE — 6370000000 HC RX 637 (ALT 250 FOR IP)

## 2025-03-30 PROCEDURE — 99233 SBSQ HOSP IP/OBS HIGH 50: CPT | Performed by: STUDENT IN AN ORGANIZED HEALTH CARE EDUCATION/TRAINING PROGRAM

## 2025-03-30 PROCEDURE — 2580000003 HC RX 258: Performed by: STUDENT IN AN ORGANIZED HEALTH CARE EDUCATION/TRAINING PROGRAM

## 2025-03-30 PROCEDURE — 82803 BLOOD GASES ANY COMBINATION: CPT

## 2025-03-30 PROCEDURE — 87040 BLOOD CULTURE FOR BACTERIA: CPT

## 2025-03-30 PROCEDURE — 94640 AIRWAY INHALATION TREATMENT: CPT

## 2025-03-30 PROCEDURE — 36600 WITHDRAWAL OF ARTERIAL BLOOD: CPT

## 2025-03-30 PROCEDURE — 6360000002 HC RX W HCPCS

## 2025-03-30 PROCEDURE — 6370000000 HC RX 637 (ALT 250 FOR IP): Performed by: STUDENT IN AN ORGANIZED HEALTH CARE EDUCATION/TRAINING PROGRAM

## 2025-03-30 PROCEDURE — 2060000000 HC ICU INTERMEDIATE R&B

## 2025-03-30 PROCEDURE — 80048 BASIC METABOLIC PNL TOTAL CA: CPT

## 2025-03-30 PROCEDURE — 85025 COMPLETE CBC W/AUTO DIFF WBC: CPT

## 2025-03-30 PROCEDURE — 94660 CPAP INITIATION&MGMT: CPT

## 2025-03-30 PROCEDURE — 87899 AGENT NOS ASSAY W/OPTIC: CPT

## 2025-03-30 PROCEDURE — 36415 COLL VENOUS BLD VENIPUNCTURE: CPT

## 2025-03-30 RX ORDER — IPRATROPIUM BROMIDE AND ALBUTEROL SULFATE 2.5; .5 MG/3ML; MG/3ML
1 SOLUTION RESPIRATORY (INHALATION)
Status: DISCONTINUED | OUTPATIENT
Start: 2025-03-30 | End: 2025-03-31

## 2025-03-30 RX ORDER — PREDNISONE 20 MG/1
40 TABLET ORAL DAILY
Status: DISCONTINUED | OUTPATIENT
Start: 2025-03-31 | End: 2025-04-04

## 2025-03-30 RX ADMIN — WATER 2000 MG: 1 INJECTION INTRAMUSCULAR; INTRAVENOUS; SUBCUTANEOUS at 02:21

## 2025-03-30 RX ADMIN — WATER 2000 MG: 1 INJECTION INTRAMUSCULAR; INTRAVENOUS; SUBCUTANEOUS at 18:16

## 2025-03-30 RX ADMIN — IPRATROPIUM BROMIDE AND ALBUTEROL SULFATE 1 DOSE: .5; 3 SOLUTION RESPIRATORY (INHALATION) at 21:07

## 2025-03-30 RX ADMIN — WATER 2000 MG: 1 INJECTION INTRAMUSCULAR; INTRAVENOUS; SUBCUTANEOUS at 09:47

## 2025-03-30 RX ADMIN — IPRATROPIUM BROMIDE AND ALBUTEROL SULFATE 1 DOSE: .5; 3 SOLUTION RESPIRATORY (INHALATION) at 13:20

## 2025-03-30 RX ADMIN — LOSARTAN POTASSIUM 50 MG: 50 TABLET, FILM COATED ORAL at 09:47

## 2025-03-30 RX ADMIN — AZITHROMYCIN MONOHYDRATE 500 MG: 500 INJECTION, POWDER, LYOPHILIZED, FOR SOLUTION INTRAVENOUS at 18:53

## 2025-03-30 RX ADMIN — ARFORMOTEROL TARTRATE 15 MCG: 15 SOLUTION RESPIRATORY (INHALATION) at 21:15

## 2025-03-30 RX ADMIN — IPRATROPIUM BROMIDE AND ALBUTEROL SULFATE 1 DOSE: .5; 3 SOLUTION RESPIRATORY (INHALATION) at 07:48

## 2025-03-30 RX ADMIN — ACETAMINOPHEN 650 MG: 325 TABLET ORAL at 15:19

## 2025-03-30 RX ADMIN — ARFORMOTEROL TARTRATE 15 MCG: 15 SOLUTION RESPIRATORY (INHALATION) at 07:48

## 2025-03-30 RX ADMIN — METOPROLOL SUCCINATE 12.5 MG: 25 TABLET, FILM COATED, EXTENDED RELEASE ORAL at 18:15

## 2025-03-30 RX ADMIN — BUMETANIDE 1 MG: 0.25 INJECTION INTRAMUSCULAR; INTRAVENOUS at 18:15

## 2025-03-30 RX ADMIN — BUMETANIDE 1 MG: 0.25 INJECTION INTRAMUSCULAR; INTRAVENOUS at 09:47

## 2025-03-30 RX ADMIN — POTASSIUM CHLORIDE 20 MEQ: 750 TABLET, EXTENDED RELEASE ORAL at 09:47

## 2025-03-30 RX ADMIN — ASPIRIN 81 MG: 81 TABLET, CHEWABLE ORAL at 09:47

## 2025-03-30 RX ADMIN — SODIUM CHLORIDE, PRESERVATIVE FREE 10 ML: 5 INJECTION INTRAVENOUS at 18:42

## 2025-03-30 RX ADMIN — WATER 60 MG: 1 INJECTION INTRAMUSCULAR; INTRAVENOUS; SUBCUTANEOUS at 18:40

## 2025-03-30 NOTE — CONSULTS
animals at home:No  Dogs: 0  Cats: 0     History of pulmonary embolism in the past: No            History of DVT in the past:No        [] Right lower extremity   [] Left lower extremity.       Review of systems   Review of Systems   Constitutional:  Positive for unexpected weight change (gain). Negative for activity change, chills, fatigue and fever.   HENT:  Negative for congestion, postnasal drip, sinus pressure and voice change.    Respiratory:  Positive for shortness of breath (on 3lpm O2). Negative for apnea, cough, choking, chest tightness and wheezing.    Cardiovascular:  Positive for leg swelling. Negative for chest pain.   Gastrointestinal:  Positive for abdominal pain. Negative for constipation, diarrhea, nausea and vomiting.   Endocrine: Positive for polydipsia.   Genitourinary:  Negative for difficulty urinating.   Musculoskeletal:  Negative for gait problem.   Skin:  Negative for color change.   Neurological:  Negative for dizziness and headaches.         Vitals     height is 1.753 m (5' 9\") and weight is 95.8 kg (211 lb 3.2 oz). His axillary temperature is 99.1 °F (37.3 °C). His blood pressure is 123/66 and his pulse is 68. His respiration is 16 and oxygen saturation is 95%.   Body mass index is 31.19 kg/m².    SUPPLEMENTAL O2: O2 Flow Rate (L/min): 5 L/min     I/O      Intake/Output Summary (Last 24 hours) at 3/30/2025 1508  Last data filed at 3/30/2025 1320  Gross per 24 hour   Intake 564.54 ml   Output 3000 ml   Net -2435.46 ml     I/O last 3 completed shifts:  In: -   Out: 1300 [Urine:1300]   Patient Vitals for the past 96 hrs (Last 3 readings):   Weight   03/30/25 0415 95.8 kg (211 lb 3.2 oz)   03/29/25 1715 96.4 kg (212 lb 8.4 oz)       Exam    Physical Exam   Constitutional: No distress on 5lpm O2 per NC. Patient appears obese   Head: Normocephalic and atraumatic.   Mouth/Throat: Oropharynx is clear and moist.  No oral thrush.  Eyes: Conjunctivae are normal. Pupils are equal, round. No scleral

## 2025-03-30 NOTE — CARE COORDINATION
03/30/25 1145   Service Assessment   Patient Orientation Alert and Oriented   Cognition Alert   History Provided By Patient   Primary Caregiver Self   Accompanied By/Relationship n/a   Support Systems Parent;Children;Family Members   Patient's Healthcare Decision Maker is: Legal Next of Kin   PCP Verified by CM Yes   Last Visit to PCP Within last 3 months   Prior Functional Level Independent in ADLs/IADLs   Current Functional Level Independent in ADLs/IADLs   Can patient return to prior living arrangement Yes   Ability to make needs known: Good   Family able to assist with home care needs: Yes   Would you like for me to discuss the discharge plan with any other family members/significant others, and if so, who? Yes  (Mother)   Financial Resources Medicaid   Community Resources None   Social/Functional History   Active  Yes   Discharge Planning   Type of Residence Trailer/Mobile Home   Living Arrangements Children   Current Services Prior To Admission Durable Medical Equipment;Oxygen Therapy   Current DME Prior to Arrival Oxygen Therapy (Comment)  (O2 3-4L ATC from [employer])   Potential Assistance Needed Durable Medical Equipment   Potential DME Needed Home Aerosol   DME Ordered? No   Potential Assistance Purchasing Medications No   Type of Home Care Services None   Patient expects to be discharged to: Trailer/mobile home   Services At/After Discharge   Mode of Transport at Discharge Other (see comment)  (family)   Confirm Follow Up Transport Family   Condition of Participation: Discharge Planning   The Plan for Transition of Care is related to the following treatment goals: \"make sure I'm  ok to go home\"     Patient Goals/Plan/Treatment Preferences: Spoke with pt. He lives at home with his daughter (under 18). He just recently got custody of her. He has home O2 from [employer] (3-4L). May need a nebulizer at discharge.     If patient is discharged prior to next notation, then this note serves as note for discharge

## 2025-03-31 ENCOUNTER — APPOINTMENT (OUTPATIENT)
Dept: GENERAL RADIOLOGY | Age: 55
DRG: 194 | End: 2025-03-31
Payer: MEDICAID

## 2025-03-31 ENCOUNTER — APPOINTMENT (OUTPATIENT)
Age: 55
DRG: 194 | End: 2025-03-31
Payer: MEDICAID

## 2025-03-31 LAB
ANION GAP SERPL CALC-SCNC: 9 MEQ/L (ref 8–16)
ARTERIAL PATENCY WRIST A: POSITIVE
BASE EXCESS BLDA CALC-SCNC: 15.4 MMOL/L (ref -2.5–2.5)
BASOPHILS ABSOLUTE: 0 THOU/MM3 (ref 0–0.1)
BASOPHILS NFR BLD AUTO: 0.4 %
BDY SITE: ABNORMAL
BUN SERPL-MCNC: 12 MG/DL (ref 8–23)
CALCIUM SERPL-MCNC: 8.5 MG/DL (ref 8.6–10)
CHLORIDE SERPL-SCNC: 95 MEQ/L (ref 98–111)
CO2 SERPL-SCNC: 37 MEQ/L (ref 22–29)
COLLECTED BY:: ABNORMAL
CREAT SERPL-MCNC: 0.7 MG/DL (ref 0.7–1.2)
DEPRECATED RDW RBC AUTO: 62.3 FL (ref 35–45)
DEVICE: ABNORMAL
ECHO AO ASC DIAM: 3.2 CM
ECHO AO ASCENDING AORTA INDEX: 1.52 CM/M2
ECHO AV CUSP MM: 1.5 CM
ECHO BSA: 2.16 M2
ECHO LA DIAMETER INDEX: 2.27 CM/M2
ECHO LA DIAMETER: 4.8 CM
ECHO LV EJECTION FRACTION 3D: 55 %
ECHO LV FRACTIONAL SHORTENING: 30 % (ref 28–44)
ECHO LV INTERNAL DIMENSION DIASTOLE INDEX: 2.18 CM/M2
ECHO LV INTERNAL DIMENSION DIASTOLIC: 4.6 CM (ref 4.2–5.9)
ECHO LV INTERNAL DIMENSION SYSTOLIC INDEX: 1.52 CM/M2
ECHO LV INTERNAL DIMENSION SYSTOLIC: 3.2 CM
ECHO LV IVSD: 1.9 CM (ref 0.6–1)
ECHO LV MASS 2D: 378.4 G (ref 88–224)
ECHO LV MASS INDEX 2D: 179.3 G/M2 (ref 49–115)
ECHO LV POSTERIOR WALL DIASTOLIC: 1.7 CM (ref 0.6–1)
ECHO LV RELATIVE WALL THICKNESS RATIO: 0.74
ECHO RV INTERNAL DIMENSION: 3.9 CM
EOSINOPHIL NFR BLD AUTO: 0 %
EOSINOPHILS ABSOLUTE: 0 THOU/MM3 (ref 0–0.4)
ERYTHROCYTE [DISTWIDTH] IN BLOOD BY AUTOMATED COUNT: 15.7 % (ref 11.5–14.5)
FIO2 ON VENT O2 ANALYZER: 6 %
GFR SERPL CREATININE-BSD FRML MDRD: > 90 ML/MIN/1.73M2
GLUCOSE SERPL-MCNC: 167 MG/DL (ref 74–109)
HCO3 BLDA-SCNC: 46 MMOL/L (ref 23–28)
HCT VFR BLD AUTO: 43.5 % (ref 42–52)
HGB BLD-MCNC: 12.4 GM/DL (ref 14–18)
HYPOCHROMIA BLD QL SMEAR: PRESENT
IMM GRANULOCYTES # BLD AUTO: 0.02 THOU/MM3 (ref 0–0.07)
IMM GRANULOCYTES NFR BLD AUTO: 0.4 %
L PNEUMO1 AG UR QL IA.RAPID: NEGATIVE
LYMPHOCYTES ABSOLUTE: 0.6 THOU/MM3 (ref 1–4.8)
LYMPHOCYTES NFR BLD AUTO: 12 %
MCH RBC QN AUTO: 30.4 PG (ref 26–33)
MCHC RBC AUTO-ENTMCNC: 28.5 GM/DL (ref 32.2–35.5)
MCV RBC AUTO: 106.6 FL (ref 80–94)
MONOCYTES ABSOLUTE: 0.4 THOU/MM3 (ref 0.4–1.3)
MONOCYTES NFR BLD AUTO: 7.7 %
NEUTROPHILS ABSOLUTE: 4.1 THOU/MM3 (ref 1.8–7.7)
NEUTROPHILS NFR BLD AUTO: 79.5 %
NRBC BLD AUTO-RTO: 0 /100 WBC
PCO2 BLDA: 84 MMHG (ref 35–45)
PH BLDA: 7.34 [PH] (ref 7.35–7.45)
PLATELET # BLD AUTO: 241 THOU/MM3 (ref 130–400)
PMV BLD AUTO: 11.5 FL (ref 9.4–12.4)
PO2 BLDA: 98 MMHG (ref 71–104)
POTASSIUM SERPL-SCNC: 4.3 MEQ/L (ref 3.5–5.2)
RBC # BLD AUTO: 4.08 MILL/MM3 (ref 4.7–6.1)
SAO2 % BLDA: 97 %
SODIUM SERPL-SCNC: 141 MEQ/L (ref 135–145)
STREP PNEUMO AG, UR: NEGATIVE
VENTILATION MODE VENT: ABNORMAL
WBC # BLD AUTO: 5.1 THOU/MM3 (ref 4.8–10.8)

## 2025-03-31 PROCEDURE — 2700000000 HC OXYGEN THERAPY PER DAY

## 2025-03-31 PROCEDURE — 6370000000 HC RX 637 (ALT 250 FOR IP): Performed by: STUDENT IN AN ORGANIZED HEALTH CARE EDUCATION/TRAINING PROGRAM

## 2025-03-31 PROCEDURE — 94761 N-INVAS EAR/PLS OXIMETRY MLT: CPT

## 2025-03-31 PROCEDURE — 36600 WITHDRAWAL OF ARTERIAL BLOOD: CPT

## 2025-03-31 PROCEDURE — 71046 X-RAY EXAM CHEST 2 VIEWS: CPT

## 2025-03-31 PROCEDURE — 2500000003 HC RX 250 WO HCPCS

## 2025-03-31 PROCEDURE — 1200000003 HC TELEMETRY R&B

## 2025-03-31 PROCEDURE — 99233 SBSQ HOSP IP/OBS HIGH 50: CPT | Performed by: INTERNAL MEDICINE

## 2025-03-31 PROCEDURE — 6370000000 HC RX 637 (ALT 250 FOR IP)

## 2025-03-31 PROCEDURE — 6360000002 HC RX W HCPCS: Performed by: STUDENT IN AN ORGANIZED HEALTH CARE EDUCATION/TRAINING PROGRAM

## 2025-03-31 PROCEDURE — 99233 SBSQ HOSP IP/OBS HIGH 50: CPT | Performed by: STUDENT IN AN ORGANIZED HEALTH CARE EDUCATION/TRAINING PROGRAM

## 2025-03-31 PROCEDURE — 94640 AIRWAY INHALATION TREATMENT: CPT

## 2025-03-31 PROCEDURE — 2500000003 HC RX 250 WO HCPCS: Performed by: STUDENT IN AN ORGANIZED HEALTH CARE EDUCATION/TRAINING PROGRAM

## 2025-03-31 PROCEDURE — 1200000000 HC SEMI PRIVATE

## 2025-03-31 PROCEDURE — 94660 CPAP INITIATION&MGMT: CPT

## 2025-03-31 PROCEDURE — 97166 OT EVAL MOD COMPLEX 45 MIN: CPT

## 2025-03-31 PROCEDURE — 85025 COMPLETE CBC W/AUTO DIFF WBC: CPT

## 2025-03-31 PROCEDURE — 97116 GAIT TRAINING THERAPY: CPT

## 2025-03-31 PROCEDURE — 6360000002 HC RX W HCPCS

## 2025-03-31 PROCEDURE — 93307 TTE W/O DOPPLER COMPLETE: CPT | Performed by: NUCLEAR MEDICINE

## 2025-03-31 PROCEDURE — 97530 THERAPEUTIC ACTIVITIES: CPT

## 2025-03-31 PROCEDURE — 93307 TTE W/O DOPPLER COMPLETE: CPT

## 2025-03-31 PROCEDURE — 82803 BLOOD GASES ANY COMBINATION: CPT

## 2025-03-31 PROCEDURE — 36415 COLL VENOUS BLD VENIPUNCTURE: CPT

## 2025-03-31 PROCEDURE — 97162 PT EVAL MOD COMPLEX 30 MIN: CPT

## 2025-03-31 PROCEDURE — 97535 SELF CARE MNGMENT TRAINING: CPT

## 2025-03-31 PROCEDURE — 2580000003 HC RX 258: Performed by: STUDENT IN AN ORGANIZED HEALTH CARE EDUCATION/TRAINING PROGRAM

## 2025-03-31 PROCEDURE — 80048 BASIC METABOLIC PNL TOTAL CA: CPT

## 2025-03-31 RX ORDER — ALBUTEROL SULFATE 0.83 MG/ML
2.5 SOLUTION RESPIRATORY (INHALATION) EVERY 6 HOURS PRN
Status: DISCONTINUED | OUTPATIENT
Start: 2025-03-31 | End: 2025-04-08 | Stop reason: HOSPADM

## 2025-03-31 RX ADMIN — ARFORMOTEROL TARTRATE 15 MCG: 15 SOLUTION RESPIRATORY (INHALATION) at 08:32

## 2025-03-31 RX ADMIN — IPRATROPIUM BROMIDE AND ALBUTEROL SULFATE 1 DOSE: .5; 3 SOLUTION RESPIRATORY (INHALATION) at 08:32

## 2025-03-31 RX ADMIN — ARFORMOTEROL TARTRATE 15 MCG: 15 SOLUTION RESPIRATORY (INHALATION) at 21:25

## 2025-03-31 RX ADMIN — BUMETANIDE 1 MG: 0.25 INJECTION INTRAMUSCULAR; INTRAVENOUS at 08:34

## 2025-03-31 RX ADMIN — BUMETANIDE 1 MG: 0.25 INJECTION INTRAMUSCULAR; INTRAVENOUS at 17:40

## 2025-03-31 RX ADMIN — ASPIRIN 81 MG: 81 TABLET, CHEWABLE ORAL at 08:34

## 2025-03-31 RX ADMIN — SODIUM CHLORIDE, PRESERVATIVE FREE 10 ML: 5 INJECTION INTRAVENOUS at 20:37

## 2025-03-31 RX ADMIN — LOSARTAN POTASSIUM 50 MG: 50 TABLET, FILM COATED ORAL at 08:31

## 2025-03-31 RX ADMIN — ACETAMINOPHEN 650 MG: 325 TABLET ORAL at 04:09

## 2025-03-31 RX ADMIN — SODIUM CHLORIDE, PRESERVATIVE FREE 10 ML: 5 INJECTION INTRAVENOUS at 08:31

## 2025-03-31 RX ADMIN — ENOXAPARIN SODIUM 40 MG: 100 INJECTION SUBCUTANEOUS at 17:41

## 2025-03-31 RX ADMIN — ACETAMINOPHEN 650 MG: 325 TABLET ORAL at 17:40

## 2025-03-31 RX ADMIN — POTASSIUM CHLORIDE 20 MEQ: 750 TABLET, EXTENDED RELEASE ORAL at 08:34

## 2025-03-31 RX ADMIN — PREDNISONE 40 MG: 20 TABLET ORAL at 08:30

## 2025-03-31 RX ADMIN — WATER 2000 MG: 1 INJECTION INTRAMUSCULAR; INTRAVENOUS; SUBCUTANEOUS at 03:06

## 2025-03-31 RX ADMIN — AZITHROMYCIN MONOHYDRATE 500 MG: 500 INJECTION, POWDER, LYOPHILIZED, FOR SOLUTION INTRAVENOUS at 18:09

## 2025-03-31 RX ADMIN — WATER 2000 MG: 1 INJECTION INTRAMUSCULAR; INTRAVENOUS; SUBCUTANEOUS at 12:36

## 2025-03-31 RX ADMIN — WATER 2000 MG: 1 INJECTION INTRAMUSCULAR; INTRAVENOUS; SUBCUTANEOUS at 17:58

## 2025-03-31 ASSESSMENT — PAIN DESCRIPTION - ORIENTATION: ORIENTATION: ANTERIOR

## 2025-03-31 ASSESSMENT — PAIN DESCRIPTION - LOCATION: LOCATION: HEAD

## 2025-03-31 ASSESSMENT — PAIN SCALES - GENERAL
PAINLEVEL_OUTOF10: 0
PAINLEVEL_OUTOF10: 3
PAINLEVEL_OUTOF10: 0
PAINLEVEL_OUTOF10: 0

## 2025-03-31 ASSESSMENT — PAIN DESCRIPTION - DESCRIPTORS: DESCRIPTORS: ACHING;DISCOMFORT

## 2025-03-31 ASSESSMENT — PAIN SCALES - WONG BAKER
WONGBAKER_NUMERICALRESPONSE: NO HURT
WONGBAKER_NUMERICALRESPONSE: NO HURT

## 2025-03-31 ASSESSMENT — PAIN - FUNCTIONAL ASSESSMENT: PAIN_FUNCTIONAL_ASSESSMENT: ACTIVITIES ARE NOT PREVENTED

## 2025-03-31 NOTE — CARE COORDINATION
3/31/25, 3:33 PM EDT    DISCHARGE ON GOING EVALUATION    Fredo Rod       [facility] day: 2  Location: -09/009-A Reason for admit: Shortness of breath [R06.02]  Acute on chronic respiratory failure with hypercapnia [J96.22]  Acute on chronic respiratory failure with hypoxia and hypercapnia [J96.21, J96.22]  Acute on chronic congestive heart failure, unspecified heart failure type (HCC) [I50.9]     Procedures: N/A    Imaging since last note:   3/31 CXR:   1. Marked cardiomegaly. Tiny bilateral pleural effusions, best seen posteriorly.  2. Mild bibasilar pneumonia/pulmonary edema  3. Overall appearance of chest improved from prior.       Barriers to Discharge: Admitted for ARF secondary to CHF and lack of needed PAP at night. Admitted by hospitalist with consult to pulmonology. Initially on continuous BiPAP, now on 6L O2 NC. Now A+O x 4. IV Bumex BID. IV Zithromax. Echo pending. Pt also hypertensive with hospitalist adjusting meds.     PCP: Mohinder Page MD  Readmission Risk Score: 15.9    Patient Goals/Plan/Treatment Preferences: Patient is from home with 14 y.o. daughter. Has home O2 through [employer] (3-4L) at baseline. Following for needs.

## 2025-04-01 LAB
ANION GAP SERPL CALC-SCNC: 7 MEQ/L (ref 8–16)
BASOPHILS ABSOLUTE: 0 THOU/MM3 (ref 0–0.1)
BASOPHILS NFR BLD AUTO: 0.6 %
BUN SERPL-MCNC: 15 MG/DL (ref 8–23)
CALCIUM SERPL-MCNC: 8.7 MG/DL (ref 8.6–10)
CHLORIDE SERPL-SCNC: 99 MEQ/L (ref 98–111)
CO2 SERPL-SCNC: 38 MEQ/L (ref 22–29)
CREAT SERPL-MCNC: 0.6 MG/DL (ref 0.7–1.2)
DEPRECATED RDW RBC AUTO: 63 FL (ref 35–45)
EOSINOPHIL NFR BLD AUTO: 2.9 %
EOSINOPHILS ABSOLUTE: 0.2 THOU/MM3 (ref 0–0.4)
ERYTHROCYTE [DISTWIDTH] IN BLOOD BY AUTOMATED COUNT: 15.6 % (ref 11.5–14.5)
GFR SERPL CREATININE-BSD FRML MDRD: > 90 ML/MIN/1.73M2
GLUCOSE SERPL-MCNC: 91 MG/DL (ref 74–109)
HCT VFR BLD AUTO: 46.8 % (ref 42–52)
HGB BLD-MCNC: 13.2 GM/DL (ref 14–18)
HYPOCHROMIA BLD QL SMEAR: PRESENT
IMM GRANULOCYTES # BLD AUTO: 0.04 THOU/MM3 (ref 0–0.07)
IMM GRANULOCYTES NFR BLD AUTO: 0.6 %
LYMPHOCYTES ABSOLUTE: 1.5 THOU/MM3 (ref 1–4.8)
LYMPHOCYTES NFR BLD AUTO: 22.3 %
MCH RBC QN AUTO: 30.3 PG (ref 26–33)
MCHC RBC AUTO-ENTMCNC: 28.2 GM/DL (ref 32.2–35.5)
MCV RBC AUTO: 107.3 FL (ref 80–94)
MONOCYTES ABSOLUTE: 0.7 THOU/MM3 (ref 0.4–1.3)
MONOCYTES NFR BLD AUTO: 9.9 %
NEUTROPHILS ABSOLUTE: 4.2 THOU/MM3 (ref 1.8–7.7)
NEUTROPHILS NFR BLD AUTO: 63.7 %
NRBC BLD AUTO-RTO: 0 /100 WBC
PLATELET # BLD AUTO: 256 THOU/MM3 (ref 130–400)
PMV BLD AUTO: 11.1 FL (ref 9.4–12.4)
POTASSIUM SERPL-SCNC: 4.5 MEQ/L (ref 3.5–5.2)
RBC # BLD AUTO: 4.36 MILL/MM3 (ref 4.7–6.1)
SODIUM SERPL-SCNC: 144 MEQ/L (ref 135–145)
WBC # BLD AUTO: 6.6 THOU/MM3 (ref 4.8–10.8)

## 2025-04-01 PROCEDURE — 94660 CPAP INITIATION&MGMT: CPT

## 2025-04-01 PROCEDURE — 97530 THERAPEUTIC ACTIVITIES: CPT

## 2025-04-01 PROCEDURE — 94640 AIRWAY INHALATION TREATMENT: CPT

## 2025-04-01 PROCEDURE — 36415 COLL VENOUS BLD VENIPUNCTURE: CPT

## 2025-04-01 PROCEDURE — 1200000003 HC TELEMETRY R&B

## 2025-04-01 PROCEDURE — 80048 BASIC METABOLIC PNL TOTAL CA: CPT

## 2025-04-01 PROCEDURE — 6370000000 HC RX 637 (ALT 250 FOR IP)

## 2025-04-01 PROCEDURE — 99233 SBSQ HOSP IP/OBS HIGH 50: CPT | Performed by: STUDENT IN AN ORGANIZED HEALTH CARE EDUCATION/TRAINING PROGRAM

## 2025-04-01 PROCEDURE — 2500000003 HC RX 250 WO HCPCS: Performed by: STUDENT IN AN ORGANIZED HEALTH CARE EDUCATION/TRAINING PROGRAM

## 2025-04-01 PROCEDURE — 6370000000 HC RX 637 (ALT 250 FOR IP): Performed by: NURSE PRACTITIONER

## 2025-04-01 PROCEDURE — 6360000002 HC RX W HCPCS: Performed by: STUDENT IN AN ORGANIZED HEALTH CARE EDUCATION/TRAINING PROGRAM

## 2025-04-01 PROCEDURE — 94761 N-INVAS EAR/PLS OXIMETRY MLT: CPT

## 2025-04-01 PROCEDURE — 6360000002 HC RX W HCPCS

## 2025-04-01 PROCEDURE — 2700000000 HC OXYGEN THERAPY PER DAY

## 2025-04-01 PROCEDURE — 99233 SBSQ HOSP IP/OBS HIGH 50: CPT | Performed by: INTERNAL MEDICINE

## 2025-04-01 PROCEDURE — 2500000003 HC RX 250 WO HCPCS

## 2025-04-01 PROCEDURE — 6370000000 HC RX 637 (ALT 250 FOR IP): Performed by: STUDENT IN AN ORGANIZED HEALTH CARE EDUCATION/TRAINING PROGRAM

## 2025-04-01 PROCEDURE — 97116 GAIT TRAINING THERAPY: CPT

## 2025-04-01 PROCEDURE — 85025 COMPLETE CBC W/AUTO DIFF WBC: CPT

## 2025-04-01 PROCEDURE — 97535 SELF CARE MNGMENT TRAINING: CPT

## 2025-04-01 PROCEDURE — 1200000000 HC SEMI PRIVATE

## 2025-04-01 RX ORDER — AZITHROMYCIN 250 MG/1
250 TABLET, FILM COATED ORAL DAILY
Status: DISCONTINUED | OUTPATIENT
Start: 2025-04-01 | End: 2025-04-08 | Stop reason: HOSPADM

## 2025-04-01 RX ORDER — ALBUTEROL SULFATE 90 UG/1
2 INHALANT RESPIRATORY (INHALATION)
Status: DISCONTINUED | OUTPATIENT
Start: 2025-04-01 | End: 2025-04-02

## 2025-04-01 RX ADMIN — ALBUTEROL SULFATE 2 PUFF: 90 AEROSOL, METERED RESPIRATORY (INHALATION) at 10:10

## 2025-04-01 RX ADMIN — ASPIRIN 81 MG: 81 TABLET, CHEWABLE ORAL at 11:14

## 2025-04-01 RX ADMIN — SODIUM CHLORIDE, PRESERVATIVE FREE 10 ML: 5 INJECTION INTRAVENOUS at 11:14

## 2025-04-01 RX ADMIN — ARFORMOTEROL TARTRATE 15 MCG: 15 SOLUTION RESPIRATORY (INHALATION) at 20:24

## 2025-04-01 RX ADMIN — METOPROLOL SUCCINATE 12.5 MG: 25 TABLET, FILM COATED, EXTENDED RELEASE ORAL at 18:47

## 2025-04-01 RX ADMIN — WATER 2000 MG: 1 INJECTION INTRAMUSCULAR; INTRAVENOUS; SUBCUTANEOUS at 12:10

## 2025-04-01 RX ADMIN — ALBUTEROL SULFATE 2 PUFF: 90 AEROSOL, METERED RESPIRATORY (INHALATION) at 20:22

## 2025-04-01 RX ADMIN — ENOXAPARIN SODIUM 40 MG: 100 INJECTION SUBCUTANEOUS at 18:47

## 2025-04-01 RX ADMIN — WATER 2000 MG: 1 INJECTION INTRAMUSCULAR; INTRAVENOUS; SUBCUTANEOUS at 20:59

## 2025-04-01 RX ADMIN — ALBUTEROL SULFATE 2 PUFF: 90 AEROSOL, METERED RESPIRATORY (INHALATION) at 14:02

## 2025-04-01 RX ADMIN — TIOTROPIUM BROMIDE INHALATION SPRAY 2 PUFF: 3.12 SPRAY, METERED RESPIRATORY (INHALATION) at 09:59

## 2025-04-01 RX ADMIN — AZITHROMYCIN DIHYDRATE 250 MG: 250 TABLET ORAL at 12:10

## 2025-04-01 RX ADMIN — LOSARTAN POTASSIUM 50 MG: 50 TABLET, FILM COATED ORAL at 11:14

## 2025-04-01 RX ADMIN — BUMETANIDE 1 MG: 0.25 INJECTION INTRAMUSCULAR; INTRAVENOUS at 18:47

## 2025-04-01 RX ADMIN — POTASSIUM CHLORIDE 20 MEQ: 750 TABLET, EXTENDED RELEASE ORAL at 11:14

## 2025-04-01 RX ADMIN — PREDNISONE 40 MG: 20 TABLET ORAL at 11:14

## 2025-04-01 RX ADMIN — WATER 2000 MG: 1 INJECTION INTRAMUSCULAR; INTRAVENOUS; SUBCUTANEOUS at 03:32

## 2025-04-01 RX ADMIN — BUMETANIDE 1 MG: 0.25 INJECTION INTRAMUSCULAR; INTRAVENOUS at 11:14

## 2025-04-01 RX ADMIN — SODIUM CHLORIDE, PRESERVATIVE FREE 10 ML: 5 INJECTION INTRAVENOUS at 20:59

## 2025-04-01 RX ADMIN — ARFORMOTEROL TARTRATE 15 MCG: 15 SOLUTION RESPIRATORY (INHALATION) at 10:02

## 2025-04-01 RX ADMIN — ACETAMINOPHEN 650 MG: 325 TABLET ORAL at 09:22

## 2025-04-01 ASSESSMENT — PAIN DESCRIPTION - ORIENTATION: ORIENTATION: MID

## 2025-04-01 ASSESSMENT — PAIN SCALES - GENERAL
PAINLEVEL_OUTOF10: 0
PAINLEVEL_OUTOF10: 0
PAINLEVEL_OUTOF10: 7

## 2025-04-01 ASSESSMENT — PAIN SCALES - WONG BAKER: WONGBAKER_NUMERICALRESPONSE: NO HURT

## 2025-04-01 ASSESSMENT — PAIN - FUNCTIONAL ASSESSMENT: PAIN_FUNCTIONAL_ASSESSMENT: ACTIVITIES ARE NOT PREVENTED

## 2025-04-01 ASSESSMENT — PAIN DESCRIPTION - LOCATION: LOCATION: HEAD

## 2025-04-01 ASSESSMENT — PAIN DESCRIPTION - DESCRIPTORS: DESCRIPTORS: PRESSURE

## 2025-04-01 NOTE — CARE COORDINATION
CM handoff given to Rosalie Telles RN CM    Electronically signed by Brittaney Nieto RN on 4/1/2025 at 7:23 AM

## 2025-04-02 LAB
ANION GAP SERPL CALC-SCNC: 11 MEQ/L (ref 8–16)
BASOPHILS ABSOLUTE: 0 THOU/MM3 (ref 0–0.1)
BASOPHILS NFR BLD AUTO: 0.4 %
BUN SERPL-MCNC: 21 MG/DL (ref 8–23)
CALCIUM SERPL-MCNC: 8.9 MG/DL (ref 8.6–10)
CHLORIDE SERPL-SCNC: 97 MEQ/L (ref 98–111)
CO2 SERPL-SCNC: 35 MEQ/L (ref 22–29)
CREAT SERPL-MCNC: 0.7 MG/DL (ref 0.7–1.2)
DEPRECATED RDW RBC AUTO: 59.5 FL (ref 35–45)
EOSINOPHIL NFR BLD AUTO: 0 %
EOSINOPHILS ABSOLUTE: 0 THOU/MM3 (ref 0–0.4)
ERYTHROCYTE [DISTWIDTH] IN BLOOD BY AUTOMATED COUNT: 15.5 % (ref 11.5–14.5)
GFR SERPL CREATININE-BSD FRML MDRD: > 90 ML/MIN/1.73M2
GLUCOSE SERPL-MCNC: 88 MG/DL (ref 74–109)
HCT VFR BLD AUTO: 47.5 % (ref 42–52)
HGB BLD-MCNC: 13.8 GM/DL (ref 14–18)
IMM GRANULOCYTES # BLD AUTO: 0.02 THOU/MM3 (ref 0–0.07)
IMM GRANULOCYTES NFR BLD AUTO: 0.3 %
LYMPHOCYTES ABSOLUTE: 1.3 THOU/MM3 (ref 1–4.8)
LYMPHOCYTES NFR BLD AUTO: 16.9 %
MAGNESIUM SERPL-MCNC: 2.4 MG/DL (ref 1.6–2.6)
MCH RBC QN AUTO: 29.9 PG (ref 26–33)
MCHC RBC AUTO-ENTMCNC: 29.1 GM/DL (ref 32.2–35.5)
MCV RBC AUTO: 102.8 FL (ref 80–94)
MONOCYTES ABSOLUTE: 0.6 THOU/MM3 (ref 0.4–1.3)
MONOCYTES NFR BLD AUTO: 8.6 %
NEUTROPHILS ABSOLUTE: 5.5 THOU/MM3 (ref 1.8–7.7)
NEUTROPHILS NFR BLD AUTO: 73.8 %
NRBC BLD AUTO-RTO: 0 /100 WBC
PLATELET # BLD AUTO: 235 THOU/MM3 (ref 130–400)
PMV BLD AUTO: 11.3 FL (ref 9.4–12.4)
POTASSIUM SERPL-SCNC: 4.7 MEQ/L (ref 3.5–5.2)
RBC # BLD AUTO: 4.62 MILL/MM3 (ref 4.7–6.1)
SODIUM SERPL-SCNC: 143 MEQ/L (ref 135–145)
WBC # BLD AUTO: 7.4 THOU/MM3 (ref 4.8–10.8)

## 2025-04-02 PROCEDURE — 97530 THERAPEUTIC ACTIVITIES: CPT

## 2025-04-02 PROCEDURE — 6360000002 HC RX W HCPCS

## 2025-04-02 PROCEDURE — 6360000002 HC RX W HCPCS: Performed by: STUDENT IN AN ORGANIZED HEALTH CARE EDUCATION/TRAINING PROGRAM

## 2025-04-02 PROCEDURE — 2500000003 HC RX 250 WO HCPCS

## 2025-04-02 PROCEDURE — 6370000000 HC RX 637 (ALT 250 FOR IP): Performed by: STUDENT IN AN ORGANIZED HEALTH CARE EDUCATION/TRAINING PROGRAM

## 2025-04-02 PROCEDURE — 85025 COMPLETE CBC W/AUTO DIFF WBC: CPT

## 2025-04-02 PROCEDURE — 83735 ASSAY OF MAGNESIUM: CPT

## 2025-04-02 PROCEDURE — 2700000000 HC OXYGEN THERAPY PER DAY

## 2025-04-02 PROCEDURE — 99233 SBSQ HOSP IP/OBS HIGH 50: CPT | Performed by: STUDENT IN AN ORGANIZED HEALTH CARE EDUCATION/TRAINING PROGRAM

## 2025-04-02 PROCEDURE — 94761 N-INVAS EAR/PLS OXIMETRY MLT: CPT

## 2025-04-02 PROCEDURE — 80048 BASIC METABOLIC PNL TOTAL CA: CPT

## 2025-04-02 PROCEDURE — 36415 COLL VENOUS BLD VENIPUNCTURE: CPT

## 2025-04-02 PROCEDURE — 1200000000 HC SEMI PRIVATE

## 2025-04-02 PROCEDURE — 6370000000 HC RX 637 (ALT 250 FOR IP): Performed by: NURSE PRACTITIONER

## 2025-04-02 PROCEDURE — 97535 SELF CARE MNGMENT TRAINING: CPT

## 2025-04-02 PROCEDURE — 97110 THERAPEUTIC EXERCISES: CPT

## 2025-04-02 PROCEDURE — 99233 SBSQ HOSP IP/OBS HIGH 50: CPT | Performed by: INTERNAL MEDICINE

## 2025-04-02 PROCEDURE — 6370000000 HC RX 637 (ALT 250 FOR IP)

## 2025-04-02 PROCEDURE — 94640 AIRWAY INHALATION TREATMENT: CPT

## 2025-04-02 PROCEDURE — 97116 GAIT TRAINING THERAPY: CPT

## 2025-04-02 PROCEDURE — 2500000003 HC RX 250 WO HCPCS: Performed by: STUDENT IN AN ORGANIZED HEALTH CARE EDUCATION/TRAINING PROGRAM

## 2025-04-02 PROCEDURE — 94660 CPAP INITIATION&MGMT: CPT

## 2025-04-02 RX ORDER — ALBUTEROL SULFATE 90 UG/1
2 INHALANT RESPIRATORY (INHALATION)
Status: DISCONTINUED | OUTPATIENT
Start: 2025-04-02 | End: 2025-04-03

## 2025-04-02 RX ADMIN — SODIUM CHLORIDE, PRESERVATIVE FREE 10 ML: 5 INJECTION INTRAVENOUS at 08:01

## 2025-04-02 RX ADMIN — AZITHROMYCIN DIHYDRATE 250 MG: 250 TABLET ORAL at 07:56

## 2025-04-02 RX ADMIN — WATER 2000 MG: 1 INJECTION INTRAMUSCULAR; INTRAVENOUS; SUBCUTANEOUS at 13:07

## 2025-04-02 RX ADMIN — BUMETANIDE 1 MG: 0.25 INJECTION INTRAMUSCULAR; INTRAVENOUS at 07:55

## 2025-04-02 RX ADMIN — METOPROLOL SUCCINATE 12.5 MG: 25 TABLET, FILM COATED, EXTENDED RELEASE ORAL at 16:00

## 2025-04-02 RX ADMIN — LOSARTAN POTASSIUM 50 MG: 50 TABLET, FILM COATED ORAL at 07:56

## 2025-04-02 RX ADMIN — ARFORMOTEROL TARTRATE 15 MCG: 15 SOLUTION RESPIRATORY (INHALATION) at 20:05

## 2025-04-02 RX ADMIN — BUMETANIDE 1 MG: 0.25 INJECTION INTRAMUSCULAR; INTRAVENOUS at 16:46

## 2025-04-02 RX ADMIN — ALBUTEROL SULFATE 2 PUFF: 90 AEROSOL, METERED RESPIRATORY (INHALATION) at 09:36

## 2025-04-02 RX ADMIN — WATER 2000 MG: 1 INJECTION INTRAMUSCULAR; INTRAVENOUS; SUBCUTANEOUS at 04:09

## 2025-04-02 RX ADMIN — ASPIRIN 81 MG: 81 TABLET, CHEWABLE ORAL at 07:55

## 2025-04-02 RX ADMIN — POTASSIUM CHLORIDE 20 MEQ: 750 TABLET, EXTENDED RELEASE ORAL at 07:55

## 2025-04-02 RX ADMIN — PREDNISONE 40 MG: 20 TABLET ORAL at 07:56

## 2025-04-02 RX ADMIN — WATER 2000 MG: 1 INJECTION INTRAMUSCULAR; INTRAVENOUS; SUBCUTANEOUS at 18:36

## 2025-04-02 RX ADMIN — ARFORMOTEROL TARTRATE 15 MCG: 15 SOLUTION RESPIRATORY (INHALATION) at 09:36

## 2025-04-02 RX ADMIN — SODIUM CHLORIDE, PRESERVATIVE FREE 10 ML: 5 INJECTION INTRAVENOUS at 19:51

## 2025-04-02 RX ADMIN — ENOXAPARIN SODIUM 40 MG: 100 INJECTION SUBCUTANEOUS at 18:36

## 2025-04-02 RX ADMIN — ALBUTEROL SULFATE 2 PUFF: 90 AEROSOL, METERED RESPIRATORY (INHALATION) at 20:05

## 2025-04-02 RX ADMIN — TIOTROPIUM BROMIDE INHALATION SPRAY 2 PUFF: 3.12 SPRAY, METERED RESPIRATORY (INHALATION) at 09:36

## 2025-04-02 NOTE — CARE COORDINATION
4/2/25, 11:48 AM EDT    DISCHARGE ON GOING EVALUATION    Fredo CANO [facility] day: 4  Location: [facility]09/009-A Reason for admit: Shortness of breath [R06.02]  Acute on chronic respiratory failure with hypercapnia [J96.22]  Acute on chronic respiratory failure with hypoxia and hypercapnia [J96.21, J96.22]  Acute on chronic congestive heart failure, unspecified heart failure type (HCC) [I50.9]         Barriers to Discharge: Hospitalist and Pulmonology following, PT/OT, Ancef q 8 hr., Albuterol, Brovana, oral Zithromax, IV Bumex twice daily, Lovenox, Spiriva, Deltasone, prn Tylenol and Zofran, electrolyte replacement protocol, daily BMP, CBC, and Magnesium, low sodium diet with 1800 ml fluid restriction, oxygen, incentive spirometry, up with assistance.     PCP: Mohinder Page MD  Readmission Risk Score: 13.4    Patient Goals/Plan/Treatment Preferences: Fredo is from home with his 14 year old daughter. He had a NIV from [employer]. It was the second machine they provided to him as his first machine was ruined due to infestation. It is reported the same thing happened to the second machine. [employer] will no longer provide equipment to Fredoitalo at [facility]. Pulmonology is working with another company to get Fredo an NIV at discharge. Fredo also requests a rollator at discharge. Following.

## 2025-04-02 NOTE — CARE COORDINATION
Phone call placed to [facility], spoke to [facility]. [facility] states they are no longer able to provide equipment for Fredo. They have a note in on his chart. This writer did request that [employer] fax over the statement indicating the above. Electronically signed by Alma Telles RN on 4/2/25 at 11:45 AM EDT

## 2025-04-02 NOTE — CARE COORDINATION
Requested patient's medical records from [facility]. Electronically signed by Alma Telles RN on 4/2/25 at 8:42 AM EDT

## 2025-04-03 LAB
ANION GAP SERPL CALC-SCNC: 9 MEQ/L (ref 8–16)
ARTERIAL PATENCY WRIST A: POSITIVE
BASE EXCESS BLDA CALC-SCNC: 11 MMOL/L (ref -2.5–2.5)
BASOPHILS ABSOLUTE: 0.1 THOU/MM3 (ref 0–0.1)
BASOPHILS NFR BLD AUTO: 0.9 %
BDY SITE: ABNORMAL
BREATHS SETTING VENT: 15 BPM
BUN SERPL-MCNC: 23 MG/DL (ref 8–23)
CALCIUM SERPL-MCNC: 8.9 MG/DL (ref 8.6–10)
CHLORIDE SERPL-SCNC: 97 MEQ/L (ref 98–111)
CO2 SERPL-SCNC: 36 MEQ/L (ref 22–29)
COLLECTED BY:: ABNORMAL
CREAT SERPL-MCNC: 0.8 MG/DL (ref 0.7–1.2)
DEPRECATED RDW RBC AUTO: 58.8 FL (ref 35–45)
DEVICE: ABNORMAL
EOSINOPHIL NFR BLD AUTO: 1.1 %
EOSINOPHILS ABSOLUTE: 0.1 THOU/MM3 (ref 0–0.4)
ERYTHROCYTE [DISTWIDTH] IN BLOOD BY AUTOMATED COUNT: 15.5 % (ref 11.5–14.5)
FIO2 ON VENT O2 ANALYZER: 40 %
GFR SERPL CREATININE-BSD FRML MDRD: > 90 ML/MIN/1.73M2
GLUCOSE SERPL-MCNC: 83 MG/DL (ref 74–109)
HCO3 BLDA-SCNC: 39 MMOL/L (ref 23–28)
HCT VFR BLD AUTO: 47.7 % (ref 42–52)
HGB BLD-MCNC: 13.7 GM/DL (ref 14–18)
HYPOCHROMIA BLD QL SMEAR: PRESENT
IMM GRANULOCYTES # BLD AUTO: 0.03 THOU/MM3 (ref 0–0.07)
IMM GRANULOCYTES NFR BLD AUTO: 0.4 %
LYMPHOCYTES ABSOLUTE: 1.9 THOU/MM3 (ref 1–4.8)
LYMPHOCYTES NFR BLD AUTO: 24.2 %
MAGNESIUM SERPL-MCNC: 2.3 MG/DL (ref 1.6–2.6)
MCH RBC QN AUTO: 29.3 PG (ref 26–33)
MCHC RBC AUTO-ENTMCNC: 28.7 GM/DL (ref 32.2–35.5)
MCV RBC AUTO: 101.9 FL (ref 80–94)
MONOCYTES ABSOLUTE: 0.8 THOU/MM3 (ref 0.4–1.3)
MONOCYTES NFR BLD AUTO: 10.1 %
NEUTROPHILS ABSOLUTE: 5.1 THOU/MM3 (ref 1.8–7.7)
NEUTROPHILS NFR BLD AUTO: 63.3 %
NRBC BLD AUTO-RTO: 0 /100 WBC
PCO2 BLDA: 65 MMHG (ref 35–45)
PEEP SETTING VENT: 4 MMHG
PH BLDA: 7.39 [PH] (ref 7.35–7.45)
PLATELET # BLD AUTO: 288 THOU/MM3 (ref 130–400)
PMV BLD AUTO: 11.3 FL (ref 9.4–12.4)
PO2 BLDA: 97 MMHG (ref 71–104)
POTASSIUM SERPL-SCNC: 4.4 MEQ/L (ref 3.5–5.2)
RBC # BLD AUTO: 4.68 MILL/MM3 (ref 4.7–6.1)
SAO2 % BLDA: 97 %
SODIUM SERPL-SCNC: 142 MEQ/L (ref 135–145)
VENTILATION MODE VENT: ABNORMAL
WBC # BLD AUTO: 8 THOU/MM3 (ref 4.8–10.8)

## 2025-04-03 PROCEDURE — 99232 SBSQ HOSP IP/OBS MODERATE 35: CPT | Performed by: STUDENT IN AN ORGANIZED HEALTH CARE EDUCATION/TRAINING PROGRAM

## 2025-04-03 PROCEDURE — 82803 BLOOD GASES ANY COMBINATION: CPT

## 2025-04-03 PROCEDURE — 6360000002 HC RX W HCPCS

## 2025-04-03 PROCEDURE — 36600 WITHDRAWAL OF ARTERIAL BLOOD: CPT

## 2025-04-03 PROCEDURE — 6370000000 HC RX 637 (ALT 250 FOR IP)

## 2025-04-03 PROCEDURE — 6370000000 HC RX 637 (ALT 250 FOR IP): Performed by: STUDENT IN AN ORGANIZED HEALTH CARE EDUCATION/TRAINING PROGRAM

## 2025-04-03 PROCEDURE — 6360000002 HC RX W HCPCS: Performed by: STUDENT IN AN ORGANIZED HEALTH CARE EDUCATION/TRAINING PROGRAM

## 2025-04-03 PROCEDURE — 2500000003 HC RX 250 WO HCPCS

## 2025-04-03 PROCEDURE — 94660 CPAP INITIATION&MGMT: CPT

## 2025-04-03 PROCEDURE — 94761 N-INVAS EAR/PLS OXIMETRY MLT: CPT

## 2025-04-03 PROCEDURE — 97535 SELF CARE MNGMENT TRAINING: CPT

## 2025-04-03 PROCEDURE — 99233 SBSQ HOSP IP/OBS HIGH 50: CPT | Performed by: INTERNAL MEDICINE

## 2025-04-03 PROCEDURE — 6370000000 HC RX 637 (ALT 250 FOR IP): Performed by: NURSE PRACTITIONER

## 2025-04-03 PROCEDURE — 80048 BASIC METABOLIC PNL TOTAL CA: CPT

## 2025-04-03 PROCEDURE — 83735 ASSAY OF MAGNESIUM: CPT

## 2025-04-03 PROCEDURE — 97530 THERAPEUTIC ACTIVITIES: CPT

## 2025-04-03 PROCEDURE — 94640 AIRWAY INHALATION TREATMENT: CPT

## 2025-04-03 PROCEDURE — 85025 COMPLETE CBC W/AUTO DIFF WBC: CPT

## 2025-04-03 PROCEDURE — 36415 COLL VENOUS BLD VENIPUNCTURE: CPT

## 2025-04-03 PROCEDURE — 2500000003 HC RX 250 WO HCPCS: Performed by: STUDENT IN AN ORGANIZED HEALTH CARE EDUCATION/TRAINING PROGRAM

## 2025-04-03 PROCEDURE — 1200000000 HC SEMI PRIVATE

## 2025-04-03 PROCEDURE — 2700000000 HC OXYGEN THERAPY PER DAY

## 2025-04-03 RX ORDER — ALBUTEROL SULFATE 90 UG/1
2 INHALANT RESPIRATORY (INHALATION) EVERY 4 HOURS PRN
Status: DISCONTINUED | OUTPATIENT
Start: 2025-04-03 | End: 2025-04-08 | Stop reason: HOSPADM

## 2025-04-03 RX ADMIN — ARFORMOTEROL TARTRATE 15 MCG: 15 SOLUTION RESPIRATORY (INHALATION) at 20:08

## 2025-04-03 RX ADMIN — METOPROLOL SUCCINATE 12.5 MG: 25 TABLET, FILM COATED, EXTENDED RELEASE ORAL at 15:45

## 2025-04-03 RX ADMIN — ASPIRIN 81 MG: 81 TABLET, CHEWABLE ORAL at 09:16

## 2025-04-03 RX ADMIN — ACETAMINOPHEN 650 MG: 325 TABLET ORAL at 09:32

## 2025-04-03 RX ADMIN — SODIUM CHLORIDE, PRESERVATIVE FREE 10 ML: 5 INJECTION INTRAVENOUS at 19:43

## 2025-04-03 RX ADMIN — WATER 2000 MG: 1 INJECTION INTRAMUSCULAR; INTRAVENOUS; SUBCUTANEOUS at 12:32

## 2025-04-03 RX ADMIN — WATER 2000 MG: 1 INJECTION INTRAMUSCULAR; INTRAVENOUS; SUBCUTANEOUS at 05:07

## 2025-04-03 RX ADMIN — ALBUTEROL SULFATE 2 PUFF: 90 AEROSOL, METERED RESPIRATORY (INHALATION) at 10:21

## 2025-04-03 RX ADMIN — ALBUTEROL SULFATE 2 PUFF: 90 AEROSOL, METERED RESPIRATORY (INHALATION) at 20:07

## 2025-04-03 RX ADMIN — LOSARTAN POTASSIUM 50 MG: 50 TABLET, FILM COATED ORAL at 09:16

## 2025-04-03 RX ADMIN — BUMETANIDE 1 MG: 0.25 INJECTION INTRAMUSCULAR; INTRAVENOUS at 15:44

## 2025-04-03 RX ADMIN — AZITHROMYCIN DIHYDRATE 250 MG: 250 TABLET ORAL at 09:16

## 2025-04-03 RX ADMIN — SODIUM CHLORIDE, PRESERVATIVE FREE 10 ML: 5 INJECTION INTRAVENOUS at 12:23

## 2025-04-03 RX ADMIN — ACETAMINOPHEN 650 MG: 325 TABLET ORAL at 19:50

## 2025-04-03 RX ADMIN — PREDNISONE 40 MG: 20 TABLET ORAL at 09:15

## 2025-04-03 RX ADMIN — BUMETANIDE 1 MG: 0.25 INJECTION INTRAMUSCULAR; INTRAVENOUS at 12:18

## 2025-04-03 RX ADMIN — TIOTROPIUM BROMIDE INHALATION SPRAY 2 PUFF: 3.12 SPRAY, METERED RESPIRATORY (INHALATION) at 10:22

## 2025-04-03 RX ADMIN — ENOXAPARIN SODIUM 40 MG: 100 INJECTION SUBCUTANEOUS at 17:57

## 2025-04-03 RX ADMIN — SODIUM CHLORIDE, PRESERVATIVE FREE 10 ML: 5 INJECTION INTRAVENOUS at 09:16

## 2025-04-03 RX ADMIN — POTASSIUM CHLORIDE 20 MEQ: 750 TABLET, EXTENDED RELEASE ORAL at 09:16

## 2025-04-03 RX ADMIN — ARFORMOTEROL TARTRATE 15 MCG: 15 SOLUTION RESPIRATORY (INHALATION) at 10:22

## 2025-04-03 ASSESSMENT — PAIN DESCRIPTION - ORIENTATION: ORIENTATION: MID

## 2025-04-03 ASSESSMENT — PAIN DESCRIPTION - LOCATION
LOCATION: GENERALIZED
LOCATION: HEAD

## 2025-04-03 ASSESSMENT — PAIN SCALES - GENERAL
PAINLEVEL_OUTOF10: 0
PAINLEVEL_OUTOF10: 0
PAINLEVEL_OUTOF10: 2
PAINLEVEL_OUTOF10: 3

## 2025-04-03 ASSESSMENT — PAIN DESCRIPTION - DESCRIPTORS: DESCRIPTORS: ACHING

## 2025-04-03 NOTE — CARE COORDINATION
4/3/25, 2:55 PM EDT    DISCHARGE ON GOING EVALUATION    Fredo Rod       [facility] day: 5  Location: [facility]09/009-A Reason for admit: Shortness of breath [R06.02]  Acute on chronic respiratory failure with hypercapnia [J96.22]  Acute on chronic respiratory failure with hypoxia and hypercapnia [J96.21, J96.22]  Acute on chronic congestive heart failure, unspecified heart failure type (HCC) [I50.9]         Barriers to Discharge: Hospitalist attending, Pulmonology following, PT/OT, IV Bumex, oral Zithromax, Deltasone, Albuterol, Brovana, Spiriva, prn Tylenol, and Zofran, electrolyte replacement protocol, daily BMP, CBC, and Magnesium, regular diet with 1800 fluid restriction, NIV at night, incentive spirometry, up with assistance.     PCP: Mohinder Page MD  Readmission Risk Score: 13.1    Patient Goals/Plan/Treatment Preferences: Fredo is from home with his daughter. He did have an NIV from [facility]. His NIV was returned to them as it was not working. NIV ordered at discharge. Referral sent to [facility]. Spoke to [facility]. All documents have been received and sent to [facility] Medicaid for approval. They WILL NOT deliver the machine until insurance approval has been received. The machine has been ordered and will not arrive until Monday.

## 2025-04-04 LAB
ANION GAP SERPL CALC-SCNC: 10 MEQ/L (ref 8–16)
BACTERIA BLD AEROBE CULT: NORMAL
BACTERIA BLD AEROBE CULT: NORMAL
BASOPHILS ABSOLUTE: 0.1 THOU/MM3 (ref 0–0.1)
BASOPHILS NFR BLD AUTO: 0.6 %
BUN SERPL-MCNC: 23 MG/DL (ref 8–23)
CALCIUM SERPL-MCNC: 8.6 MG/DL (ref 8.6–10)
CHLORIDE SERPL-SCNC: 98 MEQ/L (ref 98–111)
CO2 SERPL-SCNC: 33 MEQ/L (ref 22–29)
CREAT SERPL-MCNC: 0.7 MG/DL (ref 0.7–1.2)
DEPRECATED RDW RBC AUTO: 58.3 FL (ref 35–45)
EOSINOPHIL NFR BLD AUTO: 0.7 %
EOSINOPHILS ABSOLUTE: 0.1 THOU/MM3 (ref 0–0.4)
ERYTHROCYTE [DISTWIDTH] IN BLOOD BY AUTOMATED COUNT: 15.7 % (ref 11.5–14.5)
GFR SERPL CREATININE-BSD FRML MDRD: > 90 ML/MIN/1.73M2
GLUCOSE SERPL-MCNC: 111 MG/DL (ref 74–109)
HCT VFR BLD AUTO: 48.4 % (ref 42–52)
HGB BLD-MCNC: 14.8 GM/DL (ref 14–18)
IMM GRANULOCYTES # BLD AUTO: 0.03 THOU/MM3 (ref 0–0.07)
IMM GRANULOCYTES NFR BLD AUTO: 0.3 %
LYMPHOCYTES ABSOLUTE: 1.8 THOU/MM3 (ref 1–4.8)
LYMPHOCYTES NFR BLD AUTO: 20.8 %
MAGNESIUM SERPL-MCNC: 2.5 MG/DL (ref 1.6–2.6)
MCH RBC QN AUTO: 30.5 PG (ref 26–33)
MCHC RBC AUTO-ENTMCNC: 30.6 GM/DL (ref 32.2–35.5)
MCV RBC AUTO: 99.6 FL (ref 80–94)
MONOCYTES ABSOLUTE: 0.8 THOU/MM3 (ref 0.4–1.3)
MONOCYTES NFR BLD AUTO: 9.3 %
NEUTROPHILS ABSOLUTE: 6 THOU/MM3 (ref 1.8–7.7)
NEUTROPHILS NFR BLD AUTO: 68.3 %
NRBC BLD AUTO-RTO: 0 /100 WBC
PLATELET # BLD AUTO: 208 THOU/MM3 (ref 130–400)
PLATELET BLD QL SMEAR: ABNORMAL
PMV BLD AUTO: 12.1 FL (ref 9.4–12.4)
POTASSIUM SERPL-SCNC: 4.2 MEQ/L (ref 3.5–5.2)
RBC # BLD AUTO: 4.86 MILL/MM3 (ref 4.7–6.1)
REASON FOR REJECTION: NORMAL
REJECTED TEST: NORMAL
SCAN OF BLOOD SMEAR: NORMAL
SODIUM SERPL-SCNC: 141 MEQ/L (ref 135–145)
WBC # BLD AUTO: 8.8 THOU/MM3 (ref 4.8–10.8)

## 2025-04-04 PROCEDURE — 97535 SELF CARE MNGMENT TRAINING: CPT

## 2025-04-04 PROCEDURE — 2500000003 HC RX 250 WO HCPCS

## 2025-04-04 PROCEDURE — 99232 SBSQ HOSP IP/OBS MODERATE 35: CPT | Performed by: INTERNAL MEDICINE

## 2025-04-04 PROCEDURE — 6370000000 HC RX 637 (ALT 250 FOR IP): Performed by: STUDENT IN AN ORGANIZED HEALTH CARE EDUCATION/TRAINING PROGRAM

## 2025-04-04 PROCEDURE — 6360000002 HC RX W HCPCS

## 2025-04-04 PROCEDURE — 94640 AIRWAY INHALATION TREATMENT: CPT

## 2025-04-04 PROCEDURE — 97530 THERAPEUTIC ACTIVITIES: CPT

## 2025-04-04 PROCEDURE — 1200000000 HC SEMI PRIVATE

## 2025-04-04 PROCEDURE — 83735 ASSAY OF MAGNESIUM: CPT

## 2025-04-04 PROCEDURE — 6360000002 HC RX W HCPCS: Performed by: NURSE PRACTITIONER

## 2025-04-04 PROCEDURE — 6360000002 HC RX W HCPCS: Performed by: STUDENT IN AN ORGANIZED HEALTH CARE EDUCATION/TRAINING PROGRAM

## 2025-04-04 PROCEDURE — 94660 CPAP INITIATION&MGMT: CPT

## 2025-04-04 PROCEDURE — 6370000000 HC RX 637 (ALT 250 FOR IP)

## 2025-04-04 PROCEDURE — 94761 N-INVAS EAR/PLS OXIMETRY MLT: CPT

## 2025-04-04 PROCEDURE — 85025 COMPLETE CBC W/AUTO DIFF WBC: CPT

## 2025-04-04 PROCEDURE — 2700000000 HC OXYGEN THERAPY PER DAY

## 2025-04-04 PROCEDURE — 36415 COLL VENOUS BLD VENIPUNCTURE: CPT

## 2025-04-04 PROCEDURE — 80048 BASIC METABOLIC PNL TOTAL CA: CPT

## 2025-04-04 PROCEDURE — 6370000000 HC RX 637 (ALT 250 FOR IP): Performed by: NURSE PRACTITIONER

## 2025-04-04 PROCEDURE — 99232 SBSQ HOSP IP/OBS MODERATE 35: CPT | Performed by: STUDENT IN AN ORGANIZED HEALTH CARE EDUCATION/TRAINING PROGRAM

## 2025-04-04 RX ORDER — BUDESONIDE 0.5 MG/2ML
0.5 INHALANT ORAL
Status: DISCONTINUED | OUTPATIENT
Start: 2025-04-04 | End: 2025-04-08 | Stop reason: HOSPADM

## 2025-04-04 RX ADMIN — TIOTROPIUM BROMIDE INHALATION SPRAY 2 PUFF: 3.12 SPRAY, METERED RESPIRATORY (INHALATION) at 08:44

## 2025-04-04 RX ADMIN — POTASSIUM CHLORIDE 20 MEQ: 750 TABLET, EXTENDED RELEASE ORAL at 09:16

## 2025-04-04 RX ADMIN — BUMETANIDE 1 MG: 0.25 INJECTION INTRAMUSCULAR; INTRAVENOUS at 11:16

## 2025-04-04 RX ADMIN — LOSARTAN POTASSIUM 50 MG: 50 TABLET, FILM COATED ORAL at 09:16

## 2025-04-04 RX ADMIN — AZITHROMYCIN DIHYDRATE 250 MG: 250 TABLET ORAL at 09:16

## 2025-04-04 RX ADMIN — ENOXAPARIN SODIUM 40 MG: 100 INJECTION SUBCUTANEOUS at 18:16

## 2025-04-04 RX ADMIN — BUDESONIDE 500 MCG: 0.5 INHALANT RESPIRATORY (INHALATION) at 20:03

## 2025-04-04 RX ADMIN — ASPIRIN 81 MG: 81 TABLET, CHEWABLE ORAL at 09:16

## 2025-04-04 RX ADMIN — ARFORMOTEROL TARTRATE 15 MCG: 15 SOLUTION RESPIRATORY (INHALATION) at 08:44

## 2025-04-04 RX ADMIN — SODIUM CHLORIDE, PRESERVATIVE FREE 10 ML: 5 INJECTION INTRAVENOUS at 20:31

## 2025-04-04 RX ADMIN — SODIUM CHLORIDE, PRESERVATIVE FREE 10 ML: 5 INJECTION INTRAVENOUS at 09:15

## 2025-04-04 RX ADMIN — PREDNISONE 40 MG: 20 TABLET ORAL at 09:16

## 2025-04-04 RX ADMIN — METOPROLOL SUCCINATE 12.5 MG: 25 TABLET, FILM COATED, EXTENDED RELEASE ORAL at 18:16

## 2025-04-04 RX ADMIN — ARFORMOTEROL TARTRATE 15 MCG: 15 SOLUTION RESPIRATORY (INHALATION) at 19:57

## 2025-04-04 RX ADMIN — BUMETANIDE 1 MG: 1 TABLET ORAL at 18:16

## 2025-04-04 NOTE — RT PROTOCOL NOTE
RT Inhaler-Nebulizer Bronchodilator Protocol Note    There is a bronchodilator order in the chart from a provider indicating to follow the RT Bronchodilator Protocol and there is an “Initiate RT Inhaler-Nebulizer Bronchodilator Protocol” order as well (see protocol at bottom of note).    CXR Findings:  No results found.    The findings from the last RT Protocol Assessment were as follows:   History Pulmonary Disease: Chronic pulmonary disease  Respiratory Pattern: Dyspnea on exertion or RR 21-25 bpm  Breath Sounds: Inspiratory and expiratory or bilateral wheezing and/or rhonchi  Cough: Strong, spontaneous, non-productive  Indication for Bronchodilator Therapy: Wheezing associated with pulm disorder  Bronchodilator Assessment Score: 10    Aerosolized bronchodilator medication orders have been revised according to the RT Inhaler-Nebulizer Bronchodilator Protocol below.    Respiratory Therapist to perform RT Therapy Protocol Assessment initially then follow the protocol.  Repeat RT Therapy Protocol Assessment PRN for score 0-3 or on second treatment, BID, and PRN for scores above 3.    No Indications - adjust the frequency to every 6 hours PRN wheezing or bronchospasm, if no treatments needed after 48 hours then discontinue using Per Protocol order mode.     If indication present, adjust the RT bronchodilator orders based on the Bronchodilator Assessment Score as indicated below.  Use Inhaler orders unless patient has one or more of the following: on home nebulizer, not able to hold breath for 10 seconds, is not alert and oriented, cannot activate and use MDI correctly, or respiratory rate 25 breaths per minute or more, then use the equivalent nebulizer order(s) with same Frequency and PRN reasons based on the score.  If a patient is on this medication at home then do not decrease Frequency below that used at home.    0-3 - enter or revise RT bronchodilator order(s) to equivalent RT Bronchodilator order with Frequency 
RT Inhaler-Nebulizer Bronchodilator Protocol Note    There is a bronchodilator order in the chart from a provider indicating to follow the RT Bronchodilator Protocol and there is an “Initiate RT Inhaler-Nebulizer Bronchodilator Protocol” order as well (see protocol at bottom of note).    CXR Findings:  No results found.    The findings from the last RT Protocol Assessment were as follows:   History Pulmonary Disease: None or smoker <15 pack years  Respiratory Pattern: Dyspnea on exertion or RR 21-25 bpm  Breath Sounds: Slightly diminished and/or crackles  Cough: Strong, productive  Indication for Bronchodilator Therapy: Wheezing associated with pulm disorder  Bronchodilator Assessment Score: 5    Aerosolized bronchodilator medication orders have been revised according to the RT Inhaler-Nebulizer Bronchodilator Protocol below.    Respiratory Therapist to perform RT Therapy Protocol Assessment initially then follow the protocol.  Repeat RT Therapy Protocol Assessment PRN for score 0-3 or on second treatment, BID, and PRN for scores above 3.    No Indications - adjust the frequency to every 6 hours PRN wheezing or bronchospasm, if no treatments needed after 48 hours then discontinue using Per Protocol order mode.     If indication present, adjust the RT bronchodilator orders based on the Bronchodilator Assessment Score as indicated below.  Use Inhaler orders unless patient has one or more of the following: on home nebulizer, not able to hold breath for 10 seconds, is not alert and oriented, cannot activate and use MDI correctly, or respiratory rate 25 breaths per minute or more, then use the equivalent nebulizer order(s) with same Frequency and PRN reasons based on the score.  If a patient is on this medication at home then do not decrease Frequency below that used at home.    0-3 - enter or revise RT bronchodilator order(s) to equivalent RT Bronchodilator order with Frequency of every 4 hours PRN for wheezing or 
bronchodilator orders with one order with TID Frequency and one order with Frequency of every 4 hours PRN wheezing or increased work of breathing using Per Protocol order mode.       11-13 - enter or revise RT Bronchodilator order(s) to one equivalent RT bronchodilator order with QID Frequency and an Albuterol order with Frequency of every 4 hours PRN wheezing or increased work of breathing using Per Protocol order mode.      Greater than 13 - enter or revise RT Bronchodilator order(s) to one equivalent RT bronchodilator order with every 4 hours Frequency and an Albuterol order with Frequency of every 2 hours PRN wheezing or increased work of breathing using Per Protocol order mode.     RT to enter RT Home Evaluation for COPD & MDI Assessment order using Per Protocol order mode.    Electronically signed by Ria Hernadez RCP on 4/1/2025 at 8:32 PM  
of breathing using Per Protocol order mode.        4-6 - enter or revise RT Bronchodilator order(s) to two equivalent RT bronchodilator orders with one order with BID Frequency and one order with Frequency of every 4 hours PRN wheezing or increased work of breathing using Per Protocol order mode.        7-10 - enter or revise RT Bronchodilator order(s) to two equivalent RT bronchodilator orders with one order with TID Frequency and one order with Frequency of every 4 hours PRN wheezing or increased work of breathing using Per Protocol order mode.       11-13 - enter or revise RT Bronchodilator order(s) to one equivalent RT bronchodilator order with QID Frequency and an Albuterol order with Frequency of every 4 hours PRN wheezing or increased work of breathing using Per Protocol order mode.      Greater than 13 - enter or revise RT Bronchodilator order(s) to one equivalent RT bronchodilator order with every 4 hours Frequency and an Albuterol order with Frequency of every 2 hours PRN wheezing or increased work of breathing using Per Protocol order mode.     RT to enter RT Home Evaluation for COPD & MDI Assessment order using Per Protocol order mode.    Electronically signed by Brittaney Oleary RCP on 4/3/2025 at 8:11 PM  
score.  If a patient is on this medication at home then do not decrease Frequency below that used at home.    0-3 - enter or revise RT bronchodilator order(s) to equivalent RT Bronchodilator order with Frequency of every 4 hours PRN for wheezing or increased work of breathing using Per Protocol order mode.        4-6 - enter or revise RT Bronchodilator order(s) to two equivalent RT bronchodilator orders with one order with BID Frequency and one order with Frequency of every 4 hours PRN wheezing or increased work of breathing using Per Protocol order mode.        7-10 - enter or revise RT Bronchodilator order(s) to two equivalent RT bronchodilator orders with one order with TID Frequency and one order with Frequency of every 4 hours PRN wheezing or increased work of breathing using Per Protocol order mode.       11-13 - enter or revise RT Bronchodilator order(s) to one equivalent RT bronchodilator order with QID Frequency and an Albuterol order with Frequency of every 4 hours PRN wheezing or increased work of breathing using Per Protocol order mode.      Greater than 13 - enter or revise RT Bronchodilator order(s) to one equivalent RT bronchodilator order with every 4 hours Frequency and an Albuterol order with Frequency of every 2 hours PRN wheezing or increased work of breathing using Per Protocol order mode.     RT to enter RT Home Evaluation for COPD & MDI Assessment order using Per Protocol order mode.    Electronically signed by Ann Marie Martin RCP on 3/30/2025 at 1:27 PM

## 2025-04-04 NOTE — DISCHARGE INSTRUCTIONS
[employer] . [phone] extension [id number].     Pulmonary Follow-up Chest X-ray  Go to outpatient radiology at [facility] 2 days prior to your appointment to obtain Chest X-ray prior to your appointment this Chest x-ray is done as walk-in procedure no scheduled time is required.

## 2025-04-05 LAB
ANION GAP SERPL CALC-SCNC: 13 MEQ/L (ref 8–16)
BUN SERPL-MCNC: 30 MG/DL (ref 8–23)
CALCIUM SERPL-MCNC: 8.5 MG/DL (ref 8.6–10)
CHLORIDE SERPL-SCNC: 98 MEQ/L (ref 98–111)
CO2 SERPL-SCNC: 27 MEQ/L (ref 22–29)
CREAT SERPL-MCNC: 0.7 MG/DL (ref 0.7–1.2)
DEPRECATED RDW RBC AUTO: 61.5 FL (ref 35–45)
ERYTHROCYTE [DISTWIDTH] IN BLOOD BY AUTOMATED COUNT: 15.6 % (ref 11.5–14.5)
GFR SERPL CREATININE-BSD FRML MDRD: > 90 ML/MIN/1.73M2
GLUCOSE SERPL-MCNC: 87 MG/DL (ref 74–109)
HCT VFR BLD AUTO: 50.2 % (ref 42–52)
HGB BLD-MCNC: 14.2 GM/DL (ref 14–18)
MAGNESIUM SERPL-MCNC: 2.4 MG/DL (ref 1.6–2.6)
MCH RBC QN AUTO: 29.7 PG (ref 26–33)
MCHC RBC AUTO-ENTMCNC: 28.3 GM/DL (ref 32.2–35.5)
MCV RBC AUTO: 105 FL (ref 80–94)
PLATELET # BLD AUTO: 227 THOU/MM3 (ref 130–400)
PMV BLD AUTO: 12.6 FL (ref 9.4–12.4)
POTASSIUM SERPL-SCNC: 4.5 MEQ/L (ref 3.5–5.2)
POTASSIUM SERPL-SCNC: 5.8 MEQ/L (ref 3.5–5.2)
RBC # BLD AUTO: 4.78 MILL/MM3 (ref 4.7–6.1)
SODIUM SERPL-SCNC: 138 MEQ/L (ref 135–145)
WBC # BLD AUTO: 11.3 THOU/MM3 (ref 4.8–10.8)

## 2025-04-05 PROCEDURE — 80048 BASIC METABOLIC PNL TOTAL CA: CPT

## 2025-04-05 PROCEDURE — 1200000000 HC SEMI PRIVATE

## 2025-04-05 PROCEDURE — 2700000000 HC OXYGEN THERAPY PER DAY

## 2025-04-05 PROCEDURE — 6370000000 HC RX 637 (ALT 250 FOR IP): Performed by: STUDENT IN AN ORGANIZED HEALTH CARE EDUCATION/TRAINING PROGRAM

## 2025-04-05 PROCEDURE — 6360000002 HC RX W HCPCS: Performed by: NURSE PRACTITIONER

## 2025-04-05 PROCEDURE — 2500000003 HC RX 250 WO HCPCS

## 2025-04-05 PROCEDURE — 36415 COLL VENOUS BLD VENIPUNCTURE: CPT

## 2025-04-05 PROCEDURE — 6360000002 HC RX W HCPCS

## 2025-04-05 PROCEDURE — 99231 SBSQ HOSP IP/OBS SF/LOW 25: CPT | Performed by: STUDENT IN AN ORGANIZED HEALTH CARE EDUCATION/TRAINING PROGRAM

## 2025-04-05 PROCEDURE — 6370000000 HC RX 637 (ALT 250 FOR IP): Performed by: NURSE PRACTITIONER

## 2025-04-05 PROCEDURE — 99231 SBSQ HOSP IP/OBS SF/LOW 25: CPT | Performed by: NURSE PRACTITIONER

## 2025-04-05 PROCEDURE — 6370000000 HC RX 637 (ALT 250 FOR IP)

## 2025-04-05 PROCEDURE — 85027 COMPLETE CBC AUTOMATED: CPT

## 2025-04-05 PROCEDURE — 94660 CPAP INITIATION&MGMT: CPT

## 2025-04-05 PROCEDURE — 83735 ASSAY OF MAGNESIUM: CPT

## 2025-04-05 PROCEDURE — 84132 ASSAY OF SERUM POTASSIUM: CPT

## 2025-04-05 PROCEDURE — 94761 N-INVAS EAR/PLS OXIMETRY MLT: CPT

## 2025-04-05 PROCEDURE — 94640 AIRWAY INHALATION TREATMENT: CPT

## 2025-04-05 RX ADMIN — ACETAMINOPHEN 650 MG: 325 TABLET ORAL at 20:37

## 2025-04-05 RX ADMIN — ARFORMOTEROL TARTRATE 15 MCG: 15 SOLUTION RESPIRATORY (INHALATION) at 08:52

## 2025-04-05 RX ADMIN — AZITHROMYCIN DIHYDRATE 250 MG: 250 TABLET ORAL at 09:57

## 2025-04-05 RX ADMIN — POTASSIUM CHLORIDE 20 MEQ: 750 TABLET, EXTENDED RELEASE ORAL at 09:57

## 2025-04-05 RX ADMIN — BUMETANIDE 1 MG: 1 TABLET ORAL at 18:29

## 2025-04-05 RX ADMIN — LOSARTAN POTASSIUM 50 MG: 50 TABLET, FILM COATED ORAL at 09:57

## 2025-04-05 RX ADMIN — SODIUM CHLORIDE, PRESERVATIVE FREE 10 ML: 5 INJECTION INTRAVENOUS at 20:34

## 2025-04-05 RX ADMIN — SODIUM CHLORIDE, PRESERVATIVE FREE 10 ML: 5 INJECTION INTRAVENOUS at 09:59

## 2025-04-05 RX ADMIN — METOPROLOL SUCCINATE 12.5 MG: 25 TABLET, FILM COATED, EXTENDED RELEASE ORAL at 18:29

## 2025-04-05 RX ADMIN — ARFORMOTEROL TARTRATE 15 MCG: 15 SOLUTION RESPIRATORY (INHALATION) at 21:31

## 2025-04-05 RX ADMIN — ENOXAPARIN SODIUM 40 MG: 100 INJECTION SUBCUTANEOUS at 18:29

## 2025-04-05 RX ADMIN — ASPIRIN 81 MG: 81 TABLET, CHEWABLE ORAL at 09:57

## 2025-04-05 RX ADMIN — TIOTROPIUM BROMIDE INHALATION SPRAY 2 PUFF: 3.12 SPRAY, METERED RESPIRATORY (INHALATION) at 08:50

## 2025-04-05 RX ADMIN — BUDESONIDE 500 MCG: 0.5 INHALANT RESPIRATORY (INHALATION) at 21:31

## 2025-04-05 RX ADMIN — BUDESONIDE 500 MCG: 0.5 INHALANT RESPIRATORY (INHALATION) at 08:57

## 2025-04-05 RX ADMIN — BUMETANIDE 1 MG: 1 TABLET ORAL at 09:57

## 2025-04-05 ASSESSMENT — PAIN DESCRIPTION - PAIN TYPE: TYPE: CHRONIC PAIN

## 2025-04-05 ASSESSMENT — PAIN DESCRIPTION - FREQUENCY: FREQUENCY: INTERMITTENT

## 2025-04-05 ASSESSMENT — PAIN DESCRIPTION - LOCATION: LOCATION: HAND

## 2025-04-05 ASSESSMENT — PAIN - FUNCTIONAL ASSESSMENT: PAIN_FUNCTIONAL_ASSESSMENT: ACTIVITIES ARE NOT PREVENTED

## 2025-04-05 ASSESSMENT — PAIN DESCRIPTION - DESCRIPTORS: DESCRIPTORS: ACHING

## 2025-04-05 ASSESSMENT — PAIN DESCRIPTION - ORIENTATION: ORIENTATION: LEFT

## 2025-04-05 ASSESSMENT — PAIN SCALES - GENERAL: PAINLEVEL_OUTOF10: 2

## 2025-04-05 ASSESSMENT — PAIN DESCRIPTION - ONSET: ONSET: SUDDEN

## 2025-04-06 LAB
ANION GAP SERPL CALC-SCNC: 10 MEQ/L (ref 8–16)
BUN SERPL-MCNC: 29 MG/DL (ref 8–23)
CALCIUM SERPL-MCNC: 8.3 MG/DL (ref 8.6–10)
CHLORIDE SERPL-SCNC: 100 MEQ/L (ref 98–111)
CO2 SERPL-SCNC: 30 MEQ/L (ref 22–29)
CREAT SERPL-MCNC: 0.7 MG/DL (ref 0.7–1.2)
DEPRECATED RDW RBC AUTO: 59.2 FL (ref 35–45)
ERYTHROCYTE [DISTWIDTH] IN BLOOD BY AUTOMATED COUNT: 15.7 % (ref 11.5–14.5)
GFR SERPL CREATININE-BSD FRML MDRD: > 90 ML/MIN/1.73M2
GLUCOSE SERPL-MCNC: 91 MG/DL (ref 74–109)
HCT VFR BLD AUTO: 47.7 % (ref 42–52)
HGB BLD-MCNC: 14.2 GM/DL (ref 14–18)
MAGNESIUM SERPL-MCNC: 2.1 MG/DL (ref 1.6–2.6)
MCH RBC QN AUTO: 30.3 PG (ref 26–33)
MCHC RBC AUTO-ENTMCNC: 29.8 GM/DL (ref 32.2–35.5)
MCV RBC AUTO: 101.7 FL (ref 80–94)
PLATELET # BLD AUTO: 294 THOU/MM3 (ref 130–400)
PMV BLD AUTO: 11.1 FL (ref 9.4–12.4)
POTASSIUM SERPL-SCNC: 4.7 MEQ/L (ref 3.5–5.2)
RBC # BLD AUTO: 4.69 MILL/MM3 (ref 4.7–6.1)
SODIUM SERPL-SCNC: 140 MEQ/L (ref 135–145)
WBC # BLD AUTO: 10.3 THOU/MM3 (ref 4.8–10.8)

## 2025-04-06 PROCEDURE — 6360000002 HC RX W HCPCS: Performed by: NURSE PRACTITIONER

## 2025-04-06 PROCEDURE — 1200000000 HC SEMI PRIVATE

## 2025-04-06 PROCEDURE — 94761 N-INVAS EAR/PLS OXIMETRY MLT: CPT

## 2025-04-06 PROCEDURE — 94660 CPAP INITIATION&MGMT: CPT

## 2025-04-06 PROCEDURE — 36415 COLL VENOUS BLD VENIPUNCTURE: CPT

## 2025-04-06 PROCEDURE — 6370000000 HC RX 637 (ALT 250 FOR IP): Performed by: STUDENT IN AN ORGANIZED HEALTH CARE EDUCATION/TRAINING PROGRAM

## 2025-04-06 PROCEDURE — 94640 AIRWAY INHALATION TREATMENT: CPT

## 2025-04-06 PROCEDURE — 6370000000 HC RX 637 (ALT 250 FOR IP)

## 2025-04-06 PROCEDURE — 83735 ASSAY OF MAGNESIUM: CPT

## 2025-04-06 PROCEDURE — 80048 BASIC METABOLIC PNL TOTAL CA: CPT

## 2025-04-06 PROCEDURE — 6370000000 HC RX 637 (ALT 250 FOR IP): Performed by: NURSE PRACTITIONER

## 2025-04-06 PROCEDURE — 2700000000 HC OXYGEN THERAPY PER DAY

## 2025-04-06 PROCEDURE — 99231 SBSQ HOSP IP/OBS SF/LOW 25: CPT | Performed by: STUDENT IN AN ORGANIZED HEALTH CARE EDUCATION/TRAINING PROGRAM

## 2025-04-06 PROCEDURE — 2500000003 HC RX 250 WO HCPCS

## 2025-04-06 PROCEDURE — 6360000002 HC RX W HCPCS

## 2025-04-06 PROCEDURE — 85027 COMPLETE CBC AUTOMATED: CPT

## 2025-04-06 RX ORDER — TAMSULOSIN HYDROCHLORIDE 0.4 MG/1
0.4 CAPSULE ORAL DAILY
Status: DISCONTINUED | OUTPATIENT
Start: 2025-04-06 | End: 2025-04-06

## 2025-04-06 RX ADMIN — ENOXAPARIN SODIUM 40 MG: 100 INJECTION SUBCUTANEOUS at 21:08

## 2025-04-06 RX ADMIN — ARFORMOTEROL TARTRATE 15 MCG: 15 SOLUTION RESPIRATORY (INHALATION) at 08:31

## 2025-04-06 RX ADMIN — TIOTROPIUM BROMIDE INHALATION SPRAY 2 PUFF: 3.12 SPRAY, METERED RESPIRATORY (INHALATION) at 08:29

## 2025-04-06 RX ADMIN — LOSARTAN POTASSIUM 50 MG: 50 TABLET, FILM COATED ORAL at 09:00

## 2025-04-06 RX ADMIN — AZITHROMYCIN DIHYDRATE 250 MG: 250 TABLET ORAL at 08:59

## 2025-04-06 RX ADMIN — SODIUM CHLORIDE, PRESERVATIVE FREE 10 ML: 5 INJECTION INTRAVENOUS at 09:06

## 2025-04-06 RX ADMIN — METOPROLOL SUCCINATE 12.5 MG: 25 TABLET, FILM COATED, EXTENDED RELEASE ORAL at 18:04

## 2025-04-06 RX ADMIN — BUMETANIDE 1 MG: 1 TABLET ORAL at 18:04

## 2025-04-06 RX ADMIN — SODIUM CHLORIDE, PRESERVATIVE FREE 10 ML: 5 INJECTION INTRAVENOUS at 21:08

## 2025-04-06 RX ADMIN — ACETAMINOPHEN 650 MG: 325 TABLET ORAL at 21:08

## 2025-04-06 RX ADMIN — BUDESONIDE 500 MCG: 0.5 INHALANT RESPIRATORY (INHALATION) at 20:32

## 2025-04-06 RX ADMIN — ARFORMOTEROL TARTRATE 15 MCG: 15 SOLUTION RESPIRATORY (INHALATION) at 20:27

## 2025-04-06 RX ADMIN — BUDESONIDE 500 MCG: 0.5 INHALANT RESPIRATORY (INHALATION) at 08:35

## 2025-04-06 RX ADMIN — ASPIRIN 81 MG: 81 TABLET, CHEWABLE ORAL at 09:01

## 2025-04-06 RX ADMIN — BUMETANIDE 1 MG: 1 TABLET ORAL at 08:59

## 2025-04-06 ASSESSMENT — PAIN - FUNCTIONAL ASSESSMENT: PAIN_FUNCTIONAL_ASSESSMENT: ACTIVITIES ARE NOT PREVENTED

## 2025-04-06 ASSESSMENT — PAIN SCALES - GENERAL: PAINLEVEL_OUTOF10: 3

## 2025-04-06 ASSESSMENT — PAIN DESCRIPTION - ORIENTATION: ORIENTATION: RIGHT;POSTERIOR

## 2025-04-06 ASSESSMENT — PAIN DESCRIPTION - DESCRIPTORS: DESCRIPTORS: ACHING

## 2025-04-06 ASSESSMENT — PAIN DESCRIPTION - LOCATION: LOCATION: HEAD

## 2025-04-07 LAB
ANION GAP SERPL CALC-SCNC: 12 MEQ/L (ref 8–16)
BUN SERPL-MCNC: 24 MG/DL (ref 8–23)
CALCIUM SERPL-MCNC: 8.7 MG/DL (ref 8.6–10)
CHLORIDE SERPL-SCNC: 98 MEQ/L (ref 98–111)
CO2 SERPL-SCNC: 31 MEQ/L (ref 22–29)
CREAT SERPL-MCNC: 0.7 MG/DL (ref 0.7–1.2)
DEPRECATED RDW RBC AUTO: 58.1 FL (ref 35–45)
EKG ATRIAL RATE: 68 BPM
EKG P AXIS: 59 DEGREES
EKG P-R INTERVAL: 152 MS
EKG Q-T INTERVAL: 396 MS
EKG QRS DURATION: 98 MS
EKG QTC CALCULATION (BAZETT): 421 MS
EKG R AXIS: 88 DEGREES
EKG T AXIS: 45 DEGREES
EKG VENTRICULAR RATE: 68 BPM
ERYTHROCYTE [DISTWIDTH] IN BLOOD BY AUTOMATED COUNT: 15.3 % (ref 11.5–14.5)
GFR SERPL CREATININE-BSD FRML MDRD: > 90 ML/MIN/1.73M2
GLUCOSE SERPL-MCNC: 87 MG/DL (ref 74–109)
HCT VFR BLD AUTO: 48.6 % (ref 42–52)
HGB BLD-MCNC: 14.3 GM/DL (ref 14–18)
MAGNESIUM SERPL-MCNC: 2.3 MG/DL (ref 1.6–2.6)
MCH RBC QN AUTO: 29.9 PG (ref 26–33)
MCHC RBC AUTO-ENTMCNC: 29.4 GM/DL (ref 32.2–35.5)
MCV RBC AUTO: 101.5 FL (ref 80–94)
PLATELET # BLD AUTO: 290 THOU/MM3 (ref 130–400)
PMV BLD AUTO: 11.2 FL (ref 9.4–12.4)
POTASSIUM SERPL-SCNC: 4.4 MEQ/L (ref 3.5–5.2)
RBC # BLD AUTO: 4.79 MILL/MM3 (ref 4.7–6.1)
SODIUM SERPL-SCNC: 141 MEQ/L (ref 135–145)
WBC # BLD AUTO: 10.6 THOU/MM3 (ref 4.8–10.8)

## 2025-04-07 PROCEDURE — 6370000000 HC RX 637 (ALT 250 FOR IP): Performed by: STUDENT IN AN ORGANIZED HEALTH CARE EDUCATION/TRAINING PROGRAM

## 2025-04-07 PROCEDURE — 6370000000 HC RX 637 (ALT 250 FOR IP)

## 2025-04-07 PROCEDURE — 6360000002 HC RX W HCPCS

## 2025-04-07 PROCEDURE — 36415 COLL VENOUS BLD VENIPUNCTURE: CPT

## 2025-04-07 PROCEDURE — 1200000000 HC SEMI PRIVATE

## 2025-04-07 PROCEDURE — 99232 SBSQ HOSP IP/OBS MODERATE 35: CPT | Performed by: INTERNAL MEDICINE

## 2025-04-07 PROCEDURE — 2700000000 HC OXYGEN THERAPY PER DAY

## 2025-04-07 PROCEDURE — 6370000000 HC RX 637 (ALT 250 FOR IP): Performed by: NURSE PRACTITIONER

## 2025-04-07 PROCEDURE — 2500000003 HC RX 250 WO HCPCS

## 2025-04-07 PROCEDURE — 99231 SBSQ HOSP IP/OBS SF/LOW 25: CPT | Performed by: STUDENT IN AN ORGANIZED HEALTH CARE EDUCATION/TRAINING PROGRAM

## 2025-04-07 PROCEDURE — 83735 ASSAY OF MAGNESIUM: CPT

## 2025-04-07 PROCEDURE — 6360000002 HC RX W HCPCS: Performed by: NURSE PRACTITIONER

## 2025-04-07 PROCEDURE — 94660 CPAP INITIATION&MGMT: CPT

## 2025-04-07 PROCEDURE — 93005 ELECTROCARDIOGRAM TRACING: CPT | Performed by: STUDENT IN AN ORGANIZED HEALTH CARE EDUCATION/TRAINING PROGRAM

## 2025-04-07 PROCEDURE — 85027 COMPLETE CBC AUTOMATED: CPT

## 2025-04-07 PROCEDURE — 94761 N-INVAS EAR/PLS OXIMETRY MLT: CPT

## 2025-04-07 PROCEDURE — 94640 AIRWAY INHALATION TREATMENT: CPT

## 2025-04-07 PROCEDURE — 80048 BASIC METABOLIC PNL TOTAL CA: CPT

## 2025-04-07 RX ORDER — ALBUTEROL SULFATE 90 UG/1
2 INHALANT RESPIRATORY (INHALATION) EVERY 6 HOURS PRN
Qty: 18 G | Refills: 3 | Status: SHIPPED | OUTPATIENT
Start: 2025-04-07

## 2025-04-07 RX ORDER — AZITHROMYCIN 250 MG/1
250 TABLET, FILM COATED ORAL DAILY
Qty: 30 TABLET | Refills: 2 | Status: SHIPPED | OUTPATIENT
Start: 2025-04-07 | End: 2025-07-06

## 2025-04-07 RX ADMIN — METOPROLOL SUCCINATE 12.5 MG: 25 TABLET, FILM COATED, EXTENDED RELEASE ORAL at 17:25

## 2025-04-07 RX ADMIN — BUDESONIDE 500 MCG: 0.5 INHALANT RESPIRATORY (INHALATION) at 20:37

## 2025-04-07 RX ADMIN — BUMETANIDE 1 MG: 1 TABLET ORAL at 08:19

## 2025-04-07 RX ADMIN — SODIUM CHLORIDE, PRESERVATIVE FREE 10 ML: 5 INJECTION INTRAVENOUS at 08:21

## 2025-04-07 RX ADMIN — ARFORMOTEROL TARTRATE 15 MCG: 15 SOLUTION RESPIRATORY (INHALATION) at 08:10

## 2025-04-07 RX ADMIN — ARFORMOTEROL TARTRATE 15 MCG: 15 SOLUTION RESPIRATORY (INHALATION) at 20:37

## 2025-04-07 RX ADMIN — ACETAMINOPHEN 650 MG: 325 TABLET ORAL at 16:06

## 2025-04-07 RX ADMIN — LOSARTAN POTASSIUM 50 MG: 50 TABLET, FILM COATED ORAL at 08:19

## 2025-04-07 RX ADMIN — AZITHROMYCIN DIHYDRATE 250 MG: 250 TABLET ORAL at 08:19

## 2025-04-07 RX ADMIN — ENOXAPARIN SODIUM 40 MG: 100 INJECTION SUBCUTANEOUS at 17:57

## 2025-04-07 RX ADMIN — POTASSIUM CHLORIDE 20 MEQ: 750 TABLET, EXTENDED RELEASE ORAL at 08:19

## 2025-04-07 RX ADMIN — ASPIRIN 81 MG: 81 TABLET, CHEWABLE ORAL at 08:19

## 2025-04-07 RX ADMIN — ACETAMINOPHEN 650 MG: 325 TABLET ORAL at 05:23

## 2025-04-07 RX ADMIN — SODIUM CHLORIDE, PRESERVATIVE FREE 10 ML: 5 INJECTION INTRAVENOUS at 20:37

## 2025-04-07 RX ADMIN — BUDESONIDE 500 MCG: 0.5 INHALANT RESPIRATORY (INHALATION) at 08:10

## 2025-04-07 RX ADMIN — TIOTROPIUM BROMIDE INHALATION SPRAY 2 PUFF: 3.12 SPRAY, METERED RESPIRATORY (INHALATION) at 08:10

## 2025-04-07 ASSESSMENT — PAIN SCALES - GENERAL
PAINLEVEL_OUTOF10: 6
PAINLEVEL_OUTOF10: 0

## 2025-04-07 ASSESSMENT — PAIN - FUNCTIONAL ASSESSMENT: PAIN_FUNCTIONAL_ASSESSMENT: ACTIVITIES ARE NOT PREVENTED

## 2025-04-07 ASSESSMENT — PAIN DESCRIPTION - ORIENTATION: ORIENTATION: RIGHT;LEFT

## 2025-04-07 ASSESSMENT — PAIN DESCRIPTION - DESCRIPTORS: DESCRIPTORS: ACHING

## 2025-04-07 ASSESSMENT — PAIN DESCRIPTION - LOCATION: LOCATION: OTHER (COMMENT)

## 2025-04-07 NOTE — CARE COORDINATION
4/7/25, 4:06 PM EDT    DISCHARGE ON GOING EVALUATION    Fredo Rod       [facility] day: 9  Location: [facility]09/009-A Reason for admit: Shortness of breath [R06.02]  Acute on chronic respiratory failure with hypercapnia [J96.22]  Acute on chronic respiratory failure with hypoxia and hypercapnia [J96.21, J96.22]  Acute on chronic congestive heart failure, unspecified heart failure type (HCC) [I50.9]       Barriers to Discharge: Hospitalist attending, Pulmonology following, PT/OT, oral Zithromax, Pulmicort and Brovana nebulizer, Lovenox, prn Tylenol and Zofran, electrolyte replacement protocol, regular low sodium diet with 1800 ml fluid restriction, home oxygen evaluation, incentive spirometry, up with assistance.   PCP: Mohinder Page MD  Readmission Risk Score: 12.5    Patient Goals/Plan/Treatment Preferences: Fredo is from home with his daughter. He is current with [facility] for home oxygen. Home NIV through [facility], approval received. Planning for delivery tomorrow with education/instruction and discharge to home.

## 2025-04-07 NOTE — PROCEDURES
PROCEDURE NOTE  Date: 4/7/2025   Name: Fredo Rod  YOB: 1970    Procedures  EKG completed, given ro RN

## 2025-04-08 ENCOUNTER — TELEPHONE (OUTPATIENT)
Age: 55
End: 2025-04-08

## 2025-04-08 ENCOUNTER — TELEPHONE (OUTPATIENT)
Dept: PULMONOLOGY | Age: 55
End: 2025-04-08

## 2025-04-08 VITALS
OXYGEN SATURATION: 96 % | WEIGHT: 200.4 LBS | HEIGHT: 69 IN | SYSTOLIC BLOOD PRESSURE: 122 MMHG | HEART RATE: 65 BPM | RESPIRATION RATE: 16 BRPM | TEMPERATURE: 98.2 F | BODY MASS INDEX: 29.68 KG/M2 | DIASTOLIC BLOOD PRESSURE: 69 MMHG

## 2025-04-08 DIAGNOSIS — J44.9 CHRONIC OBSTRUCTIVE PULMONARY DISEASE, UNSPECIFIED COPD TYPE (HCC): Primary | ICD-10-CM

## 2025-04-08 LAB
ANION GAP SERPL CALC-SCNC: 11 MEQ/L (ref 8–16)
BUN SERPL-MCNC: 22 MG/DL (ref 8–23)
CALCIUM SERPL-MCNC: 9 MG/DL (ref 8.6–10)
CHLORIDE SERPL-SCNC: 97 MEQ/L (ref 98–111)
CO2 SERPL-SCNC: 29 MEQ/L (ref 22–29)
CREAT SERPL-MCNC: 0.7 MG/DL (ref 0.7–1.2)
DEPRECATED RDW RBC AUTO: 57.1 FL (ref 35–45)
ERYTHROCYTE [DISTWIDTH] IN BLOOD BY AUTOMATED COUNT: 15.5 % (ref 11.5–14.5)
GFR SERPL CREATININE-BSD FRML MDRD: > 90 ML/MIN/1.73M2
GLUCOSE SERPL-MCNC: 106 MG/DL (ref 74–109)
HCT VFR BLD AUTO: 50.2 % (ref 42–52)
HGB BLD-MCNC: 15.4 GM/DL (ref 14–18)
MCH RBC QN AUTO: 30.6 PG (ref 26–33)
MCHC RBC AUTO-ENTMCNC: 30.7 GM/DL (ref 32.2–35.5)
MCV RBC AUTO: 99.8 FL (ref 80–94)
PLATELET # BLD AUTO: 326 THOU/MM3 (ref 130–400)
PMV BLD AUTO: 11.2 FL (ref 9.4–12.4)
POTASSIUM SERPL-SCNC: 4.9 MEQ/L (ref 3.5–5.2)
RBC # BLD AUTO: 5.03 MILL/MM3 (ref 4.7–6.1)
SODIUM SERPL-SCNC: 137 MEQ/L (ref 135–145)
WBC # BLD AUTO: 12.5 THOU/MM3 (ref 4.8–10.8)

## 2025-04-08 PROCEDURE — 2500000003 HC RX 250 WO HCPCS

## 2025-04-08 PROCEDURE — 80048 BASIC METABOLIC PNL TOTAL CA: CPT

## 2025-04-08 PROCEDURE — 94640 AIRWAY INHALATION TREATMENT: CPT

## 2025-04-08 PROCEDURE — 94660 CPAP INITIATION&MGMT: CPT

## 2025-04-08 PROCEDURE — 6370000000 HC RX 637 (ALT 250 FOR IP): Performed by: STUDENT IN AN ORGANIZED HEALTH CARE EDUCATION/TRAINING PROGRAM

## 2025-04-08 PROCEDURE — 94761 N-INVAS EAR/PLS OXIMETRY MLT: CPT

## 2025-04-08 PROCEDURE — 6360000002 HC RX W HCPCS: Performed by: NURSE PRACTITIONER

## 2025-04-08 PROCEDURE — 6360000002 HC RX W HCPCS

## 2025-04-08 PROCEDURE — 85027 COMPLETE CBC AUTOMATED: CPT

## 2025-04-08 PROCEDURE — 2700000000 HC OXYGEN THERAPY PER DAY

## 2025-04-08 PROCEDURE — 6370000000 HC RX 637 (ALT 250 FOR IP)

## 2025-04-08 PROCEDURE — 99239 HOSP IP/OBS DSCHRG MGMT >30: CPT | Performed by: STUDENT IN AN ORGANIZED HEALTH CARE EDUCATION/TRAINING PROGRAM

## 2025-04-08 PROCEDURE — 6370000000 HC RX 637 (ALT 250 FOR IP): Performed by: NURSE PRACTITIONER

## 2025-04-08 PROCEDURE — 36415 COLL VENOUS BLD VENIPUNCTURE: CPT

## 2025-04-08 PROCEDURE — 99232 SBSQ HOSP IP/OBS MODERATE 35: CPT | Performed by: INTERNAL MEDICINE

## 2025-04-08 RX ORDER — BUDESONIDE 0.5 MG/2ML
1 INHALANT ORAL 2 TIMES DAILY
Qty: 120 ML | Refills: 3 | Status: SHIPPED | OUTPATIENT
Start: 2025-04-08 | End: 2025-04-09 | Stop reason: CLARIF

## 2025-04-08 RX ORDER — ARFORMOTEROL TARTRATE 15 UG/2ML
1 SOLUTION RESPIRATORY (INHALATION)
Qty: 120 ML | Refills: 3 | Status: SHIPPED | OUTPATIENT
Start: 2025-04-08 | End: 2025-05-08

## 2025-04-08 RX ORDER — ASPIRIN 81 MG/1
81 TABLET, CHEWABLE ORAL DAILY
Qty: 30 TABLET | Refills: 3 | Status: SHIPPED | OUTPATIENT
Start: 2025-04-09

## 2025-04-08 RX ADMIN — AZITHROMYCIN DIHYDRATE 250 MG: 250 TABLET ORAL at 08:03

## 2025-04-08 RX ADMIN — TIOTROPIUM BROMIDE INHALATION SPRAY 2 PUFF: 3.12 SPRAY, METERED RESPIRATORY (INHALATION) at 07:31

## 2025-04-08 RX ADMIN — BUMETANIDE 1 MG: 1 TABLET ORAL at 08:03

## 2025-04-08 RX ADMIN — BUDESONIDE 500 MCG: 0.5 INHALANT RESPIRATORY (INHALATION) at 07:31

## 2025-04-08 RX ADMIN — SODIUM CHLORIDE, PRESERVATIVE FREE 10 ML: 5 INJECTION INTRAVENOUS at 08:04

## 2025-04-08 RX ADMIN — LOSARTAN POTASSIUM 50 MG: 50 TABLET, FILM COATED ORAL at 08:06

## 2025-04-08 RX ADMIN — ARFORMOTEROL TARTRATE 15 MCG: 15 SOLUTION RESPIRATORY (INHALATION) at 07:31

## 2025-04-08 RX ADMIN — POTASSIUM CHLORIDE 20 MEQ: 750 TABLET, EXTENDED RELEASE ORAL at 08:02

## 2025-04-08 RX ADMIN — ASPIRIN 81 MG: 81 TABLET, CHEWABLE ORAL at 08:03

## 2025-04-08 RX ADMIN — ACETAMINOPHEN 650 MG: 325 TABLET ORAL at 07:43

## 2025-04-08 ASSESSMENT — PAIN DESCRIPTION - ORIENTATION
ORIENTATION: LOWER
ORIENTATION: LOWER

## 2025-04-08 ASSESSMENT — PAIN DESCRIPTION - DESCRIPTORS
DESCRIPTORS: ACHING;DISCOMFORT
DESCRIPTORS: ACHING;DISCOMFORT

## 2025-04-08 ASSESSMENT — PAIN SCALES - GENERAL
PAINLEVEL_OUTOF10: 10
PAINLEVEL_OUTOF10: 0
PAINLEVEL_OUTOF10: 0
PAINLEVEL_OUTOF10: 10

## 2025-04-08 ASSESSMENT — PAIN DESCRIPTION - LOCATION
LOCATION: BACK
LOCATION: BACK

## 2025-04-08 NOTE — DISCHARGE SUMMARY
Resident Discharge Summary (Hospitalist)      Patient: Fredo Rod 54 y.o. male  : 1970  MRN: [id number]   Account: [id number]   Patient's PCP: Mohinder Page MD    Admit Date: 3/29/2025   Discharge Date:   25    Admitting Physician: No admitting provider for patient encounter.  Discharge Physician: Gorge Agrawal,        Discharge Diagnoses:  Acute hypoxic respiratory failure, improved: 2/2 CHF exacerbation, COPD exacerbation, BiPAP noncompliance.  Patient presented with proBNP of 1255.  Saturating at 62% on arrival.  ABG 3/31 showed pH 7.34, pCO2 84, pO2 98, HCO3 46.  CXR 3/31 showed improved pulmonary vascular congestion. Patient on 3 L O2 at rest, 4 L with activity at baseline.  Pulmonology following. Pt currently on 2L NC.  Home O2 eval done that showed patient will need 1 L at rest, 4 L with activity. New NIV machine to be delivered 25. Continue on previous settings.  Acute on chronic HFpEF exacerbation, improving: Echo 3/31/2025 showed EF 50 to 55%. Small localized pericardial effusion. proBNP of 1255. With trace pitting edema bilateral lower extremities today. GDMT includes losartan, Toprol. Continue Bumex, losartan, Toprol on discharge.  Severe COPD exacerbation: Last PFTs 2024 showed FEV1/FVC ratio of 56.  FEV1 of 24%.  Patient on BiPAP at night however reports noncompliance given BiPAP malfunctioning.  Pulmonology following.  Patient completed azithromycin, prednisone course during admission.  Continue Zithromycin 250 mg daily on discharge.  Continue home inhalers on discharge.  Elevated troponin: Likely secondary to demand ischemia in setting of AHRF.  Troponin of 32.  EKG showed NSR.  Patient denies any chest pain.  Abdominal wall cellulitis: Abdominal wall erythematous with indurated skin.  Patient with similar presentation during admission in 2024.  Completed Ancef during admission  Chronic macrocytic anemia: Chronically elevated since 2024.

## 2025-04-08 NOTE — TELEPHONE ENCOUNTER
Submitted prior authorization for Budesonide Nebulizer Solution today.   -  I will update this encounter once a determination has been received.  -  CoverMyMeds Key: [id number]

## 2025-04-08 NOTE — TELEPHONE ENCOUNTER
Bert Chahal, APRN - CNP  Edilberto Winslow MA      8 weeks chest x-ray 2 days prior to appointment for PFT NIV with download    Thanks  Bert Chahal

## 2025-04-08 NOTE — TELEPHONE ENCOUNTER
----- Message from DAYSI Davis - CNP sent at 4/7/2025  1:31 PM EDT -----      8 weeks chest x-ray 2 days prior to appointment for PFT NIV with download    Thanks  Bert Chahal

## 2025-04-08 NOTE — TELEPHONE ENCOUNTER
pft 6.9.25 arriving at 2:45 for 3 o clock appt . cxr to be completed a few days prior to hfu.  hfu sched for 6.16.25 w Manoj @ 1:00. dl from NIV needed for appt . preps for testing will be mailed out    Perfect serve sent to [facility]   Appt & testing preps and reminders mailed out   Called could not lvm for patient - vm is not set up

## 2025-04-08 NOTE — PROGRESS NOTES
Hospitalist Progress Note      Patient:  Fredo Rod    Unit/Bed:4K-21/021-A  YOB: 1970  MRN: [id number]   Acct: [id number]   PCP: Mohinder Page MD  Date of Admission: 3/29/2025    ASSESSMENT AND PLAN    Active Problems  Acute hypoxic respiratory failure: Likely 2/2 acute CHF exacerbation vs. BiPAP noncompliance at night. NT-proBNP 1,255. Saturating at 62% on RA on arrival. Currently on BiPAP 24/6; FiO2 40%. ABG: pH 7.23, pCO2 124, pO2 80, HCO3 52. CXR shows cardiomegaly and probable congestive heart failure. Patient supposed to use BiPAP at night; unable to obtain BiPAP due to insurance issues. Patient uses oxygen at home; 3 L at rest and 4 L with activity.   Pulmonology consulted to help arrange NIV at home for suspected LINDA.   Now on 5 L NC, BIPAP to be worn at night and with naps.   Management of HF exacerbation and COPD exacerbation as below.   Acute HFpEF exacerbation: NT-proBNP 1,255. 12/17/2024 echocardiogram shows EF 55 to 60%. CXR findings as noted above. On Bumex 1 Mg p.o. twice daily at home. Reports compliance. Reports his legs have gotten more swollen. Echocardiogram ordered, pending.  Diuresis: Bumex 1 Mg IV twice daily  Pending echocardiogram  Strict I's and O's  Daily weights  On fluid and salt restriction diet.   Will repeat CXR tomorrow.   Metabolic encephalopathy (improving): Likely secondary to CO2 retention from BiPAP noncompliance. Initial pCO2 124. Currently A&Ox3. UA positive for protein 30.  Continue NIV as noted above  Trend BMP  COPD exacerbation: PFT showing FEV1 24%. Patient supposed to use BiPAP at night; unable to obtain BiPAP due to insurance issues. Steroids, DuoNebs and antibiotics ordered.  Continue albuterol sulfate 2 puffs inhalation every 4 hours as needed  Brovana 15 mcg nebulization twice daily daily  DuoNebs 1 dose inhalation every 4 hours while awake  On solumedrol IV (3/29 
    Hospitalist Progress Note  Internal Medicine Resident      Patient: Fredo Rod 54 y.o. male      Unit/Bed: -09/009-A    Admit Date: 3/29/2025      ASSESSMENT AND PLAN  Active Problems  Acute hypoxic respiratory failure, improved: 2/2 CHF exacerbation, COPD exacerbation, BiPAP noncompliance.  Patient presented with proBNP of 1255.  Saturating at 62% on arrival.  ABG 3/31 showed pH 7.34, pCO2 84, pO2 98, HCO3 46.  CXR 3/31 showed improved pulmonary vascular congestion. Patient on 3 L O2 at rest, 4 L with activity at baseline.  Pulmonology following. Pt currently on 2L NC.   Order placed for new NIV, will likely deliver Monday   Continue to wean oxygen as tolerated, keep SpO2 88 to 92%.  Acute on chronic HFpEF exacerbation, improving: Echo 3/31/2025 showed EF 50 to 55%. Small localized pericardial effusion. proBNP of 1255. With trace pitting edema bilateral lower extremities today. GDMT includes losartan, Toprol.   Continue PO Bumex 1 mg twice daily  Continue losartan 50 mg daily  Continue Toprol 12.5 mg daily  Strict I's and O's  Daily weights  2 L fluid restriction  Severe COPD exacerbation: Last PFTs 9/17/2024 showed FEV1/FVC ratio of 56.  FEV1 of 24%.  Patient on BiPAP at night however reports noncompliance given BiPAP malfunctioning.  Pulmonology following.  Continue prednisone 40 mg daily (3/31-4/4)  Continue azithromycin 250 mg  Continue Proventil 2.5 mg nebulization every 6 hours as needed  Continue albuterol sulfate 2 puffs inhalation every 4 hours as needed  Brovana 15 mcg nebulization twice daily  Spiriva 2.5 mcg 2 puffs inhalation daily  Elevated troponin: Likely secondary to demand ischemia in setting of AHRF.  Troponin of 32.  EKG showed NSR.  Patient denies any chest pain.  Abdominal wall cellulitis: Abdominal wall erythematous with indurated skin.  Patient with similar presentation during admission in December 2024.  Continue Ancef.  Chronic macrocytic anemia: Chronically elevated since 
    Hospitalist Progress Note  Internal Medicine Resident      Patient: Fredo Rod 54 y.o. male      Unit/Bed: -09/009-A    Admit Date: 3/29/2025      ASSESSMENT AND PLAN  Active Problems  Acute hypoxic respiratory failure, improved: 2/2 CHF exacerbation, COPD exacerbation, BiPAP noncompliance.  Patient presented with proBNP of 1255.  Saturating at 62% on arrival.  ABG 3/31 showed pH 7.34, pCO2 84, pO2 98, HCO3 46.  CXR 3/31 showed improved pulmonary vascular congestion. Patient on 3 L O2 at rest, 4 L with activity at baseline.  Pulmonology following. Pt currently on 2L NC.   Order placed for new NIV, will likely deliver Monday   Continue to wean oxygen as tolerated, keep SpO2 88 to 92%.  Acute on chronic HFpEF exacerbation, improving: Echo 3/31/2025 showed EF 50 to 55%. Small localized pericardial effusion. proBNP of 1255. With trace pitting edema bilateral lower extremities today. GDMT includes losartan, Toprol.   Continue PO Bumex 1 mg twice daily  Continue losartan 50 mg daily  Continue Toprol 12.5 mg daily  Strict I's and O's  Daily weights  2 L fluid restriction  Severe COPD exacerbation: Last PFTs 9/17/2024 showed FEV1/FVC ratio of 56.  FEV1 of 24%.  Patient on BiPAP at night however reports noncompliance given BiPAP malfunctioning.  Pulmonology following.  Continue prednisone 40 mg daily (3/31-4/4)  Continue azithromycin 250 mg  Continue Proventil 2.5 mg nebulization every 6 hours as needed  Continue albuterol sulfate 2 puffs inhalation every 4 hours as needed  Brovana 15 mcg nebulization twice daily  Spiriva 2.5 mcg 2 puffs inhalation daily  Elevated troponin: Likely secondary to demand ischemia in setting of AHRF.  Troponin of 32.  EKG showed NSR.  Patient denies any chest pain.  Abdominal wall cellulitis: Abdominal wall erythematous with indurated skin.  Patient with similar presentation during admission in December 2024.  Continue Ancef.  Chronic macrocytic anemia: Chronically elevated since 
    Hospitalist Progress Note  Internal Medicine Resident      Patient: Fredo Rod 54 y.o. male      Unit/Bed: -09/009-A    Admit Date: 3/29/2025      ASSESSMENT AND PLAN  Active Problems  Acute hypoxic respiratory failure, improvin/2 CHF exacerbation, COPD exacerbation, BiPAP noncompliance.  Patient presented with proBNP of 1255.  Saturating at 62% on arrival.  ABG 3/31 showed pH 7.34, pCO2 84, pO2 98, HCO3 46.  CXR 3/31 showed improved pulmonary vascular congestion.  Patient on 3 L O2 at rest, 4 L with activity at baseline.  Pulmonology following. Pt currently on 3 L NC.   Order placed for new NIV, will follow-up tomorrow for delivery date.  Continue to wean oxygen as tolerated, keep SpO2 88 to 92%.  Acute on chronic HFpEF exacerbation: Echo 3/31/2025 showed EF 50 to 55%. Small localized pericardial effusion. proBNP of 1255. With +2 pitting edema bilateral lower extremities today. GDMT includes losartan, Toprol.   Continue IV Bumex 1 mg twice daily  Strict I's and O's  Daily weights  2 L fluid restriction  Severe COPD exacerbation: Last PFTs 2024 showed FEV1/FVC ratio of 56.  FEV1 of 24%.  Patient on BiPAP at night however reports noncompliance given BiPAP malfunctioning.  Pulmonology following.  Continue prednisone 40 mg daily (3/31-)  Continue azithromycin 250 mg  Continue Proventil 2.5 mg nebulization every 6 hours as needed  Continue albuterol sulfate 2 puffs inhalation every 4 hours as needed  Brovana 15 mcg nebulization twice daily  Spiriva 2.5 mcg 2 puffs inhalation daily  Elevated troponin: Likely secondary to demand ischemia in setting of AHRF.  Troponin of 32.  EKG showed NSR.  Patient denies any chest pain.  Abdominal wall cellulitis: Abdominal wall erythematous with indurated skin.  Patient with similar presentation during admission in 2024.  Continue Ancef.  Chronic macrocytic anemia: Chronically elevated since 2024.  .6.  Vitamin B12 1141.  Folate 
   04/02/25 0946   RT Protocol   History Pulmonary Disease 0   Respiratory pattern 2   Breath sounds 2   Cough 1   Bronchodilator Assessment Score 5       
  [place] [facility]      Patient - Fredo Rod   MRN -  [id number]   [facility] # - [id number]   - 1970      Date of Admission -  3/29/2025  1:50 PM  Date of evaluation -  2025  Room - --A   Hospital Day - 10  Consulting - Sahara Hood MD Primary Care Physician - Mohinder Page MD     Problem List      Active Hospital Problems    Diagnosis Date Noted    Acute on chronic respiratory failure with hypercapnia [J96.22] 2025    Hypertensive urgency [I16.0] 2025    Metabolic encephalopathy [G93.41] 2025    Macrocytic anemia [D53.9] 2025    Elevated alkaline phosphatase level [R74.8] 2025    Primary hypertension [I10] 2025    History of CAD (coronary artery disease) [Z86.79] 2025    Acute on chronic respiratory failure with hypoxia and hypercapnia [J96.21, J96.22] 2024    Acute on chronic heart failure with preserved ejection fraction (HFpEF) (HCC) [I50.33] 2024    Elevated troponin [R79.89] 2024     Reason for Consult    Lost NIV acute on chronic respiratory failure with hypoxia and hypercapnia    HPI   History Obtained From: Patient  and electronic medical record.    Fredo Rod is a 54 y.o. male ambulated into emergency department 3- from home with family complaining of altered mental status and polydipsia.  Family reported abnormal behavior with hallucinations and seeing things during the day for close to a month.  There was also concern for weight gain with worsening bilateral lower extremity edema.  Patient had been on his home 3 L nasal cannula, has not been wearing BiPAP-at that time stated it was secondary to insurance issues.  ABG 7.2 3/124/80/52, BNP 1255.  Chest x-ray showing increased density through lung fields, increased pulmonary vasculature.  Patient was placed on BiPAP and started on Bumex drip, repeat ABG 7.2 106/95/49.    On conversation with patient he tells me 
  [place] [facility] Pulmonary, Sleep and Critical Care Medicine      Patient - Fredo Rod   MRN -  [id number]   [facility] # - [id number]   - 1970      Date of Admission -  3/29/2025  1:50 PM  Date of evaluation -  2025  Room - --A   Hospital Day - 4  Consulting - Sahara Hood MD Primary Care Physician - Mohinder Page MD     Problem List      Active Hospital Problems    Diagnosis Date Noted    Acute on chronic respiratory failure with hypercapnia [J96.22] 2025    Hypertensive urgency [I16.0] 2025    Metabolic encephalopathy [G93.41] 2025    Macrocytic anemia [D53.9] 2025    Elevated alkaline phosphatase level [R74.8] 2025    Primary hypertension [I10] 2025    History of CAD (coronary artery disease) [Z86.79] 2025    Acute on chronic respiratory failure with hypoxia and hypercapnia [J96.21, J96.22] 2024    Acute on chronic heart failure with preserved ejection fraction (HFpEF) (HCC) [I50.33] 2024    Elevated troponin [R79.89] 2024     Reason for Consult    Loss previous NIV AECOPD with hypoxic and hypercapnic respiratory failure  HPI   History Obtained From: Patient  and electronic medical record.    Fredo Rod is a 54 y.o. male ambulated into emergency department 3- from home with family complaining of altered mental status and polydipsia.  Family reported abnormal behavior with hallucinations and seeing things during the day for close to a month.  There was also concern for weight gain with worsening bilateral lower extremity edema.  Patient had been on his home 3 L nasal cannula, has not been wearing BiPAP-at that time stated it was secondary to insurance issues.  ABG 7.2 3/124/80/52, BNP 1255.  Chest x-ray showing increased density through lung fields, increased pulmonary vasculature.  Patient was placed on BiPAP and started on Bumex drip, repeat ABG 7.2 8106/95/49.    On conversation with patient he tells me he 
  [place] [facility] Pulmonary, Sleep and Critical Care Medicine      Patient - Fredo Rod   MRN -  [id number]   [facility] # - [id number]   - 1970      Date of Admission -  3/29/2025  1:50 PM  Date of evaluation -  4/3/2025  Room - 5--A   Hospital Day - 5  Consulting - Sahara Hood MD Primary Care Physician - Mohinder Page MD     Problem List      Active Hospital Problems    Diagnosis Date Noted    Acute on chronic respiratory failure with hypercapnia [J96.22] 2025    Hypertensive urgency [I16.0] 2025    Metabolic encephalopathy [G93.41] 2025    Macrocytic anemia [D53.9] 2025    Elevated alkaline phosphatase level [R74.8] 2025    Primary hypertension [I10] 2025    History of CAD (coronary artery disease) [Z86.79] 2025    Acute on chronic respiratory failure with hypoxia and hypercapnia [J96.21, J96.22] 2024    Acute on chronic heart failure with preserved ejection fraction (HFpEF) (HCC) [I50.33] 2024    Elevated troponin [R79.89] 2024     Reason for Consult    Lost previous NIV , AECOPD with hypoxic and hypercapnic respiratory  failure   HPI   History Obtained From: Patient  and electronic medical record.    Fredo Rod is a 54 y.o. male ambulated into emergency department 3- from home with family complaining of altered mental status and polydipsia.  Family reported abnormal behavior with hallucinations and seeing things during the day for close to a month.  There was also concern for weight gain with worsening bilateral lower extremity edema.  Patient had been on his home 3 L nasal cannula, has not been wearing BiPAP-at that time stated it was secondary to insurance issues.  ABG 7.2 3/124/80/52, BNP 1255.  Chest x-ray showing increased density through lung fields, increased pulmonary vasculature.  Patient was placed on BiPAP and started on Bumex drip, repeat ABG 7.2 8106/95/49.    On conversation with patient he tells me 
  [place] for Pulmonary, Sleep and Critical Care Medicine      Patient - Fredo Rod   MRN -  [id number]   [facility] # - [id number]   - 1970      Date of Admission -  3/29/2025  1:50 PM  Date of evaluation -  2025  Room - --A   Hospital Day - 9  Consulting - Sahara Hood MD Primary Care Physician - Mohinder Page MD     Problem List      Active Hospital Problems    Diagnosis Date Noted    Acute on chronic respiratory failure with hypercapnia [J96.22] 2025    Hypertensive urgency [I16.0] 2025    Metabolic encephalopathy [G93.41] 2025    Macrocytic anemia [D53.9] 2025    Elevated alkaline phosphatase level [R74.8] 2025    Primary hypertension [I10] 2025    History of CAD (coronary artery disease) [Z86.79] 2025    Acute on chronic respiratory failure with hypoxia and hypercapnia [J96.21, J96.22] 2024    Acute on chronic heart failure with preserved ejection fraction (HFpEF) (HCC) [I50.33] 2024    Elevated troponin [R79.89] 2024     Reason for Consult    Lost previous NIV AE COPD with hypoxic and hypercapnic respiratory failure  HPI   History Obtained From: Patient  and electronic medical record.    Fredo Rod is a 54 y.o. male ambulated into emergency department 3- from home with family complaining of altered mental status and polydipsia.  Family reported abnormal behavior with hallucinations and seeing things during the day for close to a month.  There was also concern for weight gain with worsening bilateral lower extremity edema.  Patient had been on his home 3 L nasal cannula, has not been wearing BiPAP-at that time stated it was secondary to insurance issues.  ABG 7.2 3/124/80/52, BNP 1255.  Chest x-ray showing increased density through lung fields, increased pulmonary vasculature.  Patient was placed on BiPAP and started on Bumex drip, repeat ABG 7.2 8106/95/49.    On conversation with patient he tells me he 
  [place] [facility] Pulmonary, Sleep and Critical Care Medicine      Patient - Fredo Rod   MRN -  [id number]   [facility] # - [id number]   - 1970      Date of Admission -  3/29/2025  1:50 PM  Date of evaluation -  2025  Room - --A   Hospital Day - 6  Consulting - Sahara Hood MD Primary Care Physician - Mohinder Page MD     Problem List      Active Hospital Problems    Diagnosis Date Noted    Acute on chronic respiratory failure with hypercapnia [J96.22] 2025    Hypertensive urgency [I16.0] 2025    Metabolic encephalopathy [G93.41] 2025    Macrocytic anemia [D53.9] 2025    Elevated alkaline phosphatase level [R74.8] 2025    Primary hypertension [I10] 2025    History of CAD (coronary artery disease) [Z86.79] 2025    Acute on chronic respiratory failure with hypoxia and hypercapnia [J96.21, J96.22] 2024    Acute on chronic heart failure with preserved ejection fraction (HFpEF) (HCC) [I50.33] 2024    Elevated troponin [R79.89] 2024     Reason for Consult    Lost previous and IV AECOPD with hypoxic and hypercapnic respiratory failure  HPI   History Obtained From: Patient  and electronic medical record.    Fredo Rod is a 54 y.o. male ambulated into emergency department 3- from home with family complaining of altered mental status and polydipsia.  Family reported abnormal behavior with hallucinations and seeing things during the day for close to a month.  There was also concern for weight gain with worsening bilateral lower extremity edema.  Patient had been on his home 3 L nasal cannula, has not been wearing BiPAP-at that time stated it was secondary to insurance issues.  ABG 7.2 3/124/80/52, BNP 1255.  Chest x-ray showing increased density through lung fields, increased pulmonary vasculature.  Patient was placed on BiPAP and started on Bumex drip, repeat ABG 7.2 8106/95/49.    On conversation with patient he tells me 
A home oxygen evaluation has been completed.     [x]Patient is an inpatient. It is expected that the patient will be discharged within the next 48 hours. Qualified provider to write order for home prescription if patient qualifies. Social service/care managers will arrange for home oxygen.  If patient is active, arrange for Home Medical supplier to assess for Oxygen Conserving Device per pulse oximetry.  []Patient is an outpatient. Results will be faxed to the ordering provider. Qualified provider to write order for home prescription if patient qualifies and arranges for home oxygen.      Patient was placed on room air for 5 minutes. SpO2 was 88 % on room air at rest. Patients SpO2 was below 89% and qualified for home oxygen. Oxygen was applied at 1 lpm via nasal cannula to maintain a SpO2 between 90-92% while at rest. Actual SpO2 was 90 %. Patient can ambulate for exercise flow rate. Patients was ambulated, SpO2 was 91% on 4 lpm to maintain SpO2 between 90-92% while exercising.      
Date: 3/31/2025  Patient Name: Fredo Rod        MRN: [id number]    Account Number: [id number]  : 1970  (54 y.o.)  Gender: male      Diagnosis: Acute on chronic respiratory failure with hypoxia and hypercapnia    Fredo Rod requires a wheeled walker with a seat to complete bathing, toileting, dressing and grooming tasks. A wheeled walker with a seat is also needed for use during transfer to commode, wheelchair, or bed.  These tasks cannot be completed by utilizing a cane secondary to upper body weakness, unsteady gait and/or the ability to ambulate only short distances by pushing a walker prior to requiring a seated rest break.     
Echo completed at bedside.  Echo given to Dr. Graves.  
Meds to bed delivered. AVS reviewed. Bipap delivered. Patient to  walker and nebulizer. Brother here to transport patient to private address.   
[facility] Contin[facility] Nurse  Progress Note       NAME:  Fredo Rod  MEDICAL RECORD NUMBER:  [id number]  AGE: 54 y.o.   GENDER: male  : 1970  TODAY'S DATE:  3/31/2025    Subjective   Reason for WOCN Evaluation and Assessment:     Pt with raised and reddened areas in the perineum and perianal with tenderness.         Fredo Rod is a 54 y.o. male referred by:   [] Physician/ PA/ APRN-CNP  [x] Nursing  [] Other:     Plan     Consult received for see above. Primary RN to assess wounds and document findings in LDA. If wound is a stage 3, 4, DTPI, unstageable, or complex wound, please call.  Recommend staff to utilize Wound Care orders for management of wounds.    How to: Wound Care Orders         Type WOUND in order set search bar.  Right click Wound Care Orders, select add to favorites.  Type Wound Care Orders in search.  Select appropriate orders.  Sign orders as Per Protocol: No Cosign Required.   ---These are independent nursing orders.     
Patient received sacramental anointing by a . If you are in need of  support, please call [phone]. If you are in need of a  after 6:30pm, please call the house supervisor for the on-call .      Nancy Kerr  [facility] Health Coordinator  [phone]   
Patient resting in bed, alert and oriented x4. Reports 0/10 pain at this time. Speech clear and appropriate. Pupils equal, round, and reactive to light with consensual response. Bilateral upper extremities pink, warm, dry. Movement is without difficulty. Full sensation present, reports no numbness or tingling. Skin turgor WNL. Capillary refill equal to 3 sec. Hand grasps strong and equal, arm drift negative. Pulse 63 bpm, normal rhythm. Lung sounds clear, diminished to Right and Left lower lobes. Regular rate, unlabored, chest symmetrical. On 2L NC at rest, 3L NC with activity. Bowel sounds active in all 4 quadrants, last BM 4/3/25. Lower extremities warm, dry. Movement present without difficulty. Full sensation present with no complaints of numbness or tingling. Pedal push/pull present and strong. Bilateral Posterior Tibialis and Dorsalis Pedal Pulses present and moderate.   
Patient up in chair, alert and oriented x4. Reports 0/10 pain at this time. Speech clear and appropriate. Pupils equal, round, and reactive to light with consensual response. Bilateral upper extremities pink, warm, dry. Movement is without difficulty. Full sensation present, reports no numbness or tingling. Skin turgor WNL. Capillary refill equal to 3 sec. Hand grasps strong and equal, arm drift negative. Pulse 72 bpm, normal rhythm. Lung sounds clear, diminished to Right and Left lower lobes. Regular rate, unlabored, chest symmetrical. On 2L NC at rest, 3L NC with activity. Bowel sounds active in all 4 quadrants.  Lower extremities warm, dry. Movement present without difficulty. Full sensation present with no complaints of numbness or tingling. Pedal push/pull present and strong. Bilateral Posterior Tibialis and Dorsalis Pedal Pulses present and moderate.   
Pt attempting to get out of bed with family member at bedside. Pt reports need to go to the restroom. The tech offered the bedpan when he stated he needed to have a bowel movement as an alternative to ambulating with the BiPAP on. Pt then stated he only needed to urinate. Pt has attempted to get out of bed without assist 2 times in 10 minutes prior to this RN receiving bedside report. Bedside video monitor ordered and setup being placed by PCA at this time.   
Report given to Primary nurse KUSH Travis  
Report received from primary nurse KUSH Travis.  
[facility] [place]  [facility] ONC MED 5K  Occupational Therapy  Daily Note    Discharge Recommendations: Home with assist as needed  Equipment Recommendations: No        Time In: 1405  Time Out: 1513  Timed Code Treatment Minutes: 68 Minutes  Minutes: 68          Date: 2025  Patient Name: Fredo Rod,   Gender: male      Room: -09/009-A  MRN: [id number]  : 1970  (54 y.o.)  Referring Practitioner: Behl, Ashita, MD  Diagnosis: Acute on chronic respiratory failure with hypoxia and hypercapnia  Additional Pertinent Hx: per chart review; \"Fredo Rod is a 54 y.o. male with PMHx of HFpEF, abdominal cellulitis, chronic macrocytic anemia, severe COPD, HTN, CAD s/p stent placement who presents to [facility] with AMS. Patient initially altered; provide limited history. Endorsed that symptoms started last night. Patient reports he is always short of breath. Reports that he supposed to use a BiPAP at night but does not have it due to insurance issues. Patient reports being thirsty and wanting to drink a lot.  Patient uses oxygen at home; 3 L at rest and 4 L with activity. Reports no chest pain.  Reports abdomen is more distended than usual; started 2 to 3 days ago.  Patient reports he cannot remember his cardiologist; last time he saw was when he had his heart attack 3 years ago. Patient reports his legs are swelling more.\"    Restrictions/Precautions:  Restrictions/Precautions: General Precautions, Fall Risk  Position Activity Restriction  Other Position/Activity Restrictions: 3 L O2 at rest, 4L O2 with activity at baseline.     Social/Functional History:  Lives With: Other (Comment) (daughter (14 years old))  Type of Home: Trailer  Home Layout: One level  Home Access: Stairs to enter without rails  Entrance Stairs - Number of Steps: 4  Home Equipment: Oxygen   Bathroom Shower/Tub: Tub only  Bathroom Toilet: Standard  Bathroom Equipment: None  Bathroom Accessibility: Accessible  IADL 
Spiritual Health History and Assessment/Progress Note  [facility]    (P) Spiritual/Emotional Needs,  ,  ,      Name: Fredo Rod MRN: [id number]    Age: 54 y.o.     Sex: male   Language: English   Mosque: Islam   Acute on chronic respiratory failure with hypoxia and hypercapnia     Date: 4/7/2025            Total Time Calculated: (P) 8 min              Spiritual Assessment began in [facility] ON[facility] MED 5K        Referral/Consult From: (P) Rounding   Encounter Overview/Reason: (P) Spiritual/Emotional Needs  Service Provided For: (P) Patient    Yessenia, Belief, Meaning:   Patient identifies as spiritual  Family/Friends identify as spiritual      Importance and Influence:  Patient has spiritual/personal beliefs that influence decisions regarding their health  Family/Friends have spiritual/personal beliefs that influence decisions regarding the patient's health    Community:  Patient is connected with a spiritual community  Family/Friends are connected with a spiritual community:    Assessment and Plan of Care:   In my encounter with the 54 yr old patient, while rounding  the unit 5K,  I provided spiritual care to patient through conversation, I also came to assess the patient's spiritual needs present. The pt was admitted due to acute chronic respiratory failure.     Patient Interventions include: Facilitated expression of thoughts and feelings  Family/Friends Interventions include: Facilitated expression of thoughts and feelings    Patient Plan of Care: Spiritual Care available upon further referral  Family/Friends Plan of Care: Spiritual Care available upon further referral    Electronically signed by RODOLFO Deshpande on 4/7/2025 at 2:16 PM   
Spiritual Health History and Assessment/Progress Note  [facility]    Spiritual/Emotional Needs,  ,  ,      Name: Fredo Rod MRN: [id number]    Age: 54 y.o.     Sex: male   Language: English   Pentecostal: Denominational   Acute on chronic respiratory failure with hypoxia and hypercapnia     Date: 4/2/2025            Total Time Calculated: (P) 8 min              Spiritual Assessment continued in [facility] MED 5K        Referral/Consult From: Rounding   Encounter Overview/Reason: Spiritual/Emotional Needs  Service Provided For: Patient    Yessenia, Belief, Meaning:   Patient identifies as spiritual  Family/Friends identify as spiritual      Importance and Influence:  Patient has spiritual/personal beliefs that influence decisions regarding their health  Family/Friends have spiritual/personal beliefs that influence decisions regarding the patient's health    Community:  Patient is connected with a spiritual community  Family/Friends are connected with a spiritual community:    Assessment and Plan of Care:   In my encounter with the 54 yr old patient, while rounding the unit 5K,  I provided spiritual care to patient through conversation, I also came to assess the patient's spiritual needs present. The pt was admitted due to acute chronic respiratory failure.     Patient Interventions include: Facilitated expression of thoughts and feelings  Family/Friends Interventions include: Facilitated expression of thoughts and feelings    Patient Plan of Care: Spiritual Care available upon further referral  Family/Friends Plan of Care: Spiritual Care available upon further referral    Electronically signed by RODOLFO Deshpande on 4/2/2025 at 3:46 PM   
This RN called [facility] to obtain patient records regarding NIV machine. Documents in route via fax.  
Transfer received from 4. Patient arrived to 5k09 in stable condition.  
[facility]  [facility] ONC MED 5K  Occupational Therapy  Daily Note    Discharge Recommendations: Home with assist as needed  Equipment Recommendations: No        Time In: 1149  Time Out: 1242  Timed Code Treatment Minutes: 53 Minutes  Minutes: 53          Date: 2025  Patient Name: Fredo Rod,   Gender: male      Room: -09/009-A  MRN: [id number]  : 1970  (54 y.o.)  Referring Practitioner: Behl, Ashita, MD  Diagnosis: Acute on chronic respiratory failure with hypoxia and hypercapnia  Additional Pertinent Hx: per chart review; \"Fredo Rod is a 54 y.o. male with PMHx of HFpEF, abdominal cellulitis, chronic macrocytic anemia, severe COPD, HTN, CAD s/p stent placement who presents to [facility] with AMS. Patient initially altered; provide limited history. Endorsed that symptoms started last night. Patient reports he is always short of breath. Reports that he supposed to use a BiPAP at night but does not have it due to insurance issues. Patient reports being thirsty and wanting to drink a lot.  Patient uses oxygen at home; 3 L at rest and 4 L with activity. Reports no chest pain.  Reports abdomen is more distended than usual; started 2 to 3 days ago.  Patient reports he cannot remember his cardiologist; last time he saw was when he had his heart attack 3 years ago. Patient reports his legs are swelling more.\"    Restrictions/Precautions:  Restrictions/Precautions: General Precautions, Fall Risk  Position Activity Restriction  Other Position/Activity Restrictions: 3 L O2 at rest, 4L O2 with activity at baseline.     Social/Functional History:  Lives With: Other (Comment) (daughter (14 years old))  Type of Home: Trailer  Home Layout: One level  Home Access: Stairs to enter without rails  Entrance Stairs - Number of Steps: 4  Home Equipment: Oxygen   Bathroom Shower/Tub: Tub only  Bathroom Toilet: Standard  Bathroom Equipment: None  Bathroom Accessibility: Accessible  IADL 
[facility]  INPATIENT OCCUPATIONAL THERAPY  [facility] ICU STEPDOWN TELEMETRY 4K  EVALUATION      Discharge Recommendations: Home with assist PRN, Home with Home health OT  Equipment Recommendations: No        Time In: 0944  Time Out: 1016  Timed Code Treatment Minutes: 23 Minutes  Minutes: 32          Date: 3/31/2025  Patient Name: Fredo Rod,   Gender: male      MRN: [id number]  : 1970  (54 y.o.)  Referring Practitioner: Behl, Ashita, MD  Diagnosis: Acute on chronic respiratory failure with hypoxia and hypercapnia  Additional Pertinent Hx: per chart review; \"Fredo Rod is a 54 y.o. male with PMHx of HFpEF, abdominal cellulitis, chronic macrocytic anemia, severe COPD, HTN, CAD s/p stent placement who presents to [facility] with AMS. Patient initially altered; provide limited history. Endorsed that symptoms started last night. Patient reports he is always short of breath. Reports that he supposed to use a BiPAP at night but does not have it due to insurance issues. Patient reports being thirsty and wanting to drink a lot.  Patient uses oxygen at home; 3 L at rest and 4 L with activity. Reports no chest pain.  Reports abdomen is more distended than usual; started 2 to 3 days ago.  Patient reports he cannot remember his cardiologist; last time he saw was when he had his heart attack 3 years ago. Patient reports his legs are swelling more.\"    Restrictions/Precautions:  Restrictions/Precautions: General Precautions, Fall Risk  Position Activity Restriction  Other Position/Activity Restrictions: 3 L O2 at rest, 4L O2 with activity at baseline.    Subjective  Chart Reviewed: Yes, Orders, Progress Notes, History and Physical  Patient assessed for rehabilitation services?: Yes  Family / Caregiver Present: No    Subjective: RN approved session, patient seated up in recliner upon OT arrival and agreeable to eval. patient A & O x 2. telesitter in room. patient seated on chair alarm 
Chronically elevated since 9/13/2024.  .6.  Vitamin B12 1141.  Folate 10.9.  Resolved Problems  Metabolic encephalopathy: Likely 2/2 CO2 retention.  Initial pCO2 of 124.  Patient alert and oriented x 4 on exam today.  Continue BiPAP.  Hypertensive urgency: Blood pressure in ED of 193/147.  Patient on Toprol and Cozaar at home.  Started on nitro drip, discontinued 3/30.  Continue home meds.  Chronic Conditions (reviewed and stable unless otherwise stated)  HTN: Continue Toprol 12.5 mg daily, Cozaar 50 mg daily  CAD: S/p stent placement.  Continue aspirin daily.      LDA: []CVC / []PICC / []Midline / []Borja / []Drains / []Mediport / [x]None  Antibiotics: Ancef, Zithromax  Steroids: Prednisone  Labs (still needed?): [x]Yes / []No  IVF (still needed?): []Yes / [x]No    Level of care: [x]Step Down / []Med-Surg  Bed Status: [x]Inpatient / []Observation  Telemetry: [x]Yes / []No  PT/OT: []Yes / [x]No    DVT Prophylaxis: [x] Lovenox / [] Heparin / [] SCDs / [] Already on Systemic Anticoagulation / [] None     Expected discharge date: TBD  Disposition: TBD     Code status: Full Code     ===================================================================    Chief Complaint: AMS  Subjective (past 24 hours): Pt seen and examined at bedside. No issues overnight. Currently on 2L NC. Trace pitting edema bilateral lower ext. Will continue to diurese. Awaiting insurance approval for new NIV machine.     4/1: Patient seen and examined at bedside. Alert and oriented x 4. Patient on 4 L nasal cannula. With +2 pitting edema bilateral lower extremities.  Patient with regular bowel movements. Denies chest pain, shortness of breath, fever, chills. Will work to get home BiPAP.   4/2: Patient seen and examined at bedside. Patient with +2 pitting edema bilateral lower extremities. Patient on 3 L nasal cannula, which is at baseline. Working to get BiPAP machine for home.  4/3:Patient seen examined at bedside today. Denies any overnight 
Independent  Prior Level of Assist for Transfers: Independent  Active : Yes  IADL Comments: owns a dog and cat  Additional Comments: used no AD prior to admit  Has the patient had two or more falls in the past year or any fall with injury in the past year?: Yes    Restrictions/Precautions:  Restrictions/Precautions: General Precautions, Fall Risk  Position Activity Restriction  Other Position/Activity Restrictions: 3 L O2 at rest, 4L O2 with activity at baseline.     SUBJECTIVE: Patient is seated in recliner and agreeable for PT. RN approves patient for PT.      PAIN: Denies    Vitals: Oxygen: 93-95% with activities. During standing marching SpO2 decrease to 85%, patient returned sitting with quick return to 90%  Heart Rate: 07-75 at rest,  with standing exercises    OBJECTIVE:  Bed Mobility:  Not Tested    Transfers:  Sit to Stand: Stand By Assistance  Stand to Sit:Stand By Assistance    Ambulation:  Stand By Assistance  Distance: 500 ft with x 2 standing rest breaks  Surface: Level Tile  Device: Rolling Walker  Gait Deviations: Forward Flexed Posture, Decreased Step Length Bilaterally, Lean to Right, Lean to Left, Decreased Heel Strike Bilaterally, Narrow Base of Support, and Increased reliance on assistive device     Stairs:  Not Tested    Balance:  Static Sitting Balance:  Modified Independent  Static Standing Balance: Stand By Assistance  Dynamic Standing Balance: Contact Guard Assistance    Exercise:  Patient was guided in 1 set(s) 10 reps of exercises to both lower extremities: Standing heel/toe raises, Standing marches, Standing hip abduction/adduction, and Standing hip flexion.  Exercises were completed for increased independence with functional mobility.    Functional Outcome Measures:  [facility] (6 CLICK) BASIC MOBILITY  AM-PAC Inpatient Mobility Raw Score : 18  AM-PAC Inpatient T-Scale Score : 43.63        Modified Darling:  Current Functional Status:  Not Applicable    ASSESSMENT:  Assessment: 
Independent  Prior Level of Assist for Transfers: Independent  Active : Yes  IADL Comments: owns a dog and cat  Additional Comments: used no AD prior to admit  Has the patient had two or more falls in the past year or any fall with injury in the past year?: Yes    Restrictions/Precautions:  Restrictions/Precautions: General Precautions, Fall Risk  Position Activity Restriction  Other Position/Activity Restrictions: 3 L O2 at rest, 4L O2 with activity at baseline.     SUBJECTIVE: pleasant and cooperative. Pt stated hoping to go home soon.     PAIN: no c/o pain    Vitals: Oxygen: 3L with O2 sat dec to 84% with amb so inc to 4L to maintain >90%, after rest break pt returned to 3L O2 with RN aware.    OBJECTIVE:  Bed Mobility:  Not Tested    Transfers:  Sit to Stand: Supervision, X 1  Stand to Sit:Supervision, X 1  X2 trials from bedside chair  Ambulation:  Stand By Assistance, X 1  Distance: 3'x1, 650'x1  Surface: Level Tile  Device: Rolling Walker  Gait Deviations: Slow Mabel and cues for more erect posture and dec WB BUE on walker. Pt able to talk and walk throughout, cues to self monitor O2 sats for safety-pt would benefit from a home O2 monitor.     Stairs:  Not Tested    Balance:  Static Sitting Balance:  Modified Independent  Static Standing Balance: Supervision, X 1, with 0-1 UE support  Dynamic Standing Balance: Supervision, X 1, to use urinal with 0-1 UE support. Reaching waist level. No LOB    Exercise:  None    Functional Outcome Measures:  [facility] (6 CLICK) BASIC MOBILITY  AM-[facility] Inpatient Mobility Raw Score : 18  AM-PAC Inpatient T-Scale Score : 43.63        Modified Srinivas:  Current Functional Status:  Not Applicable    ASSESSMENT:  Assessment: Patient progressing toward established goals. Pt is primarily S to SBA with use of RW for home mobility and stated no questions or concerns for home mobility with good home set up and aware to watch out for O2 lines.  Activity Tolerance:  Patient tolerance of 
was discharged with BIPAP last hospitalization and he took her machine back to get \"fixed because it was buzzing\" He states they told him it would be one week and then he was never called to pick it up and he tried to go get it and they did not have it so he has gone without since then and has been wearing his 3lpm at night     He denies feeling ill prior to presenting to hospital, he denies cough, fevers, shortness of breath chills. He states his family said he was \"acting off and weight and swelling increased\"  He states he has not smoked one cigarette since last hospital discharge   easons other than planned procedures.      Past 24 hrs   -On 3 LPM via NC   -Patient reports shortness of breath improving  -Reports ongoing wheezing improved from time of admission  All other systems reviewed    PMHx   Past Medical History      Diagnosis Date    (HFpEF) heart failure with preserved ejection fraction (HCC)     CAD (coronary artery disease)     Hepatic steatosis     LINDA (obstructive sleep apnea)     Pulmonary hypertension (HCC)     Sarcoma of rib (HCC)       Past Surgical History        Procedure Laterality Date    ARM SURGERY Left 1997    BONE RESECTION, RIB       Meds    Current Medications    azithromycin  250 mg Oral Daily    albuterol sulfate HFA  2 puff Inhalation TID RT    tiotropium  2 puff Inhalation Daily RT    predniSONE  40 mg Oral Daily    arformoterol tartrate  15 mcg Nebulization BID RT    aspirin  81 mg Oral Daily    [Held by provider] bumetanide  1 mg Oral BID    losartan  50 mg Oral Daily    metoprolol succinate  12.5 mg Oral QPM    potassium chloride  20 mEq Oral Daily    sodium chloride flush  5-40 mL IntraVENous 2 times per day    enoxaparin  40 mg SubCUTAneous Q24H    bumetanide  1 mg IntraVENous BID    ceFAZolin  2,000 mg IntraVENous Q8H     albuterol, sodium chloride flush, sodium chloride, potassium chloride **OR** potassium alternative oral replacement **OR** potassium chloride, magnesium 
[place]:  Premorbid Functional Status: Not Applicable  Current Functional Status:  Not Applicable    ASSESSMENT:  Activity Tolerance:  Patient tolerance of treatment:Good.    Treatment Initiated: Treatment and education initiated within context of evaluation.  Evaluation time included review of current medical information, gathering information related to past medical, social and functional history, completion of standardized testing, formal and informal observation of tasks, assessment of data and development of plan of care and goals.  Treatment time included skilled education and facilitation of tasks to increase safety and independence with functional mobility for improved independence and quality of life.    Assessment:  Body Structures, Functions, Activity Limitations Requiring Skilled Therapeutic Intervention: Decreased functional mobility , Decreased strength, Decreased endurance, Decreased balance  Assessment: Fredo Rod is a 54 y.o. male that presents with acute on chronic resp failure with hypoxia and hypercapnia. Pt demonstrates a decrease in baseline by way of bed mobility, transfers and ambulation secondary to decreased activity tolerance, strength, fatigue, and balance deficits. Pt will benefit from skilled PT services throughout admission and beyond hospital discharge for improvements in functional mobility and in order to decrease fall risk and return pt to PLOF.     Therapy Prognosis: Good    Requires PT Follow-Up: Yes    Patient Education:      .    Patient Education  Education Given To: Patient  Education Provided: Role of Therapy, Plan of Care  Education Provided Comments: use of walker  Education Method: Verbal, Demonstration  Barriers to Learning: None  Education Outcome: Verbalized understanding, Demonstrated understanding, Continued education needed       Plan:  Current Treatment Recommendations: Strengthening, Endurance training, Functional mobility training, Balance training, 
and take rest breaks, keeping home free of clutter and throw rugs.  Pt. Demo good understanding throughout educational portion of OT session.       Functional Outcome Measures:   AM-PAC Inpatient Daily Activity Raw Score: 24     ASSESSMENT:     Activity Tolerance:  Patient tolerance of  treatment: Good treatment tolerance      Plan: Times Per Week: 5x  Times Per Day: Once a day  Current Treatment Recommendations: Strengthening, Balance training, Functional mobility training, Endurance training, Neuromuscular re-education, Positioning, Patient/Caregiver education & training, Safety education & training, Self-Care / ADL, Pain management, Coordination training    Education:  Learners: Patient  Role of OT, Plan of Care, ADL's, IADL's, Energy Conservation, Home Safety, Importance of Increasing Activity, Fall Prevention, Assistive Device Safety, Pursed Lip Breathing, and Safety with transfers and mobility.    Goals  Short Term Goals  Time Frame for Short Term Goals: by discharge  Short Term Goal 1: patient will tolerate 5 min functional standing with (S) with O2 >/= 90% to increase ease with toileting and grooming.  Short Term Goal 2: patient will functionally ambulate to/from BR with (S) and 0-1 cues for O2 tubing mgmt.  Short Term Goal 3: patient will complete ADL routine with (S) and edu in energy conservation tech to increase ADL success.  Short Term Goal 4: patient will demo 5/5 (B) UE strength to increase ease with functional transfers.    Following session, patient left in safe position with all fall risk precautions in place.  
with (S) with O2 >/= 90% to increase ease with toileting and grooming.  Short Term Goal 2: patient will functionally ambulate to/from BR with (S) and 0-1 cues for O2 tubing mgmt.  Short Term Goal 3: patient will complete ADL routine with (S) and edu in energy conservation tech to increase ADL success.  Short Term Goal 4: patient will demo 5/5 (B) UE strength to increase ease with functional transfers.    Following session, patient left in safe position with all fall risk precautions in place.        
acetaminophen    Exam:  BP (!) 140/89   Pulse 66   Temp 99.3 °F (37.4 °C) (Oral)   Resp 20   Ht 1.753 m (5' 9\")   Wt 95.8 kg (211 lb 3.2 oz)   SpO2 95%   BMI 31.19 kg/m²   General: No distress, appears stated age.  Eyes:  PERRL. Conjunctivae/corneas clear.  HENT: Head normal appearing. Nares normal. Oral mucosa moist.  Hearing intact.   Neck: Supple, with full range of motion. Trachea midline.  No gross JVD appreciated.  Respiratory:  Normal effort. Clear to auscultation, without rales or wheezes or rhonchi.  Cardiovascular: Normal rate, regular rhythm with normal S1/S2 without murmurs.    No lower extremity edema.   Abdomen: Soft, non-tender, non-distended with normal bowel sounds.  Musculoskeletal: No joint swelling or tenderness. Normal tone. No abnormal movements.   Skin: Warm and dry. No rashes or lesions.  Neurologic:  No focal sensory/motor deficits in the upper or lower extremities. Cranial nerves:  grossly non-focal 2-12.     Psychiatric: Alert and oriented, normal insight and thought content.   Capillary Refill: Brisk,< 3 seconds.  Peripheral Pulses: +2 palpable, equal bilaterally.       Labs/Radiology: See chart or assessment above.     Electronically signed by Nanci Gould DO on 3/31/2025 at 10:51 PM  Case was discussed with Attending, Dr. Behl.    
chloride  20 mEq Oral Daily    sodium chloride flush  5-40 mL IntraVENous 2 times per day    enoxaparin  40 mg SubCUTAneous Q24H    PRN Meds: albuterol sulfate HFA, albuterol, sodium chloride flush, sodium chloride, potassium chloride **OR** potassium alternative oral replacement **OR** potassium chloride, magnesium sulfate, ondansetron **OR** ondansetron, polyethylene glycol, acetaminophen **OR** acetaminophen    Exam:  BP (!) 90/54   Pulse 62   Temp 97.9 °F (36.6 °C) (Oral)   Resp 17   Ht 1.753 m (5' 9\")   Wt 90.9 kg (200 lb 6.4 oz)   SpO2 97%   BMI 29.59 kg/m²   General: No distress, appears stated age.  Eyes:  PERRL. Conjunctivae/corneas clear.  HENT: Head normal appearing. Nares normal. Oral mucosa moist. Hearing intact.   Neck: Supple, with full range of motion. Trachea midline. No gross JVD appreciated.  Respiratory:  Normal effort. Clear to auscultation, without rales or wheezes or rhonchi.  Cardiovascular: Normal rate, regular rhythm with normal S1/S2 without murmurs.    Trace pitting edema bilaterally  Abdomen: erythema. Indurated.   Musculoskeletal: No joint swelling or tenderness. Normal tone. No abnormal movements.   Skin: Warm and dry. No rashes or lesions.  Neurologic: No focal sensory/motor deficits in the upper or lower extremities. Cranial nerves:  grossly non-focal 2-12.     Psychiatric: Alert and oriented, normal insight and thought content.   Capillary Refill: Brisk,< 3 seconds.  Peripheral Pulses: +2 palpable, equal bilaterally.       Labs/Radiology: See chart or assessment above.     Electronically signed by Sahara Hood MD on 4/7/2025   
hours) at 4/5/2025 1257  Last data filed at 4/5/2025 0631  Gross per 24 hour   Intake 500 ml   Output 1600 ml   Net -1100 ml     I/O last 3 completed shifts:  In: 1000 [P.O.:1000]  Out: 2500 [Urine:2500]   Patient Vitals for the past 96 hrs (Last 3 readings):   Weight   04/03/25 1436 91.2 kg (201 lb 1 oz)         Exam   Physical Exam   Physical Exam   Constitutional: No distress on RA , sitting up in bed . Patient appears moderately built and  moderately nourished.   Head: Normocephalic and atraumatic.   Right Ear: External ear normal in appearance.   Left Ear: External ear normal in appearance.  Mouth/Throat: Oropharynx is clear and moist.    Eyes: Conjunctivae are normal  Cardiovascular: Regular rate, regular rhythm, S1 and S2 with no murmur.  No peripheral edema  Pulmonary/Chest: Normal effort with clear breath sounds. No stridor. No respiratory distress. Patient exhibits no tenderness.   Abdominal: Soft. Bowel sounds audible. No distension or tenderness to palp.   Musculoskeletal: Moves all extremities  Neurological: Patient is alert and oriented to person, place, and time.   Skin: Warm and dry.    Labs  - Old records and notes have been reviewed in [facility]   ABG  Lab Results   Component Value Date/Time    PH 7.39 04/03/2025 06:06 AM    PO2 97 04/03/2025 06:06 AM    PCO2 65 04/03/2025 06:06 AM    HCO3 39 04/03/2025 06:06 AM    O2SAT 97 04/03/2025 06:06 AM     Lab Results   Component Value Date/Time    IFIO2 40 04/03/2025 06:06 AM    MODE Not entered 04/03/2025 06:06 AM    SETPEEP 4.0 04/03/2025 06:06 AM     CBC  Recent Labs     04/03/25  0440 04/04/25  0455 04/05/25  0533   WBC 8.0 8.8 11.3*   RBC 4.68* 4.86 4.78   HGB 13.7* 14.8 14.2   HCT 47.7 48.4 50.2   .9* 99.6* 105.0*   MCH 29.3 30.5 29.7   MCHC 28.7* 30.6* 28.3*    208 227   MPV 11.3 12.1 12.6*      BMP  Recent Labs     04/03/25  0440 04/04/25  0455 04/04/25  0624 04/05/25  0533 04/05/25  0910     --  141 138  --    K 4.4  --  4.2 
magnesium sulfate, ondansetron **OR** ondansetron, polyethylene glycol, acetaminophen **OR** acetaminophen    Exam:  /85   Pulse 72   Temp 98.8 °F (37.1 °C) (Oral)   Resp 18   Ht 1.753 m (5' 9\")   Wt 91.2 kg (201 lb 1 oz)   SpO2 94%   BMI 29.69 kg/m²   General: No distress, appears stated age.  Eyes:  PERRL. Conjunctivae/corneas clear.  HENT: Head normal appearing. Nares normal. Oral mucosa moist. Hearing intact.   Neck: Supple, with full range of motion. Trachea midline. No gross JVD appreciated.  Respiratory:  Normal effort. Clear to auscultation, without rales or wheezes or rhonchi.  Cardiovascular: Normal rate, regular rhythm with normal S1/S2 without murmurs.    +1 pitting edema bilaterally  Abdomen: erythema. Indurated.   Musculoskeletal: No joint swelling or tenderness. Normal tone. No abnormal movements.   Skin: Warm and dry. No rashes or lesions.  Neurologic: No focal sensory/motor deficits in the upper or lower extremities. Cranial nerves:  grossly non-focal 2-12.     Psychiatric: Alert and oriented, normal insight and thought content.   Capillary Refill: Brisk,< 3 seconds.  Peripheral Pulses: +2 palpable, equal bilaterally.       Labs/Radiology: See chart or assessment above.     Electronically signed by Gorge Agrawal DO on 4/3/2025 at 3:10 PM  Case was discussed with Attending, Dr. Hood    
motion. Trachea midline.  No gross JVD appreciated.  Respiratory:  Normal effort. Clear to auscultation, without rales or wheezes or rhonchi.  Cardiovascular: Normal rate, regular rhythm with normal S1/S2 without murmurs.    No lower extremity edema.   Abdomen: Soft, non-tender, non-distended with normal bowel sounds.  Musculoskeletal: No joint swelling or tenderness. Normal tone. No abnormal movements.   Skin: Warm and dry. No rashes or lesions.  Neurologic:  No focal sensory/motor deficits in the upper or lower extremities. Cranial nerves:  grossly non-focal 2-12.     Psychiatric: Alert and oriented, normal insight and thought content.   Capillary Refill: Brisk,< 3 seconds.  Peripheral Pulses: +2 palpable, equal bilaterally.       Labs/Radiology: See chart or assessment above.     Electronically signed by Gorge Agrawal DO on 4/1/2025 at 4:25 PM  Case was discussed with Attending, Dr. Behl.    
in December 2024  He usually follows with Dr. Judson Kaur MD.  Patient was seen by Dr. Judson Kaur MD for the last time on 24 April 2024  Obtain his old noninvasive ventilator settings from [employer]  Patient educated about need of good compliance to recommended noninvasive ventilator therapy.  He verbalized understanding.  He is on treatment with albuterol HFA, Brovana, Pulmicort neb at home  He is also on azithromycin 250 mg p.o. daily  He underwent a baseline sleep study in March 2021 at [facility] sleep lab.  No clinically significant obstructive sleep apnea noted.  Freod Rod educated about my impression and plan. He verbalizes understanding.    Electronically signed by   Pari Hanna MD on 3/31/2025 at 5:50 PM

## 2025-04-08 NOTE — CARE COORDINATION
4/8/25, 3:01 PM EDT    Patient goals/plan/ treatment preferences discussed by  and .  Patient goals/plan/ treatment preferences reviewed with patient/ family.  Patient/ family verbalize understanding of discharge plan and are in agreement with goal/plan/treatment preferences.  Understanding was demonstrated using the teach back method.  AVS provided by RN at time of discharge, which includes all necessary medical information pertaining to the patients current course of illness, treatment, post-discharge goals of care, and treatment preferences.     Services At/After Discharge: DME    NIV and rollator from [facility]    Patient was current with [facility] for home oxygen. 4/07/2025 new home oxygen respiratory evalution faxed to [facility] with new orders.

## 2025-04-08 NOTE — PLAN OF CARE
Problem: Chronic Conditions and Co-morbidities  Goal: Patient's chronic conditions and co-morbidity symptoms are monitored and maintained or improved  4/5/2025 1203 by Lisy Velasquez RN  Outcome: Progressing  Flowsheets (Taken 4/5/2025 0945)  Care Plan - Patient's Chronic Conditions and Co-Morbidity Symptoms are Monitored and Maintained or Improved:   Monitor and assess patient's chronic conditions and comorbid symptoms for stability, deterioration, or improvement   Collaborate with multidisciplinary team to address chronic and comorbid conditions and prevent exacerbation or deterioration   Update acute care plan with appropriate goals if chronic or comorbid symptoms are exacerbated and prevent overall improvement and discharge  4/4/2025 2350 by Ronnie Teresa RN  Outcome: Progressing  Flowsheets (Taken 4/4/2025 2028)  Care Plan - Patient's Chronic Conditions and Co-Morbidity Symptoms are Monitored and Maintained or Improved:   Monitor and assess patient's chronic conditions and comorbid symptoms for stability, deterioration, or improvement   Collaborate with multidisciplinary team to address chronic and comorbid conditions and prevent exacerbation or deterioration   Update acute care plan with appropriate goals if chronic or comorbid symptoms are exacerbated and prevent overall improvement and discharge     Problem: Discharge Planning  Goal: Discharge to home or other facility with appropriate resources  4/5/2025 1203 by Lisy Velasquez, RN  Outcome: Progressing  Flowsheets (Taken 4/5/2025 0945)  Discharge to home or other facility with appropriate resources:   Identify barriers to discharge with patient and caregiver   Arrange for needed discharge resources and transportation as appropriate   Identify discharge learning needs (meds, wound care, etc)   Refer to discharge planning if patient needs post-hospital services based on physician order or complex needs related to functional status, cognitive ability or 
  Problem: Chronic Conditions and Co-morbidities  Goal: Patient's chronic conditions and co-morbidity symptoms are monitored and maintained or improved  4/7/2025 1054 by Rut Hubbard RN  Outcome: Progressing  Flowsheets (Taken 4/7/2025 1054)  Care Plan - Patient's Chronic Conditions and Co-Morbidity Symptoms are Monitored and Maintained or Improved:   Monitor and assess patient's chronic conditions and comorbid symptoms for stability, deterioration, or improvement   Collaborate with multidisciplinary team to address chronic and comorbid conditions and prevent exacerbation or deterioration     Problem: Discharge Planning  Goal: Discharge to home or other facility with appropriate resources  4/7/2025 1054 by Rut Hubbard RN  Outcome: Progressing  Flowsheets (Taken 4/7/2025 1054)  Discharge to home or other facility with appropriate resources:   Arrange for needed discharge resources and transportation as appropriate   Identify barriers to discharge with patient and caregiver     Problem: Respiratory - Adult  Goal: Achieves optimal ventilation and oxygenation  4/7/2025 1054 by Rut Hubbard RN  Outcome: Progressing  Flowsheets (Taken 4/7/2025 1054)  Achieves optimal ventilation and oxygenation: Assess for changes in respiratory status     Problem: Respiratory - Adult  Goal: Clear lung sounds  4/7/2025 1054 by Rut Hubbadr RN  Outcome: Progressing     Problem: Pain  Goal: Verbalizes/displays adequate comfort level or baseline comfort level  4/7/2025 1054 by Rut Hubbard RN  Outcome: Progressing  Flowsheets (Taken 4/7/2025 1054)  Verbalizes/displays adequate comfort level or baseline comfort level:   Encourage patient to monitor pain and request assistance   Assess pain using appropriate pain scale   Administer analgesics based on type and severity of pain and evaluate response   Implement non-pharmacological measures as appropriate and evaluate response     Problem: Skin/Tissue 
  Problem: Chronic Conditions and Co-morbidities  Goal: Patient's chronic conditions and co-morbidity symptoms are monitored and maintained or improved  Outcome: Progressing     Problem: Discharge Planning  Goal: Discharge to home or other facility with appropriate resources  Outcome: Progressing     Problem: Respiratory - Adult  Goal: Achieves optimal ventilation and oxygenation  4/2/2025 2242 by Patric Holm RN  Outcome: Progressing  4/2/2025 2014 by Brittaney Oleary RCP  Outcome: Progressing  4/2/2025 0941 by Kaylee Albrecht RCP  Outcome: Progressing  Goal: Clear lung sounds  4/2/2025 2242 by Patric Holm RN  Outcome: Progressing  4/2/2025 2014 by Brittaney Oleary RCP  Outcome: Progressing  4/2/2025 0941 by Kaylee Albrecht RCP  Outcome: Progressing     Problem: Pain  Goal: Verbalizes/displays adequate comfort level or baseline comfort level  Outcome: Progressing     Problem: Skin/Tissue Integrity  Goal: Skin integrity remains intact  Description: 1.  Monitor for areas of redness and/or skin breakdown  2.  Assess vascular access sites hourly  3.  Every 4-6 hours minimum:  Change oxygen saturation probe site  4.  Every 4-6 hours:  If on nasal continuous positive airway pressure, respiratory therapy assess nares and determine need for appliance change or resting period  Outcome: Progressing     Problem: Safety - Adult  Goal: Free from fall injury  Outcome: Progressing     
  Problem: Chronic Conditions and Co-morbidities  Goal: Patient's chronic conditions and co-morbidity symptoms are monitored and maintained or improved  Outcome: Progressing     Problem: Discharge Planning  Goal: Discharge to home or other facility with appropriate resources  Outcome: Progressing     Problem: Respiratory - Adult  Goal: Achieves optimal ventilation and oxygenation  Outcome: Progressing  Goal: Clear lung sounds  4/1/2025 2254 by Patric Holm RN  Outcome: Progressing  4/1/2025 1016 by Blanche Rodarte RCP  Outcome: Progressing  Note: Txs to help improve lung aeration. Patient mutually agreed on goals.       Problem: Pain  Goal: Verbalizes/displays adequate comfort level or baseline comfort level  Outcome: Progressing     Problem: Skin/Tissue Integrity  Goal: Skin integrity remains intact  Description: 1.  Monitor for areas of redness and/or skin breakdown  2.  Assess vascular access sites hourly  3.  Every 4-6 hours minimum:  Change oxygen saturation probe site  4.  Every 4-6 hours:  If on nasal continuous positive airway pressure, respiratory therapy assess nares and determine need for appliance change or resting period  Outcome: Progressing     Problem: Safety - Adult  Goal: Free from fall injury  Outcome: Progressing     
  Problem: Chronic Conditions and Co-morbidities  Goal: Patient's chronic conditions and co-morbidity symptoms are monitored and maintained or improved  Outcome: Progressing  Flowsheets (Taken 3/30/2025 0209)  Care Plan - Patient's Chronic Conditions and Co-Morbidity Symptoms are Monitored and Maintained or Improved: Monitor and assess patient's chronic conditions and comorbid symptoms for stability, deterioration, or improvement     
  Problem: Chronic Conditions and Co-morbidities  Goal: Patient's chronic conditions and co-morbidity symptoms are monitored and maintained or improved  Outcome: Progressing  Flowsheets (Taken 4/6/2025 2304)  Care Plan - Patient's Chronic Conditions and Co-Morbidity Symptoms are Monitored and Maintained or Improved:   Monitor and assess patient's chronic conditions and comorbid symptoms for stability, deterioration, or improvement   Collaborate with multidisciplinary team to address chronic and comorbid conditions and prevent exacerbation or deterioration   Update acute care plan with appropriate goals if chronic or comorbid symptoms are exacerbated and prevent overall improvement and discharge     Problem: Discharge Planning  Goal: Discharge to home or other facility with appropriate resources  4/6/2025 2304 by Jodi Gay, RN  Outcome: Progressing  Flowsheets (Taken 4/6/2025 2304)  Discharge to home or other facility with appropriate resources:   Identify barriers to discharge with patient and caregiver   Identify discharge learning needs (meds, wound care, etc)   Refer to discharge planning if patient needs post-hospital services based on physician order or complex needs related to functional status, cognitive ability or social support system   Arrange for needed discharge resources and transportation as appropriate     Problem: Respiratory - Adult  Goal: Achieves optimal ventilation and oxygenation  4/6/2025 2304 by Jodi Gay, RN  Outcome: Progressing  Flowsheets (Taken 4/6/2025 2304)  Achieves optimal ventilation and oxygenation:   Assess for changes in respiratory status   Position to facilitate oxygenation and minimize respiratory effort   Respiratory therapy support as indicated   Assess for changes in mentation and behavior   Oxygen supplementation based on oxygen saturation or arterial blood gases     Problem: Pain  Goal: Verbalizes/displays adequate comfort level or baseline comfort 
  Problem: Discharge Planning  Goal: Discharge to home or other facility with appropriate resources  4/6/2025 1527 by Luciano Nelson RN  Outcome: Progressing  4/6/2025 0616 by Fortunato Montelongo RN  Outcome: Progressing  Flowsheets (Taken 4/6/2025 0616)  Discharge to home or other facility with appropriate resources:   Identify barriers to discharge with patient and caregiver   Identify discharge learning needs (meds, wound care, etc)     Problem: Respiratory - Adult  Goal: Achieves optimal ventilation and oxygenation  4/6/2025 1527 by Luciano Nelson, RN  Outcome: Progressing  4/6/2025 0616 by Fortunato Montelongo, RN  Flowsheets (Taken 4/6/2025 0616)  Achieves optimal ventilation and oxygenation:   Position to facilitate oxygenation and minimize respiratory effort   Assess for changes in respiratory status   Assess for changes in mentation and behavior   Assess the need for suctioning and aspirate as needed   Assess and instruct to report shortness of breath or any respiratory difficulty     
  Problem: Respiratory - Adult  Goal: Achieves optimal ventilation and oxygenation  3/31/2025 0838 by Sally Osorio, RCP  Outcome: Progressing     
  Problem: Respiratory - Adult  Goal: Achieves optimal ventilation and oxygenation  3/31/2025 2130 by Gela Rod RCP  Outcome: Progressing     Problem: Respiratory - Adult  Goal: Clear lung sounds  Outcome: Progressing     Patient mutually agreed on goals.  
  Problem: Respiratory - Adult  Goal: Achieves optimal ventilation and oxygenation  4/2/2025 0941 by Kaylee Albrecht, ESMER  Outcome: Progressing     Problem: Respiratory - Adult  Goal: Clear lung sounds  4/2/2025 0941 by Kaylee Albrecht, RCP  Outcome: Progressing     
  Problem: Respiratory - Adult  Goal: Achieves optimal ventilation and oxygenation  4/2/2025 2014 by Brittaney Oleary RCP  Outcome: Progressing     Problem: Respiratory - Adult  Goal: Clear lung sounds  4/2/2025 2014 by Brittaney Oleary RCP  Outcome: Progressing   Lung sounds are improved. Current treatment regimen will be discussed with provider to see if treatments may be changed.Patient mutually agreed on goals.      
  Problem: Respiratory - Adult  Goal: Achieves optimal ventilation and oxygenation  4/3/2025 1034 by Beatriz Velasquez RCP  Outcome: Progressing     Problem: Respiratory - Adult  Goal: Clear lung sounds  4/3/2025 1034 by Beatriz Velasquez RCP  Outcome: Progressing     
  Problem: Respiratory - Adult  Goal: Achieves optimal ventilation and oxygenation  4/3/2025 2011 by Britatney Oleary RCP  Outcome: Progressing     Problem: Respiratory - Adult  Goal: Clear lung sounds  4/3/2025 2011 by Brittaney Oleary RCP  Outcome: Progressing   Patient lung sounds are considered normal for their current lung condition. No signs of distress noted. Current treatment regimen appropriate Patient mutually agreed on goals.      
  Problem: Respiratory - Adult  Goal: Achieves optimal ventilation and oxygenation  4/5/2025 0528 by Karen Haider, RCP  Outcome: Progressing   Wearing NIV HS to support sleep apnea.     Patient mutually agreed on goals.    
  Problem: Respiratory - Adult  Goal: Achieves optimal ventilation and oxygenation  4/7/2025 2040 by Brittaney Oleary RCP  Outcome: Progressing     Problem: Respiratory - Adult  Goal: Clear lung sounds  4/7/2025 2040 by Brittaney Oleary RCP  Outcome: Progressing   Patient lung sounds are considered normal for their current lung condition. No signs of distress noted. Current treatment regimen appropriate Patient mutually agreed on goals.    
  Problem: Respiratory - Adult  Goal: Achieves optimal ventilation and oxygenation  Outcome: Progressing  Flowsheets (Taken 3/30/2025 0443)  Achieves optimal ventilation and oxygenation:   Assess for changes in respiratory status   Respiratory therapy support as indicated   Assess for changes in mentation and behavior   Oxygen supplementation based on oxygen saturation or arterial blood gases   Encourage broncho-pulmonary hygiene including cough, deep breathe, incentive spirometry   Assess and instruct to report shortness of breath or any respiratory difficulty     
  Problem: Respiratory - Adult  Goal: Clear lung sounds  4/1/2025 1016 by Blanche Rodarte, ESMER  Outcome: Progressing  Note: Txs to help improve lung aeration. Patient mutually agreed on goals.       
  Problem: Respiratory - Adult  Goal: Clear lung sounds  4/5/2025 0859 by Blanche Rodarte, RCP  Outcome: Progressing  Note: Txs to help improve lung aeration. Patient mutually agreed on goals.       
  Problem: Respiratory - Adult  Goal: Clear lung sounds  4/5/2025 2134 by Karen Haider, RCP  Outcome: Progressing   Receiving nebulizer treatments to improve aeration and maintain COPD. Will continue therapies as ordered.  Wearing NIV HS for LINDA tolerating well.     Patient mutually agreed on goals.      
  Problem: Respiratory - Adult  Goal: Clear lung sounds  4/6/2025 0836 by Blanche Rodarte, RCP  Outcome: Progressing  Note: Txs to help improve lung aeration. Patient mutually agreed on goals.       
  Problem: Respiratory - Adult  Goal: Clear lung sounds  4/6/2025 2029 by Ria Hernadez RCP  Outcome: Progressing     
  Problem: Respiratory - Adult  Goal: Clear lung sounds  4/7/2025 0811 by Judith Mcdaniel RCP  Outcome: Progressing   Patient mutually agrees on goals.  
  Problem: Respiratory - Adult  Goal: Clear lung sounds  Outcome: Progressing     
Progressing  Flowsheets (Taken 3/30/2025 2122)  Verbalizes/displays adequate comfort level or baseline comfort level:   Encourage patient to monitor pain and request assistance   Assess pain using appropriate pain scale   Administer analgesics based on type and severity of pain and evaluate response   Implement non-pharmacological measures as appropriate and evaluate response   Consider cultural and social influences on pain and pain management   Notify Licensed Independent Practitioner if interventions unsuccessful or patient reports new pain     Problem: Skin/Tissue Integrity  Goal: Skin integrity remains intact  Description: 1.  Monitor for areas of redness and/or skin breakdown  2.  Assess vascular access sites hourly  3.  Every 4-6 hours minimum:  Change oxygen saturation probe site  4.  Every 4-6 hours:  If on nasal continuous positive airway pressure, respiratory therapy assess nares and determine need for appliance change or resting period  Outcome: Progressing  Flowsheets (Taken 3/30/2025 2122)  Skin Integrity Remains Intact: Monitor for areas of redness and/or skin breakdown     Problem: Safety - Adult  Goal: Free from fall injury  Outcome: Progressing  Flowsheets (Taken 3/30/2025 2122)  Free From Fall Injury: Instruct family/caregiver on patient safety     
adequate comfort level or baseline comfort level:   Encourage patient to monitor pain and request assistance   Assess pain using appropriate pain scale   Administer analgesics based on type and severity of pain and evaluate response  Note: PRN Tylenol. Patient reported headache this shift.      Problem: Skin/Tissue Integrity  Goal: Skin integrity remains intact  Description: 1.  Monitor for areas of redness and/or skin breakdown  2.  Assess vascular access sites hourly  3.  Every 4-6 hours minimum:  Change oxygen saturation probe site  4.  Every 4-6 hours:  If on nasal continuous positive airway pressure, respiratory therapy assess nares and determine need for appliance change or resting period  Outcome: Progressing  Flowsheets (Taken 4/3/2025 1302)  Skin Integrity Remains Intact:   Monitor for areas of redness and/or skin breakdown   Assess vascular access sites hourly   Turn and reposition as indicated  Note: Patient repositions every 2 hours. Heels elevated off of bed. Skin kept clean and dry. Skin assessed with every head to toe. Chandan scale complete. Shower done this shift.        Problem: Safety - Adult  Goal: Free from fall injury  Outcome: Progressing  Flowsheets (Taken 4/3/2025 1302)  Free From Fall Injury:   Instruct family/caregiver on patient safety   Based on caregiver fall risk screen, instruct family/caregiver to ask for assistance with transferring infant if caregiver noted to have fall risk factors     Care plan reviewed with patient. Patient verbalizes understanding and will contribute to achieving goals.    
for areas of redness and/or skin breakdown  2.  Assess vascular access sites hourly  3.  Every 4-6 hours minimum:  Change oxygen saturation probe site  4.  Every 4-6 hours:  If on nasal continuous positive airway pressure, respiratory therapy assess nares and determine need for appliance change or resting period  Outcome: Progressing  Flowsheets (Taken 3/31/2025 1453)  Skin Integrity Remains Intact: Monitor for areas of redness and/or skin breakdown     Problem: Safety - Adult  Goal: Free from fall injury  Outcome: Progressing  Flowsheets (Taken 3/31/2025 1453)  Free From Fall Injury: Instruct family/caregiver on patient safety   Care plan reviewed with patient.  Patient verbalizes understanding of the plan of care and contributes to goal setting.    
redness and/or skin breakdown  2.  Assess vascular access sites hourly  3.  Every 4-6 hours minimum:  Change oxygen saturation probe site  4.  Every 4-6 hours:  If on nasal continuous positive airway pressure, respiratory therapy assess nares and determine need for appliance change or resting period  Outcome: Progressing  Flowsheets (Taken 4/6/2025 0616)  Skin Integrity Remains Intact: Monitor for areas of redness and/or skin breakdown     Problem: Safety - Adult  Goal: Free from fall injury  Outcome: Progressing  Note: Bed locked & in low position, call light in reach, side-rails up x2, bed/chair alarm utilized, non-slip socks on when ambulating, reminded patient to use call light to call for assistance.    Care plan reviewed with patient.  Patient verbalizes understanding of the care plan and contributed to goal setting.   
request assistance   Assess pain using appropriate pain scale   Administer analgesics based on type and severity of pain and evaluate response   Implement non-pharmacological measures as appropriate and evaluate response     Problem: Skin/Tissue Integrity  Goal: Skin integrity remains intact  Description: 1.  Monitor for areas of redness and/or skin breakdown  2.  Assess vascular access sites hourly  3.  Every 4-6 hours minimum:  Change oxygen saturation probe site  4.  Every 4-6 hours:  If on nasal continuous positive airway pressure, respiratory therapy assess nares and determine need for appliance change or resting period  Outcome: Progressing  Flowsheets (Taken 4/4/2025 2028)  Skin Integrity Remains Intact:   Monitor for areas of redness and/or skin breakdown   Assess vascular access sites hourly     Problem: Safety - Adult  Goal: Free from fall injury  Outcome: Progressing  Flowsheets (Taken 4/4/2025 2350)  Free From Fall Injury: Instruct family/caregiver on patient safety

## 2025-04-09 RX ORDER — FLUTICASONE PROPIONATE 110 UG/1
2 AEROSOL, METERED RESPIRATORY (INHALATION) 2 TIMES DAILY
Qty: 12 G | Refills: 3 | Status: SHIPPED | OUTPATIENT
Start: 2025-04-09 | End: 2026-04-09

## 2025-04-09 NOTE — TELEPHONE ENCOUNTER
Prior authorization for Budesonide Nebulizer Solution denied.  -  Preferred (medication covered by the plan) medications include: Pulmicort Flexhaler and Flovent and Fluticasone Diskus or HFA inhalers.   -  Appeal not submitted please consider prescribing a plan preferred medication. If the plan preferred medications are not equivalent to the denied medication please let me know. Thanks!

## 2025-04-09 NOTE — TELEPHONE ENCOUNTER
VM not set up. Trying to notify patient of medication change from budesonide nebulizer solution to flovent inhaler due to insurance denial of budesonide nebulizer solution.

## 2025-04-09 NOTE — TELEPHONE ENCOUNTER
Will trial on fluticasone 110 mcg BID notify patient of change and reasoning. To notify office in 14 days if he has worsening of respiratory symptoms

## 2025-04-28 PROBLEM — R79.89 ELEVATED TROPONIN: Status: RESOLVED | Noted: 2024-02-04 | Resolved: 2025-04-28

## 2025-06-20 ENCOUNTER — HOSPITAL ENCOUNTER (INPATIENT)
Age: 55
LOS: 5 days | Discharge: HOME | End: 2025-06-26
Attending: EMERGENCY MEDICINE | Admitting: SURGERY
Payer: MEDICAID

## 2025-06-20 ENCOUNTER — APPOINTMENT (OUTPATIENT)
Dept: GENERAL RADIOLOGY | Age: 55
End: 2025-06-20
Payer: MEDICAID

## 2025-06-20 DIAGNOSIS — J96.22 ACUTE ON CHRONIC RESPIRATORY FAILURE WITH HYPERCAPNIA (HCC): ICD-10-CM

## 2025-06-20 DIAGNOSIS — J96.02 ACUTE RESPIRATORY FAILURE WITH HYPOXIA AND HYPERCAPNIA (HCC): Primary | ICD-10-CM

## 2025-06-20 DIAGNOSIS — J96.12 CHRONIC RESPIRATORY FAILURE WITH HYPERCAPNIA (HCC): ICD-10-CM

## 2025-06-20 DIAGNOSIS — J96.01 ACUTE RESPIRATORY FAILURE WITH HYPOXIA AND HYPERCAPNIA (HCC): Primary | ICD-10-CM

## 2025-06-20 DIAGNOSIS — J80 ARDS (ADULT RESPIRATORY DISTRESS SYNDROME) (HCC): ICD-10-CM

## 2025-06-20 DIAGNOSIS — J44.1 COPD EXACERBATION (HCC): ICD-10-CM

## 2025-06-20 LAB
ALBUMIN SERPL BCG-MCNC: 3.3 G/DL (ref 3.4–4.9)
ALP SERPL-CCNC: 141 U/L (ref 40–129)
ALT SERPL W/O P-5'-P-CCNC: 45 U/L (ref 10–50)
ANION GAP SERPL CALC-SCNC: 7 MEQ/L (ref 8–16)
ARTERIAL PATENCY WRIST A: POSITIVE
AST SERPL-CCNC: 28 U/L (ref 10–50)
BASE EXCESS BLDA CALC-SCNC: 15.9 MMOL/L (ref -2.5–2.5)
BASE EXCESS BLDA CALC-SCNC: 16.7 MMOL/L (ref -2–3)
BDY SITE: ABNORMAL
BILIRUB SERPL-MCNC: 0.6 MG/DL (ref 0.3–1.2)
BUN SERPL-MCNC: 10 MG/DL (ref 8–23)
CALCIUM SERPL-MCNC: 8.3 MG/DL (ref 8.6–10)
CHLORIDE SERPL-SCNC: 95 MEQ/L (ref 98–111)
CO2 SERPL-SCNC: 41 MEQ/L (ref 22–29)
COLLECTED BY:: ABNORMAL
COLLECTED BY:: ABNORMAL
CREAT SERPL-MCNC: 0.7 MG/DL (ref 0.7–1.2)
DEVICE: ABNORMAL
DEVICE: ABNORMAL
FIO2 ON VENT O2 ANALYZER: 45 %
GFR SERPL CREATININE-BSD FRML MDRD: > 90 ML/MIN/1.73M2
GLUCOSE SERPL-MCNC: 93 MG/DL (ref 74–109)
HCO3 BLDA-SCNC: 50 MMOL/L (ref 23–28)
HCO3 BLDA-SCNC: 50 MMOL/L (ref 23–28)
NT-PROBNP SERPL IA-MCNC: 684 PG/ML (ref 0–124)
OSMOLALITY SERPL CALC.SUM OF ELEC: 283.7 MOSMOL/KG (ref 275–300)
PCO2 BLDA: 110 MMHG (ref 35–45)
PCO2 TEMP ADJ BLDMV: 106 MMHG (ref 41–51)
PH BLDA: 7.26 [PH] (ref 7.35–7.45)
PH BLDMV: 7.28 [PH] (ref 7.31–7.41)
PO2 BLDA: 72 MMHG (ref 71–104)
PO2 BLDMV: 26 MMHG (ref 25–40)
POTASSIUM SERPL-SCNC: 4 MEQ/L (ref 3.5–5.2)
PROT SERPL-MCNC: 6.5 G/DL (ref 6.4–8.3)
SAO2 % BLDA: 89 %
SAO2 % BLDMV: 34 %
SITE: ABNORMAL
SODIUM SERPL-SCNC: 143 MEQ/L (ref 135–145)
TROPONIN, HIGH SENSITIVITY: 15 NG/L (ref 0–12)
VENTILATION MODE VENT: ABNORMAL
VENTILATION MODE VENT: ABNORMAL

## 2025-06-20 PROCEDURE — 85025 COMPLETE CBC W/AUTO DIFF WBC: CPT

## 2025-06-20 PROCEDURE — 84145 PROCALCITONIN (PCT): CPT

## 2025-06-20 PROCEDURE — 36415 COLL VENOUS BLD VENIPUNCTURE: CPT

## 2025-06-20 PROCEDURE — 5A09457 ASSISTANCE WITH RESPIRATORY VENTILATION, 24-96 CONSECUTIVE HOURS, CONTINUOUS POSITIVE AIRWAY PRESSURE: ICD-10-PCS | Performed by: SURGERY

## 2025-06-20 PROCEDURE — 94660 CPAP INITIATION&MGMT: CPT

## 2025-06-20 PROCEDURE — 86140 C-REACTIVE PROTEIN: CPT

## 2025-06-20 PROCEDURE — 93005 ELECTROCARDIOGRAM TRACING: CPT | Performed by: EMERGENCY MEDICINE

## 2025-06-20 PROCEDURE — 99291 CRITICAL CARE FIRST HOUR: CPT

## 2025-06-20 PROCEDURE — 36600 WITHDRAWAL OF ARTERIAL BLOOD: CPT

## 2025-06-20 PROCEDURE — 80053 COMPREHEN METABOLIC PANEL: CPT

## 2025-06-20 PROCEDURE — 84484 ASSAY OF TROPONIN QUANT: CPT

## 2025-06-20 PROCEDURE — 94761 N-INVAS EAR/PLS OXIMETRY MLT: CPT

## 2025-06-20 PROCEDURE — 82803 BLOOD GASES ANY COMBINATION: CPT

## 2025-06-20 PROCEDURE — 71046 X-RAY EXAM CHEST 2 VIEWS: CPT

## 2025-06-20 PROCEDURE — 83880 ASSAY OF NATRIURETIC PEPTIDE: CPT

## 2025-06-20 PROCEDURE — 2700000000 HC OXYGEN THERAPY PER DAY

## 2025-06-20 PROCEDURE — 83735 ASSAY OF MAGNESIUM: CPT

## 2025-06-20 PROCEDURE — 99285 EMERGENCY DEPT VISIT HI MDM: CPT

## 2025-06-20 ASSESSMENT — PAIN - FUNCTIONAL ASSESSMENT: PAIN_FUNCTIONAL_ASSESSMENT: NONE - DENIES PAIN

## 2025-06-21 ENCOUNTER — APPOINTMENT (OUTPATIENT)
Dept: GENERAL RADIOLOGY | Age: 55
End: 2025-06-21
Payer: MEDICAID

## 2025-06-21 ENCOUNTER — APPOINTMENT (OUTPATIENT)
Dept: CT IMAGING | Age: 55
End: 2025-06-21
Payer: MEDICAID

## 2025-06-21 PROBLEM — J96.92 HYPERCAPNIC RESPIRATORY FAILURE (HCC): Status: ACTIVE | Noted: 2025-06-21

## 2025-06-21 LAB
ANION GAP SERPL CALC-SCNC: 12 MEQ/L (ref 8–16)
ANISOCYTOSIS BLD QL SMEAR: PRESENT
ANISOCYTOSIS BLD QL SMEAR: PRESENT
ARTERIAL PATENCY WRIST A: POSITIVE
BASE EXCESS BLDA CALC-SCNC: 13.4 MMOL/L (ref -2.5–2.5)
BASE EXCESS BLDA CALC-SCNC: 13.5 MMOL/L (ref -2–3)
BASE EXCESS BLDA CALC-SCNC: 18 MMOL/L (ref -2.5–2.5)
BASE EXCESS BLDA CALC-SCNC: 18.5 MMOL/L (ref -2.5–2.5)
BASO STIPL BLD QL SMEAR: ABNORMAL
BASOPHILS ABSOLUTE: 0 THOU/MM3 (ref 0–0.1)
BASOPHILS ABSOLUTE: 0.1 THOU/MM3 (ref 0–0.1)
BASOPHILS NFR BLD AUTO: 0.4 %
BASOPHILS NFR BLD AUTO: 0.7 %
BDY SITE: ABNORMAL
BUN SERPL-MCNC: 11 MG/DL (ref 8–23)
CALCIUM SERPL-MCNC: 7.7 MG/DL (ref 8.6–10)
CHLORIDE SERPL-SCNC: 95 MEQ/L (ref 98–111)
CO2 SERPL-SCNC: 36 MEQ/L (ref 22–29)
COLLECTED BY:: ABNORMAL
CREAT SERPL-MCNC: 0.6 MG/DL (ref 0.7–1.2)
CRP SERPL-MCNC: 1.17 MG/DL (ref 0–0.5)
DEPRECATED RDW RBC AUTO: 72.7 FL (ref 35–45)
DEPRECATED RDW RBC AUTO: 73.3 FL (ref 35–45)
DEVICE: ABNORMAL
EKG ATRIAL RATE: 81 BPM
EKG P AXIS: 44 DEGREES
EKG P-R INTERVAL: 156 MS
EKG Q-T INTERVAL: 400 MS
EKG QRS DURATION: 94 MS
EKG QTC CALCULATION (BAZETT): 464 MS
EKG R AXIS: -19 DEGREES
EKG T AXIS: 30 DEGREES
EKG VENTRICULAR RATE: 81 BPM
EOSINOPHIL NFR BLD AUTO: 2.5 %
EOSINOPHIL NFR BLD AUTO: 3.7 %
EOSINOPHILS ABSOLUTE: 0.2 THOU/MM3 (ref 0–0.4)
EOSINOPHILS ABSOLUTE: 0.3 THOU/MM3 (ref 0–0.4)
ERYTHROCYTE [DISTWIDTH] IN BLOOD BY AUTOMATED COUNT: 17.5 % (ref 11.5–14.5)
ERYTHROCYTE [DISTWIDTH] IN BLOOD BY AUTOMATED COUNT: 17.8 % (ref 11.5–14.5)
FIO2 ON VENT O2 ANALYZER: 30 %
FIO2 ON VENT O2 ANALYZER: 40 %
FIO2 ON VENT O2 ANALYZER: 60 %
GFR SERPL CREATININE-BSD FRML MDRD: > 90 ML/MIN/1.73M2
GLUCOSE SERPL-MCNC: 106 MG/DL (ref 74–109)
HCO3 BLDA-SCNC: 45 MMOL/L (ref 23–28)
HCO3 BLDA-SCNC: 46 MMOL/L (ref 23–28)
HCO3 BLDA-SCNC: 49 MMOL/L (ref 23–28)
HCO3 BLDA-SCNC: 54 MMOL/L (ref 23–28)
HCT VFR BLD AUTO: 44.7 % (ref 42–52)
HCT VFR BLD AUTO: 47.5 % (ref 42–52)
HGB BLD-MCNC: 12.5 GM/DL (ref 14–18)
HGB BLD-MCNC: 13.2 GM/DL (ref 14–18)
HYPOCHROMIA BLD QL SMEAR: PRESENT
HYPOCHROMIA BLD QL SMEAR: PRESENT
IMM GRANULOCYTES # BLD AUTO: 0.04 THOU/MM3 (ref 0–0.07)
IMM GRANULOCYTES # BLD AUTO: 0.06 THOU/MM3 (ref 0–0.07)
IMM GRANULOCYTES NFR BLD AUTO: 0.4 %
IMM GRANULOCYTES NFR BLD AUTO: 0.6 %
LYMPHOCYTES ABSOLUTE: 1.3 THOU/MM3 (ref 1–4.8)
LYMPHOCYTES ABSOLUTE: 1.6 THOU/MM3 (ref 1–4.8)
LYMPHOCYTES NFR BLD AUTO: 13.4 %
LYMPHOCYTES NFR BLD AUTO: 17.1 %
MACROCYTES BLD QL SMEAR: PRESENT
MACROCYTES BLD QL SMEAR: PRESENT
MAGNESIUM SERPL-MCNC: 2 MG/DL (ref 1.6–2.6)
MAGNESIUM SERPL-MCNC: 2 MG/DL (ref 1.6–2.6)
MCH RBC QN AUTO: 30.8 PG (ref 26–33)
MCH RBC QN AUTO: 30.8 PG (ref 26–33)
MCHC RBC AUTO-ENTMCNC: 27.8 GM/DL (ref 32.2–35.5)
MCHC RBC AUTO-ENTMCNC: 28 GM/DL (ref 32.2–35.5)
MCV RBC AUTO: 110.1 FL (ref 80–94)
MCV RBC AUTO: 111 FL (ref 80–94)
MONOCYTES ABSOLUTE: 1 THOU/MM3 (ref 0.4–1.3)
MONOCYTES ABSOLUTE: 1 THOU/MM3 (ref 0.4–1.3)
MONOCYTES NFR BLD AUTO: 10.2 %
MONOCYTES NFR BLD AUTO: 10.7 %
NEUTROPHILS ABSOLUTE: 6.1 THOU/MM3 (ref 1.8–7.7)
NEUTROPHILS ABSOLUTE: 6.9 THOU/MM3 (ref 1.8–7.7)
NEUTROPHILS NFR BLD AUTO: 67.4 %
NEUTROPHILS NFR BLD AUTO: 72.9 %
NRBC BLD AUTO-RTO: 0 /100 WBC
NRBC BLD AUTO-RTO: 0 /100 WBC
PCO2 BLDA: 131 MMHG (ref 35–45)
PCO2 BLDA: 84 MMHG (ref 35–45)
PCO2 BLDA: 91 MMHG (ref 35–45)
PCO2 TEMP ADJ BLDMV: 101 MMHG (ref 41–51)
PEEP SETTING VENT: 8 MMHG
PEEP SETTING VENT: 8 MMHG
PH BLDA: 7.22 [PH] (ref 7.35–7.45)
PH BLDA: 7.3 [PH] (ref 7.35–7.45)
PH BLDA: 7.38 [PH] (ref 7.35–7.45)
PH BLDMV: 7.27 [PH] (ref 7.31–7.41)
PIP: 24 CMH2O
PIP: 24 CMH2O
PLATELET # BLD AUTO: 307 THOU/MM3 (ref 130–400)
PLATELET # BLD AUTO: 354 THOU/MM3 (ref 130–400)
PLATELET BLD QL SMEAR: ADEQUATE
PMV BLD AUTO: 10.2 FL (ref 9.4–12.4)
PMV BLD AUTO: 10.3 FL (ref 9.4–12.4)
PO2 BLDA: 112 MMHG (ref 71–104)
PO2 BLDA: 54 MMHG (ref 71–104)
PO2 BLDA: 75 MMHG (ref 71–104)
PO2 BLDMV: 38 MMHG (ref 25–40)
POLYCHROMASIA BLD QL SMEAR: ABNORMAL
POLYCHROMASIA BLD QL SMEAR: ABNORMAL
POTASSIUM SERPL-SCNC: 3.9 MEQ/L (ref 3.5–5.2)
PROCALCITONIN SERPL IA-MCNC: 0.1 NG/ML (ref 0.01–0.09)
RBC # BLD AUTO: 4.06 MILL/MM3 (ref 4.7–6.1)
RBC # BLD AUTO: 4.28 MILL/MM3 (ref 4.7–6.1)
SAO2 % BLDA: 84 %
SAO2 % BLDA: 92 %
SAO2 % BLDA: 96 %
SAO2 % BLDMV: 59 %
SCAN OF BLOOD SMEAR: NORMAL
SCAN OF BLOOD SMEAR: NORMAL
SITE: ABNORMAL
SODIUM SERPL-SCNC: 143 MEQ/L (ref 135–145)
STOMATOCYTES: ABNORMAL
STOMATOCYTES: ABNORMAL
VENTILATION MODE VENT: ABNORMAL
WBC # BLD AUTO: 9.1 THOU/MM3 (ref 4.8–10.8)
WBC # BLD AUTO: 9.4 THOU/MM3 (ref 4.8–10.8)

## 2025-06-21 PROCEDURE — 94761 N-INVAS EAR/PLS OXIMETRY MLT: CPT

## 2025-06-21 PROCEDURE — 99291 CRITICAL CARE FIRST HOUR: CPT

## 2025-06-21 PROCEDURE — 99291 CRITICAL CARE FIRST HOUR: CPT | Performed by: SURGERY

## 2025-06-21 PROCEDURE — 6360000002 HC RX W HCPCS

## 2025-06-21 PROCEDURE — 94640 AIRWAY INHALATION TREATMENT: CPT

## 2025-06-21 PROCEDURE — 36600 WITHDRAWAL OF ARTERIAL BLOOD: CPT

## 2025-06-21 PROCEDURE — 83735 ASSAY OF MAGNESIUM: CPT

## 2025-06-21 PROCEDURE — 36415 COLL VENOUS BLD VENIPUNCTURE: CPT

## 2025-06-21 PROCEDURE — 82803 BLOOD GASES ANY COMBINATION: CPT

## 2025-06-21 PROCEDURE — 71045 X-RAY EXAM CHEST 1 VIEW: CPT

## 2025-06-21 PROCEDURE — 2700000000 HC OXYGEN THERAPY PER DAY

## 2025-06-21 PROCEDURE — 6360000002 HC RX W HCPCS: Performed by: EMERGENCY MEDICINE

## 2025-06-21 PROCEDURE — 2500000003 HC RX 250 WO HCPCS

## 2025-06-21 PROCEDURE — 93010 ELECTROCARDIOGRAM REPORT: CPT | Performed by: INTERNAL MEDICINE

## 2025-06-21 PROCEDURE — 2100000000 HC CCU R&B

## 2025-06-21 PROCEDURE — 6370000000 HC RX 637 (ALT 250 FOR IP)

## 2025-06-21 PROCEDURE — 80048 BASIC METABOLIC PNL TOTAL CA: CPT

## 2025-06-21 PROCEDURE — 70450 CT HEAD/BRAIN W/O DYE: CPT

## 2025-06-21 PROCEDURE — 94660 CPAP INITIATION&MGMT: CPT

## 2025-06-21 PROCEDURE — 96374 THER/PROPH/DIAG INJ IV PUSH: CPT

## 2025-06-21 PROCEDURE — 85025 COMPLETE CBC W/AUTO DIFF WBC: CPT

## 2025-06-21 RX ORDER — BUMETANIDE 1 MG/1
1 TABLET ORAL 2 TIMES DAILY
Status: DISCONTINUED | OUTPATIENT
Start: 2025-06-21 | End: 2025-06-21

## 2025-06-21 RX ORDER — METOPROLOL SUCCINATE 25 MG/1
12.5 TABLET, EXTENDED RELEASE ORAL EVERY EVENING
Status: DISCONTINUED | OUTPATIENT
Start: 2025-06-21 | End: 2025-06-26 | Stop reason: HOSPADM

## 2025-06-21 RX ORDER — ACETAMINOPHEN 650 MG/1
650 SUPPOSITORY RECTAL EVERY 6 HOURS PRN
Status: DISCONTINUED | OUTPATIENT
Start: 2025-06-21 | End: 2025-06-26 | Stop reason: HOSPADM

## 2025-06-21 RX ORDER — ALBUTEROL SULFATE 0.83 MG/ML
2.5 SOLUTION RESPIRATORY (INHALATION) EVERY 4 HOURS PRN
Status: DISCONTINUED | OUTPATIENT
Start: 2025-06-21 | End: 2025-06-26 | Stop reason: HOSPADM

## 2025-06-21 RX ORDER — BUMETANIDE 0.25 MG/ML
1 INJECTION, SOLUTION INTRAMUSCULAR; INTRAVENOUS 2 TIMES DAILY
Status: DISCONTINUED | OUTPATIENT
Start: 2025-06-21 | End: 2025-06-22

## 2025-06-21 RX ORDER — MAGNESIUM SULFATE IN WATER 40 MG/ML
2000 INJECTION, SOLUTION INTRAVENOUS PRN
Status: DISCONTINUED | OUTPATIENT
Start: 2025-06-21 | End: 2025-06-26 | Stop reason: HOSPADM

## 2025-06-21 RX ORDER — POTASSIUM CHLORIDE 750 MG/1
20 TABLET, EXTENDED RELEASE ORAL DAILY
Status: DISCONTINUED | OUTPATIENT
Start: 2025-06-21 | End: 2025-06-22

## 2025-06-21 RX ORDER — SODIUM CHLORIDE 0.9 % (FLUSH) 0.9 %
5-40 SYRINGE (ML) INJECTION EVERY 12 HOURS SCHEDULED
Status: DISCONTINUED | OUTPATIENT
Start: 2025-06-21 | End: 2025-06-26 | Stop reason: HOSPADM

## 2025-06-21 RX ORDER — POTASSIUM CHLORIDE 7.45 MG/ML
10 INJECTION INTRAVENOUS PRN
Status: DISCONTINUED | OUTPATIENT
Start: 2025-06-21 | End: 2025-06-26 | Stop reason: HOSPADM

## 2025-06-21 RX ORDER — ALBUTEROL SULFATE 0.83 MG/ML
5 SOLUTION RESPIRATORY (INHALATION) ONCE
Status: COMPLETED | OUTPATIENT
Start: 2025-06-21 | End: 2025-06-21

## 2025-06-21 RX ORDER — PANTOPRAZOLE SODIUM 40 MG/10ML
40 INJECTION, POWDER, LYOPHILIZED, FOR SOLUTION INTRAVENOUS DAILY
Status: DISCONTINUED | OUTPATIENT
Start: 2025-06-21 | End: 2025-06-26 | Stop reason: HOSPADM

## 2025-06-21 RX ORDER — LOSARTAN POTASSIUM 50 MG/1
50 TABLET ORAL DAILY
Status: DISCONTINUED | OUTPATIENT
Start: 2025-06-21 | End: 2025-06-26 | Stop reason: HOSPADM

## 2025-06-21 RX ORDER — POLYETHYLENE GLYCOL 3350 17 G/17G
17 POWDER, FOR SOLUTION ORAL DAILY PRN
Status: DISCONTINUED | OUTPATIENT
Start: 2025-06-21 | End: 2025-06-26 | Stop reason: HOSPADM

## 2025-06-21 RX ORDER — ASPIRIN 81 MG/1
81 TABLET, CHEWABLE ORAL DAILY
Status: DISCONTINUED | OUTPATIENT
Start: 2025-06-21 | End: 2025-06-26 | Stop reason: HOSPADM

## 2025-06-21 RX ORDER — ACETAMINOPHEN 325 MG/1
650 TABLET ORAL EVERY 6 HOURS PRN
Status: DISCONTINUED | OUTPATIENT
Start: 2025-06-21 | End: 2025-06-26 | Stop reason: HOSPADM

## 2025-06-21 RX ORDER — ONDANSETRON 4 MG/1
4 TABLET, ORALLY DISINTEGRATING ORAL EVERY 8 HOURS PRN
Status: DISCONTINUED | OUTPATIENT
Start: 2025-06-21 | End: 2025-06-26 | Stop reason: HOSPADM

## 2025-06-21 RX ORDER — BUMETANIDE 0.25 MG/ML
1 INJECTION, SOLUTION INTRAMUSCULAR; INTRAVENOUS ONCE
Status: COMPLETED | OUTPATIENT
Start: 2025-06-21 | End: 2025-06-21

## 2025-06-21 RX ORDER — SODIUM CHLORIDE 0.9 % (FLUSH) 0.9 %
5-40 SYRINGE (ML) INJECTION PRN
Status: DISCONTINUED | OUTPATIENT
Start: 2025-06-21 | End: 2025-06-26 | Stop reason: HOSPADM

## 2025-06-21 RX ORDER — NICOTINE 21 MG/24HR
1 PATCH, TRANSDERMAL 24 HOURS TRANSDERMAL DAILY
Status: DISCONTINUED | OUTPATIENT
Start: 2025-06-21 | End: 2025-06-26 | Stop reason: HOSPADM

## 2025-06-21 RX ORDER — SODIUM CHLORIDE 9 MG/ML
INJECTION, SOLUTION INTRAVENOUS PRN
Status: DISCONTINUED | OUTPATIENT
Start: 2025-06-21 | End: 2025-06-26 | Stop reason: HOSPADM

## 2025-06-21 RX ORDER — ONDANSETRON 2 MG/ML
4 INJECTION INTRAMUSCULAR; INTRAVENOUS EVERY 6 HOURS PRN
Status: DISCONTINUED | OUTPATIENT
Start: 2025-06-21 | End: 2025-06-26 | Stop reason: HOSPADM

## 2025-06-21 RX ORDER — POTASSIUM CHLORIDE 29.8 MG/ML
20 INJECTION INTRAVENOUS PRN
Status: DISCONTINUED | OUTPATIENT
Start: 2025-06-21 | End: 2025-06-26 | Stop reason: HOSPADM

## 2025-06-21 RX ADMIN — SODIUM CHLORIDE, PRESERVATIVE FREE 10 ML: 5 INJECTION INTRAVENOUS at 20:00

## 2025-06-21 RX ADMIN — BUMETANIDE 1 MG: 0.25 INJECTION INTRAMUSCULAR; INTRAVENOUS at 18:23

## 2025-06-21 RX ADMIN — BUMETANIDE 1 MG: 0.25 INJECTION INTRAMUSCULAR; INTRAVENOUS at 00:13

## 2025-06-21 RX ADMIN — PANTOPRAZOLE SODIUM 40 MG: 40 INJECTION, POWDER, FOR SOLUTION INTRAVENOUS at 10:03

## 2025-06-21 RX ADMIN — ALBUTEROL SULFATE 2.5 MG: 2.5 SOLUTION RESPIRATORY (INHALATION) at 11:49

## 2025-06-21 RX ADMIN — SODIUM CHLORIDE, PRESERVATIVE FREE 10 ML: 5 INJECTION INTRAVENOUS at 10:20

## 2025-06-21 RX ADMIN — ALBUTEROL SULFATE 5 MG: 2.5 SOLUTION RESPIRATORY (INHALATION) at 05:16

## 2025-06-21 RX ADMIN — METOPROLOL SUCCINATE 12.5 MG: 25 TABLET, FILM COATED, EXTENDED RELEASE ORAL at 18:23

## 2025-06-21 RX ADMIN — ALBUTEROL SULFATE 5 MG: 2.5 SOLUTION RESPIRATORY (INHALATION) at 00:28

## 2025-06-21 RX ADMIN — ASPIRIN 81 MG: 81 TABLET, CHEWABLE ORAL at 11:03

## 2025-06-21 RX ADMIN — BUMETANIDE 1 MG: 0.25 INJECTION INTRAMUSCULAR; INTRAVENOUS at 10:03

## 2025-06-21 ASSESSMENT — PAIN SCALES - GENERAL
PAINLEVEL_OUTOF10: 0

## 2025-06-21 NOTE — ED PROVIDER NOTES
[facility] EMERGENCY DEPARTMENT      EMERGENCY MEDICINE     Pt Name: Fredo Rod  MRN: [id number]  Birthdate 1970  Date of evaluation: 6/20/2025  Provider: INO PINEDA DO, FACEP    CHIEF COMPLAINT       Chief Complaint   Patient presents with    Shortness of Breath    Altered Mental Status     HISTORY OF PRESENT ILLNESS   Fredo Rod is a pleasant 55 y.o. male who presents to the emergency department for evaluation of shortness of breath.  Patient states that shortness of breath this time started 2 days ago, it is exertional, less so positional, does not seem to be worse when he is laying flat.  Patient states he does not feel like he is particularly swollen, however his daughter in the room states that he certainly has a lot more swelling of his lower extremities he normally.  He has a history of HFpEF as well as a history of significant COPD.  EMS found the patient to be hypoxic despite his home oxygen of 2 L, gave him a DuoNeb, and transported him to the ED.  On arrival here he is 88 to 90% on 2 L, noted to be tachypneic, however not in respiratory distress.  Denies any chest pain, denies any fever, has had a cough productive of yellowish sputum for the last few days..    PASTMEDICAL HISTORY/Co-Morbid Conditions:     Past Medical History:   Diagnosis Date    (HFpEF) heart failure with preserved ejection fraction (HCC)     CAD (coronary artery disease)     Hepatic steatosis     LINDA (obstructive sleep apnea)     Pulmonary hypertension (HCC)     Sarcoma of rib (HCC)        Patient Active Problem List   Diagnosis Code    Cellulitis L03.90    Acute respiratory failure with hypoxia and hypercapnia (HCC) J96.01, J96.02    Anasarca R60.1    Coronary artery disease involving native coronary artery of native heart without angina pectoris I25.10    H/O heart artery stent Z95.5    COPD exacerbation (HCC) J44.1    Hyperammonemia E72.20    Abdominal wall cellulitis L03.311    ARDS (adult

## 2025-06-21 NOTE — ED NOTES
This RN and respiratory transporting pt to ICU at this time. Pt transported w/ telemetry monitor. Pt in stable condition.

## 2025-06-21 NOTE — H&P
CRITICAL CARE- History & Physical      Patient: Fredo Rod    Unit/Bed:20/020A  YOB: 1970  MRN: [id number]   Acct: [id number]   PCP: Mohinder Page MD    Date of Service: Pt seen/examined on 06/21/25  and Admitted to Inpatient with expected LOS greater than two midnights due to medical therapy.     Chief Complaint: Shortness of breath, altered mental status.    Assessment and Plan:-  Acute hypoxic, hypercapnic respiratory failure.  Multifactorial -COPD, HFpEF, less likely pneumonia..  Patient with history of severe COPD, PFTs September 2024 with FEV1/FVC ratio of 56, FEV1 of 24% predicted.  History of HFpEF, last echo 3/31/2025 with EF 50 to 55%.  Chest x-ray with right lower lung atelectasis or infiltrate, but no fevers, normal white count, Pro-Charles only 0.10.  Stiolto 2 puffs twice daily  Albuterol every 4 hours as needed  Increasing home Bumex form 1 mg daily to 1 mg twice daily.  History of hypertension.  Resuming home losartan, metoprolol.  History of CAD status post 2 stents.  Continue PTA baby aspirin.  Tobacco use: Nicotine patch   GI Prophylaxis: Protonix  DVT Prophylaxis: SCDs    History Of Present Illness:    Fredo Rod is a 55 y.o. male with a history of HFpEF, CAD, COPD on 2 L baseline, pulmonary hypertension, LINDA who presented for 2 days exertional dyspnea, peripheral edema.  In ED, patient was tachypneic, with rales present.  The patient was also displaying intermittent periods of hypersomnolence.  The patient had been tolerating BiPAP with slight improvement in pCO2.  However, given the patient's severe COPD, HFpEF, significant hypercapnia, intermittent hypersomnolence, patient admitted to the ICU with low threshold to intubate.    Past Medical History:        Diagnosis Date    (HFpEF) heart failure with preserved ejection fraction (HCC)     CAD (coronary artery disease)     Hepatic steatosis     LINDA (obstructive sleep apnea)     Pulmonary hypertension (HCC)      PFTs, and imaging are personally viewed and interpreted unless otherwise noted).   Chest x-ray right lower lung atelectasis versus infiltrate, increased pulmonary vascular congestion.  CT head with no acute intracranial findings.  Sodium 143, potassium 4.0, bicarb 41, BUN 10, creatinine 0.7, magnesium 2.0, calcium 8.3, Pro-Charles 0.10.  Troponin 15, , albumin 3.3, white count 9.4, hemoglobin 13.2, platelets 354   Latest Reference Range & Units 06/20/25 23:56 06/21/25 02:23   Source:  R Radial L Radial   pH, Blood Gas 7.35 - 7.45  7.26 (L) 7.30 (L)   PCO2 35 - 45 mmhg 110 (HH) 91 (HH)   pO2 71 - 104 mmhg 72 75   HCO3 23 - 28 mmol/l 50 (H) 45 (H)   Base Excess (Calculated) -2.5 - 2.5 mmol/l 15.9 (H) 13.4 (H)   O2 Sat % 89 92   Matt Test  Positive Positive   IFIO2  45 40       CURRENT PARENTERAL VASOACTIVE / INOTROPIC AGENTS:  [x] None Vasopressors:  [] Norepinephrine  [] Dopamine  [] Phenylephrine  [] Vasopressin  [] Epinephrine  [] Other: Sedation:  [] No Sedation  [] Propofol gtt-    [] BZD gtt -    [] Opiate gtt  -    [] Dexmedetomidine  [] Paralytics Antihypertensives gtt  [] Ca Channel Antagonist:  [] Beta-blocker:  [] Nitroglycerin  [] Nitroprusside     SUPPORT DEVICES:  BIPAP - 45%    Vent Information  Ventilator ID: [id number]  Additional Respiratory Assessments  Pulse: 79  Respirations: 25  SpO2: 94 %  Oxygen Delivery - O2 Flow Rate (L/min): 4 L/min  ABGs:   Lab Results   Component Value Date/Time    PH 7.30 06/21/2025 02:23 AM    PCO2 91 06/21/2025 02:23 AM    PO2 75 06/21/2025 02:23 AM    HCO3 45 06/21/2025 02:23 AM    O2SAT 92 06/21/2025 02:23 AM     Lab Results   Component Value Date/Time    IFIO2 40 06/21/2025 02:23 AM    MODE BiLevel 06/21/2025 02:23 AM    SETPEEP 4.0 04/03/2025 06:06 AM         CENTRAL LINES/CHEMOPORT/TUNNEL CATH:-    [x] No   [] Yes   (Date )           If yes -    [] Right IJ   [] Left IJ [] Right Femoral [] Left Femoral     [] Right Subclavian [] Left Subclavian  [x] Peripheral IV

## 2025-06-21 NOTE — ED NOTES
Phlebotomy at bedside. Registration at bedside. Vitals collected. Pt requesting food and water. Call light in reach.

## 2025-06-21 NOTE — PLAN OF CARE
Problem: Chronic Conditions and Co-morbidities  Goal: Patient's chronic conditions and co-morbidity symptoms are monitored and maintained or improved  Outcome: Progressing  Flowsheets (Taken 6/21/2025 0522)  Care Plan - Patient's Chronic Conditions and Co-Morbidity Symptoms are Monitored and Maintained or Improved:   Monitor and assess patient's chronic conditions and comorbid symptoms for stability, deterioration, or improvement   Collaborate with multidisciplinary team to address chronic and comorbid conditions and prevent exacerbation or deterioration     Problem: Discharge Planning  Goal: Discharge to home or other facility with appropriate resources  Outcome: Progressing  Flowsheets (Taken 6/21/2025 0522)  Discharge to home or other facility with appropriate resources:   Identify barriers to discharge with patient and caregiver   Arrange for needed discharge resources and transportation as appropriate     Problem: Pain  Goal: Verbalizes/displays adequate comfort level or baseline comfort level  Outcome: Progressing  Flowsheets (Taken 6/21/2025 0522)  Verbalizes/displays adequate comfort level or baseline comfort level:   Encourage patient to monitor pain and request assistance   Assess pain using appropriate pain scale     Problem: Skin/Tissue Integrity  Goal: Skin integrity remains intact  Description: 1.  Monitor for areas of redness and/or skin breakdown  2.  Assess vascular access sites hourly  3.  Every 4-6 hours minimum:  Change oxygen saturation probe site  4.  Every 4-6 hours:  If on nasal continuous positive airway pressure, respiratory therapy assess nares and determine need for appliance change or resting period  Outcome: Progressing  Flowsheets (Taken 6/21/2025 0522)  Skin Integrity Remains Intact:   Monitor for areas of redness and/or skin breakdown   Assess vascular access sites hourly     Problem: Safety - Adult  Goal: Free from fall injury  Outcome: Progressing  Flowsheets (Taken 6/21/2025

## 2025-06-21 NOTE — ED TRIAGE NOTES
Pt to ED via EMS from home w/ report of shortness of breath and AMS. Pt reports that he has had increased SOB since yesterday. EMS report that pt SpO2 was mid-80s on baseline 2L NC upon their arrival. EMS report increasing O2 to 4L NC, SpO2 90s. EMS report hx of COPD. EMS report giving 1 duoneb treatment. Pt family reports AMS and reports that pt thought he was at the hospital before EMS arrived. Upon arrival to ED, pt A&O X 3 to name, birthdate, place, but not to year. EKG completed upon arrival. Call light in reach.

## 2025-06-21 NOTE — CARE COORDINATION
Patient Goals/Plan/Treatment Preferences: plans home w 15 y/o daughter Anne, still drives, has walker, SR HME oxygen 2L, CPAP     If patient is discharged prior to next notation, then this note serves as note for discharge by case management.     06/21/25 1312   Service Assessment   Patient Orientation Alert and Oriented   Cognition Alert   History Provided By Medical Record;Patient   Primary Caregiver Self   Accompanied By/Relationship none   Support Systems Children;Family Members   Patient's Healthcare Decision Maker is: Legal Next of Kin   PCP Verified by CM Yes   Last Visit to PCP Within last 3 months   Prior Functional Level Independent in ADLs/IADLs   Current Functional Level Independent in ADLs/IADLs   Can patient return to prior living arrangement Yes   Ability to make needs known: Good   Family able to assist with home care needs: Yes   Would you like for me to discuss the discharge plan with any other family members/significant others, and if so, who? No   Financial Resources Medicaid   Community Resources None   CM/SW Referral ADLs/IADLs   Social/Functional History   Lives With Family;Daughter   Type of Home Trailer   Home Layout One level   Active  Yes   Discharge Planning   Type of Residence Trailer/Mobile Home   Living Arrangements Children   Current Services Prior To Admission Durable Medical Equipment   Current DME Prior to Arrival Oxygen Therapy (Comment);Cpap   Potential Assistance Needed N/A   DME Ordered? No   Potential Assistance Purchasing Medications No   Type of Home Care Services None   Patient expects to be discharged to: Trailer/mobile home   Follow Up Appointment: Best Day/Time    (am)   One/Two Story Residence One story   History of falls? 0   Services At/After Discharge   Transition of Care Consult (CM Consult) Discharge Planning   Services At/After Discharge None    Resource Information Provided? No   Mode of Transport at Discharge Self   Confirm Follow Up Transport

## 2025-06-21 NOTE — PLAN OF CARE
Problem: Chronic Conditions and Co-morbidities  Goal: Patient's chronic conditions and co-morbidity symptoms are monitored and maintained or improved  6/21/2025 1431 by Mario Lan RN  Outcome: Progressing  Flowsheets (Taken 6/21/2025 0800)  Care Plan - Patient's Chronic Conditions and Co-Morbidity Symptoms are Monitored and Maintained or Improved:   Monitor and assess patient's chronic conditions and comorbid symptoms for stability, deterioration, or improvement   Collaborate with multidisciplinary team to address chronic and comorbid conditions and prevent exacerbation or deterioration   Update acute care plan with appropriate goals if chronic or comorbid symptoms are exacerbated and prevent overall improvement and discharge  6/21/2025 0522 by Boubacar Guerrero RN  Outcome: Progressing  Flowsheets (Taken 6/21/2025 0522)  Care Plan - Patient's Chronic Conditions and Co-Morbidity Symptoms are Monitored and Maintained or Improved:   Monitor and assess patient's chronic conditions and comorbid symptoms for stability, deterioration, or improvement   Collaborate with multidisciplinary team to address chronic and comorbid conditions and prevent exacerbation or deterioration     Problem: Discharge Planning  Goal: Discharge to home or other facility with appropriate resources  6/21/2025 1431 by Mario Lan RN  Outcome: Progressing  Flowsheets (Taken 6/21/2025 0800)  Discharge to home or other facility with appropriate resources:   Identify barriers to discharge with patient and caregiver   Arrange for needed discharge resources and transportation as appropriate   Identify discharge learning needs (meds, wound care, etc)   Refer to discharge planning if patient needs post-hospital services based on physician order or complex needs related to functional status, cognitive ability or social support system  6/21/2025 0522 by Boubacar Guerrero RN  Outcome: Progressing  Flowsheets (Taken 6/21/2025 0522)  Discharge to home or

## 2025-06-21 NOTE — ED NOTES
Pt back to unit from CT. Pt in stable condition. Lights dimmed per pt request. Pt watching tv at this time. Vitals collected. Call light in reach.

## 2025-06-21 NOTE — ED NOTES
This RN finds pt sitting on edge of cot stating \"I need to go to the bathroom\" with BP cuff and telemetry off. Pt assisted w/ use of urinal at bedside.

## 2025-06-21 NOTE — ED NOTES
Pt resting on cot w/ eyes closed at this time. Pt updated on POC, verbalizes understanding. Vitals collected. Pt breathing even and unlabored. Call light in reach.

## 2025-06-22 ENCOUNTER — APPOINTMENT (OUTPATIENT)
Dept: GENERAL RADIOLOGY | Age: 55
End: 2025-06-22
Payer: MEDICAID

## 2025-06-22 LAB
ANION GAP SERPL CALC-SCNC: 8 MEQ/L (ref 8–16)
ANISOCYTOSIS BLD QL SMEAR: PRESENT
ARTERIAL PATENCY WRIST A: POSITIVE
BASE EXCESS BLDA CALC-SCNC: 17.5 MMOL/L (ref -2.5–2.5)
BASOPHILS ABSOLUTE: 0 THOU/MM3 (ref 0–0.1)
BASOPHILS NFR BLD AUTO: 0.4 %
BDY SITE: ABNORMAL
BUN SERPL-MCNC: 11 MG/DL (ref 8–23)
CALCIUM SERPL-MCNC: 8 MG/DL (ref 8.6–10)
CHLORIDE SERPL-SCNC: 93 MEQ/L (ref 98–111)
CO2 SERPL-SCNC: 43 MEQ/L (ref 22–29)
COLLECTED BY:: ABNORMAL
CREAT SERPL-MCNC: 0.7 MG/DL (ref 0.7–1.2)
DEPRECATED RDW RBC AUTO: 72.4 FL (ref 35–45)
DEVICE: ABNORMAL
EOSINOPHIL NFR BLD AUTO: 3.1 %
EOSINOPHILS ABSOLUTE: 0.3 THOU/MM3 (ref 0–0.4)
ERYTHROCYTE [DISTWIDTH] IN BLOOD BY AUTOMATED COUNT: 17.4 % (ref 11.5–14.5)
FIO2 ON VENT O2 ANALYZER: 6 %
FOLATE SERPL-MCNC: 11.1 NG/ML (ref 4.6–34.8)
GFR SERPL CREATININE-BSD FRML MDRD: > 90 ML/MIN/1.73M2
GLUCOSE SERPL-MCNC: 86 MG/DL (ref 74–109)
HCO3 BLDA-SCNC: 49 MMOL/L (ref 23–28)
HCT VFR BLD AUTO: 44.3 % (ref 42–52)
HGB BLD-MCNC: 12.4 GM/DL (ref 14–18)
HYPOCHROMIA BLD QL SMEAR: PRESENT
IMM GRANULOCYTES # BLD AUTO: 0.05 THOU/MM3 (ref 0–0.07)
IMM GRANULOCYTES NFR BLD AUTO: 0.5 %
LACTATE SERPL-SCNC: 0.6 MMOL/L (ref 0.5–2.2)
LYMPHOCYTES ABSOLUTE: 1.4 THOU/MM3 (ref 1–4.8)
LYMPHOCYTES NFR BLD AUTO: 14.4 %
MAGNESIUM SERPL-MCNC: 1.7 MG/DL (ref 1.6–2.6)
MCH RBC QN AUTO: 30.8 PG (ref 26–33)
MCHC RBC AUTO-ENTMCNC: 28 GM/DL (ref 32.2–35.5)
MCV RBC AUTO: 109.9 FL (ref 80–94)
MONOCYTES ABSOLUTE: 1.3 THOU/MM3 (ref 0.4–1.3)
MONOCYTES NFR BLD AUTO: 13.2 %
NEUTROPHILS ABSOLUTE: 6.6 THOU/MM3 (ref 1.8–7.7)
NEUTROPHILS NFR BLD AUTO: 68.4 %
NRBC BLD AUTO-RTO: 0 /100 WBC
PCO2 BLDA: 89 MMHG (ref 35–45)
PH BLDA: 7.35 [PH] (ref 7.35–7.45)
PLATELET # BLD AUTO: 314 THOU/MM3 (ref 130–400)
PMV BLD AUTO: 10.1 FL (ref 9.4–12.4)
PO2 BLDA: 86 MMHG (ref 71–104)
POTASSIUM SERPL-SCNC: 3.4 MEQ/L (ref 3.5–5.2)
POTASSIUM SERPL-SCNC: 3.5 MEQ/L (ref 3.5–5.2)
RBC # BLD AUTO: 4.03 MILL/MM3 (ref 4.7–6.1)
SAO2 % BLDA: 95 %
SODIUM SERPL-SCNC: 144 MEQ/L (ref 135–145)
VENTILATION MODE VENT: ABNORMAL
VIT B12 SERPL-MCNC: 1013 PG/ML (ref 232–1245)
WBC # BLD AUTO: 9.7 THOU/MM3 (ref 4.8–10.8)

## 2025-06-22 PROCEDURE — 6360000002 HC RX W HCPCS

## 2025-06-22 PROCEDURE — 36600 WITHDRAWAL OF ARTERIAL BLOOD: CPT

## 2025-06-22 PROCEDURE — 99233 SBSQ HOSP IP/OBS HIGH 50: CPT | Performed by: SURGERY

## 2025-06-22 PROCEDURE — 2500000003 HC RX 250 WO HCPCS: Performed by: PHYSICIAN ASSISTANT

## 2025-06-22 PROCEDURE — 82803 BLOOD GASES ANY COMBINATION: CPT

## 2025-06-22 PROCEDURE — 6370000000 HC RX 637 (ALT 250 FOR IP)

## 2025-06-22 PROCEDURE — 84132 ASSAY OF SERUM POTASSIUM: CPT

## 2025-06-22 PROCEDURE — 2700000000 HC OXYGEN THERAPY PER DAY

## 2025-06-22 PROCEDURE — 82607 VITAMIN B-12: CPT

## 2025-06-22 PROCEDURE — 94660 CPAP INITIATION&MGMT: CPT

## 2025-06-22 PROCEDURE — 80048 BASIC METABOLIC PNL TOTAL CA: CPT

## 2025-06-22 PROCEDURE — 6370000000 HC RX 637 (ALT 250 FOR IP): Performed by: PHYSICIAN ASSISTANT

## 2025-06-22 PROCEDURE — 94640 AIRWAY INHALATION TREATMENT: CPT

## 2025-06-22 PROCEDURE — 71045 X-RAY EXAM CHEST 1 VIEW: CPT

## 2025-06-22 PROCEDURE — 85025 COMPLETE CBC W/AUTO DIFF WBC: CPT

## 2025-06-22 PROCEDURE — 36415 COLL VENOUS BLD VENIPUNCTURE: CPT

## 2025-06-22 PROCEDURE — 6360000002 HC RX W HCPCS: Performed by: PHYSICIAN ASSISTANT

## 2025-06-22 PROCEDURE — 2500000003 HC RX 250 WO HCPCS

## 2025-06-22 PROCEDURE — 2060000000 HC ICU INTERMEDIATE R&B

## 2025-06-22 PROCEDURE — 83605 ASSAY OF LACTIC ACID: CPT

## 2025-06-22 PROCEDURE — 82746 ASSAY OF FOLIC ACID SERUM: CPT

## 2025-06-22 PROCEDURE — 83735 ASSAY OF MAGNESIUM: CPT

## 2025-06-22 PROCEDURE — 94761 N-INVAS EAR/PLS OXIMETRY MLT: CPT

## 2025-06-22 RX ORDER — AZITHROMYCIN 250 MG/1
500 TABLET, FILM COATED ORAL EVERY 24 HOURS
Status: COMPLETED | OUTPATIENT
Start: 2025-06-22 | End: 2025-06-24

## 2025-06-22 RX ORDER — PREDNISONE 20 MG/1
40 TABLET ORAL DAILY
Status: COMPLETED | OUTPATIENT
Start: 2025-06-23 | End: 2025-06-26

## 2025-06-22 RX ORDER — POTASSIUM CHLORIDE 1500 MG/1
20 TABLET, EXTENDED RELEASE ORAL 2 TIMES DAILY
Status: DISCONTINUED | OUTPATIENT
Start: 2025-06-22 | End: 2025-06-26 | Stop reason: HOSPADM

## 2025-06-22 RX ORDER — IPRATROPIUM BROMIDE AND ALBUTEROL SULFATE 2.5; .5 MG/3ML; MG/3ML
1 SOLUTION RESPIRATORY (INHALATION) EVERY 4 HOURS PRN
Status: DISCONTINUED | OUTPATIENT
Start: 2025-06-22 | End: 2025-06-26 | Stop reason: HOSPADM

## 2025-06-22 RX ORDER — ENOXAPARIN SODIUM 100 MG/ML
30 INJECTION SUBCUTANEOUS EVERY 12 HOURS
Status: DISCONTINUED | OUTPATIENT
Start: 2025-06-22 | End: 2025-06-26 | Stop reason: HOSPADM

## 2025-06-22 RX ORDER — POTASSIUM CHLORIDE 1500 MG/1
40 TABLET, EXTENDED RELEASE ORAL ONCE
Status: COMPLETED | OUTPATIENT
Start: 2025-06-22 | End: 2025-06-22

## 2025-06-22 RX ORDER — BUMETANIDE 0.25 MG/ML
1 INJECTION, SOLUTION INTRAMUSCULAR; INTRAVENOUS DAILY
Status: DISCONTINUED | OUTPATIENT
Start: 2025-06-22 | End: 2025-06-22

## 2025-06-22 RX ORDER — ENOXAPARIN SODIUM 100 MG/ML
40 INJECTION SUBCUTANEOUS 2 TIMES DAILY
Status: DISCONTINUED | OUTPATIENT
Start: 2025-06-22 | End: 2025-06-22 | Stop reason: SDUPTHER

## 2025-06-22 RX ORDER — IPRATROPIUM BROMIDE AND ALBUTEROL SULFATE 2.5; .5 MG/3ML; MG/3ML
1 SOLUTION RESPIRATORY (INHALATION)
Status: DISCONTINUED | OUTPATIENT
Start: 2025-06-22 | End: 2025-06-26 | Stop reason: HOSPADM

## 2025-06-22 RX ORDER — MAGNESIUM SULFATE IN WATER 40 MG/ML
2000 INJECTION, SOLUTION INTRAVENOUS ONCE
Status: COMPLETED | OUTPATIENT
Start: 2025-06-22 | End: 2025-06-23

## 2025-06-22 RX ORDER — BUMETANIDE 0.25 MG/ML
1 INJECTION, SOLUTION INTRAMUSCULAR; INTRAVENOUS ONCE
Status: COMPLETED | OUTPATIENT
Start: 2025-06-22 | End: 2025-06-22

## 2025-06-22 RX ADMIN — METHYLPREDNISOLONE SODIUM SUCCINATE 60 MG: 40 INJECTION INTRAMUSCULAR; INTRAVENOUS at 17:56

## 2025-06-22 RX ADMIN — POTASSIUM CHLORIDE 40 MEQ: 1500 TABLET, EXTENDED RELEASE ORAL at 07:05

## 2025-06-22 RX ADMIN — ENOXAPARIN SODIUM 30 MG: 100 INJECTION SUBCUTANEOUS at 09:22

## 2025-06-22 RX ADMIN — POTASSIUM BICARBONATE 40 MEQ: 782 TABLET, EFFERVESCENT ORAL at 17:49

## 2025-06-22 RX ADMIN — LOSARTAN POTASSIUM 50 MG: 50 TABLET, FILM COATED ORAL at 09:22

## 2025-06-22 RX ADMIN — ALBUTEROL SULFATE 2.5 MG: 2.5 SOLUTION RESPIRATORY (INHALATION) at 03:54

## 2025-06-22 RX ADMIN — IPRATROPIUM BROMIDE AND ALBUTEROL SULFATE 1 DOSE: .5; 3 SOLUTION RESPIRATORY (INHALATION) at 20:40

## 2025-06-22 RX ADMIN — BUMETANIDE 1 MG: 0.25 INJECTION INTRAMUSCULAR; INTRAVENOUS at 09:22

## 2025-06-22 RX ADMIN — METOPROLOL SUCCINATE 12.5 MG: 25 TABLET, FILM COATED, EXTENDED RELEASE ORAL at 17:49

## 2025-06-22 RX ADMIN — PANTOPRAZOLE SODIUM 40 MG: 40 INJECTION, POWDER, FOR SOLUTION INTRAVENOUS at 09:22

## 2025-06-22 RX ADMIN — ASPIRIN 81 MG: 81 TABLET, CHEWABLE ORAL at 09:22

## 2025-06-22 RX ADMIN — TIOTROPIUM BROMIDE AND OLODATEROL 2 PUFF: 3.124; 2.736 SPRAY, METERED RESPIRATORY (INHALATION) at 08:36

## 2025-06-22 RX ADMIN — ACETAMINOPHEN 650 MG: 325 TABLET ORAL at 09:25

## 2025-06-22 RX ADMIN — SODIUM CHLORIDE, PRESERVATIVE FREE 10 ML: 5 INJECTION INTRAVENOUS at 09:22

## 2025-06-22 RX ADMIN — BUMETANIDE 1 MG: 0.25 INJECTION INTRAMUSCULAR; INTRAVENOUS at 15:36

## 2025-06-22 ASSESSMENT — PAIN DESCRIPTION - DESCRIPTORS: DESCRIPTORS: SORE

## 2025-06-22 ASSESSMENT — PAIN SCALES - GENERAL
PAINLEVEL_OUTOF10: 0
PAINLEVEL_OUTOF10: 5
PAINLEVEL_OUTOF10: 0
PAINLEVEL_OUTOF10: 2
PAINLEVEL_OUTOF10: 0
PAINLEVEL_OUTOF10: 0

## 2025-06-22 ASSESSMENT — PAIN DESCRIPTION - ORIENTATION: ORIENTATION: ANTERIOR

## 2025-06-22 ASSESSMENT — PAIN DESCRIPTION - LOCATION: LOCATION: HEAD

## 2025-06-22 NOTE — PROGRESS NOTES
CRITICAL CARE PROGRESS NOTE    Patient:  Fredo Rod    Unit/Bed:4B-06/006-A  MRN: [id number]   PCP: Mohinder Page MD  Date of Admission: 6/20/2025    Assessment and Plan (All pulmonary edema, renal failure, PE, and respiratory failure diagnoses are acute in nature unless otherwise specified)     Acute hypoxic hypercapnic respiratory failure: Likely secondary to COPD and HFpEF exacerbation.  History of severe COPD.  PFTs 9/2024 show FEV1/FVC VC 56%, FEV1 24% predicted.  Echo 3/31/2025 shows EF 50 to 55% with mildly increased wall thickness and normal diastolic function, LA mildly dilated.  Continue Stiolto 2 puffs daily  Albuterol as needed  Continue Bumex IV 1 mg daily  Hypertension: Home medications include Toprol-XL and losartan.  Continue home medications.  Hypokalemia: Potassium 3.4.  Replaced with 40 mEq.  Follow recheck.  CAD s/p JAZMIN x 2: Continue ASA  Constipation: Continue daily GlycoLax.  Patient reports bowel movement overnight.  Tobacco use disorder: Nicotine patch held  GI prophylaxis: Continue Protonix  DVT prophylaxis: Lovenox        CC: Shortness of breath, altered mental status  HPI: Fredo Rod is a 55-year-old white male with past medical history of HFpEF, CAD, COPD on 2 L at baseline, pulmonary hypertension, LINDA who presented for 2 days of exertional dyspnea and peripheral edema.  Patient experienced alterations in mental status intermittently and hypersomnolence noted.  Patient had initially been tolerating BiPAP with minimal improvement, however patient became rather hypersomnolent, hypercapnic and was subsequently admitted to the ICU with low threshold for intubation.  ROS: Review of Systems  PMH:    Past Medical History:   Diagnosis Date    (HFpEF) heart failure with preserved ejection fraction (HCC)     CAD (coronary artery disease)     Hepatic steatosis     LINDA (obstructive sleep apnea)     Pulmonary hypertension (HCC)     Sarcoma of rib (HCC)      SHX:    Social  History     Socioeconomic History    Marital status:      Spouse name: Not on file    Number of children: Not on file    Years of education: Not on file    Highest education level: Not on file   Occupational History    Not on file   Tobacco Use    Smoking status: Former     Current packs/day: 0.00     Average packs/day: 3.0 packs/day for 38.1 years (114.3 ttl pk-yrs)     Types: Cigarettes     Start date:      Quit date: 2024     Years since quittin.3    Smokeless tobacco: Not on file   Substance and Sexual Activity    Alcohol use: Yes     Comment: occasional    Drug use: No    Sexual activity: Not on file   Other Topics Concern    Not on file   Social History Narrative    Not on file     Social Drivers of Health     Financial Resource Strain: Not on file   Food Insecurity: No Food Insecurity (3/30/2025)    Hunger Vital Sign     Worried About Running Out of Food in the Last Year: Never true     Ran Out of Food in the Last Year: Never true   Transportation Needs: No Transportation Needs (3/30/2025)    PRAPARE - Transportation     Lack of Transportation (Medical): No     Lack of Transportation (Non-Medical): No   Physical Activity: Not on file   Stress: Not on file   Social Connections: Not on file   Intimate Partner Violence: Not on file   Housing Stability: Low Risk  (3/30/2025)    Housing Stability Vital Sign     Unable to Pay for Housing in the Last Year: No     Number of Times Moved in the Last Year: 1     Homeless in the Last Year: No     FHX: History reviewed. No pertinent family history.  Allergies: No Known Allergies  Medications:     sodium chloride        potassium chloride  40 mEq Oral Once    Followed by    potassium chloride  20 mEq Oral BID    sodium chloride flush  5-40 mL IntraVENous 2 times per day    tiotropium-olodaterol  2 puff Inhalation Daily    aspirin  81 mg Oral Daily    losartan  50 mg Oral Daily    metoprolol succinate  12.5 mg Oral QPM    [Held by provider] nicotine  1

## 2025-06-22 NOTE — PLAN OF CARE
Patient received as a transfer out of the ICU.  Patient was admitted for acute on chronic respiratory failure with hypoxia and hypercapnia.  He was placed on BiPAP and his ABG is improved.  He still has a significantly elevated CO2, but he is currently compensated.  He is currently on 6 L nasal cannula.  He was diuresed with Bumex and serum bicarb was 43 this morning.  Will give a dose of acetazolamide and hold on further diuresis for now.  Patient still has some pretty significant inspiratory and expiratory wheezing on exam.  Will start DuoNebs every 4 hours awake and DuoNebs every 4 hours as needed.  Start systemic steroids.  Given severity of exacerbation, will add azithromycin 500 mg daily x 3 doses.  Continue BiPAP overnight and with naps.  Pulmonology consultation.  Repeat ABG in the morning.    Electronically signed by Norm Rodriguez PA-C on 6/22/25 at 5:05 PM EDT

## 2025-06-22 NOTE — PROGRESS NOTES
Pharmacist Review and Automatic Dose Adjustment of Prophylactic Enoxaparin    Reviewed reason(s) for admission/hospital problem list    The reviewing pharmacist has made an adjustment to the ordered enoxaparin dose or converted to UFH per the approved [facility] protocol and table as identified below.        Fredo Rod is a 55 y.o. male.     Recent Labs     06/20/25  2222 06/21/25  0700 06/22/25  0440   CREATININE 0.7 0.6* 0.7       Estimated Creatinine Clearance: 143 mL/min (based on SCr of 0.7 mg/dL).    Recent Labs     06/21/25  0700 06/22/25  0440   HGB 12.5* 12.4*   HCT 44.7 44.3    314     No results for input(s): \"INR\" in the last 72 hours.    Height:   Ht Readings from Last 1 Encounters:   06/21/25 1.753 m (5' 9\")     Weight:  Wt Readings from Last 1 Encounters:   06/22/25 101.4 kg (223 lb 8.7 oz)               Plan: Based upon the patient's weight and renal function    Ordered: Enoxaparin 40mg SUBQ BID    Changed/converted to    New Order: Enoxaparin 30mg SUBQ BID      Thank you,  Kyra Welch, [facility]  6/22/2025, 8:02 AM

## 2025-06-22 NOTE — PLAN OF CARE
Name: Fredo Rod  YOB: 1970  MRN: [id number]  Date: 06/22/25     Plan of Care      Patient weaned off BiPAP and tolerating 6 L NC.  Alert and oriented x 4.  Stable for transfer to floor.  PerfectServe sent to hospitalist, Dr. Guy.  Bed ready in 4K-25.    Electronically signed by Puneet Mcleod MD on 6/22/2025 at 4:35 PM

## 2025-06-22 NOTE — PLAN OF CARE
Problem: Chronic Conditions and Co-morbidities  Goal: Patient's chronic conditions and co-morbidity symptoms are monitored and maintained or improved  Recent Flowsheet Documentation  Taken 6/21/2025 2000 by Pedro Valle RN  Care Plan - Patient's Chronic Conditions and Co-Morbidity Symptoms are Monitored and Maintained or Improved: Monitor and assess patient's chronic conditions and comorbid symptoms for stability, deterioration, or improvement  6/21/2025 1431 by Mario Lan RN  Outcome: Progressing  Flowsheets (Taken 6/21/2025 0800)  Care Plan - Patient's Chronic Conditions and Co-Morbidity Symptoms are Monitored and Maintained or Improved:   Monitor and assess patient's chronic conditions and comorbid symptoms for stability, deterioration, or improvement   Collaborate with multidisciplinary team to address chronic and comorbid conditions and prevent exacerbation or deterioration   Update acute care plan with appropriate goals if chronic or comorbid symptoms are exacerbated and prevent overall improvement and discharge     Problem: Pain  Goal: Verbalizes/displays adequate comfort level or baseline comfort level  6/21/2025 2011 by Pedro Valle RN  Outcome: Progressing  6/21/2025 1431 by Mario Lan RN  Outcome: Progressing  Flowsheets (Taken 6/21/2025 0900)  Verbalizes/displays adequate comfort level or baseline comfort level:   Encourage patient to monitor pain and request assistance   Assess pain using appropriate pain scale   Administer analgesics based on type and severity of pain and evaluate response   Implement non-pharmacological measures as appropriate and evaluate response   Consider cultural and social influences on pain and pain management   Notify Licensed Independent Practitioner if interventions unsuccessful or patient reports new pain     Problem: Safety - Adult  Goal: Free from fall injury  6/21/2025 2011 by Pedro Valle RN  Outcome: Progressing  6/21/2025 1431 by Mario Lan

## 2025-06-22 NOTE — PLAN OF CARE
Problem: Chronic Conditions and Co-morbidities  Goal: Patient's chronic conditions and co-morbidity symptoms are monitored and maintained or improved  Outcome: Progressing  Flowsheets (Taken 6/22/2025 0800)  Care Plan - Patient's Chronic Conditions and Co-Morbidity Symptoms are Monitored and Maintained or Improved:   Monitor and assess patient's chronic conditions and comorbid symptoms for stability, deterioration, or improvement   Update acute care plan with appropriate goals if chronic or comorbid symptoms are exacerbated and prevent overall improvement and discharge   Collaborate with multidisciplinary team to address chronic and comorbid conditions and prevent exacerbation or deterioration     Problem: Discharge Planning  Goal: Discharge to home or other facility with appropriate resources  Outcome: Progressing  Flowsheets (Taken 6/22/2025 0800)  Discharge to home or other facility with appropriate resources:   Identify barriers to discharge with patient and caregiver   Arrange for needed discharge resources and transportation as appropriate   Identify discharge learning needs (meds, wound care, etc)   Refer to discharge planning if patient needs post-hospital services based on physician order or complex needs related to functional status, cognitive ability or social support system     Problem: Pain  Goal: Verbalizes/displays adequate comfort level or baseline comfort level  Outcome: Progressing     Problem: Skin/Tissue Integrity  Goal: Skin integrity remains intact  Description: 1.  Monitor for areas of redness and/or skin breakdown  2.  Assess vascular access sites hourly  3.  Every 4-6 hours minimum:  Change oxygen saturation probe site  4.  Every 4-6 hours:  If on nasal continuous positive airway pressure, respiratory therapy assess nares and determine need for appliance change or resting period  Outcome: Progressing  Flowsheets (Taken 6/22/2025 0800)  Skin Integrity Remains Intact:   Monitor for areas of

## 2025-06-23 ENCOUNTER — APPOINTMENT (OUTPATIENT)
Dept: GENERAL RADIOLOGY | Age: 55
End: 2025-06-23
Payer: MEDICAID

## 2025-06-23 LAB
ANION GAP SERPL CALC-SCNC: 9 MEQ/L (ref 8–16)
ARTERIAL PATENCY WRIST A: POSITIVE
BASE EXCESS BLDA CALC-SCNC: 12.5 MMOL/L (ref -2.5–2.5)
BDY SITE: ABNORMAL
BUN SERPL-MCNC: 15 MG/DL (ref 8–23)
CA-I BLD ISE-SCNC: 0.99 MMOL/L (ref 1.12–1.32)
CALCIUM SERPL-MCNC: 8.5 MG/DL (ref 8.6–10)
CHLORIDE SERPL-SCNC: 99 MEQ/L (ref 98–111)
CO2 SERPL-SCNC: 38 MEQ/L (ref 22–29)
COLLECTED BY:: ABNORMAL
CREAT SERPL-MCNC: 0.6 MG/DL (ref 0.7–1.2)
DEPRECATED RDW RBC AUTO: 69.1 FL (ref 35–45)
DEVICE: ABNORMAL
ERYTHROCYTE [DISTWIDTH] IN BLOOD BY AUTOMATED COUNT: 17.2 % (ref 11.5–14.5)
FIO2 ON VENT O2 ANALYZER: 45 %
GFR SERPL CREATININE-BSD FRML MDRD: > 90 ML/MIN/1.73M2
GLUCOSE SERPL-MCNC: 107 MG/DL (ref 74–109)
HCO3 BLDA-SCNC: 44 MMOL/L (ref 23–28)
HCT VFR BLD AUTO: 45.8 % (ref 42–52)
HGB BLD-MCNC: 13 GM/DL (ref 14–18)
MAGNESIUM SERPL-MCNC: 2.9 MG/DL (ref 1.6–2.6)
MCH RBC QN AUTO: 30.7 PG (ref 26–33)
MCHC RBC AUTO-ENTMCNC: 28.4 GM/DL (ref 32.2–35.5)
MCV RBC AUTO: 108 FL (ref 80–94)
PCO2 BLDA: 87 MMHG (ref 35–45)
PEEP SETTING VENT: 8 MMHG
PH BLDA: 7.31 [PH] (ref 7.35–7.45)
PIP: 24 CMH2O
PLATELET # BLD AUTO: 306 THOU/MM3 (ref 130–400)
PMV BLD AUTO: 10.7 FL (ref 9.4–12.4)
PO2 BLDA: 127 MMHG (ref 71–104)
POTASSIUM SERPL-SCNC: 4.2 MEQ/L (ref 3.5–5.2)
RBC # BLD AUTO: 4.24 MILL/MM3 (ref 4.7–6.1)
SAO2 % BLDA: 98 %
SODIUM SERPL-SCNC: 146 MEQ/L (ref 135–145)
VENTILATION MODE VENT: ABNORMAL
WBC # BLD AUTO: 10.4 THOU/MM3 (ref 4.8–10.8)

## 2025-06-23 PROCEDURE — 2060000000 HC ICU INTERMEDIATE R&B

## 2025-06-23 PROCEDURE — 99223 1ST HOSP IP/OBS HIGH 75: CPT | Performed by: INTERNAL MEDICINE

## 2025-06-23 PROCEDURE — 6370000000 HC RX 637 (ALT 250 FOR IP)

## 2025-06-23 PROCEDURE — 94660 CPAP INITIATION&MGMT: CPT

## 2025-06-23 PROCEDURE — 6360000002 HC RX W HCPCS

## 2025-06-23 PROCEDURE — 83735 ASSAY OF MAGNESIUM: CPT

## 2025-06-23 PROCEDURE — 2580000003 HC RX 258: Performed by: PHYSICIAN ASSISTANT

## 2025-06-23 PROCEDURE — 82330 ASSAY OF CALCIUM: CPT

## 2025-06-23 PROCEDURE — 36415 COLL VENOUS BLD VENIPUNCTURE: CPT

## 2025-06-23 PROCEDURE — 82803 BLOOD GASES ANY COMBINATION: CPT

## 2025-06-23 PROCEDURE — 94640 AIRWAY INHALATION TREATMENT: CPT

## 2025-06-23 PROCEDURE — 2700000000 HC OXYGEN THERAPY PER DAY

## 2025-06-23 PROCEDURE — 80048 BASIC METABOLIC PNL TOTAL CA: CPT

## 2025-06-23 PROCEDURE — 2500000003 HC RX 250 WO HCPCS

## 2025-06-23 PROCEDURE — 94761 N-INVAS EAR/PLS OXIMETRY MLT: CPT

## 2025-06-23 PROCEDURE — 99233 SBSQ HOSP IP/OBS HIGH 50: CPT | Performed by: STUDENT IN AN ORGANIZED HEALTH CARE EDUCATION/TRAINING PROGRAM

## 2025-06-23 PROCEDURE — 6360000002 HC RX W HCPCS: Performed by: PHYSICIAN ASSISTANT

## 2025-06-23 PROCEDURE — 85027 COMPLETE CBC AUTOMATED: CPT

## 2025-06-23 PROCEDURE — 36600 WITHDRAWAL OF ARTERIAL BLOOD: CPT

## 2025-06-23 PROCEDURE — 6370000000 HC RX 637 (ALT 250 FOR IP): Performed by: PHYSICIAN ASSISTANT

## 2025-06-23 PROCEDURE — 6360000002 HC RX W HCPCS: Performed by: STUDENT IN AN ORGANIZED HEALTH CARE EDUCATION/TRAINING PROGRAM

## 2025-06-23 PROCEDURE — 71046 X-RAY EXAM CHEST 2 VIEWS: CPT

## 2025-06-23 RX ORDER — BUDESONIDE 0.5 MG/2ML
0.5 INHALANT ORAL
Status: DISCONTINUED | OUTPATIENT
Start: 2025-06-23 | End: 2025-06-26 | Stop reason: HOSPADM

## 2025-06-23 RX ADMIN — IPRATROPIUM BROMIDE AND ALBUTEROL SULFATE 1 DOSE: .5; 3 SOLUTION RESPIRATORY (INHALATION) at 07:56

## 2025-06-23 RX ADMIN — LOSARTAN POTASSIUM 50 MG: 50 TABLET, FILM COATED ORAL at 08:08

## 2025-06-23 RX ADMIN — AZITHROMYCIN DIHYDRATE 500 MG: 250 TABLET ORAL at 03:19

## 2025-06-23 RX ADMIN — PREDNISONE 40 MG: 20 TABLET ORAL at 08:08

## 2025-06-23 RX ADMIN — METOPROLOL SUCCINATE 12.5 MG: 25 TABLET, FILM COATED, EXTENDED RELEASE ORAL at 17:20

## 2025-06-23 RX ADMIN — IPRATROPIUM BROMIDE AND ALBUTEROL SULFATE 1 DOSE: .5; 3 SOLUTION RESPIRATORY (INHALATION) at 22:10

## 2025-06-23 RX ADMIN — IPRATROPIUM BROMIDE AND ALBUTEROL SULFATE 1 DOSE: .5; 3 SOLUTION RESPIRATORY (INHALATION) at 15:02

## 2025-06-23 RX ADMIN — POTASSIUM CHLORIDE 20 MEQ: 1500 TABLET, EXTENDED RELEASE ORAL at 08:19

## 2025-06-23 RX ADMIN — IPRATROPIUM BROMIDE AND ALBUTEROL SULFATE 1 DOSE: .5; 3 SOLUTION RESPIRATORY (INHALATION) at 11:00

## 2025-06-23 RX ADMIN — ACETAZOLAMIDE SODIUM 250 MG: 500 INJECTION, POWDER, LYOPHILIZED, FOR SOLUTION INTRAVENOUS at 01:15

## 2025-06-23 RX ADMIN — ENOXAPARIN SODIUM 30 MG: 100 INJECTION SUBCUTANEOUS at 03:18

## 2025-06-23 RX ADMIN — PANTOPRAZOLE SODIUM 40 MG: 40 INJECTION, POWDER, FOR SOLUTION INTRAVENOUS at 08:19

## 2025-06-23 RX ADMIN — BUDESONIDE 500 MCG: 0.5 INHALANT RESPIRATORY (INHALATION) at 22:10

## 2025-06-23 RX ADMIN — POTASSIUM CHLORIDE 20 MEQ: 1500 TABLET, EXTENDED RELEASE ORAL at 01:15

## 2025-06-23 RX ADMIN — POTASSIUM CHLORIDE 20 MEQ: 1500 TABLET, EXTENDED RELEASE ORAL at 21:13

## 2025-06-23 RX ADMIN — SODIUM CHLORIDE, PRESERVATIVE FREE 10 ML: 5 INJECTION INTRAVENOUS at 08:09

## 2025-06-23 RX ADMIN — ASPIRIN 81 MG: 81 TABLET, CHEWABLE ORAL at 08:19

## 2025-06-23 RX ADMIN — MAGNESIUM SULFATE HEPTAHYDRATE 2000 MG: 40 INJECTION, SOLUTION INTRAVENOUS at 01:15

## 2025-06-23 RX ADMIN — SODIUM CHLORIDE, PRESERVATIVE FREE 10 ML: 5 INJECTION INTRAVENOUS at 21:13

## 2025-06-23 RX ADMIN — ENOXAPARIN SODIUM 30 MG: 100 INJECTION SUBCUTANEOUS at 16:16

## 2025-06-23 RX ADMIN — AZITHROMYCIN DIHYDRATE 500 MG: 250 TABLET ORAL at 17:20

## 2025-06-23 NOTE — PLAN OF CARE
Problem: Chronic Conditions and Co-morbidities  Goal: Patient's chronic conditions and co-morbidity symptoms are monitored and maintained or improved  6/23/2025 1733 by Veda Arteaga RN  Outcome: Progressing  6/23/2025 0546 by Pedro Estrada RN  Outcome: Progressing     Problem: Discharge Planning  Goal: Discharge to home or other facility with appropriate resources  6/23/2025 1733 by Veda Arteaga RN  Outcome: Progressing  6/23/2025 0546 by Pedro Estrada RN  Outcome: Progressing     Problem: Pain  Goal: Verbalizes/displays adequate comfort level or baseline comfort level  6/23/2025 1733 by Veda Arteaga RN  Outcome: Progressing  6/23/2025 0546 by Pedro Estrada RN  Outcome: Progressing     Problem: Skin/Tissue Integrity  Goal: Skin integrity remains intact  Description: 1.  Monitor for areas of redness and/or skin breakdown  2.  Assess vascular access sites hourly  3.  Every 4-6 hours minimum:  Change oxygen saturation probe site  4.  Every 4-6 hours:  If on nasal continuous positive airway pressure, respiratory therapy assess nares and determine need for appliance change or resting period  6/23/2025 1733 by Veda Arteaga RN  Outcome: Progressing  6/23/2025 0546 by Pedro Estrada RN  Outcome: Progressing     Problem: Safety - Adult  Goal: Free from fall injury  6/23/2025 1733 by Veda Arteaga RN  Outcome: Progressing  6/23/2025 0546 by Pedro Estrada RN  Outcome: Progressing     Problem: Respiratory - Adult  Goal: Achieves optimal ventilation and oxygenation  6/23/2025 1733 by Veda Arteaga RN  Outcome: Progressing  6/23/2025 0546 by Pedro Estrada RN  Outcome: Progressing  Goal: Clear lung sounds  Description: Clear lung sounds  6/23/2025 0801 by Sally Osorio RCP  Outcome: Progressing

## 2025-06-23 NOTE — PLAN OF CARE
Problem: Respiratory - Adult  Goal: Clear lung sounds  Description: Clear lung sounds  6/23/2025 0801 by Sally Osorio, RCP  Outcome: Progressing

## 2025-06-23 NOTE — PROGRESS NOTES
Conjunctivae/corneas clear.  HENT: Head normal appearing. Nares normal. Oral mucosa moist. Hearing intact.   Neck: Supple, with full range of motion. Trachea midline.  No gross JVD appreciated.  Respiratory:  Normal effort. Clear to auscultation, without rales or wheezes or rhonchi.  Cardiovascular: Normal rate, regular rhythm with normal S1/S2 without murmurs.    No lower extremity edema.   Abdomen: Soft, non-tender, non-distended with normal bowel sounds.  Musculoskeletal: No joint swelling or tenderness. Normal tone. No abnormal movements.   Skin: Warm and dry. No rashes or lesions.  Neurologic:  No focal sensory/motor deficits in the upper or lower extremities. Cranial nerves:  grossly non-focal 2-12.     Psychiatric: Alert and oriented, normal insight and thought content.   Capillary Refill: Brisk,< 3 seconds.  Peripheral Pulses: +2 palpable, equal bilaterally.       Labs/Radiology: See chart or assessment above.     Electronically signed by Nanci Gould DO on 6/23/2025 at 5:06 PM  Case was discussed with Attending, Dr. Henderson.

## 2025-06-23 NOTE — PLAN OF CARE
Problem: Chronic Conditions and Co-morbidities  Goal: Patient's chronic conditions and co-morbidity symptoms are monitored and maintained or improved  6/23/2025 0546 by Pedro Estrada RN  Outcome: Progressing  6/22/2025 1756 by Mario Lan RN  Outcome: Progressing  Flowsheets (Taken 6/22/2025 0800)  Care Plan - Patient's Chronic Conditions and Co-Morbidity Symptoms are Monitored and Maintained or Improved:   Monitor and assess patient's chronic conditions and comorbid symptoms for stability, deterioration, or improvement   Update acute care plan with appropriate goals if chronic or comorbid symptoms are exacerbated and prevent overall improvement and discharge   Collaborate with multidisciplinary team to address chronic and comorbid conditions and prevent exacerbation or deterioration     Problem: Discharge Planning  Goal: Discharge to home or other facility with appropriate resources  6/23/2025 0546 by Pedro Estrada RN  Outcome: Progressing  6/22/2025 1756 by Mario Lan RN  Outcome: Progressing  Flowsheets (Taken 6/22/2025 0800)  Discharge to home or other facility with appropriate resources:   Identify barriers to discharge with patient and caregiver   Arrange for needed discharge resources and transportation as appropriate   Identify discharge learning needs (meds, wound care, etc)   Refer to discharge planning if patient needs post-hospital services based on physician order or complex needs related to functional status, cognitive ability or social support system     Problem: Pain  Goal: Verbalizes/displays adequate comfort level or baseline comfort level  6/23/2025 0546 by Pedro Estrada RN  Outcome: Progressing  6/22/2025 1756 by Mario Lan RN  Outcome: Progressing     Problem: Skin/Tissue Integrity  Goal: Skin integrity remains intact  Description: 1.  Monitor for areas of redness and/or skin breakdown  2.  Assess vascular access sites hourly  3.  Every 4-6 hours minimum:  Change oxygen  saturation probe site  4.  Every 4-6 hours:  If on nasal continuous positive airway pressure, respiratory therapy assess nares and determine need for appliance change or resting period  6/23/2025 0546 by Pedro Estrada RN  Outcome: Progressing  6/22/2025 1756 by Mario Lan RN  Outcome: Progressing  Flowsheets (Taken 6/22/2025 0800)  Skin Integrity Remains Intact:   Monitor for areas of redness and/or skin breakdown   Assess vascular access sites hourly   Every 4-6 hours minimum:  Change oxygen saturation probe site   Every 4-6 hours:  If on nasal continuous positive airway pressure, assess nares and determine need for appliance change or resting period   Assess need for specialty bed   Turn and reposition as indicated   Positioning devices   Pressure redistribution bed/mattress (bed type)   Check visual cues for pain   Monitor skin under medical devices     Problem: Safety - Adult  Goal: Free from fall injury  6/23/2025 0546 by Pedro Estrada RN  Outcome: Progressing  6/22/2025 1756 by Mario Lan RN  Outcome: Progressing     Problem: Respiratory - Adult  Goal: Achieves optimal ventilation and oxygenation  6/23/2025 0546 by Pedro Estrada RN  Outcome: Progressing  6/22/2025 2044 by Brittaney Oleary RCP  Outcome: Progressing  Goal: Clear lung sounds  Description: Clear lung sounds  6/22/2025 2044 by Brittaney Oleary RCP  Outcome: Progressing

## 2025-06-23 NOTE — CONSULTS
sulfate HFA (PROVENTIL;VENTOLIN;PROAIR) 108 (90 Base) MCG/ACT inhaler, Inhale 2 puffs into the lungs every 6 hours as needed for Wheezing or Shortness of Breath  tiotropium (SPIRIVA RESPIMAT) 2.5 MCG/ACT AERS inhaler, Inhale 2 puffs into the lungs daily  metoprolol succinate (TOPROL XL) 25 MG extended release tablet, Take 0.5 tablets by mouth every evening  losartan (COZAAR) 50 MG tablet, Take 1 tablet by mouth daily  arformoterol tartrate (BROVANA) 15 MCG/2ML NEBU, Take 1 ampule by nebulization in the morning and 1 ampule in the evening.  azithromycin (ZITHROMAX) 250 MG tablet, Take 1 tablet by mouth daily  nicotine (NICODERM CQ) 14 MG/24HR, Place 1 patch onto the skin daily  potassium chloride (K-TAB) 20 MEQ TBCR extended release tablet, Take 1 tablet by mouth daily with food Do not restart until you follow up with your PCP regarding repeat lab results  EQ CLEARLAX 17 GM/SCOOP powder, MIX 17 GRAMS OF POWDER IN GLASS OF WATER AND DRINK ONCE DAILY AS NEEDED FOR CONSTIPATION  bumetanide (BUMEX) 1 MG tablet, Take 1 tablet by mouth in the morning and 1 tablet in the evening.  acetaminophen (TYLENOL) 325 MG tablet, Take 2 tablets by mouth as needed for Pain (Patient not taking: Reported on 6/21/2025)  Walking Boot MISC, by Does not apply route Dx: Left Calcaneal Stress Fracture, Hemorrhagic Fracture Blisters  Sig: Please fit/dispense offloading pneumatic fracture boot, to help offload with the assistance of crutches.  Diet    ADULT DIET; Regular; Low Sodium (2 gm); 2000 ml  Allergies    Patient has no known allergies.  Social History     Social History     Socioeconomic History    Marital status:      Spouse name: Not on file    Number of children: Not on file    Years of education: Not on file    Highest education level: Not on file   Occupational History    Not on file   Tobacco Use    Smoking status: Former     Current packs/day: 0.00     Average packs/day: 3.0 packs/day for 38.1 years (114.3 ttl pk-yrs)      \"APTT\" in the last 72 hours.  Glucose  No results for input(s): \"POCGLU\" in the last 72 hours.  UA No results for input(s): \"SPECGRAV\", \"PHUR\", \"COLORU\", \"CLARITYU\", \"MUCUS\", \"PROTEINU\", \"BLOODU\", \"RBCUA\", \"WBCUA\", \"BACTERIA\", \"NITRU\", \"GLUCOSEU\", \"BILIRUBINUR\", \"UROBILINOGEN\", \"KETUA\", \"LABCAST\", \"LABCASTTY\", \"AMORPHOS\" in the last 72 hours.    Invalid input(s): \"CRYSTALS\".    PFTs         Sleep studies                 Cultures    None.    EKG     Echocardiogram   3/31/25 TTE:     Left Ventricle: Low normal left ventricular systolic function with a visually estimated EF of 50 - 55%. Left ventricle size is normal. Mildly increased wall thickness. Findings consistent with mild concentric hypertrophy. Normal wall motion. Normal diastolic function.    Left Atrium: Left atrium is mildly dilated.    Pericardium: Small (<1 cm) localized pericardial effusion present around the posterior and left ventricle. No indication of cardiac tamponade.    Image quality is technically difficult. Technically difficult study, technically difficult study due to patient's body habitus and procedure performed with the patient in a supine position.    Radiology    CXR  6/23/25 IMPRESSION:  1. No acute cardiopulmonary findings.    6/22/25 IMPRESSION:  Stable cardiomegaly  Mild worsening pulmonary venous congestion  Old fracture of the distal diaphysis left clavicle again noted with   increased convex angulation of the fracture when compared to the prior exam    6/21/25 Impression:  1. Resolved pleural-parenchymal disease left lower lobe. Stable   cardiomegaly with improvement in the passive venous congestion and   interstitial thickening.        CT Scans  (See actual reports for details)  12/17/24 CTA chest IMPRESSION:  1. No filling defects are noted within the pulmonary arterial vasculature to  suggest the presence of pulmonary embolus. The small pericardial effusion has  increased in volume measuring up to 20 mm in thickness. Bilateral

## 2025-06-23 NOTE — PLAN OF CARE
Problem: Respiratory - Adult  Goal: Achieves optimal ventilation and oxygenation  Outcome: Progressing     Problem: Respiratory - Adult  Goal: Clear lung sounds  Description: Clear lung sounds  Outcome: Progressing   Pt aware of needing to go on bipap throughout the night, pt not ready to go on at this time, Patient mutually agreed on goals.

## 2025-06-23 NOTE — PROGRESS NOTES
Patient received sacramental anointing by a . If you are in need of  support, please call [phone]. If you are in need of a  after 6:30pm, please call the house supervisor for the on-call .     Rev. Julito Carter MDiv, [facility]  Director of Spiritual Health  O: [phone]    To reach a  call [phone]

## 2025-06-24 LAB
ANION GAP SERPL CALC-SCNC: 7 MEQ/L (ref 8–16)
BUN SERPL-MCNC: 20 MG/DL (ref 8–23)
CALCIUM SERPL-MCNC: 8.4 MG/DL (ref 8.6–10)
CHLORIDE SERPL-SCNC: 106 MEQ/L (ref 98–111)
CO2 SERPL-SCNC: 36 MEQ/L (ref 22–29)
CREAT SERPL-MCNC: 0.8 MG/DL (ref 0.7–1.2)
DEPRECATED RDW RBC AUTO: 73 FL (ref 35–45)
ERYTHROCYTE [DISTWIDTH] IN BLOOD BY AUTOMATED COUNT: 17.4 % (ref 11.5–14.5)
GFR SERPL CREATININE-BSD FRML MDRD: > 90 ML/MIN/1.73M2
GLUCOSE SERPL-MCNC: 114 MG/DL (ref 74–109)
HCT VFR BLD AUTO: 44.3 % (ref 42–52)
HGB BLD-MCNC: 12.2 GM/DL (ref 14–18)
MAGNESIUM SERPL-MCNC: 2.4 MG/DL (ref 1.6–2.6)
MCH RBC QN AUTO: 30.6 PG (ref 26–33)
MCHC RBC AUTO-ENTMCNC: 27.5 GM/DL (ref 32.2–35.5)
MCV RBC AUTO: 111 FL (ref 80–94)
PATHOLOGIST REVIEW: ABNORMAL
PLATELET # BLD AUTO: 306 THOU/MM3 (ref 130–400)
PMV BLD AUTO: 10.5 FL (ref 9.4–12.4)
POTASSIUM SERPL-SCNC: 4.5 MEQ/L (ref 3.5–5.2)
RBC # BLD AUTO: 3.99 MILL/MM3 (ref 4.7–6.1)
SCAN OF BLOOD SMEAR: NORMAL
SODIUM SERPL-SCNC: 149 MEQ/L (ref 135–145)
WBC # BLD AUTO: 9.7 THOU/MM3 (ref 4.8–10.8)

## 2025-06-24 PROCEDURE — 6370000000 HC RX 637 (ALT 250 FOR IP): Performed by: PHYSICIAN ASSISTANT

## 2025-06-24 PROCEDURE — 94660 CPAP INITIATION&MGMT: CPT

## 2025-06-24 PROCEDURE — 6360000002 HC RX W HCPCS: Performed by: STUDENT IN AN ORGANIZED HEALTH CARE EDUCATION/TRAINING PROGRAM

## 2025-06-24 PROCEDURE — 80048 BASIC METABOLIC PNL TOTAL CA: CPT

## 2025-06-24 PROCEDURE — 2580000003 HC RX 258: Performed by: STUDENT IN AN ORGANIZED HEALTH CARE EDUCATION/TRAINING PROGRAM

## 2025-06-24 PROCEDURE — 2500000003 HC RX 250 WO HCPCS

## 2025-06-24 PROCEDURE — 6370000000 HC RX 637 (ALT 250 FOR IP)

## 2025-06-24 PROCEDURE — 2700000000 HC OXYGEN THERAPY PER DAY

## 2025-06-24 PROCEDURE — 2060000000 HC ICU INTERMEDIATE R&B

## 2025-06-24 PROCEDURE — 6360000002 HC RX W HCPCS

## 2025-06-24 PROCEDURE — 36415 COLL VENOUS BLD VENIPUNCTURE: CPT

## 2025-06-24 PROCEDURE — 99232 SBSQ HOSP IP/OBS MODERATE 35: CPT | Performed by: STUDENT IN AN ORGANIZED HEALTH CARE EDUCATION/TRAINING PROGRAM

## 2025-06-24 PROCEDURE — 94640 AIRWAY INHALATION TREATMENT: CPT

## 2025-06-24 PROCEDURE — 94760 N-INVAS EAR/PLS OXIMETRY 1: CPT

## 2025-06-24 PROCEDURE — 83735 ASSAY OF MAGNESIUM: CPT

## 2025-06-24 PROCEDURE — 85027 COMPLETE CBC AUTOMATED: CPT

## 2025-06-24 PROCEDURE — 99233 SBSQ HOSP IP/OBS HIGH 50: CPT | Performed by: INTERNAL MEDICINE

## 2025-06-24 RX ORDER — DEXTROSE MONOHYDRATE 50 MG/ML
INJECTION, SOLUTION INTRAVENOUS CONTINUOUS
Status: ACTIVE | OUTPATIENT
Start: 2025-06-24 | End: 2025-06-24

## 2025-06-24 RX ADMIN — ENOXAPARIN SODIUM 30 MG: 100 INJECTION SUBCUTANEOUS at 03:15

## 2025-06-24 RX ADMIN — DEXTROSE MONOHYDRATE: 50 INJECTION, SOLUTION INTRAVENOUS at 10:11

## 2025-06-24 RX ADMIN — POTASSIUM CHLORIDE 20 MEQ: 1500 TABLET, EXTENDED RELEASE ORAL at 07:56

## 2025-06-24 RX ADMIN — POTASSIUM CHLORIDE 20 MEQ: 1500 TABLET, EXTENDED RELEASE ORAL at 20:09

## 2025-06-24 RX ADMIN — IPRATROPIUM BROMIDE AND ALBUTEROL SULFATE 1 DOSE: .5; 3 SOLUTION RESPIRATORY (INHALATION) at 20:03

## 2025-06-24 RX ADMIN — IPRATROPIUM BROMIDE AND ALBUTEROL SULFATE 1 DOSE: .5; 3 SOLUTION RESPIRATORY (INHALATION) at 12:32

## 2025-06-24 RX ADMIN — BUDESONIDE 500 MCG: 0.5 INHALANT RESPIRATORY (INHALATION) at 20:46

## 2025-06-24 RX ADMIN — SODIUM CHLORIDE, PRESERVATIVE FREE 10 ML: 5 INJECTION INTRAVENOUS at 07:56

## 2025-06-24 RX ADMIN — ENOXAPARIN SODIUM 30 MG: 100 INJECTION SUBCUTANEOUS at 14:45

## 2025-06-24 RX ADMIN — PREDNISONE 40 MG: 20 TABLET ORAL at 07:56

## 2025-06-24 RX ADMIN — METOPROLOL SUCCINATE 12.5 MG: 25 TABLET, FILM COATED, EXTENDED RELEASE ORAL at 17:44

## 2025-06-24 RX ADMIN — LOSARTAN POTASSIUM 50 MG: 50 TABLET, FILM COATED ORAL at 07:56

## 2025-06-24 RX ADMIN — PANTOPRAZOLE SODIUM 40 MG: 40 INJECTION, POWDER, FOR SOLUTION INTRAVENOUS at 07:56

## 2025-06-24 RX ADMIN — AZITHROMYCIN DIHYDRATE 500 MG: 250 TABLET ORAL at 17:43

## 2025-06-24 RX ADMIN — BUDESONIDE 500 MCG: 0.5 INHALANT RESPIRATORY (INHALATION) at 08:10

## 2025-06-24 RX ADMIN — ACETAMINOPHEN 650 MG: 325 TABLET ORAL at 14:45

## 2025-06-24 RX ADMIN — ACETAMINOPHEN 650 MG: 325 TABLET ORAL at 06:24

## 2025-06-24 RX ADMIN — ASPIRIN 81 MG: 81 TABLET, CHEWABLE ORAL at 07:55

## 2025-06-24 RX ADMIN — IPRATROPIUM BROMIDE AND ALBUTEROL SULFATE 1 DOSE: .5; 3 SOLUTION RESPIRATORY (INHALATION) at 16:25

## 2025-06-24 RX ADMIN — SODIUM CHLORIDE, PRESERVATIVE FREE 10 ML: 5 INJECTION INTRAVENOUS at 20:09

## 2025-06-24 RX ADMIN — IPRATROPIUM BROMIDE AND ALBUTEROL SULFATE 1 DOSE: .5; 3 SOLUTION RESPIRATORY (INHALATION) at 08:10

## 2025-06-24 ASSESSMENT — PAIN - FUNCTIONAL ASSESSMENT: PAIN_FUNCTIONAL_ASSESSMENT: ACTIVITIES ARE NOT PREVENTED

## 2025-06-24 ASSESSMENT — PAIN SCALES - GENERAL
PAINLEVEL_OUTOF10: 3
PAINLEVEL_OUTOF10: 0

## 2025-06-24 ASSESSMENT — PAIN DESCRIPTION - PAIN TYPE: TYPE: ACUTE PAIN

## 2025-06-24 ASSESSMENT — PAIN DESCRIPTION - ONSET: ONSET: SUDDEN

## 2025-06-24 ASSESSMENT — PAIN DESCRIPTION - ORIENTATION: ORIENTATION: MID

## 2025-06-24 ASSESSMENT — PAIN DESCRIPTION - LOCATION: LOCATION: ABDOMEN

## 2025-06-24 ASSESSMENT — PAIN DESCRIPTION - FREQUENCY: FREQUENCY: CONTINUOUS

## 2025-06-24 ASSESSMENT — PAIN DESCRIPTION - DESCRIPTORS: DESCRIPTORS: CRAMPING

## 2025-06-24 NOTE — PLAN OF CARE
Problem: Respiratory - Adult  Goal: Achieves optimal ventilation and oxygenation  6/23/2025 2211 by Manoj Awad RCP  Outcome: Progressing     Problem: Respiratory - Adult  Goal: Clear lung sounds  Description: Clear lung sounds  Outcome: Progressing   Patient mutually agrees with goal of care

## 2025-06-24 NOTE — PROGRESS NOTES
[facility]      Patient - Fredo Rod   MRN -  [id number]   [facility] # - [id number]   - 1970      Date of Admission -  2025 10:09 PM  Date of evaluation -  2025  Room - -[facility]-   Hospital Day - 3  Consulting - Luther Henderson MD Primary Care Physician - Mohinder Page MD     Problem List      Active Hospital Problems    Diagnosis Date Noted    Hypercapnic respiratory failure (HCC) [J96.92] 2025     Reason for Consult    Acute on Chronic Respiratory Failure with hypoxia and hypercapnia, COPD exacerbation  HPI   History Obtained From: Patient and electronic medical record.    Fredo Rod is a 55 y.o. male with pmhx of COPD on 2 lpm, CHF, CAD, primary hypertension that presented for 2 days of exertional dyspnea and peripheral edema. He was also noted to have waxing and waning mental status changes and hypersomnolence. Patient was placed on BiPAP and admitted to the ICU with low threshold for intubation.     Per patient, he had been having difficulty with tolerating the NIV mask causing him to stop using the machine when sleeping over 1 month ago. He states the mask would hurt his jaw, making it difficult to chew. He is interested in trying a different mask.     I discussed the importance of compliance with the patient and he stated he would like to try to be better at using the NIV with a different mask. He is not interested in a tracheostomy at this time. I informed the patient that if he is unable to tolerate the NIV and does not want the tracheostomy then the next recommendation would be hospice. Patient expressed understanding and wanted to continue NIV.       Last 24 Hours   -Wore the BiPAP overnight. Stated he slept well.   -Family to bring in home BiPAP this afternoon.   -On 3 lpm NC.   -Denies shortness of breath, cough or chest pain.     PMHx   Past Medical History      Diagnosis Date    (HFpEF) heart failure with  27.5*    306 306   MPV 10.1 10.7 10.5      BMP  Recent Labs     06/22/25  0440 06/22/25  1158 06/23/25  0551 06/24/25  0525     --  146* 149*   K 3.4* 3.5 4.2 4.5   CL 93*  --  99 106   CO2 43*  --  38* 36*   BUN 11  --  15 20   CREATININE 0.7  --  0.6* 0.8   GLUCOSE 86  --  107 114*   MG 1.7  --  2.9* 2.4   CALCIUM 8.0*  --  8.5* 8.4*     LFT  No results for input(s): \"AST\", \"ALT\", \"BILITOT\", \"ALKPHOS\", \"AMYLASE\", \"LIPASE\" in the last 72 hours.    Invalid input(s): \"ALB\"    TROP  No results found for: \"TROPONINT\"  BNP  No results for input(s): \"BNP\" in the last 72 hours.  Lactic Acid  Recent Labs     06/22/25  0440   LACTA 0.6     INR  No results for input(s): \"INR\", \"PROTIME\" in the last 72 hours.  PTT  No results for input(s): \"APTT\" in the last 72 hours.  Glucose  No results for input(s): \"POCGLU\" in the last 72 hours.  UA No results for input(s): \"SPECGRAV\", \"PHUR\", \"COLORU\", \"CLARITYU\", \"MUCUS\", \"PROTEINU\", \"BLOODU\", \"RBCUA\", \"WBCUA\", \"BACTERIA\", \"NITRU\", \"GLUCOSEU\", \"BILIRUBINUR\", \"UROBILINOGEN\", \"KETUA\", \"LABCAST\", \"LABCASTTY\", \"AMORPHOS\" in the last 72 hours.    Invalid input(s): \"CRYSTALS\".    PFTs         Sleep studies                 Cultures    None.    EKG     Echocardiogram   3/31/25 TTE:     Left Ventricle: Low normal left ventricular systolic function with a visually estimated EF of 50 - 55%. Left ventricle size is normal. Mildly increased wall thickness. Findings consistent with mild concentric hypertrophy. Normal wall motion. Normal diastolic function.    Left Atrium: Left atrium is mildly dilated.    Pericardium: Small (<1 cm) localized pericardial effusion present around the posterior and left ventricle. No indication of cardiac tamponade.    Image quality is technically difficult. Technically difficult study, technically difficult study due to patient's body habitus and procedure performed with the patient in a supine position.    Radiology    CXR  6/23/25 IMPRESSION:  1. No acute

## 2025-06-24 NOTE — PLAN OF CARE
Problem: Chronic Conditions and Co-morbidities  Goal: Patient's chronic conditions and co-morbidity symptoms are monitored and maintained or improved  Outcome: Progressing  Flowsheets (Taken 6/24/2025 1529)  Care Plan - Patient's Chronic Conditions and Co-Morbidity Symptoms are Monitored and Maintained or Improved:   Monitor and assess patient's chronic conditions and comorbid symptoms for stability, deterioration, or improvement   Collaborate with multidisciplinary team to address chronic and comorbid conditions and prevent exacerbation or deterioration   Update acute care plan with appropriate goals if chronic or comorbid symptoms are exacerbated and prevent overall improvement and discharge     Problem: Discharge Planning  Goal: Discharge to home or other facility with appropriate resources  Outcome: Progressing  Flowsheets (Taken 6/24/2025 1529)  Discharge to home or other facility with appropriate resources:   Identify barriers to discharge with patient and caregiver   Identify discharge learning needs (meds, wound care, etc)   Refer to discharge planning if patient needs post-hospital services based on physician order or complex needs related to functional status, cognitive ability or social support system   Arrange for needed discharge resources and transportation as appropriate   Arrange for interpreters to assist at discharge as needed  Note: Patient plans to return home at discharge and no needs voiced at this time. Patient working with social work for discharge planning.        Problem: Pain  Goal: Verbalizes/displays adequate comfort level or baseline comfort level  Outcome: Progressing  Flowsheets (Taken 6/24/2025 1529)  Verbalizes/displays adequate comfort level or baseline comfort level:   Encourage patient to monitor pain and request assistance   Administer analgesics based on type and severity of pain and evaluate response   Consider cultural and social influences on pain and pain management

## 2025-06-24 NOTE — PROGRESS NOTES
Hospitalist Progress Note  Internal Medicine Resident      Patient: Fredo Rod 55 y.o. male      Unit/Bed: K-25/025-A    Admit Date: 6/20/2025      ASSESSMENT AND PLAN  Active Problems  Acute hypoxic hypercapnic respiratory failure: Likely secondary to BiPAP non-compliance, COPD and HFpEF exacerbation. History of severe COPD. PFTs 9/2024 show FEV1/FVC VC 56%, FEV1 24% predicted. Echo 3/31/2025 shows EF 50 to 55% with mildly increased wall thickness and normal diastolic function, LA mildly dilated. Patient reports not wearing his BiPAP for the past month. On 2 L at baseline; currently on 3 L NC. 6/23 ABG: pH 7.31, pCO2 87, pO2 127, HCO3 44.   Pulmonology consulted; appreciate recommendations  Patient placed with home NIV to hospital; pulmonology plan to supply patient with different masks to try with home NIV to improve compliance  Continue to wean NC as tolerated; SpO2 > 90%  Continue with NIV settings as noted per pulmonology  Repeat ABG in a.m. 6/25  Continue Azithromycin 500 mg QD for 3 days  Continue Pulmicort 500 mcg nebulization BID  Continue Prednisone 40 mg QD  Continue Stiolto 2 puffs QD at discharge    Continue home Bumex on 6/25  Hypernatremia: Sodium 149. 3.8 L free water deficit. Ordered oral hydration. Started on D5 at 50 cc/hr. Trend BMP.   COPD: PFTs as noted above. Pulmonology consulted; Ppatient will need to follow up with Pulmonology Clinic and full PFT study with bronchodilator. Continue treatment for COPD maintenance as noted above.       Chronic Conditions (reviewed and stable unless otherwise stated)  Hypertension: Home medications include Toprol-XL and losartan.  Continue home medications.  CAD s/p JAZMIN x 2: Continue ASA   Tobacco use disorder: Nicotine patch held       LDA: []CVC / []PICC / []Midline / []Borja / []Drains / []Mediport / [x]None  Antibiotics: Azithromycin  Steroids: Prednisone  Labs (still needed?): [x]Yes / []No  IVF (still needed?): [x]Yes / []No    Level of care:

## 2025-06-24 NOTE — PLAN OF CARE
Problem: Chronic Conditions and Co-morbidities  Goal: Patient's chronic conditions and co-morbidity symptoms are monitored and maintained or improved  6/24/2025 0042 by Rubina Marcelino RN  Outcome: Progressing  Flowsheets (Taken 6/24/2025 0042)  Care Plan - Patient's Chronic Conditions and Co-Morbidity Symptoms are Monitored and Maintained or Improved:   Monitor and assess patient's chronic conditions and comorbid symptoms for stability, deterioration, or improvement   Collaborate with multidisciplinary team to address chronic and comorbid conditions and prevent exacerbation or deterioration   Update acute care plan with appropriate goals if chronic or comorbid symptoms are exacerbated and prevent overall improvement and discharge  6/23/2025 1733 by Veda Arteaga RN  Outcome: Progressing     Problem: Discharge Planning  Goal: Discharge to home or other facility with appropriate resources  6/24/2025 0042 by Rubina Marcelino RN  Outcome: Progressing  Flowsheets (Taken 6/24/2025 0042)  Discharge to home or other facility with appropriate resources:   Identify barriers to discharge with patient and caregiver   Arrange for needed discharge resources and transportation as appropriate   Identify discharge learning needs (meds, wound care, etc)   Refer to discharge planning if patient needs post-hospital services based on physician order or complex needs related to functional status, cognitive ability or social support system  6/23/2025 1733 by Veda Arteaga RN  Outcome: Progressing     Problem: Pain  Goal: Verbalizes/displays adequate comfort level or baseline comfort level  6/24/2025 0042 by Rubina Marcelino RN  Outcome: Progressing  Flowsheets (Taken 6/24/2025 0042)  Verbalizes/displays adequate comfort level or baseline comfort level:   Encourage patient to monitor pain and request assistance   Assess pain using appropriate pain scale   Administer analgesics based on type and severity of pain and evaluate

## 2025-06-25 LAB
ANION GAP SERPL CALC-SCNC: 5 MEQ/L (ref 8–16)
ARTERIAL PATENCY WRIST A: POSITIVE
BASE EXCESS BLDA CALC-SCNC: 7.3 MMOL/L (ref -2.5–2.5)
BDY SITE: ABNORMAL
BUN SERPL-MCNC: 20 MG/DL (ref 8–23)
CALCIUM SERPL-MCNC: 7.8 MG/DL (ref 8.6–10)
CHLORIDE SERPL-SCNC: 106 MEQ/L (ref 98–111)
CO2 SERPL-SCNC: 33 MEQ/L (ref 22–29)
COLLECTED BY:: ABNORMAL
CREAT SERPL-MCNC: 0.6 MG/DL (ref 0.7–1.2)
DEPRECATED RDW RBC AUTO: 71 FL (ref 35–45)
DEVICE: ABNORMAL
ERYTHROCYTE [DISTWIDTH] IN BLOOD BY AUTOMATED COUNT: 17.2 % (ref 11.5–14.5)
FIO2 ON VENT O2 ANALYZER: 32 %
GFR SERPL CREATININE-BSD FRML MDRD: > 90 ML/MIN/1.73M2
GLUCOSE SERPL-MCNC: 90 MG/DL (ref 74–109)
HCO3 BLDA-SCNC: 36 MMOL/L (ref 23–28)
HCT VFR BLD AUTO: 42.4 % (ref 42–52)
HGB BLD-MCNC: 11.8 GM/DL (ref 14–18)
MAGNESIUM SERPL-MCNC: 2.1 MG/DL (ref 1.6–2.6)
MCH RBC QN AUTO: 30.6 PG (ref 26–33)
MCHC RBC AUTO-ENTMCNC: 27.8 GM/DL (ref 32.2–35.5)
MCV RBC AUTO: 110.1 FL (ref 80–94)
NT-PROBNP SERPL IA-MCNC: 94 PG/ML (ref 0–124)
PCO2 BLDA: 73 MMHG (ref 35–45)
PH BLDA: 7.3 [PH] (ref 7.35–7.45)
PLATELET # BLD AUTO: 296 THOU/MM3 (ref 130–400)
PMV BLD AUTO: 10.4 FL (ref 9.4–12.4)
PO2 BLDA: 91 MMHG (ref 71–104)
POTASSIUM SERPL-SCNC: 5 MEQ/L (ref 3.5–5.2)
RBC # BLD AUTO: 3.85 MILL/MM3 (ref 4.7–6.1)
SAO2 % BLDA: 96 %
SODIUM SERPL-SCNC: 144 MEQ/L (ref 135–145)
VENTILATION MODE VENT: ABNORMAL
WBC # BLD AUTO: 8.9 THOU/MM3 (ref 4.8–10.8)

## 2025-06-25 PROCEDURE — 83880 ASSAY OF NATRIURETIC PEPTIDE: CPT

## 2025-06-25 PROCEDURE — 6370000000 HC RX 637 (ALT 250 FOR IP): Performed by: PHYSICIAN ASSISTANT

## 2025-06-25 PROCEDURE — 85027 COMPLETE CBC AUTOMATED: CPT

## 2025-06-25 PROCEDURE — 36415 COLL VENOUS BLD VENIPUNCTURE: CPT

## 2025-06-25 PROCEDURE — 6370000000 HC RX 637 (ALT 250 FOR IP)

## 2025-06-25 PROCEDURE — 2700000000 HC OXYGEN THERAPY PER DAY

## 2025-06-25 PROCEDURE — 94640 AIRWAY INHALATION TREATMENT: CPT

## 2025-06-25 PROCEDURE — 94761 N-INVAS EAR/PLS OXIMETRY MLT: CPT

## 2025-06-25 PROCEDURE — 83735 ASSAY OF MAGNESIUM: CPT

## 2025-06-25 PROCEDURE — 2060000000 HC ICU INTERMEDIATE R&B

## 2025-06-25 PROCEDURE — 99233 SBSQ HOSP IP/OBS HIGH 50: CPT | Performed by: INTERNAL MEDICINE

## 2025-06-25 PROCEDURE — 2500000003 HC RX 250 WO HCPCS

## 2025-06-25 PROCEDURE — 80048 BASIC METABOLIC PNL TOTAL CA: CPT

## 2025-06-25 PROCEDURE — 82803 BLOOD GASES ANY COMBINATION: CPT

## 2025-06-25 PROCEDURE — 6360000002 HC RX W HCPCS: Performed by: STUDENT IN AN ORGANIZED HEALTH CARE EDUCATION/TRAINING PROGRAM

## 2025-06-25 PROCEDURE — 6360000002 HC RX W HCPCS

## 2025-06-25 PROCEDURE — 36600 WITHDRAWAL OF ARTERIAL BLOOD: CPT

## 2025-06-25 RX ORDER — AZITHROMYCIN 250 MG/1
250 TABLET, FILM COATED ORAL DAILY
Status: DISCONTINUED | OUTPATIENT
Start: 2025-06-25 | End: 2025-06-26 | Stop reason: HOSPADM

## 2025-06-25 RX ADMIN — PREDNISONE 40 MG: 20 TABLET ORAL at 08:49

## 2025-06-25 RX ADMIN — IPRATROPIUM BROMIDE AND ALBUTEROL SULFATE 1 DOSE: .5; 3 SOLUTION RESPIRATORY (INHALATION) at 16:51

## 2025-06-25 RX ADMIN — AZITHROMYCIN DIHYDRATE 250 MG: 250 TABLET ORAL at 11:36

## 2025-06-25 RX ADMIN — ASPIRIN 81 MG: 81 TABLET, CHEWABLE ORAL at 08:48

## 2025-06-25 RX ADMIN — BUDESONIDE 500 MCG: 0.5 INHALANT RESPIRATORY (INHALATION) at 08:09

## 2025-06-25 RX ADMIN — SODIUM CHLORIDE, PRESERVATIVE FREE 10 ML: 5 INJECTION INTRAVENOUS at 20:19

## 2025-06-25 RX ADMIN — METOPROLOL SUCCINATE 12.5 MG: 25 TABLET, FILM COATED, EXTENDED RELEASE ORAL at 17:53

## 2025-06-25 RX ADMIN — ENOXAPARIN SODIUM 30 MG: 100 INJECTION SUBCUTANEOUS at 04:07

## 2025-06-25 RX ADMIN — POTASSIUM CHLORIDE 20 MEQ: 1500 TABLET, EXTENDED RELEASE ORAL at 20:19

## 2025-06-25 RX ADMIN — BUDESONIDE 500 MCG: 0.5 INHALANT RESPIRATORY (INHALATION) at 21:07

## 2025-06-25 RX ADMIN — ENOXAPARIN SODIUM 30 MG: 100 INJECTION SUBCUTANEOUS at 15:51

## 2025-06-25 RX ADMIN — IPRATROPIUM BROMIDE AND ALBUTEROL SULFATE 1 DOSE: .5; 3 SOLUTION RESPIRATORY (INHALATION) at 08:09

## 2025-06-25 RX ADMIN — IPRATROPIUM BROMIDE AND ALBUTEROL SULFATE 1 DOSE: .5; 3 SOLUTION RESPIRATORY (INHALATION) at 11:51

## 2025-06-25 RX ADMIN — IPRATROPIUM BROMIDE AND ALBUTEROL SULFATE 1 DOSE: .5; 3 SOLUTION RESPIRATORY (INHALATION) at 21:01

## 2025-06-25 RX ADMIN — SODIUM CHLORIDE, PRESERVATIVE FREE 10 ML: 5 INJECTION INTRAVENOUS at 08:48

## 2025-06-25 RX ADMIN — PANTOPRAZOLE SODIUM 40 MG: 40 INJECTION, POWDER, FOR SOLUTION INTRAVENOUS at 08:48

## 2025-06-25 RX ADMIN — LOSARTAN POTASSIUM 50 MG: 50 TABLET, FILM COATED ORAL at 08:49

## 2025-06-25 NOTE — PROGRESS NOTES
[facility]      Patient - Fredo Rod   MRN -  [id number]   [facility] # - [id number]   - 1970      Date of Admission -  2025 10:09 PM  Date of evaluation -  2025  Room - -[facility]-   Hospital Day - 4  Consulting - Catrachito Junior MD Primary Care Physician - Mohinder Page MD     Problem List      Active Hospital Problems    Diagnosis Date Noted    Hypercapnic respiratory failure (HCC) [J96.92] 2025     Reason for Consult    Acute on Chronic Respiratory Failure with hypoxia and hypercapnia, COPD exacerbation  HPI   History Obtained From: Patient and electronic medical record.    Fredo Rod is a 55 y.o. male with pmhx of COPD on 2 lpm, CHF, CAD, primary hypertension that presented for 2 days of exertional dyspnea and peripheral edema. He was also noted to have waxing and waning mental status changes and hypersomnolence. Patient was placed on BiPAP and admitted to the ICU with low threshold for intubation.     Per patient, he had been having difficulty with tolerating the NIV mask causing him to stop using the machine when sleeping over 1 month ago. He states the mask would hurt his jaw, making it difficult to chew. He is interested in trying a different mask.     I discussed the importance of compliance with the patient and he stated he would like to try to be better at using the NIV with a different mask. He is not interested in a tracheostomy at this time. I informed the patient that if he is unable to tolerate the NIV and does not want the tracheostomy then the next recommendation would be hospice. Patient expressed understanding and wanted to continue NIV.       Last 24 Hours   -ABG was worse this morning after using home NIV. Will need adjustments made and recheck ABG in AM.   -Denies any pain, shortness of breath.   -He likes the new full face mask.      PMHx   Past Medical History      Diagnosis Date    (HFpEF) heart failure

## 2025-06-25 NOTE — PROGRESS NOTES
Hospitalist Progress Note  Internal Medicine Resident      Patient: Fredo Rod 55 y.o. male      Unit/Bed: K-25/025-A    Admit Date: 6/20/2025      ASSESSMENT AND PLAN  Active Problems  Acute hypoxic hypercapnic respiratory failure: Likely secondary to BiPAP non-compliance, COPD and HFpEF exacerbation. History of severe COPD. PFTs 9/2024 show FEV1/FVC VC 56%, FEV1 24% predicted. Echo 3/31/2025 shows EF 50 to 55% with mildly increased wall thickness and normal diastolic function, LA mildly dilated. Patient reports not wearing his BiPAP for the past month. On 2 L at baseline; currently on 2 L NC. 6/24 ABG: pH 7.30, pCO2 73, pO2 91, HCO3 36.   Pulmonology consulted; appreciate recommendations  Per pulmonology in setting of acidotic ABG in the a.m; will adjust NIV settings with repeat ABG on 6/26 AM  Continue to wean NC as tolerated; SpO2 > 90%  Continue with NIV settings as noted per pulmonology  Continue Azithromycin 250 mg QD for COPD maintenance  Continue Pulmicort 500 mcg nebulization BID  Continue Prednisone 40 mg QD  Continue Stiolto 2 puffs QD at discharge    Continue home Bumex on 6/25  COPD: PFTs as noted above. Pulmonology consulted; Ppatient will need to follow up with Pulmonology Clinic and full PFT study with bronchodilator. Continue treatment for COPD maintenance as noted above.     Resolved Conditions  Hypernatremia      Chronic Conditions (reviewed and stable unless otherwise stated)  Hypertension: Home medications include Toprol-XL and losartan.  Continue home medications.  CAD s/p JAZMIN x 2: Continue ASA   Tobacco use disorder: Nicotine patch held       LDA: []CVC / []PICC / []Midline / []Borja / []Drains / []Mediport / [x]None  Antibiotics: Azithromycin  Steroids: Prednisone  Labs (still needed?): [x]Yes / []No  IVF (still needed?): []Yes / [x]No    Level of care: [x]Step Down / []Med-Surg  Bed Status: [x]Inpatient / []Observation  Telemetry: [x]Yes / []No  PT/OT: [x]Yes / []No    DVT

## 2025-06-25 NOTE — DISCHARGE INSTRUCTIONS
Follow up with PCP in 1 week    Follow up with pulmonology in 1 week; Pulmonology Clinic with download from NIV and after CXR and Full PFT study with bronchodilator.

## 2025-06-25 NOTE — PLAN OF CARE
Problem: Respiratory - Adult  Goal: Achieves optimal ventilation and oxygenation  6/24/2025 2049 by Tanika Huynh RCP  Outcome: Progressing       Problem: Respiratory - Adult  Goal: Clear lung sounds  Description: Clear lung sounds  Outcome: Progressing

## 2025-06-25 NOTE — PLAN OF CARE
Problem: Discharge Planning  Goal: Discharge to home or other facility with appropriate resources  6/25/2025 0041 by Brandon Babcock RN  Flowsheets (Taken 6/25/2025 0041)  Discharge to home or other facility with appropriate resources: Identify barriers to discharge with patient and caregiver     Problem: Pain  Goal: Verbalizes/displays adequate comfort level or baseline comfort level  6/25/2025 0041 by Brandon Babcock RN  Flowsheets (Taken 6/25/2025 0041)  Verbalizes/displays adequate comfort level or baseline comfort level:   Encourage patient to monitor pain and request assistance   Administer analgesics based on type and severity of pain and evaluate response   Consider cultural and social influences on pain and pain management   Assess pain using appropriate pain scale   Implement non-pharmacological measures as appropriate and evaluate response     Problem: Safety - Adult  Goal: Free from fall injury  6/25/2025 0041 by Brandon Babcock RN  Flowsheets (Taken 6/25/2025 0041)  Free From Fall Injury: Instruct family/caregiver on patient safety     Problem: Respiratory - Adult  Goal: Achieves optimal ventilation and oxygenation  6/25/2025 0041 by Brandon Babcock RN  Flowsheets (Taken 6/25/2025 0041)  Achieves optimal ventilation and oxygenation:   Assess for changes in respiratory status   Position to facilitate oxygenation and minimize respiratory effort   Respiratory therapy support as indicated   Assess for changes in mentation and behavior   Oxygen supplementation based on oxygen saturation or arterial blood gases   Encourage broncho-pulmonary hygiene including cough, deep breathe, incentive spirometry   Assess and instruct to report shortness of breath or any respiratory difficulty

## 2025-06-25 NOTE — PROGRESS NOTES
Patient keeps asking for coffee and pop to drink, pt was educated on fluid restriction. Patient is on 2000 ml fluid restriction but has already had 1870ml.

## 2025-06-26 ENCOUNTER — TELEPHONE (OUTPATIENT)
Dept: PULMONOLOGY | Age: 55
End: 2025-06-26

## 2025-06-26 VITALS
HEART RATE: 69 BPM | TEMPERATURE: 98.4 F | OXYGEN SATURATION: 95 % | RESPIRATION RATE: 19 BRPM | WEIGHT: 224.9 LBS | HEIGHT: 69 IN | DIASTOLIC BLOOD PRESSURE: 90 MMHG | SYSTOLIC BLOOD PRESSURE: 150 MMHG | BODY MASS INDEX: 33.31 KG/M2

## 2025-06-26 LAB
ANION GAP SERPL CALC-SCNC: 11 MEQ/L (ref 8–16)
ANISOCYTOSIS BLD QL SMEAR: PRESENT
ARTERIAL PATENCY WRIST A: POSITIVE
BASE EXCESS BLDA CALC-SCNC: 8.7 MMOL/L (ref -2.5–2.5)
BASOPHILS ABSOLUTE: 0 THOU/MM3 (ref 0–0.1)
BASOPHILS NFR BLD AUTO: 0.6 %
BDY SITE: ABNORMAL
BUN SERPL-MCNC: 21 MG/DL (ref 8–23)
CALCIUM SERPL-MCNC: 8.5 MG/DL (ref 8.6–10)
CHLORIDE SERPL-SCNC: 101 MEQ/L (ref 98–111)
CO2 SERPL-SCNC: 29 MEQ/L (ref 22–29)
COLLECTED BY:: ABNORMAL
CREAT SERPL-MCNC: 0.6 MG/DL (ref 0.7–1.2)
DEPRECATED RDW RBC AUTO: 68.5 FL (ref 35–45)
DEVICE: ABNORMAL
EOSINOPHIL NFR BLD AUTO: 3.7 %
EOSINOPHILS ABSOLUTE: 0.3 THOU/MM3 (ref 0–0.4)
ERYTHROCYTE [DISTWIDTH] IN BLOOD BY AUTOMATED COUNT: 17.1 % (ref 11.5–14.5)
FIO2 ON VENT O2 ANALYZER: 2 %
GFR SERPL CREATININE-BSD FRML MDRD: > 90 ML/MIN/1.73M2
GLUCOSE SERPL-MCNC: 146 MG/DL (ref 74–109)
HCO3 BLDA-SCNC: 37 MMOL/L (ref 23–28)
HCT VFR BLD AUTO: 45.5 % (ref 42–52)
HGB BLD-MCNC: 13 GM/DL (ref 14–18)
HYPOCHROMIA BLD QL SMEAR: PRESENT
IMM GRANULOCYTES # BLD AUTO: 0.03 THOU/MM3 (ref 0–0.07)
IMM GRANULOCYTES NFR BLD AUTO: 0.4 %
LYMPHOCYTES ABSOLUTE: 2 THOU/MM3 (ref 1–4.8)
LYMPHOCYTES NFR BLD AUTO: 24.8 %
MCH RBC QN AUTO: 30.9 PG (ref 26–33)
MCHC RBC AUTO-ENTMCNC: 28.6 GM/DL (ref 32.2–35.5)
MCV RBC AUTO: 108.1 FL (ref 80–94)
MONOCYTES ABSOLUTE: 0.9 THOU/MM3 (ref 0.4–1.3)
MONOCYTES NFR BLD AUTO: 11.3 %
NEUTROPHILS ABSOLUTE: 4.8 THOU/MM3 (ref 1.8–7.7)
NEUTROPHILS NFR BLD AUTO: 59.2 %
NRBC BLD AUTO-RTO: 0 /100 WBC
PCO2 BLDA: 67 MMHG (ref 35–45)
PH BLDA: 7.35 [PH] (ref 7.35–7.45)
PLATELET # BLD AUTO: 309 THOU/MM3 (ref 130–400)
PMV BLD AUTO: 10.5 FL (ref 9.4–12.4)
PO2 BLDA: 69 MMHG (ref 71–104)
POTASSIUM SERPL-SCNC: 4.8 MEQ/L (ref 3.5–5.2)
RBC # BLD AUTO: 4.21 MILL/MM3 (ref 4.7–6.1)
REASON FOR REJECTION: NORMAL
REJECTED TEST: NORMAL
SAO2 % BLDA: 92 %
SODIUM SERPL-SCNC: 141 MEQ/L (ref 135–145)
VENTILATION MODE VENT: ABNORMAL
WBC # BLD AUTO: 8.1 THOU/MM3 (ref 4.8–10.8)

## 2025-06-26 PROCEDURE — 36415 COLL VENOUS BLD VENIPUNCTURE: CPT

## 2025-06-26 PROCEDURE — 99232 SBSQ HOSP IP/OBS MODERATE 35: CPT | Performed by: INTERNAL MEDICINE

## 2025-06-26 PROCEDURE — 6370000000 HC RX 637 (ALT 250 FOR IP): Performed by: PHYSICIAN ASSISTANT

## 2025-06-26 PROCEDURE — 2500000003 HC RX 250 WO HCPCS

## 2025-06-26 PROCEDURE — 94761 N-INVAS EAR/PLS OXIMETRY MLT: CPT

## 2025-06-26 PROCEDURE — 85025 COMPLETE CBC W/AUTO DIFF WBC: CPT

## 2025-06-26 PROCEDURE — 80048 BASIC METABOLIC PNL TOTAL CA: CPT

## 2025-06-26 PROCEDURE — 82803 BLOOD GASES ANY COMBINATION: CPT

## 2025-06-26 PROCEDURE — 36600 WITHDRAWAL OF ARTERIAL BLOOD: CPT

## 2025-06-26 PROCEDURE — 6360000002 HC RX W HCPCS

## 2025-06-26 PROCEDURE — 6370000000 HC RX 637 (ALT 250 FOR IP)

## 2025-06-26 PROCEDURE — 6360000002 HC RX W HCPCS: Performed by: STUDENT IN AN ORGANIZED HEALTH CARE EDUCATION/TRAINING PROGRAM

## 2025-06-26 PROCEDURE — 2700000000 HC OXYGEN THERAPY PER DAY

## 2025-06-26 PROCEDURE — 94640 AIRWAY INHALATION TREATMENT: CPT

## 2025-06-26 RX ORDER — PREDNISONE 10 MG/1
10 TABLET ORAL DAILY
Qty: 5 TABLET | Refills: 0 | Status: SHIPPED | OUTPATIENT
Start: 2025-06-26 | End: 2025-07-01

## 2025-06-26 RX ORDER — AZITHROMYCIN 250 MG/1
250 TABLET, FILM COATED ORAL
Qty: 12 TABLET | Refills: 2 | Status: ON HOLD | OUTPATIENT
Start: 2025-06-27 | End: 2025-07-06

## 2025-06-26 RX ADMIN — SODIUM CHLORIDE, PRESERVATIVE FREE 10 ML: 5 INJECTION INTRAVENOUS at 09:42

## 2025-06-26 RX ADMIN — IPRATROPIUM BROMIDE AND ALBUTEROL SULFATE 1 DOSE: .5; 3 SOLUTION RESPIRATORY (INHALATION) at 07:23

## 2025-06-26 RX ADMIN — ENOXAPARIN SODIUM 30 MG: 100 INJECTION SUBCUTANEOUS at 03:11

## 2025-06-26 RX ADMIN — AZITHROMYCIN DIHYDRATE 250 MG: 250 TABLET ORAL at 09:41

## 2025-06-26 RX ADMIN — PREDNISONE 40 MG: 20 TABLET ORAL at 09:41

## 2025-06-26 RX ADMIN — LOSARTAN POTASSIUM 50 MG: 50 TABLET, FILM COATED ORAL at 09:41

## 2025-06-26 RX ADMIN — ASPIRIN 81 MG: 81 TABLET, CHEWABLE ORAL at 09:40

## 2025-06-26 RX ADMIN — PANTOPRAZOLE SODIUM 40 MG: 40 INJECTION, POWDER, FOR SOLUTION INTRAVENOUS at 09:40

## 2025-06-26 RX ADMIN — POTASSIUM CHLORIDE 20 MEQ: 1500 TABLET, EXTENDED RELEASE ORAL at 09:41

## 2025-06-26 RX ADMIN — BUDESONIDE 500 MCG: 0.5 INHALANT RESPIRATORY (INHALATION) at 07:23

## 2025-06-26 NOTE — PROGRESS NOTES
This RRT entered room to do patient's breathing treatment. Patient's SpO2 to be found at 79%, upon investigating, this RRT found patient's nasal cannula not plugged into the floor meter on the wall, patient's home BiPAP machine still plugged in. Breathing treatment started patient's SpO2 shon to 96%.

## 2025-06-26 NOTE — PROGRESS NOTES
[facility]      Patient - Fredo Rod   MRN -  [id number]   [facility] # - [id number]   - 1970      Date of Admission -  2025 10:09 PM  Date of evaluation -  2025  Room - -[facility]   Hospital Day - 5  Consulting - Catrachito Junior MD Primary Care Physician - Mohinder Page MD     Problem List      Active Hospital Problems    Diagnosis Date Noted    Hypercapnic respiratory failure (HCC) [J96.92] 2025     Reason for Consult    Acute on Chronic Respiratory Failure with hypoxia and hypercapnia, COPD exacerbation  HPI   History Obtained From: Patient and electronic medical record.    Fredo Rod is a 55 y.o. male with pmhx of COPD on 2 lpm, CHF, CAD, primary hypertension that presented for 2 days of exertional dyspnea and peripheral edema. He was also noted to have waxing and waning mental status changes and hypersomnolence. Patient was placed on BiPAP and admitted to the ICU with low threshold for intubation.     Per patient, he had been having difficulty with tolerating the NIV mask causing him to stop using the machine when sleeping over 1 month ago. He states the mask would hurt his jaw, making it difficult to chew. He is interested in trying a different mask.     I discussed the importance of compliance with the patient and he stated he would like to try to be better at using the NIV with a different mask. He is not interested in a tracheostomy at this time. I informed the patient that if he is unable to tolerate the NIV and does not want the tracheostomy then the next recommendation would be hospice. Patient expressed understanding and wanted to continue NIV.       Last 24 Hours   -ABG this am 7.35/67/69/37   -Overnight wore home Astral with settings to: EPAP min 4 cmH2O and max 14 cmH2O, PS min 5 with max of 20, TV target 550, RR target 17.   -He likes the new full face mask.    -No complaints, discharge planning     PMHx   Past

## 2025-06-26 NOTE — PLAN OF CARE
Problem: Discharge Planning  Goal: Discharge to home or other facility with appropriate resources  Flowsheets (Taken 6/26/2025 0039)  Discharge to home or other facility with appropriate resources: Identify barriers to discharge with patient and caregiver     Problem: Pain  Goal: Verbalizes/displays adequate comfort level or baseline comfort level  Flowsheets (Taken 6/26/2025 0039)  Verbalizes/displays adequate comfort level or baseline comfort level:   Encourage patient to monitor pain and request assistance   Administer analgesics based on type and severity of pain and evaluate response   Consider cultural and social influences on pain and pain management   Assess pain using appropriate pain scale   Implement non-pharmacological measures as appropriate and evaluate response     Problem: Safety - Adult  Goal: Free from fall injury  Flowsheets (Taken 6/26/2025 0039)  Free From Fall Injury: Instruct family/caregiver on patient safety     Problem: Respiratory - Adult  Goal: Achieves optimal ventilation and oxygenation  Flowsheets  Taken 6/26/2025 0039 by Brandon Babcock, RN  Achieves optimal ventilation and oxygenation:   Assess for changes in respiratory status   Position to facilitate oxygenation and minimize respiratory effort   Respiratory therapy support as indicated   Assess for changes in mentation and behavior   Oxygen supplementation based on oxygen saturation or arterial blood gases   Encourage broncho-pulmonary hygiene including cough, deep breathe, incentive spirometry   Assess and instruct to report shortness of breath or any respiratory difficulty  Taken 6/25/2025 1150 by Justine Rayo RCP  Achieves optimal ventilation and oxygenation: Respiratory therapy support as indicated

## 2025-06-26 NOTE — TELEPHONE ENCOUNTER
sleep office visit - 9.11.25 @ 9:40 w [facility]    pft - 9.26.25 @ 10 am ( chest x-ray to be completed this same day , walk-in)\  hfu - 9.29.25 @ 10 am w eron Scott serve sent to eron to update d/c instructions   Kd sched appts , I sched testing - appt / testing reminder mailed out to patient w preps

## 2025-06-26 NOTE — PROGRESS NOTES
A home oxygen evaluation has been completed.     [x]Patient is an inpatient. It is expected that the patient will be discharged within the next 48 hours. Qualified provider to write order for home prescription if patient qualifies. Social service/care managers will arrange for home oxygen.  If patient is active, arrange for Home Medical supplier to assess for Oxygen Conserving Device per pulse oximetry.  []Patient is an outpatient. Results will be faxed to the ordering provider. Qualified provider to write order for home prescription if patient qualifies and arranges for home oxygen.    Patient was placed on room air for 10 minutes. SpO2 was 85 % on room air at rest. 2 liters is 92% Patient was walked. SpO2 was 83 % during walking. Patients SpO2 was below 89% and qualified for home oxygen. Oxygen was applied at 3 lpm via nasal cannula to maintain a SpO2 between 90-92% while walking. Actual SpO2 was 91 %

## 2025-06-27 NOTE — PROGRESS NOTES
Physician Progress Note      PATIENT:               JEFF BARNETT  CSN #:                  [id number]  :                       1970  ADMIT DATE:       2025 10:09 PM  DISCH DATE:        2025 2:35 PM  RESPONDING  PROVIDER #:        Catrachito Junior MD          QUERY TEXT:    A mental status change is documented in the medical record H&P and   subsequently \" The patient was also displaying intermittent periods of   hypersomnolence with IM () \" Subjective (past 24 hours): Patient seen   evaluated bedside. A&Ox4. On 4 L NC. No complaints\"   Please specify the   underlying cause:    The clinical indicators include:  Risk :  per Critical Care () \"Acute hypoxic hypercapnic respiratory failure:   Likely secondary to COPD and HFpEF exacerbation.  History of severe COPD.\"    Clinical indicators :  H&P and subsequently \" The patient was also displaying intermittent periods of   hypersomnolence with IM () \" Subjective (past 24 hours): Patient seen   evaluated bedside. A&Ox4. On 4 L NC. No complaints\"  CT head () No acute intracranial findings.  -- ABGs pH 7.26 ,7.30 ref (7.35-7.45) , pCO2 110, 91 ( ref 35-45)  pO2 75, 72    (ref )    Treatment :  Admission , imaging , labs , Underlying \"Acute hypoxic hypercapnic respiratory   failure: Likely secondary to COPD and HFpEF exacerbation\"  treated ,   Pulmonology and Critical care consults    Thank You,  Manolo NAVARRO, RN, CRCR  Clinical   P: [phone]  Options provided:  -- Metabolic encephalopathy, secondary to Acute on chronic resp failure.  -- Other - I will add my own diagnosis  -- Disagree - Not applicable / Not valid  -- Disagree - Clinically unable to determine / Unknown  -- Refer to Clinical Documentation Reviewer    PROVIDER RESPONSE TEXT:    Metabolic encephalopathy, secondary to Acute on chronic resp failure.    Query created by: Manolo Maguire on 2025 8:49 AM      Electronically signed by:  Catrachito

## 2025-07-03 ENCOUNTER — APPOINTMENT (OUTPATIENT)
Dept: GENERAL RADIOLOGY | Age: 55
DRG: 137 | End: 2025-07-03
Payer: MEDICAID

## 2025-07-03 ENCOUNTER — APPOINTMENT (OUTPATIENT)
Dept: CT IMAGING | Age: 55
DRG: 137 | End: 2025-07-03
Payer: MEDICAID

## 2025-07-03 ENCOUNTER — HOSPITAL ENCOUNTER (INPATIENT)
Age: 55
LOS: 3 days | Discharge: HOME | DRG: 137 | End: 2025-07-06
Admitting: STUDENT IN AN ORGANIZED HEALTH CARE EDUCATION/TRAINING PROGRAM
Payer: MEDICAID

## 2025-07-03 DIAGNOSIS — J44.1 COPD EXACERBATION (HCC): Primary | ICD-10-CM

## 2025-07-03 DIAGNOSIS — J96.12 ACUTE HYPOXIC ON CHRONIC HYPERCAPNIC RESPIRATORY FAILURE (HCC): ICD-10-CM

## 2025-07-03 DIAGNOSIS — J44.9 CHRONIC OBSTRUCTIVE PULMONARY DISEASE, UNSPECIFIED COPD TYPE (HCC): ICD-10-CM

## 2025-07-03 DIAGNOSIS — J96.01 ACUTE HYPOXIC ON CHRONIC HYPERCAPNIC RESPIRATORY FAILURE (HCC): ICD-10-CM

## 2025-07-03 DIAGNOSIS — J44.9 STAGE 4 VERY SEVERE COPD BY GOLD CLASSIFICATION (HCC): ICD-10-CM

## 2025-07-03 LAB
ALBUMIN SERPL BCG-MCNC: 3.5 G/DL (ref 3.4–4.9)
ALP SERPL-CCNC: 128 U/L (ref 40–129)
ALT SERPL W/O P-5'-P-CCNC: 33 U/L (ref 10–50)
ANION GAP SERPL CALC-SCNC: 7 MEQ/L (ref 8–16)
ANISOCYTOSIS BLD QL SMEAR: PRESENT
ARTERIAL PATENCY WRIST A: POSITIVE
ARTERIAL PATENCY WRIST A: POSITIVE
AST SERPL-CCNC: 33 U/L (ref 10–50)
BASE EXCESS BLDA CALC-SCNC: 15.5 MMOL/L (ref -2.5–2.5)
BASE EXCESS BLDA CALC-SCNC: 15.9 MMOL/L (ref -2.5–2.5)
BASOPHILS ABSOLUTE: 0.1 THOU/MM3 (ref 0–0.1)
BASOPHILS NFR BLD AUTO: 0.5 %
BDY SITE: ABNORMAL
BDY SITE: ABNORMAL
BILIRUB SERPL-MCNC: 0.5 MG/DL (ref 0.3–1.2)
BREATHS SETTING VENT: 16 BPM
BUN SERPL-MCNC: 13 MG/DL (ref 8–23)
CA-I BLD ISE-SCNC: 1.23 MMOL/L (ref 1.12–1.32)
CALCIUM SERPL-MCNC: 8.5 MG/DL (ref 8.6–10)
CHLORIDE BLD-SCNC: 91 MEQ/L (ref 98–109)
CHLORIDE SERPL-SCNC: 95 MEQ/L (ref 98–111)
CO2 SERPL-SCNC: 42 MEQ/L (ref 22–29)
COLLECTED BY:: ABNORMAL
COLLECTED BY:: ABNORMAL
CREAT SERPL-MCNC: 0.6 MG/DL (ref 0.7–1.2)
DEPRECATED RDW RBC AUTO: 64.2 FL (ref 35–45)
DEVICE: ABNORMAL
DEVICE: ABNORMAL
EOSINOPHIL NFR BLD AUTO: 0.9 %
EOSINOPHILS ABSOLUTE: 0.1 THOU/MM3 (ref 0–0.4)
ERYTHROCYTE [DISTWIDTH] IN BLOOD BY AUTOMATED COUNT: 16.2 % (ref 11.5–14.5)
FIO2 ON VENT O2 ANALYZER: 15 %
FIO2 ON VENT O2 ANALYZER: 80 %
GFR SERPL CREATININE-BSD FRML MDRD: > 90 ML/MIN/1.73M2
GLUCOSE BLD STRIP.AUTO-MCNC: 117 MG/DL (ref 70–108)
GLUCOSE BLD-MCNC: 112 MG/DL (ref 70–108)
GLUCOSE SERPL-MCNC: 113 MG/DL (ref 74–109)
HCO3 BLDA-SCNC: 46 MMOL/L (ref 23–28)
HCO3 BLDA-SCNC: 49 MMOL/L (ref 23–28)
HCT VFR BLD AUTO: 46.1 % (ref 42–52)
HGB BLD-MCNC: 13.3 GM/DL (ref 14–18)
HYPOCHROMIA BLD QL SMEAR: PRESENT
IMM GRANULOCYTES # BLD AUTO: 0.14 THOU/MM3 (ref 0–0.07)
IMM GRANULOCYTES NFR BLD AUTO: 1.1 %
LACTATE BLD-SCNC: < 0.4 MMOL/L (ref 0.5–1.9)
LACTATE SERPL-SCNC: 0.7 MMOL/L (ref 0.5–2.2)
LYMPHOCYTES ABSOLUTE: 1.3 THOU/MM3 (ref 1–4.8)
LYMPHOCYTES NFR BLD AUTO: 10.2 %
MAGNESIUM SERPL-MCNC: 2.1 MG/DL (ref 1.6–2.6)
MCH RBC QN AUTO: 31 PG (ref 26–33)
MCHC RBC AUTO-ENTMCNC: 28.9 GM/DL (ref 32.2–35.5)
MCV RBC AUTO: 107.5 FL (ref 80–94)
MONOCYTES ABSOLUTE: 1.1 THOU/MM3 (ref 0.4–1.3)
MONOCYTES NFR BLD AUTO: 8.2 %
NEUTROPHILS ABSOLUTE: 10.4 THOU/MM3 (ref 1.8–7.7)
NEUTROPHILS NFR BLD AUTO: 79.1 %
NRBC BLD AUTO-RTO: 0 /100 WBC
NT-PROBNP SERPL IA-MCNC: 464 PG/ML (ref 0–124)
OSMOLALITY SERPL CALC.SUM OF ELEC: 287.8 MOSMOL/KG (ref 275–300)
PCO2 BLDA: 103 MMHG (ref 35–45)
PCO2 BLDA: 82 MMHG (ref 35–45)
PEEP SETTING VENT: 8 MMHG
PH BLDA: 7.29 [PH] (ref 7.35–7.45)
PH BLDA: 7.36 [PH] (ref 7.35–7.45)
PIP: 28 CMH2O
PLATELET # BLD AUTO: 442 THOU/MM3 (ref 130–400)
PLATELET BLD QL SMEAR: ABNORMAL
PMV BLD AUTO: 9.8 FL (ref 9.4–12.4)
PO2 BLDA: 134 MMHG (ref 71–104)
PO2 BLDA: 241 MMHG (ref 71–104)
POC CREATININE WHOLE BLOOD: 0.9 MG/DL (ref 0.5–1.2)
POTASSIUM BLD-SCNC: 3.9 MEQ/L (ref 3.5–4.9)
POTASSIUM SERPL-SCNC: 4.1 MEQ/L (ref 3.5–5.2)
PROT SERPL-MCNC: 6.7 G/DL (ref 6.4–8.3)
RBC # BLD AUTO: 4.29 MILL/MM3 (ref 4.7–6.1)
SAO2 % BLDA: 100 %
SAO2 % BLDA: 99 %
SCAN OF BLOOD SMEAR: NORMAL
SODIUM BLD-SCNC: 146 MEQ/L (ref 138–146)
SODIUM SERPL-SCNC: 144 MEQ/L (ref 135–145)
STOMATOCYTES: ABNORMAL
TROPONIN, HIGH SENSITIVITY: 16 NG/L (ref 0–12)
TROPONIN, HIGH SENSITIVITY: 20 NG/L (ref 0–12)
VENTILATION MODE VENT: ABNORMAL
VENTILATION MODE VENT: ABNORMAL
WBC # BLD AUTO: 13.1 THOU/MM3 (ref 4.8–10.8)

## 2025-07-03 PROCEDURE — 82330 ASSAY OF CALCIUM: CPT

## 2025-07-03 PROCEDURE — 82565 ASSAY OF CREATININE: CPT

## 2025-07-03 PROCEDURE — 96365 THER/PROPH/DIAG IV INF INIT: CPT

## 2025-07-03 PROCEDURE — 82435 ASSAY OF BLOOD CHLORIDE: CPT

## 2025-07-03 PROCEDURE — 83880 ASSAY OF NATRIURETIC PEPTIDE: CPT

## 2025-07-03 PROCEDURE — 5A09357 ASSISTANCE WITH RESPIRATORY VENTILATION, LESS THAN 24 CONSECUTIVE HOURS, CONTINUOUS POSITIVE AIRWAY PRESSURE: ICD-10-PCS | Performed by: INTERNAL MEDICINE

## 2025-07-03 PROCEDURE — 93005 ELECTROCARDIOGRAM TRACING: CPT

## 2025-07-03 PROCEDURE — 6360000002 HC RX W HCPCS

## 2025-07-03 PROCEDURE — 80053 COMPREHEN METABOLIC PANEL: CPT

## 2025-07-03 PROCEDURE — 83735 ASSAY OF MAGNESIUM: CPT

## 2025-07-03 PROCEDURE — 84295 ASSAY OF SERUM SODIUM: CPT

## 2025-07-03 PROCEDURE — 82803 BLOOD GASES ANY COMBINATION: CPT

## 2025-07-03 PROCEDURE — 0BH17EZ INSERTION OF ENDOTRACHEAL AIRWAY INTO TRACHEA, VIA NATURAL OR ARTIFICIAL OPENING: ICD-10-PCS | Performed by: INTERNAL MEDICINE

## 2025-07-03 PROCEDURE — 96368 THER/DIAG CONCURRENT INF: CPT

## 2025-07-03 PROCEDURE — 84484 ASSAY OF TROPONIN QUANT: CPT

## 2025-07-03 PROCEDURE — 83605 ASSAY OF LACTIC ACID: CPT

## 2025-07-03 PROCEDURE — 99291 CRITICAL CARE FIRST HOUR: CPT | Performed by: STUDENT IN AN ORGANIZED HEALTH CARE EDUCATION/TRAINING PROGRAM

## 2025-07-03 PROCEDURE — 2500000003 HC RX 250 WO HCPCS

## 2025-07-03 PROCEDURE — 31500 INSERT EMERGENCY AIRWAY: CPT

## 2025-07-03 PROCEDURE — 99285 EMERGENCY DEPT VISIT HI MDM: CPT

## 2025-07-03 PROCEDURE — 94660 CPAP INITIATION&MGMT: CPT

## 2025-07-03 PROCEDURE — 36415 COLL VENOUS BLD VENIPUNCTURE: CPT

## 2025-07-03 PROCEDURE — 2000000000 HC ICU R&B

## 2025-07-03 PROCEDURE — 5A1945Z RESPIRATORY VENTILATION, 24-96 CONSECUTIVE HOURS: ICD-10-PCS | Performed by: INTERNAL MEDICINE

## 2025-07-03 PROCEDURE — 87040 BLOOD CULTURE FOR BACTERIA: CPT

## 2025-07-03 PROCEDURE — 84132 ASSAY OF SERUM POTASSIUM: CPT

## 2025-07-03 PROCEDURE — 6370000000 HC RX 637 (ALT 250 FOR IP)

## 2025-07-03 PROCEDURE — 71045 X-RAY EXAM CHEST 1 VIEW: CPT

## 2025-07-03 PROCEDURE — 82948 REAGENT STRIP/BLOOD GLUCOSE: CPT

## 2025-07-03 PROCEDURE — 82947 ASSAY GLUCOSE BLOOD QUANT: CPT

## 2025-07-03 PROCEDURE — 94002 VENT MGMT INPAT INIT DAY: CPT

## 2025-07-03 PROCEDURE — 36600 WITHDRAWAL OF ARTERIAL BLOOD: CPT

## 2025-07-03 PROCEDURE — 96375 TX/PRO/DX INJ NEW DRUG ADDON: CPT

## 2025-07-03 PROCEDURE — 94640 AIRWAY INHALATION TREATMENT: CPT

## 2025-07-03 PROCEDURE — 2580000003 HC RX 258

## 2025-07-03 PROCEDURE — 85025 COMPLETE CBC W/AUTO DIFF WBC: CPT

## 2025-07-03 RX ORDER — ENOXAPARIN SODIUM 100 MG/ML
30 INJECTION SUBCUTANEOUS 2 TIMES DAILY
Status: DISCONTINUED | OUTPATIENT
Start: 2025-07-04 | End: 2025-07-06 | Stop reason: HOSPADM

## 2025-07-03 RX ORDER — CHLORHEXIDINE GLUCONATE ORAL RINSE 1.2 MG/ML
15 SOLUTION DENTAL 2 TIMES DAILY
Status: DISCONTINUED | OUTPATIENT
Start: 2025-07-03 | End: 2025-07-05

## 2025-07-03 RX ORDER — POLYETHYLENE GLYCOL 3350 17 G/17G
17 POWDER, FOR SOLUTION ORAL DAILY PRN
Status: DISCONTINUED | OUTPATIENT
Start: 2025-07-03 | End: 2025-07-06 | Stop reason: HOSPADM

## 2025-07-03 RX ORDER — ACETAMINOPHEN 325 MG/1
650 TABLET ORAL EVERY 6 HOURS PRN
Status: DISCONTINUED | OUTPATIENT
Start: 2025-07-03 | End: 2025-07-06 | Stop reason: HOSPADM

## 2025-07-03 RX ORDER — SODIUM CHLORIDE 9 MG/ML
INJECTION, SOLUTION INTRAVENOUS PRN
Status: DISCONTINUED | OUTPATIENT
Start: 2025-07-03 | End: 2025-07-06 | Stop reason: HOSPADM

## 2025-07-03 RX ORDER — MIDAZOLAM HYDROCHLORIDE 1 MG/ML
4 INJECTION, SOLUTION INTRAMUSCULAR; INTRAVENOUS ONCE
Status: COMPLETED | OUTPATIENT
Start: 2025-07-03 | End: 2025-07-03

## 2025-07-03 RX ORDER — PROPOFOL 10 MG/ML
5-50 INJECTION, EMULSION INTRAVENOUS CONTINUOUS
Status: DISCONTINUED | OUTPATIENT
Start: 2025-07-03 | End: 2025-07-05

## 2025-07-03 RX ORDER — ONDANSETRON 2 MG/ML
4 INJECTION INTRAMUSCULAR; INTRAVENOUS EVERY 6 HOURS PRN
Status: DISCONTINUED | OUTPATIENT
Start: 2025-07-03 | End: 2025-07-06 | Stop reason: HOSPADM

## 2025-07-03 RX ORDER — MAGNESIUM SULFATE IN WATER 40 MG/ML
2000 INJECTION, SOLUTION INTRAVENOUS PRN
Status: DISCONTINUED | OUTPATIENT
Start: 2025-07-03 | End: 2025-07-06 | Stop reason: HOSPADM

## 2025-07-03 RX ORDER — MIDAZOLAM HYDROCHLORIDE 1 MG/ML
2 INJECTION, SOLUTION INTRAMUSCULAR; INTRAVENOUS ONCE
Status: COMPLETED | OUTPATIENT
Start: 2025-07-03 | End: 2025-07-03

## 2025-07-03 RX ORDER — SUCCINYLCHOLINE CHLORIDE 20 MG/ML
100 INJECTION INTRAMUSCULAR; INTRAVENOUS ONCE
Status: COMPLETED | OUTPATIENT
Start: 2025-07-03 | End: 2025-07-03

## 2025-07-03 RX ORDER — IPRATROPIUM BROMIDE AND ALBUTEROL SULFATE 2.5; .5 MG/3ML; MG/3ML
3 SOLUTION RESPIRATORY (INHALATION) ONCE
Status: COMPLETED | OUTPATIENT
Start: 2025-07-03 | End: 2025-07-03

## 2025-07-03 RX ORDER — SODIUM CHLORIDE 0.9 % (FLUSH) 0.9 %
5-40 SYRINGE (ML) INJECTION EVERY 12 HOURS SCHEDULED
Status: DISCONTINUED | OUTPATIENT
Start: 2025-07-03 | End: 2025-07-06 | Stop reason: HOSPADM

## 2025-07-03 RX ORDER — POTASSIUM CHLORIDE 1500 MG/1
40 TABLET, EXTENDED RELEASE ORAL PRN
Status: DISCONTINUED | OUTPATIENT
Start: 2025-07-03 | End: 2025-07-06 | Stop reason: HOSPADM

## 2025-07-03 RX ORDER — 0.9 % SODIUM CHLORIDE 0.9 %
500 INTRAVENOUS SOLUTION INTRAVENOUS ONCE
Status: COMPLETED | OUTPATIENT
Start: 2025-07-03 | End: 2025-07-03

## 2025-07-03 RX ORDER — MAGNESIUM SULFATE IN WATER 40 MG/ML
2000 INJECTION, SOLUTION INTRAVENOUS ONCE
Status: COMPLETED | OUTPATIENT
Start: 2025-07-03 | End: 2025-07-03

## 2025-07-03 RX ORDER — SODIUM CHLORIDE 0.9 % (FLUSH) 0.9 %
5-40 SYRINGE (ML) INJECTION PRN
Status: DISCONTINUED | OUTPATIENT
Start: 2025-07-03 | End: 2025-07-06 | Stop reason: HOSPADM

## 2025-07-03 RX ORDER — POTASSIUM CHLORIDE 7.45 MG/ML
10 INJECTION INTRAVENOUS PRN
Status: DISCONTINUED | OUTPATIENT
Start: 2025-07-03 | End: 2025-07-06 | Stop reason: HOSPADM

## 2025-07-03 RX ORDER — ETOMIDATE 2 MG/ML
20 INJECTION INTRAVENOUS ONCE
Status: COMPLETED | OUTPATIENT
Start: 2025-07-03 | End: 2025-07-03

## 2025-07-03 RX ORDER — ACETAMINOPHEN 650 MG/1
650 SUPPOSITORY RECTAL EVERY 6 HOURS PRN
Status: DISCONTINUED | OUTPATIENT
Start: 2025-07-03 | End: 2025-07-06 | Stop reason: HOSPADM

## 2025-07-03 RX ORDER — ONDANSETRON 4 MG/1
4 TABLET, ORALLY DISINTEGRATING ORAL EVERY 8 HOURS PRN
Status: DISCONTINUED | OUTPATIENT
Start: 2025-07-03 | End: 2025-07-06 | Stop reason: HOSPADM

## 2025-07-03 RX ADMIN — FAMOTIDINE 20 MG: 10 INJECTION, SOLUTION INTRAVENOUS at 21:18

## 2025-07-03 RX ADMIN — MIDAZOLAM 2 MG: 1 INJECTION INTRAMUSCULAR; INTRAVENOUS at 21:30

## 2025-07-03 RX ADMIN — MIDAZOLAM 4 MG: 1 INJECTION INTRAMUSCULAR; INTRAVENOUS at 20:39

## 2025-07-03 RX ADMIN — SODIUM CHLORIDE 500 ML: 9 INJECTION, SOLUTION INTRAVENOUS at 20:15

## 2025-07-03 RX ADMIN — PROPOFOL 20 MCG/KG/MIN: 10 INJECTION, EMULSION INTRAVENOUS at 21:32

## 2025-07-03 RX ADMIN — Medication 100 MG: at 20:26

## 2025-07-03 RX ADMIN — ETOMIDATE 20 MG: 2 INJECTION INTRAVENOUS at 20:25

## 2025-07-03 RX ADMIN — AZITHROMYCIN MONOHYDRATE 500 MG: 500 INJECTION, POWDER, LYOPHILIZED, FOR SOLUTION INTRAVENOUS at 19:51

## 2025-07-03 RX ADMIN — WATER 125 MG: 1 INJECTION INTRAMUSCULAR; INTRAVENOUS; SUBCUTANEOUS at 19:42

## 2025-07-03 RX ADMIN — DEXMEDETOMIDINE 0.2 MCG/KG/HR: 100 INJECTION, SOLUTION INTRAVENOUS at 20:31

## 2025-07-03 RX ADMIN — MAGNESIUM SULFATE HEPTAHYDRATE 2000 MG: 40 INJECTION, SOLUTION INTRAVENOUS at 19:53

## 2025-07-03 RX ADMIN — IPRATROPIUM BROMIDE AND ALBUTEROL SULFATE 3 DOSE: .5; 3 SOLUTION RESPIRATORY (INHALATION) at 19:40

## 2025-07-03 RX ADMIN — SODIUM CHLORIDE 5 MCG/MIN: 0.9 INJECTION, SOLUTION INTRAVENOUS at 21:45

## 2025-07-03 ASSESSMENT — PAIN - FUNCTIONAL ASSESSMENT: PAIN_FUNCTIONAL_ASSESSMENT: NONE - DENIES PAIN

## 2025-07-03 ASSESSMENT — PULMONARY FUNCTION TESTS
PIF_VALUE: 28
PIF_VALUE: 25

## 2025-07-03 NOTE — ED TRIAGE NOTES
Pt presents to ED via EMS for SOB that started this evening at home. Family called squad. Pt has history of COPD, CHF. EMS reports when they arrived to pick pt up, pt was GCS 15, A&Ox4. While in route, pt started declining and staring off into space. Dr. Escobedo at bedside on pt arrival.

## 2025-07-04 ENCOUNTER — APPOINTMENT (OUTPATIENT)
Dept: CT IMAGING | Age: 55
DRG: 137 | End: 2025-07-04
Payer: MEDICAID

## 2025-07-04 PROBLEM — J96.12 ACUTE HYPOXIC ON CHRONIC HYPERCAPNIC RESPIRATORY FAILURE (HCC): Status: ACTIVE | Noted: 2024-09-13

## 2025-07-04 LAB
ANION GAP SERPL CALC-SCNC: 8 MEQ/L (ref 8–16)
ARTERIAL PATENCY WRIST A: ABNORMAL
ARTERIAL PATENCY WRIST A: POSITIVE
BASE EXCESS BLDA CALC-SCNC: 14.5 MMOL/L (ref -2.5–2.5)
BASE EXCESS BLDA CALC-SCNC: 20.2 MMOL/L (ref -2.5–2.5)
BASOPHILS ABSOLUTE: 0 THOU/MM3 (ref 0–0.1)
BASOPHILS NFR BLD AUTO: 0.2 %
BDY SITE: ABNORMAL
BDY SITE: ABNORMAL
BREATHS SETTING VENT: 16 BPM
BUN SERPL-MCNC: 17 MG/DL (ref 8–23)
CALCIUM SERPL-MCNC: 8.4 MG/DL (ref 8.6–10)
CHLORIDE SERPL-SCNC: 97 MEQ/L (ref 98–111)
CO2 SERPL-SCNC: 37 MEQ/L (ref 22–29)
COLLECTED BY:: ABNORMAL
COLLECTED BY:: ABNORMAL
CREAT SERPL-MCNC: 0.6 MG/DL (ref 0.7–1.2)
DEPRECATED RDW RBC AUTO: 63.6 FL (ref 35–45)
DEVICE: ABNORMAL
DEVICE: ABNORMAL
EKG ATRIAL RATE: 90 BPM
EKG P AXIS: 39 DEGREES
EKG P-R INTERVAL: 156 MS
EKG Q-T INTERVAL: 372 MS
EKG QRS DURATION: 80 MS
EKG QTC CALCULATION (BAZETT): 455 MS
EKG R AXIS: -28 DEGREES
EKG T AXIS: 37 DEGREES
EKG VENTRICULAR RATE: 90 BPM
EOSINOPHIL NFR BLD AUTO: 0.1 %
EOSINOPHILS ABSOLUTE: 0 THOU/MM3 (ref 0–0.4)
ERYTHROCYTE [DISTWIDTH] IN BLOOD BY AUTOMATED COUNT: 16 % (ref 11.5–14.5)
FIO2 ON VENT O2 ANALYZER: 40 %
FIO2 ON VENT O2 ANALYZER: 45 %
GFR SERPL CREATININE-BSD FRML MDRD: > 90 ML/MIN/1.73M2
GLUCOSE BLD STRIP.AUTO-MCNC: 112 MG/DL (ref 70–108)
GLUCOSE SERPL-MCNC: 164 MG/DL (ref 74–109)
HCO3 BLDA-SCNC: 45 MMOL/L (ref 23–28)
HCO3 BLDA-SCNC: 48 MMOL/L (ref 23–28)
HCT VFR BLD AUTO: 45 % (ref 42–52)
HGB BLD-MCNC: 12.9 GM/DL (ref 14–18)
HYPOCHROMIA BLD QL SMEAR: PRESENT
IMM GRANULOCYTES # BLD AUTO: 0.13 THOU/MM3 (ref 0–0.07)
IMM GRANULOCYTES NFR BLD AUTO: 1 %
LYMPHOCYTES ABSOLUTE: 0.6 THOU/MM3 (ref 1–4.8)
LYMPHOCYTES NFR BLD AUTO: 4.7 %
MCH RBC QN AUTO: 30.9 PG (ref 26–33)
MCHC RBC AUTO-ENTMCNC: 28.7 GM/DL (ref 32.2–35.5)
MCV RBC AUTO: 107.7 FL (ref 80–94)
MONOCYTES ABSOLUTE: 0.1 THOU/MM3 (ref 0.4–1.3)
MONOCYTES NFR BLD AUTO: 1.1 %
NEUTROPHILS ABSOLUTE: 12.3 THOU/MM3 (ref 1.8–7.7)
NEUTROPHILS NFR BLD AUTO: 92.9 %
NRBC BLD AUTO-RTO: 0 /100 WBC
OSMOLALITY SERPL CALC.SUM OF ELEC: 288.3 MOSMOL/KG (ref 275–300)
PCO2 BLDA: 64 MMHG (ref 35–45)
PCO2 BLDA: 81 MMHG (ref 35–45)
PEEP SETTING VENT: 6 MMHG
PEEP SETTING VENT: 6 MMHG
PH BLDA: 7.35 [PH] (ref 7.35–7.45)
PH BLDA: 7.48 [PH] (ref 7.35–7.45)
PIP: 24 CMH2O
PIP: 26 CMH2O
PLATELET # BLD AUTO: 439 THOU/MM3 (ref 130–400)
PMV BLD AUTO: 9.9 FL (ref 9.4–12.4)
PO2 BLDA: 66 MMHG (ref 71–104)
PO2 BLDA: 72 MMHG (ref 71–104)
POTASSIUM SERPL-SCNC: 4.7 MEQ/L (ref 3.5–5.2)
RBC # BLD AUTO: 4.18 MILL/MM3 (ref 4.7–6.1)
SAO2 % BLDA: 90 %
SAO2 % BLDA: 95 %
SCAN OF BLOOD SMEAR: NORMAL
SODIUM SERPL-SCNC: 142 MEQ/L (ref 135–145)
VENTILATION MODE VENT: ABNORMAL
VENTILATION MODE VENT: ABNORMAL
WBC # BLD AUTO: 13.2 THOU/MM3 (ref 4.8–10.8)

## 2025-07-04 PROCEDURE — 2000000000 HC ICU R&B

## 2025-07-04 PROCEDURE — 87205 SMEAR GRAM STAIN: CPT

## 2025-07-04 PROCEDURE — 2580000003 HC RX 258: Performed by: STUDENT IN AN ORGANIZED HEALTH CARE EDUCATION/TRAINING PROGRAM

## 2025-07-04 PROCEDURE — 87081 CULTURE SCREEN ONLY: CPT

## 2025-07-04 PROCEDURE — 85025 COMPLETE CBC W/AUTO DIFF WBC: CPT

## 2025-07-04 PROCEDURE — 6370000000 HC RX 637 (ALT 250 FOR IP)

## 2025-07-04 PROCEDURE — 6360000004 HC RX CONTRAST MEDICATION

## 2025-07-04 PROCEDURE — 2500000003 HC RX 250 WO HCPCS

## 2025-07-04 PROCEDURE — 51798 US URINE CAPACITY MEASURE: CPT

## 2025-07-04 PROCEDURE — 94003 VENT MGMT INPAT SUBQ DAY: CPT

## 2025-07-04 PROCEDURE — 94640 AIRWAY INHALATION TREATMENT: CPT

## 2025-07-04 PROCEDURE — 70450 CT HEAD/BRAIN W/O DYE: CPT

## 2025-07-04 PROCEDURE — 93010 ELECTROCARDIOGRAM REPORT: CPT | Performed by: INTERNAL MEDICINE

## 2025-07-04 PROCEDURE — 6360000002 HC RX W HCPCS

## 2025-07-04 PROCEDURE — 2700000000 HC OXYGEN THERAPY PER DAY

## 2025-07-04 PROCEDURE — 87070 CULTURE OTHR SPECIMN AEROBIC: CPT

## 2025-07-04 PROCEDURE — 80048 BASIC METABOLIC PNL TOTAL CA: CPT

## 2025-07-04 PROCEDURE — 87086 URINE CULTURE/COLONY COUNT: CPT

## 2025-07-04 PROCEDURE — 71275 CT ANGIOGRAPHY CHEST: CPT

## 2025-07-04 PROCEDURE — 36600 WITHDRAWAL OF ARTERIAL BLOOD: CPT

## 2025-07-04 PROCEDURE — 2500000003 HC RX 250 WO HCPCS: Performed by: STUDENT IN AN ORGANIZED HEALTH CARE EDUCATION/TRAINING PROGRAM

## 2025-07-04 PROCEDURE — 36415 COLL VENOUS BLD VENIPUNCTURE: CPT

## 2025-07-04 PROCEDURE — 99291 CRITICAL CARE FIRST HOUR: CPT | Performed by: SURGERY

## 2025-07-04 PROCEDURE — 82948 REAGENT STRIP/BLOOD GLUCOSE: CPT

## 2025-07-04 PROCEDURE — 94761 N-INVAS EAR/PLS OXIMETRY MLT: CPT

## 2025-07-04 PROCEDURE — 82803 BLOOD GASES ANY COMBINATION: CPT

## 2025-07-04 RX ORDER — ASPIRIN 81 MG/1
81 TABLET, CHEWABLE ORAL DAILY
Status: DISCONTINUED | OUTPATIENT
Start: 2025-07-05 | End: 2025-07-06 | Stop reason: HOSPADM

## 2025-07-04 RX ORDER — METOPROLOL SUCCINATE 25 MG/1
12.5 TABLET, EXTENDED RELEASE ORAL EVERY EVENING
Status: DISCONTINUED | OUTPATIENT
Start: 2025-07-04 | End: 2025-07-06 | Stop reason: HOSPADM

## 2025-07-04 RX ORDER — ALBUTEROL SULFATE 0.83 MG/ML
2.5 SOLUTION RESPIRATORY (INHALATION) EVERY 4 HOURS PRN
Status: DISCONTINUED | OUTPATIENT
Start: 2025-07-04 | End: 2025-07-06 | Stop reason: HOSPADM

## 2025-07-04 RX ORDER — HYDROCORTISONE SODIUM SUCCINATE 100 MG/2ML
50 INJECTION INTRAMUSCULAR; INTRAVENOUS EVERY 6 HOURS
Status: DISCONTINUED | OUTPATIENT
Start: 2025-07-05 | End: 2025-07-05

## 2025-07-04 RX ORDER — DEXMEDETOMIDINE HYDROCHLORIDE 4 UG/ML
.1-1.5 INJECTION, SOLUTION INTRAVENOUS CONTINUOUS
Status: DISCONTINUED | OUTPATIENT
Start: 2025-07-04 | End: 2025-07-05

## 2025-07-04 RX ORDER — ALBUTEROL SULFATE 0.83 MG/ML
2.5 SOLUTION RESPIRATORY (INHALATION) EVERY 6 HOURS PRN
Status: DISCONTINUED | OUTPATIENT
Start: 2025-07-04 | End: 2025-07-06 | Stop reason: HOSPADM

## 2025-07-04 RX ORDER — AZITHROMYCIN 250 MG/1
250 TABLET, FILM COATED ORAL
Status: DISCONTINUED | OUTPATIENT
Start: 2025-07-04 | End: 2025-07-06 | Stop reason: HOSPADM

## 2025-07-04 RX ORDER — IOPAMIDOL 755 MG/ML
80 INJECTION, SOLUTION INTRAVASCULAR
Status: COMPLETED | OUTPATIENT
Start: 2025-07-04 | End: 2025-07-04

## 2025-07-04 RX ORDER — LOSARTAN POTASSIUM 50 MG/1
50 TABLET ORAL DAILY
Status: DISCONTINUED | OUTPATIENT
Start: 2025-07-04 | End: 2025-07-06 | Stop reason: HOSPADM

## 2025-07-04 RX ORDER — BUMETANIDE 0.25 MG/ML
1 INJECTION, SOLUTION INTRAMUSCULAR; INTRAVENOUS DAILY
Status: DISCONTINUED | OUTPATIENT
Start: 2025-07-04 | End: 2025-07-06

## 2025-07-04 RX ORDER — FLUTICASONE PROPIONATE 110 UG/1
2 AEROSOL, METERED RESPIRATORY (INHALATION) 2 TIMES DAILY
Status: DISCONTINUED | OUTPATIENT
Start: 2025-07-04 | End: 2025-07-06 | Stop reason: HOSPADM

## 2025-07-04 RX ADMIN — PROPOFOL 30 MCG/KG/MIN: 10 INJECTION, EMULSION INTRAVENOUS at 23:01

## 2025-07-04 RX ADMIN — AZITHROMYCIN DIHYDRATE 250 MG: 250 TABLET ORAL at 21:22

## 2025-07-04 RX ADMIN — PROPOFOL 20 MCG/KG/MIN: 10 INJECTION, EMULSION INTRAVENOUS at 08:55

## 2025-07-04 RX ADMIN — 0.12% CHLORHEXIDINE GLUCONATE 15 ML: 1.2 RINSE ORAL at 08:51

## 2025-07-04 RX ADMIN — Medication 0.4 MCG/KG/HR: at 09:52

## 2025-07-04 RX ADMIN — SODIUM CHLORIDE, PRESERVATIVE FREE 10 ML: 5 INJECTION INTRAVENOUS at 20:07

## 2025-07-04 RX ADMIN — ENOXAPARIN SODIUM 30 MG: 100 INJECTION SUBCUTANEOUS at 08:51

## 2025-07-04 RX ADMIN — FAMOTIDINE 20 MG: 10 INJECTION, SOLUTION INTRAVENOUS at 20:07

## 2025-07-04 RX ADMIN — Medication 0.4 MCG/KG/HR: at 22:19

## 2025-07-04 RX ADMIN — WATER 40 MG: 1 INJECTION INTRAMUSCULAR; INTRAVENOUS; SUBCUTANEOUS at 20:12

## 2025-07-04 RX ADMIN — 0.12% CHLORHEXIDINE GLUCONATE 15 ML: 1.2 RINSE ORAL at 20:07

## 2025-07-04 RX ADMIN — PROPOFOL 20 MCG/KG/MIN: 10 INJECTION, EMULSION INTRAVENOUS at 16:05

## 2025-07-04 RX ADMIN — BUMETANIDE 1 MG: 0.25 INJECTION INTRAMUSCULAR; INTRAVENOUS at 20:11

## 2025-07-04 RX ADMIN — FLUTICASONE PROPIONATE 2 PUFF: 110 AEROSOL, METERED RESPIRATORY (INHALATION) at 22:21

## 2025-07-04 RX ADMIN — SODIUM CHLORIDE, PRESERVATIVE FREE 10 ML: 5 INJECTION INTRAVENOUS at 08:52

## 2025-07-04 RX ADMIN — Medication 0.6 MCG/KG/HR: at 02:29

## 2025-07-04 RX ADMIN — ENOXAPARIN SODIUM 30 MG: 100 INJECTION SUBCUTANEOUS at 20:39

## 2025-07-04 RX ADMIN — PROPOFOL 20 MCG/KG/MIN: 10 INJECTION, EMULSION INTRAVENOUS at 03:11

## 2025-07-04 RX ADMIN — TIOTROPIUM BROMIDE AND OLODATEROL 2 PUFF: 3.124; 2.736 SPRAY, METERED RESPIRATORY (INHALATION) at 08:03

## 2025-07-04 RX ADMIN — IOPAMIDOL 80 ML: 755 INJECTION, SOLUTION INTRAVENOUS at 00:47

## 2025-07-04 RX ADMIN — FAMOTIDINE 20 MG: 10 INJECTION, SOLUTION INTRAVENOUS at 08:51

## 2025-07-04 ASSESSMENT — PULMONARY FUNCTION TESTS
PIF_VALUE: 27
PIF_VALUE: 25
PIF_VALUE: 24
PIF_VALUE: 23
PIF_VALUE: 25

## 2025-07-04 NOTE — PLAN OF CARE
Problem: Respiratory - Adult  Goal: Achieves optimal ventilation and oxygenation  7/4/2025 1705 by Kari Avila RCP  Outcome: Progressing  7/4/2025 1436 by Duane Jones, RN  Outcome: Progressing  Flowsheets  Taken 7/4/2025 1152 by Kari Avila RCP  Achieves optimal ventilation and oxygenation:   Respiratory therapy support as indicated   Assess for changes in respiratory status  Taken 7/4/2025 0804 by Kari Avila RCP  Achieves optimal ventilation and oxygenation:   Respiratory therapy support as indicated   Assess for changes in respiratory status  Taken 7/4/2025 0800 by Duane Jones RN  Achieves optimal ventilation and oxygenation:   Assess for changes in respiratory status   Assess for changes in mentation and behavior   Position to facilitate oxygenation and minimize respiratory effort  7/4/2025 0623 by Tanika Huynh RCP  Outcome: Progressing  7/4/2025 0323 by Kelsie Kerr RN  Outcome: Progressing  Flowsheets (Taken 7/4/2025 0323)  Achieves optimal ventilation and oxygenation:   Assess for changes in respiratory status   Position to facilitate oxygenation and minimize respiratory effort   Assess for changes in mentation and behavior   Respiratory therapy support as indicated   Assess the need for suctioning and aspirate as needed   Encourage broncho-pulmonary hygiene including cough, deep breathe, incentive spirometry   Oxygen supplementation based on oxygen saturation or arterial blood gases   Patient lung sounds are considered normal for their current lung condition. No signs of distress noted. Current treatment regimen appropriate

## 2025-07-04 NOTE — ED NOTES
Patient resting in bed. Respirations easy and unlabored with BiPAP on. No distress noted. Call light within reach. Family and respiratory at bedside

## 2025-07-04 NOTE — PLAN OF CARE
Problem: Respiratory - Adult  Goal: Achieves optimal ventilation and oxygenation  7/4/2025 0623 by Tanika Huynh RCP  Outcome: Progressing                                               Patient Weaning Progress    The patient's vent settings was able to be weaned this shift.      Ventilator settings that were weaned              [] Mode   [x] Pressure support weaned   [x] Fio2 weaned   [] Peep weaned      Spontaneous weaning trial  was not attempted.     due to defined parameters for SBT (spontaneous breathing trial) not being met.     *Results of SBT documented under SBTOUTCOME note.    Reason that defined ventilator parameters for SBT was not met              [x] Patient condition requires increased ventilator settings  [] Requires increased sedation   [] Settings not within weaning range   [] SAT not completed   [] Physician orders      Unable to get agreement for goals because no family is present and patient cannot respond.

## 2025-07-04 NOTE — PLAN OF CARE
Problem: Respiratory - Adult  Goal: Achieves optimal ventilation and oxygenation  7/4/2025 1436 by Duane Jones RN  Outcome: Progressing  Flowsheets  Taken 7/4/2025 1152 by Kari Avila RCP  Achieves optimal ventilation and oxygenation:   Respiratory therapy support as indicated   Assess for changes in respiratory status  Taken 7/4/2025 0804 by Kari Avila RCP  Achieves optimal ventilation and oxygenation:   Respiratory therapy support as indicated   Assess for changes in respiratory status  Taken 7/4/2025 0800 by Duane Jones RN  Achieves optimal ventilation and oxygenation:   Assess for changes in respiratory status   Assess for changes in mentation and behavior   Position to facilitate oxygenation and minimize respiratory effort  7/4/2025 0623 by aTnika Huynh RCP  Outcome: Progressing  7/4/2025 0323 by Kelsie Kerr RN  Outcome: Progressing  Flowsheets (Taken 7/4/2025 0323)  Achieves optimal ventilation and oxygenation:   Assess for changes in respiratory status   Position to facilitate oxygenation and minimize respiratory effort   Assess for changes in mentation and behavior   Respiratory therapy support as indicated   Assess the need for suctioning and aspirate as needed   Encourage broncho-pulmonary hygiene including cough, deep breathe, incentive spirometry   Oxygen supplementation based on oxygen saturation or arterial blood gases     Problem: Chronic Conditions and Co-morbidities  Goal: Patient's chronic conditions and co-morbidity symptoms are monitored and maintained or improved  7/4/2025 1436 by Duane Jones RN  Outcome: Progressing  Flowsheets (Taken 7/4/2025 0800)  Care Plan - Patient's Chronic Conditions and Co-Morbidity Symptoms are Monitored and Maintained or Improved: Monitor and assess patient's chronic conditions and comorbid symptoms for stability, deterioration, or improvement  7/4/2025 0323 by Kelsie Kerr RN  Outcome: Progressing  Flowsheets (Taken 7/4/2025

## 2025-07-04 NOTE — ED PROVIDER NOTES
[facility] EMERGENCY DEPARTMENT  EMERGENCY DEPARTMENT ENCOUNTER          Pt Name: Fredo Rod  MRN: [id number]  Birthdate 1970  Date of evaluation: 7/3/2025  Attending Physician: Bogdan Escobedo MD       CHIEF COMPLAINT       Chief Complaint   Patient presents with    Shortness of Breath         HISTORY OF PRESENT ILLNESS    HPI  Fredo Rod is a 55 y.o. male who presents to the emergency department from home, brought in by EMS for evaluation of shortness of breath.    Patient has history of severe COPD with recent hospitalizations requiring BiPAP to manage his symptoms.  Patient arriving today in respiratory distress from to be hypoxic on nonrebreather by EMS.  Placed on BiPAP upon arrival given DuoNeb x 3, Solu-Medrol 125, magnesium 2 g over 20 minutes to help with exacerbation.  Patient's initial ABG showing PCO2 of 102.  Patient's GCS less than 8 with confusion, localizing to pain.  After 1 hour of BiPAP patient did not improve with his mental status and was therefore intubated.      The patient has no other acute complaints at this time.    ROS negative except as stated above.    PAST MEDICAL AND SURGICAL HISTORY     Past Medical History:   Diagnosis Date    (HFpEF) heart failure with preserved ejection fraction (HCC)     CAD (coronary artery disease)     Hepatic steatosis     LINDA (obstructive sleep apnea)     Pulmonary hypertension (HCC)     Sarcoma of rib (HCC)      Past Surgical History:   Procedure Laterality Date    ARM SURGERY Left 1997    BONE RESECTION, RIB           MEDICATIONS     Current Facility-Administered Medications:     tiotropium-olodaterol (STIOLTO) 2.5-2.5 MCG/ACT inhaler 2 puff, 2 puff, Inhalation, Daily, Gerardo Romero MD    dexmedeTOMIDine (PRECEDEX) 400 mcg in sodium chloride 0.9 % 100 mL infusion, 0.1-1.5 mcg/kg/hr, IntraVENous, Continuous, Kenji Marin MD    norepinephrine (LEVOPHED) 8 mg/ mL infusion, 1-100 mcg/min, IntraVENous,

## 2025-07-04 NOTE — PROCEDURES
PROCEDURE NOTE  Date: 7/3/2025   Name: Fredo Rod  YOB: 1970    Intubation    Date/Time: 7/3/2025 9:03 PM    Performed by: Mt Lewis MD  Authorized by: Bogdan Escobedo MD    Consent:     Consent obtained:  Written    Consent given by:  Guardian    Risks, benefits, and alternatives were discussed: yes      Risks discussed:  Aspiration, brain injury, dental trauma, laryngeal injury, pneumothorax, death, bleeding and hypoxia    Alternatives discussed:  Delayed treatment, alternative treatment, observation and referral  Universal protocol:     Procedure explained and questions answered to patient or proxy's satisfaction: yes      Relevant documents present and verified: yes      Test results available: yes      Imaging studies available: yes      Required blood products, implants, devices, and special equipment available: yes      Site/side marked: yes      Immediately prior to procedure, a time out was called: yes      Patient identity confirmed:  Hospital-assigned identification number and arm band  Pre-procedure details:     Indications: airway protection, altered consciousness, respiratory distress and respiratory failure      Patient status:  Altered mental status    Look externally: facial hair      Mouth opening - incisor distance:  3 or more finger widths    Hyoid-mental distance: 3 or more finger widths      Hyoid-thyroid distance: 2 or more finger widths      Mallampati score:  III    Obstruction: none      Neck mobility: normal      Pharmacologic strategy: RSI and sedation      Induction agents:  Etomidate    Paralytics:  Succinylcholine  Procedure details:     Preoxygenation:  Bag valve mask    CPR in progress: no      Number of attempts:  1  Successful intubation attempt details:     Intubation method:  Oral    Intubation technique: video assisted      Laryngoscope blade:  Mac 4    Bougie used: no      Grade view: III      Tube size (mm):  7.5    Tube type:  Cuffed     Tube visualized through cords: yes    Placement assessment:     ETT at teeth/gumline (cm):  23    Tube secured with:  ETT christopher    Breath sounds:  Equal    Placement verification: chest rise, colorimetric ETCO2, CXR verification, numeric ETCO2 and waveform ETCO2      CXR findings:  Appropriate position  Post-procedure details:     Procedure completion:  Tolerated

## 2025-07-04 NOTE — H&P
CRITICAL CARE PROGRESS NOTE      Patient:  Fredo Rod    Unit/Bed:01/001A  YOB: 1970  MRN: [id number]   PCP: Mohinder Page MD  Date of Admission: 7/3/2025  Chief Complaint:- sob    Assessment and Plan:    Acute hypercapnic respiratory failure:         -severe copd with FEV 24% on home O2 2L NC         -pH of 7.29, pc02 103, p02 241, hc03 49         -2/2 copd exacerbation, h/o non compliance with NIV         -Recent icu admission 1 week ago for copd exacerbation          -Patient was given duoneb, solumedrol, mg and trial of bipap with worsening LOC         -Patient intubated in ED         -spiriva, albuterol prn added         -methylpred 40mg daily         -home azithromycin MWF resumed         -Evaluated by pulmonology previous admission, option of hospice vs tracheostomy discussed if patient non compliant with NIV  Hypotension:         Likely 2/2 propofol        Started on levophed   HFpEF: Echo 3/25 EF 50-55%        Xr chest-mild pulmonary congestion        Bnp 464        Home bumex 1 mg daily resumed  Leukocytosis:         Wbc 13, LA 0.7, afebrile          reactive         Blood c/s sent from ed, follow result  Elevated troponin:         Troponin 20, repeat 16          EKG-no acute ischemic changes  HTN; home medications topral and losartan held  CAD s/p DESx2      INITIAL H AND P AND ICU COURSE:  Sukhjinder Rod is a 55 y.o. male who presents to the emergency department from home, brought in by EMS for evaluation of shortness of breath.   Patient has history of severe COPD with recent hospitalizations requiring BiPAP to manage his symptoms.  Patient arriving today in respiratory distress found to be hypoxic on nonrebreather by EMS.  Placed on BiPAP upon arrival given DuoNeb x 3, Solu-Medrol 125, magnesium 2 g over 20 minutes to help with exacerbation.  Patient's initial ABG showing PCO2 of 102.  Patient's GCS less than 8 with confusion, localizing to pain.  After 1 hour of BiPAP  patient did not improve with his mental status and was therefore intubated.    Past Medical History:  htn, cad, HFpEF,copd, pHTN  Family History: no pertinent history  Social History:  Ex smoker, 114 pack year smoking history. Current alcohol use    ROS   Sedated and intubated    Scheduled Meds:   chlorhexidine  15 mL Mouth/Throat BID    famotidine (PEPCID) injection  20 mg IntraVENous BID    sodium chloride flush  5-40 mL IntraVENous 2 times per day    enoxaparin  30 mg SubCUTAneous BID     Continuous Infusions:   dexmedeTOMIDine 0.6 mcg/kg/hr (07/03/25 2125)    norepinephrine 5 mcg/min (07/03/25 2145)    propofol 20 mcg/kg/min (07/03/25 2132)    sodium chloride         PHYSICAL EXAMINATION:  T:  99.  P:  68. RR:  14. B/P:  125/82  Body mass index is 33.08 kg/m².     PC: 28/8: fi02: 80    General:   acutely ill appearing male  HEENT:  normocephalic and atraumatic.  No scleral icterus. PERR  Neck: supple.  No Thyromegaly.  Lungs: clear to auscultation.  No retractions  Cardiac: RRR.  No JVD.  Abdomen: soft. Distended. Nontender.   Extremities:  No clubbing, cyanosis, or edema x 4.    Vasculature: capillary refill < 3 seconds. Palpable dorsalis pedis pulses.  Skin:  warm and dry.  Psych:  sedated and intubated  Lymph:  No supraclavicular adenopathy.  Neurologic:  sedated and intubated    Data: (All radiographs, tracings, PFTs, and imaging are personally viewed and interpreted unless otherwise noted).   Sodium 144, k 4, hc03 42, s cr 0.6, la 0.7  Trop 20, pro bnp 464  WBC 13, hb 13, plts 442  Telemetry shows NSR        Seen with multidisciplinary ICU team .  Meets Continued ICU Level Care Criteria:    [x] Yes   [] No - Transfer Planned to listed location:  [] HOSPITALIST CONTACTED-      Case and plan discussed with         Electronically signed by Gerardo Romero MD  CRITICAL CARE SPECIALIST

## 2025-07-04 NOTE — ED NOTES
ED to inpatient nurses report      Chief Complaint:  Chief Complaint   Patient presents with    Shortness of Breath     Present to ED from: Home    MOA:     LOC: Intubated  Mobility: Fully dependent  Oxygen Baseline: RA    Current needs required: Ventilator      Code Status:   Full Code    What abnormal results were found and what did you give/do to treat them? COPD exacerbation; O2, ventilator, medications   Any procedures or intervention occur? Intubation, OG tube, blakely    Mental Status:  Level of Consciousness: Unresponsive (3) (Ventilator/sedated)    Psych Assessment:        Vitals:  Patient Vitals for the past 24 hrs:   BP Temp Temp src Pulse Resp SpO2 Height Weight   07/03/25 2249 131/82 -- -- 69 14 98 % -- --   07/03/25 2246 (!) 142/86 -- -- 67 14 97 % -- --   07/03/25 2244 (!) 141/90 -- -- 66 14 98 % -- --   07/03/25 2229 125/82 -- -- 68 14 97 % -- --   07/03/25 2156 107/73 -- -- 71 16 99 % -- --   07/03/25 2141 97/60 -- -- 74 16 99 % -- --   07/03/25 2134 123/74 -- -- 80 16 99 % -- --   07/03/25 2109 -- -- -- 83 18 99 % -- --   07/03/25 2049 (!) 144/75 -- -- 95 16 100 % -- --   07/03/25 2026 118/70 -- -- 95 14 94 % -- --   07/03/25 2001 123/73 -- -- 92 12 96 % -- --   07/03/25 1953 -- -- -- 93 12 96 % -- --   07/03/25 1924 (!) 152/83 99.6 °F (37.6 °C) Axillary (!) 103 28 (!) 81 % 1.753 m (5' 9\") 101.6 kg (224 lb)        LDAs:   Peripheral IV 07/03/25 Distal;Left;Dorsal Forearm (Active)       Peripheral IV 07/03/25 Left Forearm (Active)       Ambulatory Status:  Presents to emergency department  because of falls (Syncope, seizure, or loss of consciousness): No, Age > 70: No, Altered Mental Status, Intoxication with alcohol or substance confusion (Disorientation, impaired judgment, poor safety awaremess, or inability to follow instructions): Yes, Nursing Judgement: Yes    Diagnosis:  DISPOSITION Admitted 07/03/2025 09:48:37 PM   Final diagnoses:   COPD exacerbation (HCC)        Consults:  IP CONSULT TO

## 2025-07-04 NOTE — PROGRESS NOTES
Patient arrived to unit from ED via stretcher. Patient transferred to ICU bed and placed on continuous ICU bedside monitor. Patient admitted for COPD exacerbation (HCC) [J44.1]  Acute hypoxic respiratory failure (HCC) [J96.01]. Vitals obtained. See flowsheets. Patient's IV access includes 18 G left hand and 20 G left forearm. Current infusions and rates of infusion include precedex at 0.08, propofol at 25 mch, and levophed at 2mcg.. Assessment completed by Kelsie CURRIE. Two nurse skin assessment completed by Kelsie CURRIE and Laurie CURRIE. See flowsheets for assessment details. Policies and procedures of ICU unable to be explained to patient at this time. Family member(s)/representative(s) present at time of admission include no family present. Patient rights explained to family member(s)/representatives and patient, as able. Patient/patient's family member(s)/representative(s) N/A to have physician notified of their admission. All questions posed by patient's family member(s)/representative(s) and patient answered at this time.

## 2025-07-04 NOTE — PROGRESS NOTES
Ventilator spontaneous breathing trial outcome:                [] Tolerated SBT and vitals are stable   [] Order to extubate entered by provider    [x] SBT not tolerated    [] Respiratory distress    [] Decreased LOC    [x] Vitals unstable    [] Agitation  [] Provider request                                          Patient Weaning Progress    The patient's vent settings was able to be weaned this shift.      Ventilator settings that were weaned              [] Mode   [] Pressure support weaned   [x] Fio2 weaned   [] Peep weaned             Spontaneous weaning trial  was not attempted.     Evac tube was  hooked up with continuous low suction(20-30mmHg)      Cuff was not  deflated to determine cuff leak.        *Specific details of weaning located in Ventilator documentation flowsheets*

## 2025-07-04 NOTE — PROGRESS NOTES
CRITICAL CARE PROGRESS NOTE      Patient:  Fredo Rod    Unit/Bed:4D-17/017-A  YOB: 1970  MRN: [id number]   PCP: Mohinder Page MD  Date of Admission: 7/3/2025  Chief Complaint:- SOB    Assessment and Plan:    Acute hypercapnic respiratory failure secondary to COPD exacerbation: Last FEV 24%.  Baseline on 2 L.  Hypercapnia not improving with BiPAP.  Patient was intubated on 7/3.  Repeat ABG on 7/4 showed pH 7.48, [id number] .  Wean down sedation, wean down vent settings.  Daily chest x-ray.  SBT on 7/5 AM  Continue Stiolto, and as needed albuterol  Bradycardia and hypotension: Secondary to propofol.  Currently on Levophed.  Wean off propofol.   Diastolic heart failure: EF 50-55% (3/25).  On 1 mg Bumex daily at home.  Continue Bumex.  Monitor renal function  Leukocytosis: WBC flat 13.2.  Afebrile.  Blood culture and urine culture pending.  Currently not on antibiotics pending cultures.  Elevated troponin: Cardiac 20-16.  EKG no acute ischemic changes.   Cholelithiasis and gallbladder hydrops: Outpatient follow-up  Hypertension: Hold home Toprol and losartan in the setting of hypotension requiring Levophed  CAD s/p JAZMIN x 2: 2021    INITIAL H AND P AND ICU COURSE:  55-year-old male with past medical history of severe COPD on 2 L nasal cannula at home, CAD s/p JAZMIN x 2, diastolic heart failure (EF 50-55%), LINDA, obesity who presented for evaluation of shortness of breath.  Patient was recently hospitalized for severe COPD requiring BiPAP for symptom management.  On this admission patient was found to be hypoxic on nonrebreather and was placed on BiPAP on arrival, was also given DuoNeb and solu-medrol without improvement. Repeat ABG after 1 hours of BiPAP showed no improvement of hypercapnia and pt was intubated.    Past Medical History:  per HPI.  Family History:  none.  Social History:  none.    ROS   Intubated and sedated    Scheduled Meds:   tiotropium-olodaterol  2 puff Inhalation Daily

## 2025-07-04 NOTE — PLAN OF CARE
Problem: Respiratory - Adult  Goal: Achieves optimal ventilation and oxygenation  Outcome: Progressing  Flowsheets (Taken 7/4/2025 0323)  Achieves optimal ventilation and oxygenation:   Assess for changes in respiratory status   Position to facilitate oxygenation and minimize respiratory effort   Assess for changes in mentation and behavior   Respiratory therapy support as indicated   Assess the need for suctioning and aspirate as needed   Encourage broncho-pulmonary hygiene including cough, deep breathe, incentive spirometry   Oxygen supplementation based on oxygen saturation or arterial blood gases     Problem: Chronic Conditions and Co-morbidities  Goal: Patient's chronic conditions and co-morbidity symptoms are monitored and maintained or improved  Outcome: Progressing  Flowsheets (Taken 7/4/2025 0145)  Care Plan - Patient's Chronic Conditions and Co-Morbidity Symptoms are Monitored and Maintained or Improved:   Monitor and assess patient's chronic conditions and comorbid symptoms for stability, deterioration, or improvement   Collaborate with multidisciplinary team to address chronic and comorbid conditions and prevent exacerbation or deterioration   Update acute care plan with appropriate goals if chronic or comorbid symptoms are exacerbated and prevent overall improvement and discharge     Problem: Discharge Planning  Goal: Discharge to home or other facility with appropriate resources  Outcome: Progressing  Flowsheets (Taken 7/4/2025 0145)  Discharge to home or other facility with appropriate resources:   Identify barriers to discharge with patient and caregiver   Arrange for needed discharge resources and transportation as appropriate   Identify discharge learning needs (meds, wound care, etc)     Problem: Pain  Goal: Verbalizes/displays adequate comfort level or baseline comfort level  Outcome: Progressing  Flowsheets (Taken 7/4/2025 0323)  Verbalizes/displays adequate comfort level or baseline comfort

## 2025-07-05 ENCOUNTER — APPOINTMENT (OUTPATIENT)
Dept: GENERAL RADIOLOGY | Age: 55
DRG: 137 | End: 2025-07-05
Payer: MEDICAID

## 2025-07-05 LAB
ANION GAP SERPL CALC-SCNC: 9 MEQ/L (ref 8–16)
BUN SERPL-MCNC: 17 MG/DL (ref 8–23)
CALCIUM SERPL-MCNC: 8.3 MG/DL (ref 8.6–10)
CHLORIDE SERPL-SCNC: 98 MEQ/L (ref 98–111)
CO2 SERPL-SCNC: 37 MEQ/L (ref 22–29)
CREAT SERPL-MCNC: 0.7 MG/DL (ref 0.7–1.2)
DEPRECATED RDW RBC AUTO: 66.5 FL (ref 35–45)
ERYTHROCYTE [DISTWIDTH] IN BLOOD BY AUTOMATED COUNT: 16.8 % (ref 11.5–14.5)
GFR SERPL CREATININE-BSD FRML MDRD: > 90 ML/MIN/1.73M2
GLUCOSE SERPL-MCNC: 105 MG/DL (ref 74–109)
HCT VFR BLD AUTO: 44.7 % (ref 42–52)
HGB BLD-MCNC: 12.8 GM/DL (ref 14–18)
MCH RBC QN AUTO: 30.7 PG (ref 26–33)
MCHC RBC AUTO-ENTMCNC: 28.6 GM/DL (ref 32.2–35.5)
MCV RBC AUTO: 107.2 FL (ref 80–94)
MRSA SPEC QL CULT: NORMAL
PLATELET # BLD AUTO: 409 THOU/MM3 (ref 130–400)
PMV BLD AUTO: 10.4 FL (ref 9.4–12.4)
POTASSIUM SERPL-SCNC: 4.5 MEQ/L (ref 3.5–5.2)
RBC # BLD AUTO: 4.17 MILL/MM3 (ref 4.7–6.1)
SODIUM SERPL-SCNC: 144 MEQ/L (ref 135–145)
WBC # BLD AUTO: 16.6 THOU/MM3 (ref 4.8–10.8)

## 2025-07-05 PROCEDURE — 80048 BASIC METABOLIC PNL TOTAL CA: CPT

## 2025-07-05 PROCEDURE — 6370000000 HC RX 637 (ALT 250 FOR IP): Performed by: INTERNAL MEDICINE

## 2025-07-05 PROCEDURE — 36415 COLL VENOUS BLD VENIPUNCTURE: CPT

## 2025-07-05 PROCEDURE — 85027 COMPLETE CBC AUTOMATED: CPT

## 2025-07-05 PROCEDURE — 94003 VENT MGMT INPAT SUBQ DAY: CPT

## 2025-07-05 PROCEDURE — 2060000000 HC ICU INTERMEDIATE R&B

## 2025-07-05 PROCEDURE — 6360000002 HC RX W HCPCS: Performed by: INTERNAL MEDICINE

## 2025-07-05 PROCEDURE — 94761 N-INVAS EAR/PLS OXIMETRY MLT: CPT

## 2025-07-05 PROCEDURE — 99233 SBSQ HOSP IP/OBS HIGH 50: CPT | Performed by: INTERNAL MEDICINE

## 2025-07-05 PROCEDURE — 6360000002 HC RX W HCPCS

## 2025-07-05 PROCEDURE — 94640 AIRWAY INHALATION TREATMENT: CPT

## 2025-07-05 PROCEDURE — 94669 MECHANICAL CHEST WALL OSCILL: CPT

## 2025-07-05 PROCEDURE — 2500000003 HC RX 250 WO HCPCS: Performed by: INTERNAL MEDICINE

## 2025-07-05 PROCEDURE — 2500000003 HC RX 250 WO HCPCS

## 2025-07-05 PROCEDURE — 6370000000 HC RX 637 (ALT 250 FOR IP)

## 2025-07-05 PROCEDURE — 71045 X-RAY EXAM CHEST 1 VIEW: CPT

## 2025-07-05 PROCEDURE — 2700000000 HC OXYGEN THERAPY PER DAY

## 2025-07-05 RX ORDER — GUAIFENESIN 600 MG/1
600 TABLET, EXTENDED RELEASE ORAL 2 TIMES DAILY
Status: DISCONTINUED | OUTPATIENT
Start: 2025-07-05 | End: 2025-07-06 | Stop reason: HOSPADM

## 2025-07-05 RX ORDER — PREDNISONE 50 MG/1
50 TABLET ORAL DAILY
Status: DISCONTINUED | OUTPATIENT
Start: 2025-07-07 | End: 2025-07-06 | Stop reason: HOSPADM

## 2025-07-05 RX ORDER — FAMOTIDINE 20 MG/1
20 TABLET, FILM COATED ORAL 2 TIMES DAILY
Status: DISCONTINUED | OUTPATIENT
Start: 2025-07-05 | End: 2025-07-06 | Stop reason: HOSPADM

## 2025-07-05 RX ADMIN — HYDROCORTISONE SODIUM SUCCINATE 50 MG: 100 INJECTION, POWDER, FOR SOLUTION INTRAMUSCULAR; INTRAVENOUS at 02:08

## 2025-07-05 RX ADMIN — BUMETANIDE 1 MG: 0.25 INJECTION INTRAMUSCULAR; INTRAVENOUS at 08:12

## 2025-07-05 RX ADMIN — GUAIFENESIN 600 MG: 600 TABLET, MULTILAYER, EXTENDED RELEASE ORAL at 18:22

## 2025-07-05 RX ADMIN — WATER 2000 MG: 1 INJECTION INTRAMUSCULAR; INTRAVENOUS; SUBCUTANEOUS at 13:22

## 2025-07-05 RX ADMIN — HYDROCORTISONE SODIUM SUCCINATE 50 MG: 100 INJECTION, POWDER, FOR SOLUTION INTRAMUSCULAR; INTRAVENOUS at 08:14

## 2025-07-05 RX ADMIN — FLUTICASONE PROPIONATE 2 PUFF: 110 AEROSOL, METERED RESPIRATORY (INHALATION) at 20:59

## 2025-07-05 RX ADMIN — METOPROLOL SUCCINATE 12.5 MG: 25 TABLET, EXTENDED RELEASE ORAL at 18:22

## 2025-07-05 RX ADMIN — ENOXAPARIN SODIUM 30 MG: 100 INJECTION SUBCUTANEOUS at 08:12

## 2025-07-05 RX ADMIN — HYDROCORTISONE SODIUM SUCCINATE 50 MG: 100 INJECTION, POWDER, FOR SOLUTION INTRAMUSCULAR; INTRAVENOUS at 13:22

## 2025-07-05 RX ADMIN — FLUTICASONE PROPIONATE 2 PUFF: 110 AEROSOL, METERED RESPIRATORY (INHALATION) at 09:19

## 2025-07-05 RX ADMIN — FAMOTIDINE 20 MG: 20 TABLET, FILM COATED ORAL at 20:48

## 2025-07-05 RX ADMIN — ENOXAPARIN SODIUM 30 MG: 100 INJECTION SUBCUTANEOUS at 20:48

## 2025-07-05 RX ADMIN — TIOTROPIUM BROMIDE AND OLODATEROL 2 PUFF: 3.124; 2.736 SPRAY, METERED RESPIRATORY (INHALATION) at 09:19

## 2025-07-05 RX ADMIN — WATER 60 MG: 1 INJECTION INTRAMUSCULAR; INTRAVENOUS; SUBCUTANEOUS at 18:43

## 2025-07-05 RX ADMIN — SODIUM CHLORIDE, PRESERVATIVE FREE 10 ML: 5 INJECTION INTRAVENOUS at 08:15

## 2025-07-05 ASSESSMENT — PAIN SCALES - GENERAL
PAINLEVEL_OUTOF10: 0

## 2025-07-05 ASSESSMENT — PULMONARY FUNCTION TESTS: PIF_VALUE: 30

## 2025-07-05 NOTE — PROGRESS NOTES
Pt self extubated on 7/5. Pt remained sating 92% on 6 liters. Pt able to speak to residents and states he feels fine. No complications noted. Pt to remain on nasal cannula for now.

## 2025-07-05 NOTE — PROGRESS NOTES
Attending Supervising Physician’s Attestation Statement  I was present with the resident physician during the history and exam. I discussed the findings and plans with the resident physician and agree as documented in his note . The patient required care for the management of acute hypoxic and hypercapnic respiratory failure.  Patient self extubated and was stable on supplemental nasal cannula.  Okay to transfer to floor  Patient does have leukocytosis.  It bumped a bit from yesterday.  Ceftriaxone added to azithromycin.  Electronically signed by Mercy Gipson MD on 7/5/2025 at 3:44 PM  EDT

## 2025-07-05 NOTE — PLAN OF CARE
Problem: Respiratory - Adult  Goal: Achieves optimal ventilation and oxygenation  7/4/2025 2321 by Deborah Ross RN  Outcome: Progressing  7/4/2025 1705 by Kari Avila RCP  Outcome: Progressing  7/4/2025 1436 by Duane Jones RN  Outcome: Progressing  Flowsheets  Taken 7/4/2025 1152 by Kari Avila RCP  Achieves optimal ventilation and oxygenation:   Respiratory therapy support as indicated   Assess for changes in respiratory status  Taken 7/4/2025 0804 by Kari Avila RCP  Achieves optimal ventilation and oxygenation:   Respiratory therapy support as indicated   Assess for changes in respiratory status  Taken 7/4/2025 0800 by Duane Jones RN  Achieves optimal ventilation and oxygenation:   Assess for changes in respiratory status   Assess for changes in mentation and behavior   Position to facilitate oxygenation and minimize respiratory effort     Problem: Chronic Conditions and Co-morbidities  Goal: Patient's chronic conditions and co-morbidity symptoms are monitored and maintained or improved  7/4/2025 2321 by Deborah Ross RN  Outcome: Progressing  7/4/2025 1436 by Duane Jones RN  Outcome: Progressing  Flowsheets (Taken 7/4/2025 0800)  Care Plan - Patient's Chronic Conditions and Co-Morbidity Symptoms are Monitored and Maintained or Improved: Monitor and assess patient's chronic conditions and comorbid symptoms for stability, deterioration, or improvement     Problem: Discharge Planning  Goal: Discharge to home or other facility with appropriate resources  7/4/2025 2321 by Deborah Ross RN  Outcome: Progressing  7/4/2025 1436 by Duane Jones RN  Outcome: Progressing  Flowsheets (Taken 7/4/2025 0800)  Discharge to home or other facility with appropriate resources:   Identify barriers to discharge with patient and caregiver   Arrange for needed discharge resources and transportation as appropriate   Refer to discharge planning if patient needs post-hospital services based  7/4/2025 1400 by Duane Jones RN  Remains free of injury from restraints (restraint for interference with medical device): Evaluate the patient's condition at the time of restraint application  Taken 7/4/2025 1200 by Duane Jones RN  Remains free of injury from restraints (restraint for interference with medical device): Evaluate the patient's condition at the time of restraint application  Taken 7/4/2025 1000 by Duane Jones RN  Remains free of injury from restraints (restraint for interference with medical device):   Evaluate the patient's condition at the time of restraint application   Determine that other, less restrictive measures have been tried or would not be effective before applying the restraint   Every 2 hours: Monitor safety, psychosocial status, comfort, nutrition and hydration  Taken 7/4/2025 0800 by Duane Jones RN  Remains free of injury from restraints (restraint for interference with medical device):   Determine that other, less restrictive measures have been tried or would not be effective before applying the restraint   Evaluate the patient's condition at the time of restraint application   Every 2 hours: Monitor safety, psychosocial status, comfort, nutrition and hydration  Taken 7/4/2025 0324 by Kelsie Kerr RN  Remains free of injury from restraints (restraint for interference with medical device):   Determine that other, less restrictive measures have been tried or would not be effective before applying the restraint   Evaluate the patient's condition at the time of restraint application   Inform patient/family regarding the reason for restraint   Every 2 hours: Monitor safety, psychosocial status, comfort, nutrition and hydration     Problem: Safety - Adult  Goal: Free from fall injury  7/4/2025 2321 by Deborah Ross RN  Outcome: Progressing  7/4/2025 1436 by Duane Jones RN  Outcome: Progressing

## 2025-07-05 NOTE — PROGRESS NOTES
Demographic information:  Fredo Rod  1970  [id number]      Assessment/Plan:  Acute Hypercapneic and Hypoxic Respiratory Failure: s/p intubation in ED, self extubated overnight 7/4pm in ICU. Home med Brovana, Flovent, Spiriva.  Patient reports recent baseline 3 L after last hospitalization  -Will change hydrocortisone to Solu-Medrol which will transition to p.o. medication on 7/7 with prednisone 50 mg daily  -continue stiolto.  Acapella and IS, mucinex  -Last ABG shows compensated chronic respiratory acidosis with pH 7.35 and PCO2 of 81  -Wean oxygen towards baseline, consider repeat O2 eval on discharge  -Will consider steroid tapering dose over 2 weeks, utilizing higher dose due to his weight and possibility of inadequate treatment previously.    Hx LINDA on NIV at home: Does not have unit with him will order while in house, reports compliance since last stay (was previously poorly compliant)    Leukocytosis suspect due to steroids    Gallbladder hydrops on CT imaging: Will monitor for symptoms otherwise outpatient follow-up with repeat imaging.  LFTs within normal limits.    Macrocytic anemia, stable: recent B12 and folate wnl. Due to alcohol use?    HTN: holding home antihypertensives.  - Continue holding losartan. Monitor BP  - Resume Toprol as bradycardia has resolved, suspect related to propofol use    Tobacco abuse: Nicotine patches while inpatient and will plan to discharge on nicotine gum    Subjective: Patient seen at bedside.  He admits that after his recent discharge he had been back to smoking and had been drinking as well which he believes led to his decompensation.  He does report compliance with his BiPAP device however, as his mask issues have been fixed.  He now states that he no longer wishes to smoke or drink.  He is open to nicotine gum.      HPI: Reviewed ICU notes    **ROS reviewed and no changes from previous unless stated in subjective     Objective:  General: No distress,

## 2025-07-05 NOTE — PROGRESS NOTES
CRITICAL CARE PROGRESS NOTE      Patient:  Fredo Rod    Unit/Bed:4A-15/015-A  YOB: 1970  MRN: [id number]   PCP: Mohinder Page MD  Date of Admission: 7/3/2025  Chief Complaint:- SOB    Assessment and Plan:    Acute hypercapnic respiratory failure secondary to COPD exacerbation: 2/2 COVID exacerbation.  Home baseline supplemental O2 2 L.  Patient has been intubated 7/3 due to worsening on BiPAP, patient self extubated overnight nearly morning of 7/5.  Was saturating well on 4 L NC.  Continue supplemental O2 as needed for goal SpO2 88 to 94%  Continue Stiolto and albuterol as needed  CHF with diastolic dysfunction: Not in exacerbation.  Last echo March 2025 EF 50 to 55%.  Home meds include 1 mg Bumex daily  Continue Bumex  Closely monitor fluid status  Leukocytosis: WBC flat 13.2.  Afebrile.  Blood culture and urine culture pending.  Currently not on antibiotics pending cultures.  Thrombocytosis: Reactive in setting of acute illness  Elevated troponin: Cardiac 20-16.  EKG no acute ischemic changes.   Cholelithiasis and gallbladder hydrops: Outpatient follow-up  Hypertension: Hold home Toprol and losartan in the setting of hypotension requiring Levophed  CAD s/p JAZMIN x 2: 2021  Bradycardia, resolved: 2/2 propofol, resolved after weaning off propofol.        INITIAL H AND P AND ICU COURSE:  55-year-old male with past medical history of severe COPD on 2 L nasal cannula at home, CAD s/p JAZMIN x 2, diastolic heart failure (EF 50-55%), LINDA, obesity who presented for evaluation of shortness of breath.  Patient was recently hospitalized for severe COPD requiring BiPAP for symptom management.  On this admission patient was found to be hypoxic on nonrebreather and was placed on BiPAP on arrival, was also given DuoNeb and solu-medrol without improvement. Repeat ABG after 1 hours of BiPAP showed no improvement of hypercapnia and pt was intubated.    Past Medical History:  per HPI.  Family History:   Leonela.        Electronically signed by Henry Mccarthy MD  CRITICAL CARE SPECIALIST

## 2025-07-05 NOTE — PLAN OF CARE
Problem: Respiratory - Adult  Goal: Achieves optimal ventilation and oxygenation  7/5/2025 0910 by Duane Jones RN  Outcome: Progressing  7/4/2025 2321 by Deborah Ross RN  Outcome: Progressing     Problem: Chronic Conditions and Co-morbidities  Goal: Patient's chronic conditions and co-morbidity symptoms are monitored and maintained or improved  7/5/2025 0910 by Duane Jones RN  Outcome: Progressing  Flowsheets (Taken 7/5/2025 0800)  Care Plan - Patient's Chronic Conditions and Co-Morbidity Symptoms are Monitored and Maintained or Improved:   Monitor and assess patient's chronic conditions and comorbid symptoms for stability, deterioration, or improvement   Collaborate with multidisciplinary team to address chronic and comorbid conditions and prevent exacerbation or deterioration  7/4/2025 2321 by Deborah Ross RN  Outcome: Progressing     Problem: Discharge Planning  Goal: Discharge to home or other facility with appropriate resources  7/5/2025 0910 by Duane Jones RN  Outcome: Progressing  Flowsheets (Taken 7/5/2025 0800)  Discharge to home or other facility with appropriate resources:   Identify barriers to discharge with patient and caregiver   Identify discharge learning needs (meds, wound care, etc)  7/4/2025 2321 by Deborah Ross RN  Outcome: Progressing     Problem: Pain  Goal: Verbalizes/displays adequate comfort level or baseline comfort level  7/5/2025 0910 by Duane Jones RN  Outcome: Progressing  7/4/2025 2321 by Deborah Ross RN  Outcome: Progressing     Problem: Skin/Tissue Integrity  Goal: Skin integrity remains intact  Description: 1.  Monitor for areas of redness and/or skin breakdown  2.  Assess vascular access sites hourly  3.  Every 4-6 hours minimum:  Change oxygen saturation probe site  4.  Every 4-6 hours:  If on nasal continuous positive airway pressure, respiratory therapy assess nares and determine need for appliance change or resting period  7/5/2025

## 2025-07-05 NOTE — CARE COORDINATION
07/05/25 1314   Readmission Assessment   Number of Days since last admission? 8-30 days   Previous Disposition Home with Family   Who is being Interviewed Patient  (Breathing problems)   What was the patient's/caregiver's perception as to why they think they needed to return back to the hospital? Other (Comment)   Did you visit your Primary Care Physician after you left the hospital, before you returned this time? No   Why weren't you able to visit your PCP? Other (Comment)  (States he missed his appts.)   Did you see a specialist, such as Cardiac, Pulmonary, Orthopedic Physician, etc. after you left the hospital? No   Who advised the patient to return to the hospital? Self-referral   Does the patient report anything that got in the way of taking their medications? No   In our efforts to provide the best possible care to you and others like you, can you think of anything that we could have done to help you after you left the hospital the first time, so that you might not have needed to return so soon? Other (Comment)  (Pt states return to hospital was his fault.  States he started smoking again and had some drinks.)

## 2025-07-05 NOTE — PROGRESS NOTES
Pharmacy Dose Adjustment - Ceftriaxone    Patients excluded from an automatic dose change:  Orders placed by infectious disease provider - pharmacist will contact the provider  Pediatric patients  Doses ordered for surgical prophylaxis    Wt Readings from Last 1 Encounters:   07/03/25 101.6 kg (224 lb)     Body mass index is 33.08 kg/m².    Site/Type of Infection Dose and Route Frequency   Bacteremia, Critically ill, Intra-abdominal infection, Osteomyelitis/septic arthritis, Prosthetic joint infections   2000 mg IV Every 24 hours   Pneumonia, Skin and soft tissue infections, Urinary tract infections 1000 mg IV * Every 24 hours   Meningitis and CNS infections 2000 mg IV Every 12 hours   Endocarditis: (Enterococcus synergy) 2000 mg IV Every 12 hours   Endocarditis: (non-Enterococcus synergy) 2000 mg IV Every 24 hours   Spontaneous bacterial peritonitis: prophylaxis  1000 mg IV * Every 24 hours   Spontaneous bacterial peritonitis: treatment 2000 mg IV  Every 24 hours   Gonorrhea < 150 kg 500 mg IM       Once   Gonorrhea >= 150 kg 1000 mg IM       Once   Uncomplicated disseminated gonorrhea 1000 mg IV or IM Every 24 hours     *Use 2000 mg dose for all patients with BMI >= 30 kg/m2    Plan:  Ceftriaxone dose updated to 2000 mg every 24 hours for pneumonia with BMI > 30.    Please call pharmacy with questions.   DILIA GORDON [facility]  7/5/2025  10:09 AM

## 2025-07-05 NOTE — PROGRESS NOTES
PT family, Coral the next of contact; was called and updated on pt. status and transfer to stepdown 4A-15 with KUSH Rivera.

## 2025-07-05 NOTE — SIGNIFICANT EVENT
Patient stable for transfer to stepdown, assigned to room 4A15.  Signout given to Dr Mcintosh via [employer] for allocation to hospitalist team.

## 2025-07-05 NOTE — CARE COORDINATION
07/05/25 1316   Service Assessment   Patient Orientation Alert and Oriented   Cognition Alert   History Provided By Patient   Primary Caregiver Self   Support Systems Children;Family Members   Patient's Healthcare Decision Maker is: Legal Next of Kin   PCP Verified by CM Yes   Last Visit to PCP Within last 6 months   Prior Functional Level Independent in ADLs/IADLs   Current Functional Level Independent in ADLs/IADLs   Can patient return to prior living arrangement Yes   Ability to make needs known: Good   Family able to assist with home care needs: Yes   Would you like for me to discuss the discharge plan with any other family members/significant others, and if so, who? No   Financial Resources Medicaid   Community Resources Other (Comment)  ( Home Medical for CPAP assist.)   Social/Functional History   Lives With Daughter   Type of Home Trailer   Active  Yes   Discharge Planning   Type of Residence Trailer/Mobile Home   Living Arrangements Children   Current Services Prior To Admission Durable Medical Equipment;Oxygen Therapy   Current DME Prior to Arrival Cpap;Oxygen Therapy (Comment)  (O2 per [employer] 2-3 L/NC.)   Potential Assistance Purchasing Medications No   Type of Home Care Services None   Patient expects to be discharged to: Other (comment)  ([facility] for CPAP care.)   Services At/After Discharge   Transition of Care Consult (CM Consult) Discharge Planning   Mode of Transport at Discharge Other (see comment)  (Family)   Confirm Follow Up Transport Self   Condition of Participation: Discharge Planning   The Plan for Transition of Care is related to the following treatment goals: Cont to breath better. Stop smoking again.   Patient Goals/Plan/Treatment Preferences: Pt resides in a trailer and a 14yo dtr resides with him. He is typically independent. He is current with [employer] for home O2. He also has a CPAP and states someone comes once monthly to check on his CPAP. Saad is a readmission and admits that

## 2025-07-06 VITALS
RESPIRATION RATE: 16 BRPM | WEIGHT: 224 LBS | BODY MASS INDEX: 33.18 KG/M2 | HEIGHT: 69 IN | HEART RATE: 62 BPM | DIASTOLIC BLOOD PRESSURE: 70 MMHG | OXYGEN SATURATION: 92 % | TEMPERATURE: 98.4 F | SYSTOLIC BLOOD PRESSURE: 143 MMHG

## 2025-07-06 LAB
ANION GAP SERPL CALC-SCNC: 12 MEQ/L (ref 8–16)
BACTERIA SPEC RESP CULT: NORMAL
BACTERIA UR CULT: NORMAL
BUN SERPL-MCNC: 20 MG/DL (ref 8–23)
CALCIUM SERPL-MCNC: 8.3 MG/DL (ref 8.6–10)
CHLORIDE SERPL-SCNC: 95 MEQ/L (ref 98–111)
CO2 SERPL-SCNC: 35 MEQ/L (ref 22–29)
CREAT SERPL-MCNC: 0.6 MG/DL (ref 0.7–1.2)
DEPRECATED RDW RBC AUTO: 64.4 FL (ref 35–45)
ERYTHROCYTE [DISTWIDTH] IN BLOOD BY AUTOMATED COUNT: 16.2 % (ref 11.5–14.5)
GFR SERPL CREATININE-BSD FRML MDRD: > 90 ML/MIN/1.73M2
GLUCOSE SERPL-MCNC: 131 MG/DL (ref 74–109)
GRAM STN SPEC: NORMAL
HCT VFR BLD AUTO: 43.5 % (ref 42–52)
HGB BLD-MCNC: 12.5 GM/DL (ref 14–18)
MCH RBC QN AUTO: 30.4 PG (ref 26–33)
MCHC RBC AUTO-ENTMCNC: 28.7 GM/DL (ref 32.2–35.5)
MCV RBC AUTO: 105.8 FL (ref 80–94)
PLATELET # BLD AUTO: 384 THOU/MM3 (ref 130–400)
PMV BLD AUTO: 10.3 FL (ref 9.4–12.4)
POTASSIUM SERPL-SCNC: 3.9 MEQ/L (ref 3.5–5.2)
RBC # BLD AUTO: 4.11 MILL/MM3 (ref 4.7–6.1)
SODIUM SERPL-SCNC: 142 MEQ/L (ref 135–145)
WBC # BLD AUTO: 10.9 THOU/MM3 (ref 4.8–10.8)

## 2025-07-06 PROCEDURE — 80048 BASIC METABOLIC PNL TOTAL CA: CPT

## 2025-07-06 PROCEDURE — 6360000002 HC RX W HCPCS

## 2025-07-06 PROCEDURE — 85027 COMPLETE CBC AUTOMATED: CPT

## 2025-07-06 PROCEDURE — 94660 CPAP INITIATION&MGMT: CPT

## 2025-07-06 PROCEDURE — 2500000003 HC RX 250 WO HCPCS: Performed by: INTERNAL MEDICINE

## 2025-07-06 PROCEDURE — 6370000000 HC RX 637 (ALT 250 FOR IP): Performed by: INTERNAL MEDICINE

## 2025-07-06 PROCEDURE — 94640 AIRWAY INHALATION TREATMENT: CPT

## 2025-07-06 PROCEDURE — 2500000003 HC RX 250 WO HCPCS

## 2025-07-06 PROCEDURE — 6370000000 HC RX 637 (ALT 250 FOR IP)

## 2025-07-06 PROCEDURE — 2700000000 HC OXYGEN THERAPY PER DAY

## 2025-07-06 PROCEDURE — 99239 HOSP IP/OBS DSCHRG MGMT >30: CPT | Performed by: INTERNAL MEDICINE

## 2025-07-06 PROCEDURE — 94761 N-INVAS EAR/PLS OXIMETRY MLT: CPT

## 2025-07-06 PROCEDURE — 36415 COLL VENOUS BLD VENIPUNCTURE: CPT

## 2025-07-06 PROCEDURE — 6360000002 HC RX W HCPCS: Performed by: INTERNAL MEDICINE

## 2025-07-06 RX ORDER — NICOTINE 21 MG/24HR
1 PATCH, TRANSDERMAL 24 HOURS TRANSDERMAL DAILY
Qty: 30 PATCH | Refills: 0 | Status: SHIPPED | OUTPATIENT
Start: 2025-07-06

## 2025-07-06 RX ORDER — AZITHROMYCIN 250 MG/1
250 TABLET, FILM COATED ORAL
Qty: 12 TABLET | Refills: 2 | Status: SHIPPED | OUTPATIENT
Start: 2025-07-07 | End: 2025-10-05

## 2025-07-06 RX ORDER — SENNA AND DOCUSATE SODIUM 50; 8.6 MG/1; MG/1
1 TABLET, FILM COATED ORAL DAILY
Qty: 10 TABLET | Refills: 0 | Status: SHIPPED | OUTPATIENT
Start: 2025-07-06 | End: 2025-07-06

## 2025-07-06 RX ORDER — BUMETANIDE 1 MG/1
1 TABLET ORAL 2 TIMES DAILY
Qty: 30 TABLET | Refills: 0 | Status: SHIPPED | OUTPATIENT
Start: 2025-07-06

## 2025-07-06 RX ORDER — ASPIRIN 81 MG/1
81 TABLET, CHEWABLE ORAL DAILY
Qty: 30 TABLET | Refills: 3 | Status: SHIPPED | OUTPATIENT
Start: 2025-07-06

## 2025-07-06 RX ORDER — LOSARTAN POTASSIUM 50 MG/1
50 TABLET ORAL DAILY
Qty: 30 TABLET | Refills: 3 | Status: SHIPPED | OUTPATIENT
Start: 2025-07-06

## 2025-07-06 RX ORDER — PANTOPRAZOLE SODIUM 40 MG/1
40 TABLET, DELAYED RELEASE ORAL
Qty: 30 TABLET | Refills: 0 | Status: SHIPPED | OUTPATIENT
Start: 2025-07-06 | End: 2025-07-06

## 2025-07-06 RX ORDER — POTASSIUM CHLORIDE 1500 MG/1
20 TABLET, EXTENDED RELEASE ORAL DAILY
Qty: 60 TABLET | Refills: 0 | Status: SHIPPED | OUTPATIENT
Start: 2025-07-06

## 2025-07-06 RX ORDER — METOPROLOL SUCCINATE 25 MG/1
12.5 TABLET, EXTENDED RELEASE ORAL EVERY EVENING
Qty: 30 TABLET | Refills: 3 | Status: SHIPPED | OUTPATIENT
Start: 2025-07-06

## 2025-07-06 RX ORDER — SENNA AND DOCUSATE SODIUM 50; 8.6 MG/1; MG/1
1 TABLET, FILM COATED ORAL DAILY
Qty: 10 TABLET | Refills: 0 | Status: SHIPPED | OUTPATIENT
Start: 2025-07-06 | End: 2025-07-16

## 2025-07-06 RX ORDER — PREDNISONE 50 MG/1
TABLET ORAL
Qty: 7 TABLET | Refills: 0 | Status: SHIPPED | OUTPATIENT
Start: 2025-07-07 | End: 2025-07-17

## 2025-07-06 RX ORDER — ALBUTEROL SULFATE 90 UG/1
2 INHALANT RESPIRATORY (INHALATION) EVERY 6 HOURS PRN
Qty: 18 G | Refills: 3 | Status: SHIPPED | OUTPATIENT
Start: 2025-07-06

## 2025-07-06 RX ORDER — FLUTICASONE PROPIONATE 110 UG/1
2 AEROSOL, METERED RESPIRATORY (INHALATION) 2 TIMES DAILY
Qty: 12 G | Refills: 3 | Status: SHIPPED | OUTPATIENT
Start: 2025-07-06 | End: 2026-07-06

## 2025-07-06 RX ORDER — BUMETANIDE 1 MG/1
1 TABLET ORAL 2 TIMES DAILY
Status: DISCONTINUED | OUTPATIENT
Start: 2025-07-06 | End: 2025-07-06 | Stop reason: HOSPADM

## 2025-07-06 RX ORDER — CEFDINIR 300 MG/1
300 CAPSULE ORAL 2 TIMES DAILY
Qty: 6 CAPSULE | Refills: 0 | Status: SHIPPED | OUTPATIENT
Start: 2025-07-06 | End: 2025-07-06

## 2025-07-06 RX ORDER — PANTOPRAZOLE SODIUM 40 MG/1
40 TABLET, DELAYED RELEASE ORAL
Qty: 30 TABLET | Refills: 0 | Status: SHIPPED | OUTPATIENT
Start: 2025-07-06 | End: 2025-08-05

## 2025-07-06 RX ORDER — GUAIFENESIN 600 MG/1
600 TABLET, EXTENDED RELEASE ORAL 2 TIMES DAILY
Qty: 10 TABLET | Refills: 0 | Status: SHIPPED | OUTPATIENT
Start: 2025-07-06 | End: 2025-07-11

## 2025-07-06 RX ORDER — CEFDINIR 300 MG/1
300 CAPSULE ORAL 2 TIMES DAILY
Qty: 6 CAPSULE | Refills: 0 | Status: SHIPPED | OUTPATIENT
Start: 2025-07-06 | End: 2025-07-09

## 2025-07-06 RX ORDER — GUAIFENESIN 600 MG/1
600 TABLET, EXTENDED RELEASE ORAL 2 TIMES DAILY
Qty: 10 TABLET | Refills: 0 | Status: SHIPPED | OUTPATIENT
Start: 2025-07-06 | End: 2025-07-06

## 2025-07-06 RX ORDER — PREDNISONE 50 MG/1
TABLET ORAL
Qty: 7 TABLET | Refills: 0 | Status: SHIPPED | OUTPATIENT
Start: 2025-07-07 | End: 2025-07-06

## 2025-07-06 RX ADMIN — FLUTICASONE PROPIONATE 2 PUFF: 110 AEROSOL, METERED RESPIRATORY (INHALATION) at 08:36

## 2025-07-06 RX ADMIN — GUAIFENESIN 600 MG: 600 TABLET, MULTILAYER, EXTENDED RELEASE ORAL at 09:18

## 2025-07-06 RX ADMIN — ENOXAPARIN SODIUM 30 MG: 100 INJECTION SUBCUTANEOUS at 09:18

## 2025-07-06 RX ADMIN — SODIUM CHLORIDE, PRESERVATIVE FREE 10 ML: 5 INJECTION INTRAVENOUS at 09:18

## 2025-07-06 RX ADMIN — BUMETANIDE 1 MG: 1 TABLET ORAL at 09:18

## 2025-07-06 RX ADMIN — FAMOTIDINE 20 MG: 20 TABLET, FILM COATED ORAL at 09:18

## 2025-07-06 RX ADMIN — WATER 2000 MG: 1 INJECTION INTRAMUSCULAR; INTRAVENOUS; SUBCUTANEOUS at 12:26

## 2025-07-06 RX ADMIN — WATER 60 MG: 1 INJECTION INTRAMUSCULAR; INTRAVENOUS; SUBCUTANEOUS at 09:18

## 2025-07-06 RX ADMIN — ASPIRIN 81 MG: 81 TABLET, CHEWABLE ORAL at 09:18

## 2025-07-06 RX ADMIN — TIOTROPIUM BROMIDE AND OLODATEROL 2 PUFF: 3.124; 2.736 SPRAY, METERED RESPIRATORY (INHALATION) at 08:35

## 2025-07-06 ASSESSMENT — PAIN SCALES - GENERAL: PAINLEVEL_OUTOF10: 0

## 2025-07-06 NOTE — PROGRESS NOTES
Called patient's mother, Berna, to set up transport for discharge. Confirmed that the family was bringing an oxygen.

## 2025-07-06 NOTE — DISCHARGE SUMMARY
Resident Discharge Summary (Hospitalist)      Patient: Fredo Rod 55 y.o. male  : 1970  MRN: [id number]   Account: [id number]   Patient's PCP: Mohinder Page MD    Admit Date: 7/3/2025   Discharge Date:   2025    Admitting Physician: Kenji Marin MD  Discharge Physician: Mark Lopez MD       Discharge Diagnoses:  Acute hypercapnic hypoxic respiratory failure: (Likely secondary to COPD exacerbation) status post intubation in the ED, self extubation  in ICU subsequently transferred back to the floor.  Baseline oxygen requirement around 3 L.  Currently on 2 L nasal cannula  Will discharge with 3-day course of cefdinir, will continue azithromycin every   Will start steroid tapering dose 50 mg prednisone for 4 days followed by 25 mg for 6 days  Will need follow-up with PCP  Continue home inhalers  CHF with diastolic dysfunction, chronic: Not in acute exacerbation last echo 2025 with an EF of 50 to 55%.  Restart home Bumex 1 mg twice daily p.o. at discharge  History of CAD s/p JAZMIN x 2 in : Noted  History of LINDA with noncompliance  Leukocytosis: Secondary to steroids  Gallbladder hydrops incidental finding on CT: Will have follow-up with PCP, may need to repeat studies in the future however LFTs within normal limits no reported abdominal pain.  Macrocytic anemia: B12 folate normal, likely secondary to alcohol use.  Hypertension: Resume losartan, Toprol at discharge (bradycardia likely secondary to propofol use while intubated)  Tobacco abuse: Prescribed nicotine patches and nicotine gum, counseled on smoking sensation will need follow-up with PCP      Hospital Course:   Fredo Rod is a 55 y.o. male with PMHx COPD on 2 L nasal cannula at home, CAD s/p JAZMIN x 2, diastolic heart failure, LINDA, obesity presented secondary to shortness of breath patient was recently hospitalized for severe COPD exacerbation requiring BiPAP for symptom  pulmonary trunk suggests pulmonary arterial hypertension.   4. Cholelithiasis and gallbladder hydrops. Recommend correlation for acute    cholecystitis.   5. Other findings as discussed above.      This document has been electronically signed by: Vince Diaz MD on    07/04/2025 01:23 AM      All CTs at this facility use dose modulation techniques and iterative    reconstructions, and/or weight-based dosing   when appropriate to reduce radiation to a low as reasonably achievable.      3D Post-processing was performed on this study.      XR CHEST PORTABLE   Final Result   Similar left lower lung atelectasis or infiltrate.   Support devices as above.      This document has been electronically signed by: Abdullahi Mireles MD on    07/03/2025 09:38 PM      XR CHEST PORTABLE   Final Result   Left lower lung atelectasis or infiltrate.   Nonemergent/incidental findings above.      This document has been electronically signed by: Abdullahi Mireles MD on    07/03/2025 08:13 PM           Consults:   None    Disposition: Home  Condition at Discharge: Stable    Code Status:  Full Code     Patient Instructions:    Discharge lab work: Follow-up with PCP  Activity: activity as tolerated  Diet: ADULT DIET; Regular      Follow-up visits:   Mohinder Page MD  [address] [address] [phone]    Schedule an appointment as soon as possible for a visit in 3 day(s)           Discharge Medications:      Medication List        START taking these medications      cefdinir 300 MG capsule  Commonly known as: OMNICEF  Take 1 capsule by mouth 2 times daily for 3 days     guaiFENesin 600 MG extended release tablet  Commonly known as: MUCINEX  Take 1 tablet by mouth 2 times daily for 5 days     nicotine polacrilex 2 MG gum  Commonly known as: Nicorette  Take 1 each by mouth as needed for Smoking cessation     pantoprazole 40 MG tablet  Commonly known as: PROTONIX  Take 1 tablet by mouth every morning (before breakfast)

## 2025-07-06 NOTE — PLAN OF CARE
Problem: Respiratory - Adult  Goal: Achieves optimal ventilation and oxygenation  7/6/2025 1029 by Eloy Santiago RN  Flowsheets (Taken 7/6/2025 1029)  Achieves optimal ventilation and oxygenation:   Assess for changes in respiratory status   Assess for changes in mentation and behavior   Position to facilitate oxygenation and minimize respiratory effort   Initiate smoking cessation protocol as indicated   Encourage broncho-pulmonary hygiene including cough, deep breathe, incentive spirometry   Assess and instruct to report shortness of breath or any respiratory difficulty   Assess the need for suctioning and aspirate as needed   Respiratory therapy support as indicated   Oxygen supplementation based on oxygen saturation or arterial blood gases     Problem: Chronic Conditions and Co-morbidities  Goal: Patient's chronic conditions and co-morbidity symptoms are monitored and maintained or improved  Outcome: Progressing  Flowsheets (Taken 7/6/2025 1029)  Care Plan - Patient's Chronic Conditions and Co-Morbidity Symptoms are Monitored and Maintained or Improved:   Monitor and assess patient's chronic conditions and comorbid symptoms for stability, deterioration, or improvement   Collaborate with multidisciplinary team to address chronic and comorbid conditions and prevent exacerbation or deterioration   Update acute care plan with appropriate goals if chronic or comorbid symptoms are exacerbated and prevent overall improvement and discharge     Problem: Discharge Planning  Goal: Discharge to home or other facility with appropriate resources  Outcome: Progressing  Flowsheets (Taken 7/6/2025 1029)  Discharge to home or other facility with appropriate resources:   Identify barriers to discharge with patient and caregiver   Arrange for needed discharge resources and transportation as appropriate   Identify discharge learning needs (meds, wound care, etc)   Refer to discharge planning if patient needs post-hospital  services based on physician order or complex needs related to functional status, cognitive ability or social support system     Problem: Pain  Goal: Verbalizes/displays adequate comfort level or baseline comfort level  Outcome: Progressing  Flowsheets (Taken 7/6/2025 1029)  Verbalizes/displays adequate comfort level or baseline comfort level:   Encourage patient to monitor pain and request assistance   Assess pain using appropriate pain scale   Administer analgesics based on type and severity of pain and evaluate response   Implement non-pharmacological measures as appropriate and evaluate response   Consider cultural and social influences on pain and pain management   Notify Licensed Independent Practitioner if interventions unsuccessful or patient reports new pain     Problem: Skin/Tissue Integrity  Goal: Skin integrity remains intact  Description: 1.  Monitor for areas of redness and/or skin breakdown  2.  Assess vascular access sites hourly  3.  Every 4-6 hours minimum:  Change oxygen saturation probe site  4.  Every 4-6 hours:  If on nasal continuous positive airway pressure, respiratory therapy assess nares and determine need for appliance change or resting period  Outcome: Progressing  Flowsheets (Taken 7/6/2025 1029)  Skin Integrity Remains Intact:   Monitor for areas of redness and/or skin breakdown   Assess vascular access sites hourly   Pressure redistribution bed/mattress (bed type)   Positioning devices   Assess need for specialty bed   Turn and reposition as indicated   Check visual cues for pain   Monitor skin under medical devices     Problem: Safety - Adult  Goal: Free from fall injury  Outcome: Progressing  Flowsheets (Taken 7/6/2025 1029)  Free From Fall Injury:   Instruct family/caregiver on patient safety   Based on caregiver fall risk screen, instruct family/caregiver to ask for assistance with transferring infant if caregiver noted to have fall risk factors     Problem: Cardiovascular -

## 2025-07-06 NOTE — PROGRESS NOTES
Discharge teaching and instructions for diagnosis/procedure of COPD exacerbation completed with patient using teachback method. AVS reviewed. Printed prescriptions given to patient. Patient voiced understanding regarding prescriptions, follow up appointments, and care of self at home. Discharged in a wheelchair to  home with support per family  .

## 2025-07-06 NOTE — PLAN OF CARE
Problem: Respiratory - Adult  Goal: Achieves optimal ventilation and oxygenation  7/5/2025 2104 by Lexii Louie RCP  Outcome: Progressing   Patient mutually agrees upon goal.

## 2025-07-07 NOTE — PROGRESS NOTES
Patient received sacramental anointing by a . If you are in need of  support, please call [phone]. If you are in need of a  after 6:30pm, please call the house supervisor for the on-call .

## 2025-07-08 LAB
BACTERIA BLD AEROBE CULT: NORMAL
BACTERIA BLD AEROBE CULT: NORMAL

## 2025-07-10 PROBLEM — J18.9 PNEUMONIA DUE TO INFECTIOUS ORGANISM: Status: ACTIVE | Noted: 2025-07-10

## 2025-07-17 NOTE — PROGRESS NOTES
Physician Progress Note      PATIENT:               JEFF BARNETT  CSN #:                  [id number]  :                       1970  ADMIT DATE:       7/3/2025 7:23 PM  DISCH DATE:        2025 12:58 PM  RESPONDING  PROVIDER #:        VIDYA PACHECO          QUERY TEXT:    COVID is documented in the Progress Notes by Henry Mccarthy MD at   2025. Please provide additional clinical indicators supportive of your   documentation. Or please document if the diagnosis of COVID has been ruled out   after study.    The clinical indicators include:  Progress Notes by Henry Mccarthy MD at 2025  \"Acute hypercapnic respiratory failure secondary to COPD exacerbation: 2/   COVID exacerbation.  Home baseline supplemental O2 2 L.  Patient has been   intubated 7/3 due to worsening on BiPAP, patient self extubated overnight   nearly morning of .  Was saturating well on 4 L NC.  Continue supplemental O2 as needed for goal SpO2 88 to 94%  Continue Stiolto and albuterol as needed\"    Epic labs 7/3/2025  pH of 7.29, pc02 103, p02 241, hc03 49    Epic flowsheets  SpO2 81%, 96%    Respiratory Rate 28, 12    Treatment: duoneb, solumedrol, oxygen supplementation, cpap, mechanical   ventilator  Options provided:  -- COVID present as evidenced by, Please document evidence.  -- COVID was ruled out after study  -- Other - I will add my own diagnosis  -- Disagree - Not applicable / Not valid  -- Disagree - Clinically unable to determine / Unknown  -- Refer to Clinical Documentation Reviewer    PROVIDER RESPONSE TEXT:    COVID was ruled out after study.    Query created by: Akiko España on 2025 1:48 PM      Electronically signed by:  VIDYA PACHECO 2025 9:15 AM